# Patient Record
Sex: FEMALE | Race: WHITE | NOT HISPANIC OR LATINO | Employment: FULL TIME | URBAN - METROPOLITAN AREA
[De-identification: names, ages, dates, MRNs, and addresses within clinical notes are randomized per-mention and may not be internally consistent; named-entity substitution may affect disease eponyms.]

---

## 2019-04-04 ENCOUNTER — OFFICE VISIT (OUTPATIENT)
Dept: INTERNAL MEDICINE | Facility: CLINIC | Age: 49
End: 2019-04-04
Payer: COMMERCIAL

## 2019-04-04 VITALS
RESPIRATION RATE: 16 BRPM | HEIGHT: 67 IN | BODY MASS INDEX: 22.18 KG/M2 | OXYGEN SATURATION: 98 % | SYSTOLIC BLOOD PRESSURE: 104 MMHG | DIASTOLIC BLOOD PRESSURE: 70 MMHG | HEART RATE: 105 BPM | WEIGHT: 141.31 LBS | TEMPERATURE: 97 F

## 2019-04-04 DIAGNOSIS — Z00.00 ANNUAL PHYSICAL EXAM: ICD-10-CM

## 2019-04-04 DIAGNOSIS — M79.642 PAIN IN BOTH HANDS: ICD-10-CM

## 2019-04-04 DIAGNOSIS — M79.641 PAIN IN BOTH HANDS: ICD-10-CM

## 2019-04-04 DIAGNOSIS — Z12.31 SCREENING MAMMOGRAM, ENCOUNTER FOR: Primary | ICD-10-CM

## 2019-04-04 PROCEDURE — 3008F PR BODY MASS INDEX (BMI) DOCUMENTED: ICD-10-PCS | Mod: CPTII,S$GLB,, | Performed by: FAMILY MEDICINE

## 2019-04-04 PROCEDURE — 3008F BODY MASS INDEX DOCD: CPT | Mod: CPTII,S$GLB,, | Performed by: FAMILY MEDICINE

## 2019-04-04 PROCEDURE — 99204 OFFICE O/P NEW MOD 45 MIN: CPT | Mod: S$GLB,,, | Performed by: FAMILY MEDICINE

## 2019-04-04 PROCEDURE — 99999 PR PBB SHADOW E&M-NEW PATIENT-LVL III: CPT | Mod: PBBFAC,,, | Performed by: FAMILY MEDICINE

## 2019-04-04 PROCEDURE — 99999 PR PBB SHADOW E&M-NEW PATIENT-LVL III: ICD-10-PCS | Mod: PBBFAC,,, | Performed by: FAMILY MEDICINE

## 2019-04-04 PROCEDURE — 99204 PR OFFICE/OUTPT VISIT, NEW, LEVL IV, 45-59 MIN: ICD-10-PCS | Mod: S$GLB,,, | Performed by: FAMILY MEDICINE

## 2019-04-04 NOTE — PROGRESS NOTES
Tammy Saxena  04/04/2019  [id number]    Primary Doctor No  Patient Care Team:  Primary Doctor No as PCP - General  Has the patient seen any provider outside of the [facility] network since the last visit? (yes). If yes, HIPPA forms completed and records requested.        Visit Type:New patient, new problem, urgent visit    Chief Complaint:  Chief Complaint   Patient presents with    Hand Pain       History of Present Illness:  48 year old here as new patient,  Urgent visit c/o hand pain and swelling, both hands, started a month ago.  She was seen at [place] for this on 3/1/2019.  Unknown trauma. Xray taken.  On March 1, 2019, right hand xray was normal.    She reports when she sleeps on her side, she will have hip and arms numbness. She reports she has some pain, when she flexes for sometime. She reports that the pain does move up her arm.  She had some localized swelling.    She has had swelling in both hand.     She has never had MMG.  She reports last pap, >5 years.  She does menstruate, every couple of months.  She does have hot flashes.         History:  History reviewed. No pertinent past medical history.  Past Surgical History:   Procedure Laterality Date    Tubal Lig      TUBAL LIGATION       Family History   Problem Relation Age of Onset    Diabetes Sister     Cancer Sister     Diabetes Brother      Social History     Socioeconomic History    Marital status: Single     Spouse name: Not on file    Number of children: Not on file    Years of education: Not on file    Highest education level: Not on file   Occupational History    Occupation: [employer], supervisor   Social Needs    Financial resource strain: Not on file    Food insecurity:     Worry: Not on file     Inability: Not on file    Transportation needs:     Medical: Not on file     Non-medical: Not on file   Tobacco Use    Smoking status: Current Some Day Smoker     Packs/day: 0.50     Years: 30.00     Pack years: 15.00    Smokeless  tobacco: Never Used   Substance and Sexual Activity    Alcohol use: Never     Frequency: Never    Drug use: Never    Sexual activity: Not Currently   Lifestyle    Physical activity:     Days per week: Not on file     Minutes per session: Not on file    Stress: Not on file   Relationships    Social connections:     Talks on phone: Not on file     Gets together: Not on file     Attends Buddhism service: Not on file     Active member of club or organization: Not on file     Attends meetings of clubs or organizations: Not on file     Relationship status: Not on file   Other Topics Concern    Not on file   Social History Narrative    Not on file     There is no problem list on file for this patient.    Review of patient's allergies indicates:  No Known Allergies    The following were reviewed at this visit: active problem list, medication list, allergies, family history, social history, and health maintenance.    Medications:  No current outpatient medications on file prior to visit.     No current facility-administered medications on file prior to visit.        Medications have been reviewed and reconciled with patient at this visit.  Barriers to medications present (no)    Adverse reactions to current medications (no)    Over the counter medications reviewed (Yes ), and if needed added to active Medication list at this visit.     Exam:  Wt Readings from Last 3 Encounters:   No data found for Wt     Temp Readings from Last 3 Encounters:   No data found for Temp     BP Readings from Last 3 Encounters:   No data found for BP     Pulse Readings from Last 3 Encounters:   No data found for Pulse     There is no height or weight on file to calculate BMI.      Review of Systems   Constitutional: Negative.  Negative for chills and fever.   HENT: Negative.  Negative for congestion, sinus pain and sore throat.    Eyes: Negative for blurred vision and double vision.   Respiratory: Negative for cough, sputum production,  shortness of breath and wheezing.    Cardiovascular: Negative for chest pain, palpitations and leg swelling.   Gastrointestinal: Negative for abdominal pain, constipation, diarrhea, heartburn, nausea and vomiting.   Genitourinary: Negative.    Musculoskeletal: Positive for joint pain.   Skin: Negative.  Negative for rash.   Neurological: Negative.    Endo/Heme/Allergies: Negative.  Negative for polydipsia. Does not bruise/bleed easily.   Psychiatric/Behavioral: Negative for depression and substance abuse.     Physical Exam   Constitutional: She is oriented to person, place, and time. She appears well-developed and well-nourished. No distress.   HENT:   Head: Normocephalic and atraumatic.   Right Ear: External ear normal.   Left Ear: External ear normal.   Nose: Nose normal.   Mouth/Throat: Oropharynx is clear and moist. No oropharyngeal exudate.   Eyes: Pupils are equal, round, and reactive to light. Conjunctivae and EOM are normal. Right eye exhibits no discharge. Left eye exhibits no discharge.   Neck: Normal range of motion. Neck supple. No thyromegaly present.   Cardiovascular: Normal rate, regular rhythm, normal heart sounds and intact distal pulses.   No murmur heard.  Pulmonary/Chest: Effort normal and breath sounds normal. No respiratory distress. She has no wheezes.   Abdominal: Soft. Bowel sounds are normal. She exhibits no distension and no mass. There is no tenderness.   Musculoskeletal: Normal range of motion. She exhibits tenderness. She exhibits no edema.        Right hand: She exhibits tenderness. She exhibits normal capillary refill and no swelling.        Left hand: She exhibits tenderness. She exhibits normal capillary refill and no swelling. Normal sensation noted.   Lymphadenopathy:     She has no cervical adenopathy.   Neurological: She is alert and oriented to person, place, and time. No cranial nerve deficit.   Skin: Capillary refill takes less than 2 seconds. She is not diaphoretic.    Psychiatric: She has a normal mood and affect. Her behavior is normal. Judgment and thought content normal.   Nursing note and vitals reviewed.      Laboratory Reviewed ({N/A)  Lab Results   Component Value Date    CHOL 138 02/22/2005    TRIG 90 02/22/2005    HDL 36.0 (L) 02/22/2005    ALT 15 06/08/2005    AST 18 06/08/2005       Tammy was seen today for hand pain.    Diagnoses and all orders for this visit:    Screening mammogram, encounter for  -     Mammo Digital Screening Bilat; Future    Pain in both hands  -     ANT Screen w/Reflex; Future  -     Rheumatoid factor; Future  -     Sedimentation rate; Future  -     C-reactive protein; Future    Annual physical exam  -     CBC auto differential; Future  -     Comprehensive metabolic panel; Future  -     Lipid panel; Future      Labs ordered, r/o Autoimmune cause, may need Rheum consult.    HM reviewed, ordered  Return for pap.    Continue NSAID for now, may consider oral steriods, but complete labs first.    Recheck 4 weeks            Care Plan/Goals: Reviewed  (Yes)  Goals     None          Follow up: No follow-ups on file.    After visit summary was printed and given to patient upon discharge today.  Patient goals and care plan are included in After Visit Summary.

## 2019-04-08 ENCOUNTER — HOSPITAL ENCOUNTER (OUTPATIENT)
Dept: RADIOLOGY | Facility: HOSPITAL | Age: 49
Discharge: HOME | End: 2019-04-08
Attending: FAMILY MEDICINE
Payer: COMMERCIAL

## 2019-04-08 VITALS — HEIGHT: 67 IN | BODY MASS INDEX: 22.18 KG/M2 | WEIGHT: 141.31 LBS

## 2019-04-08 DIAGNOSIS — Z12.31 SCREENING MAMMOGRAM, ENCOUNTER FOR: ICD-10-CM

## 2019-04-08 PROCEDURE — 77067 SCR MAMMO BI INCL CAD: CPT | Mod: TC,PO

## 2019-04-08 PROCEDURE — 77067 SCR MAMMO BI INCL CAD: CPT | Mod: 26,,, | Performed by: RADIOLOGY

## 2019-04-08 PROCEDURE — 77067 MAMMO DIGITAL SCREENING BILAT WITH TOMOSYNTHESIS_CAD: ICD-10-PCS | Mod: 26,,, | Performed by: RADIOLOGY

## 2019-04-08 PROCEDURE — 77063 BREAST TOMOSYNTHESIS BI: CPT | Mod: 26,,, | Performed by: RADIOLOGY

## 2019-04-08 PROCEDURE — 77063 MAMMO DIGITAL SCREENING BILAT WITH TOMOSYNTHESIS_CAD: ICD-10-PCS | Mod: 26,,, | Performed by: RADIOLOGY

## 2019-04-09 ENCOUNTER — PATIENT MESSAGE (OUTPATIENT)
Dept: INTERNAL MEDICINE | Facility: CLINIC | Age: 49
End: 2019-04-09

## 2019-04-09 DIAGNOSIS — R76.8 RHEUMATOID FACTOR POSITIVE: Primary | ICD-10-CM

## 2019-04-15 NOTE — PROGRESS NOTES
"Subjective:       Patient ID: Tammy Saxena is a 48 y.o. female.    Chief Complaint: + RF factor    Tammy is a 47 y/o F referred by dr. Maria Teresa Aparicio for +RF and hand pain. ANT neg, normal esr/crp.  She reports increased hand/wrist and shoulder pain for the last few months.  Started in her right hand and wrist and moved to her left, then her shoulders.  She reports swelling in her wrist and mcps with tenderness. She states her episodes of swelling happen about every other week.   She has some mornings with stiffness in all her joints but does not note sig am stiffness in her hands.  Today she has no swelling in her hands or wrists, she is tender to her right shoulder, better than a few days ago-she could barely raise her arm.  She takes tylenol and aleve as needed.  Was seen in urgent care last month due to swelling and pain of her hand and was given lodine, she only took one.   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  She is a smoker, half pack per day, trying to quit.   She also reports a h/o TB exposure when she was a teen, she has positive skin tests and is monitored by chest xray yearly.  Today she rates her pain 0/10.    History reviewed. No pertinent past medical history.  Past Surgical History:   Procedure Laterality Date    Tubal Lig      TUBAL LIGATION       Family History   Problem Relation Age of Onset    Diabetes Sister     Cancer Sister     Breast cancer Sister     Diabetes Brother     Breast cancer Other      Social History     Socioeconomic History    Marital status: Single     Spouse name: Not on file    Number of children: Not on file    Years of education: Not on file    Highest education level: Not on file   Occupational History    Occupation: [employer], supervisor   Social Needs    Financial resource strain: Not on file    Food insecurity:     Worry: Not on file     Inability: Not on file    Transportation needs:     " "Medical: Not on file     Non-medical: Not on file   Tobacco Use    Smoking status: Current Some Day Smoker     Packs/day: 0.50     Years: 30.00     Pack years: 15.00    Smokeless tobacco: Never Used   Substance and Sexual Activity    Alcohol use: Never     Frequency: Never    Drug use: Never    Sexual activity: Not Currently   Lifestyle    Physical activity:     Days per week: Not on file     Minutes per session: Not on file    Stress: Not on file   Relationships    Social connections:     Talks on phone: Not on file     Gets together: Not on file     Attends Zoroastrian service: Not on file     Active member of club or organization: Not on file     Attends meetings of clubs or organizations: Not on file     Relationship status: Not on file   Other Topics Concern    Not on file   Social History Narrative    Not on file     Review of patient's allergies indicates:  No Known Allergies    Review of Systems   Constitutional: Negative for chills, fatigue, fever and unexpected weight change.   HENT: Negative for mouth sores, rhinorrhea and sore throat.    Eyes: Negative for pain and redness.   Respiratory: Negative for cough and shortness of breath.    Cardiovascular: Negative for chest pain.   Gastrointestinal: Negative for abdominal pain, constipation, diarrhea, nausea and vomiting.   Genitourinary: Negative for dysuria and hematuria.   Musculoskeletal: Positive for arthralgias and joint swelling. Negative for myalgias.   Skin: Negative for rash.   Neurological: Positive for tremors. Negative for weakness, numbness and headaches.   Psychiatric/Behavioral: The patient is not nervous/anxious.          Objective:   /71   Pulse 89   Ht 5' 7" (1.702 m)   Wt 63.5 kg (139 lb 15.9 oz)   LMP  (LMP Unknown)   BMI 21.93 kg/m²      Physical Exam   Constitutional: She is oriented to person, place, and time and well-developed, well-nourished, and in no distress.   HENT:   Head: Normocephalic and atraumatic.   Eyes: " Pupils are equal, round, and reactive to light. Right eye exhibits no discharge.   Neck: Normal range of motion.   Cardiovascular: Normal rate, regular rhythm and normal heart sounds.  Exam reveals no friction rub.    Pulmonary/Chest: Effort normal and breath sounds normal. No respiratory distress.   Abdominal: Soft. She exhibits no distension. There is no tenderness.   Lymphadenopathy:     She has no cervical adenopathy.   Neurological: She is alert and oriented to person, place, and time.   Skin: No rash noted. No erythema.     Psychiatric: Mood normal.   Musculoskeletal: Normal range of motion. She exhibits no edema or deformity.   delfino wrists, mcps, pips no synvoitis, no tenderness, no warmth, good rom  delfino elbows no swelling or tenderness,full rom  Right shoulder +ttp subacromial bursa, full rom, mild impingement sign   Left shoulder normal  delfino knees no effusion, no warmth, no tenderness, full rom  delfino ankles no effusion warmth or tenderness          Results for RENEE ANGEL (MRN [id number]) as of 4/15/2019 13:53   Ref. Range 4/8/2019 09:25   WBC Latest Ref Range: 3.90 - 12.70 K/uL 6.59   RBC Latest Ref Range: 4.00 - 5.40 M/uL 3.68 (L)   Hemoglobin Latest Ref Range: 12.0 - 16.0 g/dL 10.5 (L)   Hematocrit Latest Ref Range: 37.0 - 48.5 % 34.9 (L)   MCV Latest Ref Range: 82 - 98 fL 95   MCH Latest Ref Range: 27.0 - 31.0 pg 28.5   MCHC Latest Ref Range: 32.0 - 36.0 g/dL 30.1 (L)   RDW Latest Ref Range: 11.5 - 14.5 % 13.4   Platelets Latest Ref Range: 150 - 350 K/uL 287   MPV Latest Ref Range: 9.2 - 12.9 fL 11.1   Gran% Latest Ref Range: 38.0 - 73.0 % 53.6   Gran # (ANC) Latest Ref Range: 1.8 - 7.7 K/uL 3.5   Lymph% Latest Ref Range: 18.0 - 48.0 % 36.7   Lymph # Latest Ref Range: 1.0 - 4.8 K/uL 2.4   Mono% Latest Ref Range: 4.0 - 15.0 % 5.5   Mono # Latest Ref Range: 0.3 - 1.0 K/uL 0.4   Eosinophil% Latest Ref Range: 0.0 - 8.0 % 2.6   Eos # Latest Ref Range: 0.0 - 0.5 K/uL 0.2   Basophil% Latest Ref Range: 0.0  - 1.9 % 1.4   Baso # Latest Ref Range: 0.00 - 0.20 K/uL 0.09   nRBC Latest Ref Range: 0 /100 WBC 0   Differential Method Unknown Automated   Immature Grans (Abs) Latest Ref Range: 0.00 - 0.04 K/uL 0.01   Immature Granulocytes Latest Ref Range: 0.0 - 0.5 % 0.2   Sed Rate Latest Ref Range: 0 - 20 mm/Hr 16   Sodium Latest Ref Range: 136 - 145 mmol/L 140   Potassium Latest Ref Range: 3.5 - 5.1 mmol/L 4.0   Chloride Latest Ref Range: 95 - 110 mmol/L 108   CO2 Latest Ref Range: 23 - 29 mmol/L 27   Anion Gap Latest Ref Range: 8 - 16 mmol/L 5 (L)   BUN, Bld Latest Ref Range: 6 - 20 mg/dL 8   Creatinine Latest Ref Range: 0.5 - 1.4 mg/dL 0.8   eGFR if non African American Latest Ref Range: >60 mL/min/1.73 m^2 >60.0   eGFR if African American Latest Ref Range: >60 mL/min/1.73 m^2 >60.0   Glucose Latest Ref Range: 70 - 110 mg/dL 71   Calcium Latest Ref Range: 8.7 - 10.5 mg/dL 8.8   Alkaline Phosphatase Latest Ref Range: 55 - 135 U/L 83   Total Protein Latest Ref Range: 6.0 - 8.4 g/dL 7.1   Albumin Latest Ref Range: 3.5 - 5.2 g/dL 3.5   Total Bilirubin Latest Ref Range: 0.1 - 1.0 mg/dL 0.3   AST Latest Ref Range: 10 - 40 U/L 11   ALT Latest Ref Range: 10 - 44 U/L 9 (L)   CRP Latest Ref Range: 0.0 - 8.2 mg/L 2.0   Triglycerides Latest Ref Range: 30 - 150 mg/dL 82   Cholesterol Latest Ref Range: 120 - 199 mg/dL 134   HDL Latest Ref Range: 40 - 75 mg/dL 45   HDL/Chol Ratio Latest Ref Range: 20.0 - 50.0 % 33.6   LDL Cholesterol Latest Ref Range: 63.0 - 159.0 mg/dL 72.6   Non-HDL Cholesterol Latest Units: mg/dL 89   Total Cholesterol/HDL Ratio Latest Ref Range: 2.0 - 5.0  3.0   ANT Screen Latest Ref Range: Negative <1:160  Negative <1:160   Rheumatoid Factor Latest Ref Range: 0.0 - 15.0 IU/mL 18.0 (H)     Assessment:       1. Rheumatoid arthritis involving multiple sites with positive rheumatoid factor    2. Acute bursitis of right shoulder    3. Current smoker    4. 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+ years ago, monitored with CXR, no h/o infection    Current smoker: trying to quit .5 ppd    Plan:       Check ccp, g6pd, quant gold, hep studies, g6pd today   Xray hands and shoulders  She declined shoulder injection today   Given her frequent episodes of joint swelling, c/o pain and +RF will start tx for RA with plaquenil, no acute synovitis on exam -will wait on mtx -take pics of any joint swelling, if continues to flare will then add mtx   Send in medrol dose pack to have on hand for flares  Discussed plaquenil and SE in detail, drug info printed and given to patient, she knows to est plaquenil toxicity screens with her eye md  Encouraged smoking cessation      See back in 10 wks to recheck w reg 4 labs   She is on the patient portal, message me with any quest/concerns

## 2019-04-16 ENCOUNTER — PATIENT MESSAGE (OUTPATIENT)
Dept: RHEUMATOLOGY | Facility: CLINIC | Age: 49
End: 2019-04-16

## 2019-04-16 ENCOUNTER — HOSPITAL ENCOUNTER (OUTPATIENT)
Dept: RADIOLOGY | Facility: HOSPITAL | Age: 49
Discharge: HOME | End: 2019-04-16
Attending: PHYSICIAN ASSISTANT
Payer: COMMERCIAL

## 2019-04-16 ENCOUNTER — OFFICE VISIT (OUTPATIENT)
Dept: RHEUMATOLOGY | Facility: CLINIC | Age: 49
End: 2019-04-16
Payer: COMMERCIAL

## 2019-04-16 VITALS
HEART RATE: 89 BPM | DIASTOLIC BLOOD PRESSURE: 71 MMHG | WEIGHT: 140 LBS | SYSTOLIC BLOOD PRESSURE: 110 MMHG | BODY MASS INDEX: 21.97 KG/M2 | HEIGHT: 67 IN

## 2019-04-16 DIAGNOSIS — M05.79 RHEUMATOID ARTHRITIS INVOLVING MULTIPLE SITES WITH POSITIVE RHEUMATOID FACTOR: ICD-10-CM

## 2019-04-16 DIAGNOSIS — Z79.899 HIGH RISK MEDICATION USE: ICD-10-CM

## 2019-04-16 DIAGNOSIS — M75.51 ACUTE BURSITIS OF RIGHT SHOULDER: ICD-10-CM

## 2019-04-16 DIAGNOSIS — F17.200 CURRENT SMOKER: ICD-10-CM

## 2019-04-16 DIAGNOSIS — M05.79 RHEUMATOID ARTHRITIS INVOLVING MULTIPLE SITES WITH POSITIVE RHEUMATOID FACTOR: Primary | ICD-10-CM

## 2019-04-16 PROCEDURE — 73130 XR HAND COMPLETE 3 VIEWS BILATERAL: ICD-10-PCS | Mod: 26,,, | Performed by: RADIOLOGY

## 2019-04-16 PROCEDURE — 99999 PR PBB SHADOW E&M-EST. PATIENT-LVL IV: ICD-10-PCS | Mod: PBBFAC,,, | Performed by: PHYSICIAN ASSISTANT

## 2019-04-16 PROCEDURE — 3008F BODY MASS INDEX DOCD: CPT | Mod: CPTII,S$GLB,, | Performed by: PHYSICIAN ASSISTANT

## 2019-04-16 PROCEDURE — 73030 X-RAY EXAM OF SHOULDER: CPT | Mod: 26,,, | Performed by: RADIOLOGY

## 2019-04-16 PROCEDURE — 99204 OFFICE O/P NEW MOD 45 MIN: CPT | Mod: S$GLB,,, | Performed by: PHYSICIAN ASSISTANT

## 2019-04-16 PROCEDURE — 73030 X-RAY EXAM OF SHOULDER: CPT | Mod: TC,50

## 2019-04-16 PROCEDURE — 73130 X-RAY EXAM OF HAND: CPT | Mod: 26,,, | Performed by: RADIOLOGY

## 2019-04-16 PROCEDURE — 3008F PR BODY MASS INDEX (BMI) DOCUMENTED: ICD-10-PCS | Mod: CPTII,S$GLB,, | Performed by: PHYSICIAN ASSISTANT

## 2019-04-16 PROCEDURE — 73130 X-RAY EXAM OF HAND: CPT | Mod: 50,TC

## 2019-04-16 PROCEDURE — 99204 PR OFFICE/OUTPT VISIT, NEW, LEVL IV, 45-59 MIN: ICD-10-PCS | Mod: S$GLB,,, | Performed by: PHYSICIAN ASSISTANT

## 2019-04-16 PROCEDURE — 73030 XR SHOULDER COMPLETE 2 OR MORE VIEWS BILATERAL: ICD-10-PCS | Mod: 26,,, | Performed by: RADIOLOGY

## 2019-04-16 PROCEDURE — 99999 PR PBB SHADOW E&M-EST. PATIENT-LVL IV: CPT | Mod: PBBFAC,,, | Performed by: PHYSICIAN ASSISTANT

## 2019-04-16 RX ORDER — HYDROXYCHLOROQUINE SULFATE 200 MG/1
200 TABLET, FILM COATED ORAL DAILY
Qty: 60 TABLET | Refills: 3 | Status: SHIPPED | OUTPATIENT
Start: 2019-04-16 | End: 2019-04-17 | Stop reason: SDUPTHER

## 2019-04-16 RX ORDER — METHYLPREDNISOLONE 4 MG/1
TABLET ORAL
Qty: 1 PACKAGE | Refills: 5 | Status: SHIPPED | OUTPATIENT
Start: 2019-04-16 | End: 2019-05-07

## 2019-04-16 NOTE — PATIENT INSTRUCTIONS
Start plaquenil 200mg twice a day, let eye doctor know yearly plaquenil toxicity screens     Medrol dose pack to start at sign of flare     xrays today hands shoulders     Aleve for shoulder shot if needed      Hydroxychloroquine tablets  What is this medicine?  HYDROXYCHLOROQUINE (amanuel drox ee KLOR oh kwin) is used to treat rheumatoid arthritis and systemic lupus erythematosus. It is also used to treat malaria.  How should I use this medicine?  Take this medicine by mouth with a glass of water. Follow the directions on the prescription label. If this medicine upsets your stomach take it with food or milk. Take your doses at regular intervals. Do not take your medicine more often than directed.  Talk to your pediatrician regarding the use of this medicine in children. Special care may be needed.  What side effects may I notice from receiving this medicine?  Side effects that you should report to your doctor or health care professional as soon as possible:  · allergic reactions like skin rash, itching or hives, swelling of the face, lips, or tongue  · change in vision  · fever, infection  · hearing loss or ringing  · muscle weakness, tremor, or numbness  · redness, blistering, peeling or loosening of the skin, including inside the mouth  · seizures  · unusual bleeding or bruising  · unusually weak or tired  Side effects that usually do not require medical attention (report to your doctor or health care professional if they continue or are bothersome):  · change in coloration of the mouth or skin  · dizziness  · hair loss, lightening  · headache  · irritability, nervousness, nightmares  · loss of appetite  · stomach upset, diarrhea  What may interact with this medicine?  · antacids  · botulinum toxins  · digoxin  · kaolin  · penicillamine  What if I miss a dose?  If you miss a dose, take it as soon as you can. If it is almost time for your next dose, take only that dose. Do not take double or extra doses.  Where should  I keep my medicine?  Keep out of the reach of children. In children, this medicine can cause overdose with small doses.  Store at room temperature between 15 and 30 degrees C (59 and 86 degrees F). Protect from moisture and light. Throw away any unused medicine after the expiration date.  What should I tell my health care provider before I take this medicine?  They need to know if you have any of these conditions:  · alcoholism  · anemia or other blood disorder  · eye disease  · glucose 6-phosphate dehydrogenase (G6PD) deficiency  · liver disease  · porphyria  · psoriasis  · an unusual or allergic reaction to chloroquine, hydroxychloroquine, other medicines, foods, dyes, or preservatives  · pregnant or trying to get pregnant  · breast-feeding  What should I watch for while using this medicine?  Visit your doctor or health care professional for regular check ups. Tell your doctor if your symptoms do not improve. Arthritis symptoms may take several weeks to improve. If you are taking this medicine for a long time, you will need important blood work done. You will also need to have your eyes checked as directed.  This medicine can make you more sensitive to the sun. Keep out of the sun. If you cannot avoid being in the sun, wear protective clothing and use sunscreen. Do not use sun lamps or tanning beds/booths.  Avoid antacids and kaolin containing products for 2 hours before and after taking a dose of this medicine.  NOTE:This sheet is a summary. It may not cover all possible information. If you have questions about this medicine, talk to your doctor, pharmacist, or health care provider. Copyright© 2017 [employer]      Rheumatoid Arthritis    People have long feared rheumatoid arthritis (commonly called RA) as one of the most disabling types of arthritis. The good news is that the outlook has greatly improved for many people with newly diagnosed (detected) RA.   Of course, RA remains a serious disease, and one that  can vary widely in symptoms (what you feel) and outcomes. Even so, treatment advances have made it possible to stop or at least slow the progression (worsening) of joint damage. Rheumatologists now have many new treatments that target the inflammation that RA causes. They also understand better when and how to use treatments to get the best effects.  Fast facts  RA is an autoimmune disease. A faulty immune system (the bodys defense system) triggers it.   RA is the most common type of autoimmune arthritis.   At least 1.3 million [place] adults have RA.   Treatments have improved greatly and help many of those affected.   Rheumatologists are doctors who have the expertise to correctly diagnose this disease and to offer patients the most advanced treatments.  What is rheumatoid arthritis?  RA is a chronic (long-term) disease that causes pain, stiffness, swelling and limited motion and function of many joints. While RA can affect any joint, the small joints in the hands and feet tend to be involved most often. Inflammation sometimes can affect organs as well, for instance, the eyes or lungs.  The stiffness seen in active RA is most often worst in the morning. It may last one to two hours (or even the whole day). Stiffness for a long time in the morning is a clue that you may have RA, since few other arthritic diseases behave this way. For instance, osteoarthritis most often does not cause prolonged morning stiffness.  Other signs and symptoms that can occur in RA include:  Loss of energy   Low fevers   Loss of appetite   Dry eyes and mouth from a related health problem, Sjogren's syndrome   Firm lumps, called rheumatoid nodules, which grow beneath the skin in places such as the elbow and hands  The normal joint structure appears on the left. On the right is the joint with rheumatoid arthritis. RA causes synovitis, pain and swelling of the synovium (the tissue that lines the joint). This can make cartilage (the tissue that  cushions between joints) and bone erode, or wear away.  What causes rheumatoid arthritis?  RA is an autoimmune disease. This means that certain cells of the immune system do not work properly and start attacking healthy tissues -- the joints in RA. The cause of RA is not known. Yet, new research is giving us a better idea of what makes the immune system attack the body and create inflammation. In RA, the focus of the inflammation is in the synovium, the tissue that lines the joint. Immune cells release inflammation-causing chemicals. These chemicals can damage cartilage (the tissue that cushions between joints) and bone.  Other things likely play a role in RA as well. For instance, genes that affect the immune system may make some people more prone to getting RA.  Who gets rheumatoid arthritis?  RA is the most common form of autoimmune arthritis, affecting more than 1.3 million Americans. Of these, about 75% are women. In fact, 1-3% of women may get rheumatoid arthritis in their lifetime. The disease most often begins between the fourth and sixth decades of life. However, RA can start at any age.  How is rheumatoid arthritis diagnosed?  RA can be hard to detect because it may begin with subtle symptoms, such as achy joints or a little stiffness in the morning. Also, many diseases behave like RA early on. For this reason, if you or your primary care physician thinks you have RA, you should see a rheumatologist. A rheumatologist is a physician with the skill and knowledge to reach a correct diagnosis of RA and to make the most suitable treatment plan.  Diagnosis of RA depends on the symptoms and results of a physical exam, such as warmth, swelling and pain in the joints. Some blood tests also can help confirm RA. Telltale signs include:  Anemia (a low red blood cell count)   Rheumatoid factor (an antibody, or blood protein, found in about 80% of patients with RA in time, but in as few as 30% at the start of arthritis)    Antibodies to cyclic citrullinated peptides (pieces of proteins), or anti-CCP for short (found in 60-70% of patients with RA)   Elevated erythrocyte sedimentation rate (a blood test that, in most patients with RA, confirms the amount of inflammation in the joints)  X-rays can help in detecting RA, but may not show anything abnormal in early arthritis. Even so, these first X-rays may be useful later to show if the disease is progressing. Often, MRI and ultrasound scanning are done to help  the severity of RA.   There is no single test that confirms an RA diagnosis for most patients with this disease. (This is above all true for patients who have had symptoms fewer than six months.) Rather, a doctor makes the diagnosis by looking at the symptoms and results from the physical exam, lab tests and X-rays.  How is rheumatoid arthritis treated?  Therapy for RA has improved greatly in the past 30 years. Current treatments give most patients good or excellent relief of symptoms and let them keep functioning at, or near, normal levels. With the right medications, many patients can achieve remission -- that is, have no signs of active disease.   There is no cure for RA. The goal of treatment is to lessen your symptoms and poor function. Doctors do this by starting proper medical therapy as soon as possible, before your joints have lasting damage. No single treatment works for all patients. Many people with RA must change their treatment at least once during their lifetime.  Good control of RA requires early diagnosis and, at times, aggressive treatment. Thus, patients with a diagnosis of RA should begin their treatment with disease-modifying antirheumatic drugs -- referred to as DMARDs. These drugs not only relieve symptoms but also slow progression of the disease. Often, doctors prescribe DMARDs along with nonsteroidal anti-inflammatory drugs or NSAIDs and/or low-dose corticosteroids, to lower swelling, pain and  fever. DMARDs have greatly improved the symptoms, function and quality of life for nearly all patients with RA. Ask your rheumatologist about the need for DMARD therapy and the risks and benefits of these drugs.  Common DMARDs include methotrexate (brand names include Rheumatrex® and Folex®), leflunomide (Arava), hydroxychloroquine (Plaquenil) and sulfasalazine (Azulfidine). Older DMARDs include gold, given as a pill -- auranofin (Ridaura) -- or more often as an injection into a muscle (such as Myochrysine). The antibiotic minocycline (e.g., Minocin, Dynacin and Vectrin) also is a DMARD, as are the immune suppressants azathioprine (Imuran) and cyclosporine (Sandimmune and Neoral). These three drugs and gold are rarely prescribed for RA these days because other drugs work better or have fewer side effects.   Patients with more serious disease may need medications called biologic response modifiers or biologic agents. They can target the parts of the immune system and the signals that lead to inflammation and joint and tissue damage. These medications are also DMARDs. FDA-approved drugs of this type include abatacept (Orencia), adalimumab (Humira), anakinra (Kineret), certolizumab (Cimzia), etanercept (Enbrel), golimumab (Simponi) infliximab (Remicade), rituximab (Rituxan) and tocilizumab (Actemra). Most often, patients take these drugs with methotrexate, as the mix of medicines is more helpful.  The best treatment of RA needs more than medicines alone. Patient education, such as how to cope with RA, also is important. Proper care requires the expertise of a team of providers, including rheumatologists, primary care physicians, and physical and occupational therapists. You will need frequent visits through the year with your rheumatologist. These checkups let your doctor track the course of your disease and check for any side effects of your medications. You likely also will need to repeat blood tests and X-rays or  ultrasounds from time to time.  What is the broader health impact of rheumatoid arthritis?   Research shows that people with RA, mainly those whose disease is not well controlled, have a higher risk for heart disease and stroke. Talk with your doctor about these risks and ways to lower them.  Living with rheumatoid arthritis   It is important to be physically active most of the time, but to sometimes scale back activities when the disease flares. In general, rest is helpful when a joint is inflamed, or when you feel tired. At these times, do gentle range-of-motion exercises, such as stretching. This will keep the joint flexible.   When you feel better, do low-impact aerobic exercises, such as walking, and exercises to boost muscle strength. This will improve your overall health and reduce pressure on your joints. A physical or occupational therapist can help you find which types of activities are best for you, and at what level or pace you should do them.  Finding that you have a chronic illness is a life-changing event. It can cause worry and sometimes feelings of isolation or depression. Thanks to greatly improved treatments, these feelings tend to decrease with time as energy improves, and pain and stiffness decrease. Discuss these normal feelings with your health care providers. They can provide helpful information and resources.  Rheumatoid arthritis affects the wrist and the small joints of the hand, including the knuckles and the middle joints of the fingers.  Points to remember  Newer treatments are effective. RA drugs have greatly improved outcomes for patients. For most people with RA, early treatment can control joint pain and swelling, and lessen joint damage.   Seek an expert in arthritis: a rheumatologist. Expertise is vital to make an early diagnosis of RA and to rule out diseases that mimic RA, thus avoiding unneeded tests and treatments. A doctor who is an expert in RA also can design a customized  treatment plan that is best suited for you. Therefore, the rheumatologist, working with the primary care physician and other health care providers, should supervise the treatment of the patient with RA.   Start treatment early. Studies show that people who receive early treatment for RA feel better sooner and more often, and are more likely to lead an active life. They also are less likely to have the type of joint damage that leads to joint replacement.  The rheumatologist's role in the treatment of rheumatoid arthritis   RA is a complex disease, but many advances in treatment have occurred recently. Rheumatologists are doctors who are experts in diagnosing and treating arthritis and other diseases of the joints, muscles and bones. Thus, they are best qualified to make a proper diagnosis of RA. They can also advise patients about the best treatment options.  To find a rheumatologist  For a list of rheumatologists in your area, click here.  Learn more about rheumatologists and rheumatology health professionals.  For more information  The American College of Rheumatology has compiled this list to give you a starting point for your own additional research. The ACR does not endorse or maintain these web sites, and is notresponsible for any information or claims provided on them. It is always best to talk with your rheumatologist for more information and before making any decisions about your care.  Rheumatology Research Foundation  Learn how the Rheumatology Research Foundation advances research and training to improve the health of people with rheumatic diseases.  www.rheumatology.org/REF  The Arthritis Foundation  www.arthritis.org   National [place] of Arthritis and Musculoskeletal and Skin Diseases Information Clearinghouse  www.niams.nih.gov

## 2019-04-16 NOTE — LETTER
April 16, 2019      Maria Teresa Aparicio MD  [address] [id number]           [facility]  [address] [address] [address]  Phone: [phone]  Fax: [phone]          Patient: Tammy Saxena   MR Number: [id number]   YOB: 1970   Date of Visit: 4/16/2019       Dear Dr. Maria Teresa Aparicio:    Thank you for referring Tammy Saxena to me for evaluation. Attached you will find relevant portions of my assessment and plan of care.    If you have questions, please do not hesitate to call me. I look forward to following Tammy Saxena along with you.    Sincerely,    Ashley Olivas PA-C    Enclosure  CC:  No Recipients    If you would like to receive this communication electronically, please contact [email or web address] or [phone] to request more information on [employer] access.    For providers and/or their staff who would like to refer a patient to [facility], please contact us through our one-stop-shop provider referral line, [facility], at [phone].    If you feel you have received this communication in error or would no longer like to receive these types of communications, please e-mail [email or web address]

## 2019-04-17 RX ORDER — HYDROXYCHLOROQUINE SULFATE 200 MG/1
200 TABLET, FILM COATED ORAL 2 TIMES DAILY
Qty: 60 TABLET | Refills: 3 | Status: SHIPPED | OUTPATIENT
Start: 2019-04-17 | End: 2019-04-26

## 2019-04-22 ENCOUNTER — LAB VISIT (OUTPATIENT)
Dept: LAB | Facility: HOSPITAL | Age: 49
End: 2019-04-22
Attending: FAMILY MEDICINE
Payer: COMMERCIAL

## 2019-04-22 ENCOUNTER — PATIENT MESSAGE (OUTPATIENT)
Dept: RHEUMATOLOGY | Facility: CLINIC | Age: 49
End: 2019-04-22

## 2019-04-22 DIAGNOSIS — M79.10 MUSCLE PAIN: ICD-10-CM

## 2019-04-22 DIAGNOSIS — M05.79 RHEUMATOID ARTHRITIS INVOLVING MULTIPLE SITES WITH POSITIVE RHEUMATOID FACTOR: ICD-10-CM

## 2019-04-22 LAB
CK SERPL-CCNC: 79 U/L (ref 20–180)
CRP SERPL-MCNC: 6.7 MG/L (ref 0–8.2)
ERYTHROCYTE [SEDIMENTATION RATE] IN BLOOD BY WESTERGREN METHOD: 16 MM/HR (ref 0–20)

## 2019-04-22 PROCEDURE — 86140 C-REACTIVE PROTEIN: CPT

## 2019-04-22 PROCEDURE — 82550 ASSAY OF CK (CPK): CPT

## 2019-04-22 PROCEDURE — 85651 RBC SED RATE NONAUTOMATED: CPT | Mod: PO

## 2019-04-22 PROCEDURE — 36415 COLL VENOUS BLD VENIPUNCTURE: CPT | Mod: PO

## 2019-04-25 ENCOUNTER — PATIENT MESSAGE (OUTPATIENT)
Dept: RHEUMATOLOGY | Facility: CLINIC | Age: 49
End: 2019-04-25

## 2019-04-26 ENCOUNTER — PATIENT MESSAGE (OUTPATIENT)
Dept: RHEUMATOLOGY | Facility: CLINIC | Age: 49
End: 2019-04-26

## 2019-04-26 ENCOUNTER — TELEPHONE (OUTPATIENT)
Dept: RHEUMATOLOGY | Facility: CLINIC | Age: 49
End: 2019-04-26

## 2019-04-26 RX ORDER — METHOTREXATE 2.5 MG/1
TABLET ORAL
Qty: 30 TABLET | Refills: 5 | Status: SHIPPED | OUTPATIENT
Start: 2019-04-26 | End: 2020-04-15 | Stop reason: SDUPTHER

## 2019-04-26 RX ORDER — FOLIC ACID 1 MG/1
1 TABLET ORAL DAILY
Qty: 90 TABLET | Refills: 1 | Status: SHIPPED | OUTPATIENT
Start: 2019-04-26 | End: 2020-04-15 | Stop reason: SDUPTHER

## 2019-04-26 NOTE — TELEPHONE ENCOUNTER
----- Message from Ashley Olivas PA-C sent at 4/26/2019 11:23 AM CDT -----  Reg 4 labs in 4 wks.  Her phone number changed, can you change it in epic?   [phone]

## 2019-05-15 ENCOUNTER — OFFICE VISIT (OUTPATIENT)
Dept: INTERNAL MEDICINE | Facility: CLINIC | Age: 49
End: 2019-05-15
Payer: COMMERCIAL

## 2019-05-15 VITALS
BODY MASS INDEX: 22.07 KG/M2 | DIASTOLIC BLOOD PRESSURE: 72 MMHG | HEIGHT: 67 IN | HEART RATE: 95 BPM | WEIGHT: 140.63 LBS | TEMPERATURE: 98 F | SYSTOLIC BLOOD PRESSURE: 104 MMHG | OXYGEN SATURATION: 96 %

## 2019-05-15 DIAGNOSIS — Z01.419 WELL WOMAN EXAM WITH ROUTINE GYNECOLOGICAL EXAM: Primary | ICD-10-CM

## 2019-05-15 DIAGNOSIS — Z79.899 HIGH RISK MEDICATION USE: ICD-10-CM

## 2019-05-15 DIAGNOSIS — M05.79 RHEUMATOID ARTHRITIS INVOLVING MULTIPLE SITES WITH POSITIVE RHEUMATOID FACTOR: ICD-10-CM

## 2019-05-15 PROCEDURE — 99396 PR PREVENTIVE VISIT,EST,40-64: ICD-10-PCS | Mod: S$GLB,,, | Performed by: FAMILY MEDICINE

## 2019-05-15 PROCEDURE — 88175 CYTOPATH C/V AUTO FLUID REDO: CPT

## 2019-05-15 PROCEDURE — 99999 PR PBB SHADOW E&M-EST. PATIENT-LVL III: CPT | Mod: PBBFAC,,, | Performed by: FAMILY MEDICINE

## 2019-05-15 PROCEDURE — 87624 HPV HI-RISK TYP POOLED RSLT: CPT

## 2019-05-15 PROCEDURE — 99396 PREV VISIT EST AGE 40-64: CPT | Mod: S$GLB,,, | Performed by: FAMILY MEDICINE

## 2019-05-15 PROCEDURE — 99999 PR PBB SHADOW E&M-EST. PATIENT-LVL III: ICD-10-PCS | Mod: PBBFAC,,, | Performed by: FAMILY MEDICINE

## 2019-05-15 RX ORDER — METHYLPREDNISOLONE 4 MG/1
TABLET ORAL
COMMUNITY
Start: 2019-05-14 | End: 2022-07-12

## 2019-05-15 NOTE — PROGRESS NOTES
Tammy Saxena  05/15/2019  [id number]    Maria Teresa Aparicio MD  Patient Care Team:  Maria Teresa Aparicio MD as PCP - General (Family Medicine)  Has the patient seen any provider outside of the [facility] network since the last visit? (no). If yes, HIPPA forms completed and records requested.        Visit Type:a scheduled routine follow-up visit    Chief Complaint:  Chief Complaint   Patient presents with    Follow-up     4 wk f/u    Well Woman     pap smear       History of Present Illness:  Patient here for pap.    Since last visit, labs +RA  She saw Rheum. On methotrexate.    Pap today  Never had abnl pap  Last pap >5 years  She has sister with breast cancer at 51.        History:  History reviewed. No pertinent past medical history.  Past Surgical History:   Procedure Laterality Date    Tubal Lig      TUBAL LIGATION       Family History   Problem Relation Age of Onset    Diabetes Sister     Cancer Sister     Breast cancer Sister     Diabetes Brother     Breast cancer Other      Social History     Socioeconomic History    Marital status: Single     Spouse name: Not on file    Number of children: Not on file    Years of education: Not on file    Highest education level: Not on file   Occupational History    Occupation: [place], supervisor   Social Needs    Financial resource strain: Not very hard    Food insecurity:     Worry: Never true     Inability: Never true    Transportation needs:     Medical: No     Non-medical: No   Tobacco Use    Smoking status: Current Some Day Smoker     Packs/day: 0.50     Years: 30.00     Pack years: 15.00    Smokeless tobacco: Never Used   Substance and Sexual Activity    Alcohol use: Never     Frequency: Never     Drinks per session: Patient refused     Binge frequency: Never    Drug use: Never    Sexual activity: Not Currently   Lifestyle    Physical activity:     Days per week: 0 days     Minutes per session: 0 min    Stress: To some extent   Relationships     Social connections:     Talks on phone: Never     Gets together: Once a week     Attends Sikhism service: Not on file     Active member of club or organization: No     Attends meetings of clubs or organizations: Never     Relationship status:    Other Topics Concern    Not on file   Social History Narrative    Not on file     Patient Active Problem List   Diagnosis    Rheumatoid arthritis involving multiple sites with positive rheumatoid factor    Current smoker    High risk medication use    Muscle pain     Review of patient's allergies indicates:  No Known Allergies    The following were reviewed at this visit: active problem list, medication list, allergies, family history, social history, and health maintenance.    Medications:  Current Outpatient Medications on File Prior to Visit   Medication Sig Dispense Refill    folic acid (FOLVITE) 1 MG tablet Take 1 tablet (1 mg total) by mouth once daily. 90 tablet 1    methotrexate 2.5 MG Tab 4 tablets once a week x 2 weeks (10mg), then increase to 6 tablets once a week  (15mg) 30 tablet 5    methylPREDNISolone (MEDROL DOSEPACK) 4 mg tablet        No current facility-administered medications on file prior to visit.        Medications have been reviewed and reconciled with patient at this visit.  Barriers to medications present (no)    Adverse reactions to current medications (no)    Over the counter medications reviewed (Yes ), and if needed added to active Medication list at this visit.     Exam:  Wt Readings from Last 3 Encounters:   05/15/19 63.8 kg (140 lb 10.5 oz)   04/16/19 63.5 kg (139 lb 15.9 oz)   04/08/19 64.1 kg (141 lb 5 oz)     Temp Readings from Last 3 Encounters:   05/15/19 97.7 °F (36.5 °C) (Tympanic)   04/04/19 97.3 °F (36.3 °C) (Tympanic)     BP Readings from Last 3 Encounters:   05/15/19 104/72   04/16/19 110/71   04/04/19 104/70     Pulse Readings from Last 3 Encounters:   05/15/19 95   04/16/19 89   04/04/19 105     Body mass  index is 22.03 kg/m².      Review of Systems   HENT: Negative for hearing loss.    Eyes: Negative for discharge.   Respiratory: Negative for wheezing.    Cardiovascular: Negative for chest pain and palpitations.   Gastrointestinal: Negative for blood in stool, constipation, diarrhea and vomiting.   Genitourinary: Negative for dysuria and hematuria.   Musculoskeletal: Negative for neck pain.   Neurological: Negative for weakness and headaches.   Endo/Heme/Allergies: Negative for polydipsia.     Physical Exam   Constitutional: She is oriented to person, place, and time. She appears well-developed and well-nourished. No distress.   HENT:   Head: Normocephalic and atraumatic.   Right Ear: External ear normal.   Left Ear: External ear normal.   Nose: Nose normal.   Mouth/Throat: Oropharynx is clear and moist. No oropharyngeal exudate.   Eyes: Pupils are equal, round, and reactive to light. Conjunctivae and EOM are normal. Right eye exhibits no discharge. Left eye exhibits no discharge.   Neck: Normal range of motion. Neck supple. No thyromegaly present.   Cardiovascular: Normal rate, regular rhythm, normal heart sounds and intact distal pulses.   No murmur heard.  Pulmonary/Chest: Effort normal and breath sounds normal. No respiratory distress. She has no wheezes.   Abdominal: Soft. Bowel sounds are normal. She exhibits no distension and no mass. There is no tenderness.   Genitourinary: Uterus normal. Cervix exhibits no motion tenderness. Right adnexum displays no mass, no tenderness and no fullness. Left adnexum displays no mass, no tenderness and no fullness. Vaginal discharge found.   Musculoskeletal: Normal range of motion. She exhibits no edema.   Lymphadenopathy:     She has no cervical adenopathy.   Neurological: She is alert and oriented to person, place, and time. No cranial nerve deficit.   Skin: Capillary refill takes less than 2 seconds. She is not diaphoretic.   Psychiatric: She has a normal mood and affect.  Her behavior is normal. Judgment and thought content normal.   Nursing note and vitals reviewed.      Laboratory Reviewed ({Yes)  Lab Results   Component Value Date    WBC 6.59 04/08/2019    HGB 10.5 (L) 04/08/2019    HCT 34.9 (L) 04/08/2019     04/08/2019    CHOL 134 04/08/2019    TRIG 82 04/08/2019    HDL 45 04/08/2019    ALT 9 (L) 04/08/2019    AST 11 04/08/2019     04/08/2019    K 4.0 04/08/2019     04/08/2019    CREATININE 0.8 04/08/2019    BUN 8 04/08/2019    CO2 27 04/08/2019       Tammy was seen today for follow-up and well woman.    Diagnoses and all orders for this visit:    Well woman exam with routine gynecological exam  -     Liquid-based pap smear, screening  -     HPV High Risk Genotypes, PCR    High risk medication use    Rheumatoid arthritis involving multiple sites with positive rheumatoid factor      Follow up with Rheum for labs.             Care Plan/Goals: Reviewed  (Yes)  Goals     None          Follow up: Follow up in about 6 months (around 11/15/2019).    After visit summary was printed and given to patient upon discharge today.  Patient goals and care plan are included in After Visit Summary.    Answers for HPI/ROS submitted by the patient on 5/13/2019   activity change: No  unexpected weight change: No  rhinorrhea: No  trouble swallowing: No  visual disturbance: No  chest tightness: No  polyuria: No  difficulty urinating: No  menstrual problem: No  joint swelling: No  arthralgias: No  confusion: No  dysphoric mood: No

## 2019-05-21 LAB
HPV HR 12 DNA CVX QL NAA+PROBE: NEGATIVE
HPV16 AG SPEC QL: NEGATIVE
HPV18 DNA SPEC QL NAA+PROBE: NEGATIVE

## 2019-05-21 RX ORDER — METRONIDAZOLE 500 MG/1
500 TABLET ORAL EVERY 12 HOURS
Qty: 14 TABLET | Refills: 0 | Status: SHIPPED | OUTPATIENT
Start: 2019-05-21 | End: 2019-05-28

## 2019-05-22 ENCOUNTER — PATIENT MESSAGE (OUTPATIENT)
Dept: INTERNAL MEDICINE | Facility: CLINIC | Age: 49
End: 2019-05-22

## 2019-05-22 ENCOUNTER — LAB VISIT (OUTPATIENT)
Dept: LAB | Facility: HOSPITAL | Age: 49
End: 2019-05-22
Attending: PHYSICIAN ASSISTANT
Payer: COMMERCIAL

## 2019-05-22 DIAGNOSIS — M05.79 RHEUMATOID ARTHRITIS INVOLVING MULTIPLE SITES WITH POSITIVE RHEUMATOID FACTOR: ICD-10-CM

## 2019-05-22 LAB
ALBUMIN SERPL BCP-MCNC: 3.8 G/DL (ref 3.5–5.2)
ALP SERPL-CCNC: 80 U/L (ref 55–135)
ALT SERPL W/O P-5'-P-CCNC: 31 U/L (ref 10–44)
ANION GAP SERPL CALC-SCNC: 5 MMOL/L (ref 8–16)
AST SERPL-CCNC: 26 U/L (ref 10–40)
BASOPHILS # BLD AUTO: 0.06 K/UL (ref 0–0.2)
BASOPHILS NFR BLD: 0.9 % (ref 0–1.9)
BILIRUB SERPL-MCNC: 0.3 MG/DL (ref 0.1–1)
BUN SERPL-MCNC: 10 MG/DL (ref 6–20)
CALCIUM SERPL-MCNC: 9.6 MG/DL (ref 8.7–10.5)
CHLORIDE SERPL-SCNC: 106 MMOL/L (ref 95–110)
CO2 SERPL-SCNC: 26 MMOL/L (ref 23–29)
CREAT SERPL-MCNC: 0.8 MG/DL (ref 0.5–1.4)
CRP SERPL-MCNC: 2.6 MG/L (ref 0–8.2)
DIFFERENTIAL METHOD: ABNORMAL
EOSINOPHIL # BLD AUTO: 0.1 K/UL (ref 0–0.5)
EOSINOPHIL NFR BLD: 2.1 % (ref 0–8)
ERYTHROCYTE [DISTWIDTH] IN BLOOD BY AUTOMATED COUNT: 16.3 % (ref 11.5–14.5)
ERYTHROCYTE [SEDIMENTATION RATE] IN BLOOD BY WESTERGREN METHOD: 12 MM/HR (ref 0–20)
EST. GFR  (AFRICAN AMERICAN): >60 ML/MIN/1.73 M^2
EST. GFR  (NON AFRICAN AMERICAN): >60 ML/MIN/1.73 M^2
GLUCOSE SERPL-MCNC: 86 MG/DL (ref 70–110)
HCT VFR BLD AUTO: 36.6 % (ref 37–48.5)
HGB BLD-MCNC: 11.3 G/DL (ref 12–16)
LYMPHOCYTES # BLD AUTO: 2.2 K/UL (ref 1–4.8)
LYMPHOCYTES NFR BLD: 33.2 % (ref 18–48)
MCH RBC QN AUTO: 28.9 PG (ref 27–31)
MCHC RBC AUTO-ENTMCNC: 30.9 G/DL (ref 32–36)
MCV RBC AUTO: 94 FL (ref 82–98)
MONOCYTES # BLD AUTO: 0.3 K/UL (ref 0.3–1)
MONOCYTES NFR BLD: 4.9 % (ref 4–15)
NEUTROPHILS # BLD AUTO: 3.9 K/UL (ref 1.8–7.7)
NEUTROPHILS NFR BLD: 58.9 % (ref 38–73)
PLATELET # BLD AUTO: 279 K/UL (ref 150–350)
PMV BLD AUTO: 9.5 FL (ref 9.2–12.9)
POTASSIUM SERPL-SCNC: 4.1 MMOL/L (ref 3.5–5.1)
PROT SERPL-MCNC: 6.9 G/DL (ref 6–8.4)
RBC # BLD AUTO: 3.91 M/UL (ref 4–5.4)
SODIUM SERPL-SCNC: 137 MMOL/L (ref 136–145)
WBC # BLD AUTO: 6.56 K/UL (ref 3.9–12.7)

## 2019-05-22 PROCEDURE — 80053 COMPREHEN METABOLIC PANEL: CPT

## 2019-05-22 PROCEDURE — 85651 RBC SED RATE NONAUTOMATED: CPT | Mod: PO

## 2019-05-22 PROCEDURE — 36415 COLL VENOUS BLD VENIPUNCTURE: CPT | Mod: PO

## 2019-05-22 PROCEDURE — 86140 C-REACTIVE PROTEIN: CPT

## 2019-05-22 PROCEDURE — 85025 COMPLETE CBC W/AUTO DIFF WBC: CPT | Mod: PO

## 2019-05-28 ENCOUNTER — PATIENT MESSAGE (OUTPATIENT)
Dept: RHEUMATOLOGY | Facility: CLINIC | Age: 49
End: 2019-05-28

## 2019-06-17 ENCOUNTER — PATIENT MESSAGE (OUTPATIENT)
Dept: RHEUMATOLOGY | Facility: CLINIC | Age: 49
End: 2019-06-17

## 2019-08-27 ENCOUNTER — HOSPITAL ENCOUNTER (EMERGENCY)
Facility: HOSPITAL | Age: 49
Discharge: HOME | End: 2019-08-27
Attending: EMERGENCY MEDICINE

## 2019-08-27 VITALS
HEART RATE: 85 BPM | WEIGHT: 148.38 LBS | HEIGHT: 67 IN | RESPIRATION RATE: 20 BRPM | BODY MASS INDEX: 23.29 KG/M2 | OXYGEN SATURATION: 100 % | DIASTOLIC BLOOD PRESSURE: 63 MMHG | TEMPERATURE: 98 F | SYSTOLIC BLOOD PRESSURE: 125 MMHG

## 2019-08-27 DIAGNOSIS — M79.644 PAIN OF RIGHT THUMB: Primary | ICD-10-CM

## 2019-08-27 PROCEDURE — 99283 EMERGENCY DEPT VISIT LOW MDM: CPT | Mod: 25

## 2019-08-27 RX ORDER — NAPROXEN 375 MG/1
375 TABLET ORAL 2 TIMES DAILY WITH MEALS
Qty: 30 TABLET | Refills: 0 | Status: SHIPPED | OUTPATIENT
Start: 2019-08-27 | End: 2020-06-30

## 2019-08-27 NOTE — ED PROVIDER NOTES
SCRIBE #1 NOTE: I, Jose Joy, am scribing for, and in the presence of, Vadim Diaz MD. I have scribed the entire note.      History      Chief Complaint   Patient presents with    Hand Pain     pt reports injury to R thumb from metal bar       Review of patient's allergies indicates:  No Known Allergies     HPI   HPI    8/27/2019, 6:15 AM   History obtained from the patient      History of Present Illness: Tammy Saxena is a 48 y.o. female patient who presents to the Emergency Department for R thumb pain, onset just PTA after the area with hit with a metal bar at work. Symptoms are constant and moderate in severity. No mitigating or exacerbating factors reported. No associated sxs reported. Patient denies any fever, chills, n/v/d, SOB, CP, weakness, numbness, dizziness, headache, LOC, and all other sxs at this time. No prior Tx reported. No further complaints or concerns at this time.       Arrival mode: Personal vehicle     PCP: Maria Teresa Aparicio MD       Past Medical History:  Past Medical History:   Diagnosis Date    RA (rheumatoid arthritis)        Past Surgical History:  Past Surgical History:   Procedure Laterality Date    Tubal Lig      TUBAL LIGATION           Family History:  Family History   Problem Relation Age of Onset    Diabetes Sister     Cancer Sister     Breast cancer Sister     Diabetes Brother     Breast cancer Other        Social History:  Social History     Tobacco Use    Smoking status: Current Every Day Smoker     Packs/day: 0.50     Years: 30.00     Pack years: 15.00     Types: Cigarettes    Smokeless tobacco: Never Used   Substance and Sexual Activity    Alcohol use: Never     Frequency: Never     Drinks per session: Patient refused     Binge frequency: Never    Drug use: Never    Sexual activity: Not Currently       ROS   Review of Systems   Constitutional: Negative for chills, diaphoresis, fatigue and fever.   HENT: Negative for sore throat.    Respiratory:  Negative for shortness of breath.    Cardiovascular: Negative for chest pain.   Gastrointestinal: Negative for abdominal pain, nausea and vomiting.   Genitourinary: Negative for dysuria.   Musculoskeletal: Positive for arthralgias (R thumb). Negative for back pain.   Skin: Negative for rash.   Neurological: Negative for dizziness, weakness, light-headedness, numbness and headaches.   Hematological: Does not bruise/bleed easily.   All other systems reviewed and are negative.    Physical Exam      Initial Vitals [08/27/19 0609]   BP Pulse Resp Temp SpO2   125/63 85 20 97.9 °F (36.6 °C) 100 %      MAP       --          Physical Exam  Nursing Notes and Vital Signs Reviewed.  Constitutional: Patient is in no acute distress. Well-developed and well-nourished.  Head: Atraumatic. Normocephalic.  Eyes: PERRL. EOM intact. Conjunctivae are not pale. No scleral icterus.  ENT: Mucous membranes are moist. Oropharynx is clear and symmetric.    Neck: Supple. Full ROM. No lymphadenopathy.  Cardiovascular: Regular rate. Regular rhythm. No murmurs, rubs, or gallops. Distal pulses are 2+ and symmetric.  Pulmonary/Chest: No respiratory distress. Clear to auscultation bilaterally. No wheezing or rales.  Abdominal: Soft and non-distended.  There is no tenderness.  No rebound, guarding, or rigidity.  Musculoskeletal: Moves all extremities. No obvious deformities.  Right Hand: No obvious deformity. There is mild swelling of the R thumb.  There is TTP to the MCP of the R thumb. Full flexion and extension of the wrist. Radial, median, and ulnar nerves are intact. Radial and ulnar pulses are 2+. Normal capillary refill.  Distal sensation is intact. NVI distally.   Skin: Warm and dry.  Neurological:  Alert, awake, and appropriate.  Normal speech.  No acute focal neurological deficits are appreciated.  Psychiatric: Normal affect. Good eye contact. Appropriate in content.    ED Course    Procedures  ED Vital Signs:  Vitals:    08/27/19 0609   BP:  "125/63   Pulse: 85   Resp: 20   Temp: 97.9 °F (36.6 °C)   TempSrc: Oral   SpO2: 100%   Weight: 67.3 kg (148 lb 5.9 oz)   Height: 5' 7" (1.702 m)       Imaging Results:  Imaging Results          X-Ray Finger 2 or More Views Right (In process)                6:41 AM: Per ED provider, pt's XR results: No acute fracture or dislocation. No acute findings.           The Emergency Provider reviewed the vital signs and test results, which are outlined above.    ED Discussion     6:42 AM: Reassessed pt at this time. Discussed with pt all pertinent ED information and results. Discussed pt dx and plan of tx. Gave pt all f/u and return to the ED instructions. All questions and concerns were addressed at this time. Pt expresses understanding of information and instructions, and is comfortable with plan to discharge. Pt is stable for discharge.    Trauma precautions were discussed with patient and/or family/caretaker; I do not specifically detect any abdominal, thoracic, CNS, orthopedic, or other emergent or life threatening condition and that patient is safe to be discharged.  It was also discussed that despite an unrevealing examination and negative radiographic examination for serious or life threatening injury, these conditions may still exist.  As such, patient should return to ED immediately should they experience, severe or worsening pain, shortness of breath, abdominal pain, headache, vomiting, or any other concern.  It was also discussed that not infrequently, injuries may not be diagnosed during the initial ED visit (such as fractures) and that if the patient discovers a new area of concern, a new area of injury that was not evaluated in the ED, they should return for evaluation as they may have an injury that requires treatment.    ED Medication(s):  Medications - No data to display    Follow-up Information     Maria Teresa Aparicio MD.    Specialty:  Family Medicine  Contact information:  [id number]  [facility]" [place] [id number]  [phone]                  New Prescriptions    NAPROXEN (NAPROSYN) 375 MG TABLET    Take 1 tablet (375 mg total) by mouth 2 (two) times daily with meals.         Medical Decision Making    Medical Decision Making:   Clinical Tests:   Radiological Study: Ordered and Reviewed           Scribe Attestation:   Scribe #1: I performed the above scribed service and the documentation accurately describes the services I performed. I attest to the accuracy of the note.    Attending:   Physician Attestation Statement for Scribe #1: I, Vadim Diaz MD, personally performed the services described in this documentation, as scribed by Jose Joy, in my presence, and it is both accurate and complete.          Clinical Impression       ICD-10-CM ICD-9-CM   1. Pain of right thumb M79.644 729.5       Disposition:   Disposition: Discharged  Condition: Stable         Vadim Diaz MD  08/27/19 0645

## 2020-04-15 ENCOUNTER — PATIENT MESSAGE (OUTPATIENT)
Dept: RHEUMATOLOGY | Facility: CLINIC | Age: 50
End: 2020-04-15

## 2020-04-15 ENCOUNTER — LAB VISIT (OUTPATIENT)
Dept: LAB | Facility: HOSPITAL | Age: 50
End: 2020-04-15
Attending: PHYSICIAN ASSISTANT

## 2020-04-15 DIAGNOSIS — M05.79 RHEUMATOID ARTHRITIS INVOLVING MULTIPLE SITES WITH POSITIVE RHEUMATOID FACTOR: Primary | ICD-10-CM

## 2020-04-15 DIAGNOSIS — Z79.899 HIGH RISK MEDICATION USE: ICD-10-CM

## 2020-04-15 DIAGNOSIS — M05.79 RHEUMATOID ARTHRITIS INVOLVING MULTIPLE SITES WITH POSITIVE RHEUMATOID FACTOR: ICD-10-CM

## 2020-04-15 LAB
ALBUMIN SERPL BCP-MCNC: 4 G/DL (ref 3.5–5.2)
ALP SERPL-CCNC: 87 U/L (ref 55–135)
ALT SERPL W/O P-5'-P-CCNC: 9 U/L (ref 10–44)
ANION GAP SERPL CALC-SCNC: 7 MMOL/L (ref 8–16)
AST SERPL-CCNC: 12 U/L (ref 10–40)
BASOPHILS # BLD AUTO: 0.12 K/UL (ref 0–0.2)
BASOPHILS NFR BLD: 1.3 % (ref 0–1.9)
BILIRUB SERPL-MCNC: 0.2 MG/DL (ref 0.1–1)
BUN SERPL-MCNC: 8 MG/DL (ref 6–20)
CALCIUM SERPL-MCNC: 9.1 MG/DL (ref 8.7–10.5)
CHLORIDE SERPL-SCNC: 105 MMOL/L (ref 95–110)
CO2 SERPL-SCNC: 29 MMOL/L (ref 23–29)
CREAT SERPL-MCNC: 0.9 MG/DL (ref 0.5–1.4)
DIFFERENTIAL METHOD: ABNORMAL
EOSINOPHIL # BLD AUTO: 0.5 K/UL (ref 0–0.5)
EOSINOPHIL NFR BLD: 5.1 % (ref 0–8)
ERYTHROCYTE [DISTWIDTH] IN BLOOD BY AUTOMATED COUNT: 14.8 % (ref 11.5–14.5)
EST. GFR  (AFRICAN AMERICAN): >60 ML/MIN/1.73 M^2
EST. GFR  (NON AFRICAN AMERICAN): >60 ML/MIN/1.73 M^2
GLUCOSE SERPL-MCNC: 115 MG/DL (ref 70–110)
HCT VFR BLD AUTO: 38.5 % (ref 37–48.5)
HGB BLD-MCNC: 12.6 G/DL (ref 12–16)
IMM GRANULOCYTES # BLD AUTO: 0.02 K/UL (ref 0–0.04)
IMM GRANULOCYTES NFR BLD AUTO: 0.2 % (ref 0–0.5)
LYMPHOCYTES # BLD AUTO: 3 K/UL (ref 1–4.8)
LYMPHOCYTES NFR BLD: 33.5 % (ref 18–48)
MCH RBC QN AUTO: 31.3 PG (ref 27–31)
MCHC RBC AUTO-ENTMCNC: 32.7 G/DL (ref 32–36)
MCV RBC AUTO: 96 FL (ref 82–98)
MONOCYTES # BLD AUTO: 0.5 K/UL (ref 0.3–1)
MONOCYTES NFR BLD: 5.7 % (ref 4–15)
NEUTROPHILS # BLD AUTO: 4.9 K/UL (ref 1.8–7.7)
NEUTROPHILS NFR BLD: 54.2 % (ref 38–73)
NRBC BLD-RTO: 0 /100 WBC
PLATELET # BLD AUTO: 283 K/UL (ref 150–350)
PMV BLD AUTO: 10.1 FL (ref 9.2–12.9)
POTASSIUM SERPL-SCNC: 3.5 MMOL/L (ref 3.5–5.1)
PROT SERPL-MCNC: 7.4 G/DL (ref 6–8.4)
RBC # BLD AUTO: 4.03 M/UL (ref 4–5.4)
SODIUM SERPL-SCNC: 141 MMOL/L (ref 136–145)
WBC # BLD AUTO: 9.02 K/UL (ref 3.9–12.7)

## 2020-04-15 PROCEDURE — 80053 COMPREHEN METABOLIC PANEL: CPT

## 2020-04-15 PROCEDURE — 36415 COLL VENOUS BLD VENIPUNCTURE: CPT | Mod: PO

## 2020-04-15 PROCEDURE — 85025 COMPLETE CBC W/AUTO DIFF WBC: CPT | Mod: PO

## 2020-04-15 RX ORDER — FOLIC ACID 1 MG/1
1 TABLET ORAL DAILY
Qty: 90 TABLET | Refills: 1 | Status: SHIPPED | OUTPATIENT
Start: 2020-04-15 | End: 2022-07-12

## 2020-04-15 RX ORDER — HYDROXYCHLOROQUINE SULFATE 200 MG/1
200 TABLET, FILM COATED ORAL 2 TIMES DAILY
Qty: 60 TABLET | Refills: 3 | Status: SHIPPED | OUTPATIENT
Start: 2020-04-15 | End: 2022-07-12

## 2020-04-15 RX ORDER — METHOTREXATE 2.5 MG/1
TABLET ORAL
Qty: 30 TABLET | Refills: 3 | Status: SHIPPED | OUTPATIENT
Start: 2020-04-15 | End: 2020-06-30 | Stop reason: SDUPTHER

## 2020-05-27 ENCOUNTER — PATIENT MESSAGE (OUTPATIENT)
Dept: RHEUMATOLOGY | Facility: CLINIC | Age: 50
End: 2020-05-27

## 2020-06-26 ENCOUNTER — LAB VISIT (OUTPATIENT)
Dept: LAB | Facility: HOSPITAL | Age: 50
End: 2020-06-26
Attending: PHYSICIAN ASSISTANT

## 2020-06-26 DIAGNOSIS — M05.79 RHEUMATOID ARTHRITIS INVOLVING MULTIPLE SITES WITH POSITIVE RHEUMATOID FACTOR: ICD-10-CM

## 2020-06-26 DIAGNOSIS — Z79.899 HIGH RISK MEDICATION USE: ICD-10-CM

## 2020-06-26 LAB
ALBUMIN SERPL BCP-MCNC: 3.6 G/DL (ref 3.5–5.2)
ALP SERPL-CCNC: 103 U/L (ref 55–135)
ALT SERPL W/O P-5'-P-CCNC: 11 U/L (ref 10–44)
ANION GAP SERPL CALC-SCNC: 6 MMOL/L (ref 8–16)
AST SERPL-CCNC: 15 U/L (ref 10–40)
BASOPHILS # BLD AUTO: 0.11 K/UL (ref 0–0.2)
BASOPHILS NFR BLD: 1.4 % (ref 0–1.9)
BILIRUB SERPL-MCNC: 0.3 MG/DL (ref 0.1–1)
BUN SERPL-MCNC: 12 MG/DL (ref 6–20)
CALCIUM SERPL-MCNC: 8.6 MG/DL (ref 8.7–10.5)
CHLORIDE SERPL-SCNC: 107 MMOL/L (ref 95–110)
CO2 SERPL-SCNC: 25 MMOL/L (ref 23–29)
CREAT SERPL-MCNC: 0.8 MG/DL (ref 0.5–1.4)
CRP SERPL-MCNC: 19 MG/L (ref 0–8.2)
DIFFERENTIAL METHOD: ABNORMAL
EOSINOPHIL # BLD AUTO: 0.4 K/UL (ref 0–0.5)
EOSINOPHIL NFR BLD: 4.9 % (ref 0–8)
ERYTHROCYTE [DISTWIDTH] IN BLOOD BY AUTOMATED COUNT: 12.3 % (ref 11.5–14.5)
ERYTHROCYTE [SEDIMENTATION RATE] IN BLOOD BY WESTERGREN METHOD: 18 MM/HR (ref 0–20)
EST. GFR  (AFRICAN AMERICAN): >60 ML/MIN/1.73 M^2
EST. GFR  (NON AFRICAN AMERICAN): >60 ML/MIN/1.73 M^2
GLUCOSE SERPL-MCNC: 69 MG/DL (ref 70–110)
HCT VFR BLD AUTO: 37.9 % (ref 37–48.5)
HGB BLD-MCNC: 12.5 G/DL (ref 12–16)
IMM GRANULOCYTES # BLD AUTO: 0.03 K/UL (ref 0–0.04)
IMM GRANULOCYTES NFR BLD AUTO: 0.4 % (ref 0–0.5)
LYMPHOCYTES # BLD AUTO: 1.8 K/UL (ref 1–4.8)
LYMPHOCYTES NFR BLD: 22.6 % (ref 18–48)
MCH RBC QN AUTO: 31.6 PG (ref 27–31)
MCHC RBC AUTO-ENTMCNC: 33 G/DL (ref 32–36)
MCV RBC AUTO: 96 FL (ref 82–98)
MONOCYTES # BLD AUTO: 0.4 K/UL (ref 0.3–1)
MONOCYTES NFR BLD: 4.9 % (ref 4–15)
NEUTROPHILS # BLD AUTO: 5.1 K/UL (ref 1.8–7.7)
NEUTROPHILS NFR BLD: 65.8 % (ref 38–73)
NRBC BLD-RTO: 0 /100 WBC
PLATELET # BLD AUTO: 273 K/UL (ref 150–350)
PMV BLD AUTO: 10 FL (ref 9.2–12.9)
POTASSIUM SERPL-SCNC: 3.8 MMOL/L (ref 3.5–5.1)
PROT SERPL-MCNC: 7.2 G/DL (ref 6–8.4)
RBC # BLD AUTO: 3.96 M/UL (ref 4–5.4)
SODIUM SERPL-SCNC: 138 MMOL/L (ref 136–145)
WBC # BLD AUTO: 7.8 K/UL (ref 3.9–12.7)

## 2020-06-26 PROCEDURE — 36415 COLL VENOUS BLD VENIPUNCTURE: CPT | Mod: PO

## 2020-06-26 PROCEDURE — 80053 COMPREHEN METABOLIC PANEL: CPT

## 2020-06-26 PROCEDURE — 86140 C-REACTIVE PROTEIN: CPT

## 2020-06-26 PROCEDURE — 85651 RBC SED RATE NONAUTOMATED: CPT | Mod: PO

## 2020-06-26 PROCEDURE — 85025 COMPLETE CBC W/AUTO DIFF WBC: CPT | Mod: PO

## 2020-06-29 ENCOUNTER — PATIENT OUTREACH (OUTPATIENT)
Dept: ADMINISTRATIVE | Facility: OTHER | Age: 50
End: 2020-06-29

## 2020-06-29 NOTE — PROGRESS NOTES
"Subjective:       Patient ID: Tammy Saxena is a 49 y.o. female.    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  We started plaquenil and methotrexate and she hasn't been seen in a year d/t insurance issues..  Hand xrays normal. She resopnded very well to mtx initially.  She reports nausea with plaquenil so not taking.     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     She also reports a h/o TB exposure when she was a teen, she has positive skin tests and is monitored by chest xray yearly.  Today she rates her pain 0/10. But does have times when she has increased knee pain, low back pain, hip pain and hand pain.  She c/o morning stiffness for 30+ minutes.  Does have tenderenss and swelling to her left 2 mcp otherwise no swollen joints.     She has quit smoking.       Past Medical History:   Diagnosis Date    RA (rheumatoid arthritis)      Past Surgical History:   Procedure Laterality Date    Tubal Lig      TUBAL LIGATION       Family History   Problem Relation Age of Onset    Diabetes Sister     Cancer Sister     Breast cancer Sister     Diabetes Brother     Breast cancer Other      Social History     Socioeconomic History    Marital status: Single     Spouse name: Not on file    Number of children: Not on file    Years of education: Not on file    Highest education level: Not on file   Occupational History    Occupation: [place], supervisor   Social Needs    Financial resource strain: Not very hard    Food insecurity     Worry: Never true     Inability: Never true    Transportation needs     Medical: No     Non-medical: No   Tobacco Use    Smoking status: Current Every Day Smoker     Packs/day: 0.50     Years: 30.00     Pack years: 15.00     Types: Cigarettes    Smokeless tobacco: Never Used   Substance and Sexual Activity    Alcohol use: Never     Frequency: Never     " Drinks per session: Patient refused     Binge frequency: Never    Drug use: Never    Sexual activity: Not Currently   Lifestyle    Physical activity     Days per week: 0 days     Minutes per session: 0 min    Stress: To some extent   Relationships    Social connections     Talks on phone: Never     Gets together: Once a week     Attends Muslim service: Not on file     Active member of club or organization: No     Attends meetings of clubs or organizations: Never     Relationship status:    Other Topics Concern    Not on file   Social History Narrative    Not on file     Review of patient's allergies indicates:  No Known Allergies    Review of Systems   Constitutional: Negative for chills, fatigue, fever and unexpected weight change.   HENT: Negative for mouth sores, rhinorrhea and sore throat.    Eyes: Negative for pain and redness.   Respiratory: Negative for cough and shortness of breath.    Cardiovascular: Negative for chest pain.   Gastrointestinal: Negative for abdominal pain, constipation, diarrhea, nausea and vomiting.   Genitourinary: Negative for dysuria and hematuria.   Musculoskeletal: Positive for arthralgias and joint swelling. Negative for myalgias.   Skin: Negative for rash.   Neurological: Positive for tremors. Negative for weakness, numbness and headaches.   Psychiatric/Behavioral: The patient is not nervous/anxious.          Objective:   /76   Pulse 92   Wt 60.9 kg (134 lb 4.2 oz)   BMI 21.03 kg/m²      Physical Exam   Constitutional: She is oriented to person, place, and time and well-developed, well-nourished, and in no distress.   HENT:   Head: Normocephalic and atraumatic.   Eyes: Pupils are equal, round, and reactive to light. Right eye exhibits no discharge.   Neck: Normal range of motion.   Cardiovascular: Normal rate, regular rhythm and normal heart sounds.  Exam reveals no friction rub.    Pulmonary/Chest: Effort normal and breath sounds normal. No respiratory  distress.   Abdominal: Soft. She exhibits no distension. There is no abdominal tenderness.   Lymphadenopathy:     She has no cervical adenopathy.   Neurological: She is alert and oriented to person, place, and time.   Skin: No rash noted. No erythema.     Psychiatric: Mood normal.   Musculoskeletal: Normal range of motion. No deformity or edema.      Comments: Left 2 mcp swelling and tender otherwise  delfino wrists, mcps, pips no synvoitis, no tenderness, no warmth, good rom  delfino elbows no swelling or tenderness,full rom  Right shoulder +ttp subacromial bursa, full rom, mild impingement sign   Left shoulder normal  delfino knees no effusion, no warmth, no tenderness, full rom  delfino ankles no effusion warmth or tenderness           Total Cholesterol/HDL Ratio Latest Ref Range: 2.0 - 5.0  3.0   ANT Screen Latest Ref Range: Negative <1:160  Negative <1:160   Rheumatoid Factor Latest Ref Range: 0.0 - 15.0 IU/mL 18.0 (H)     Assessment:       1. Rheumatoid arthritis involving multiple sites with positive rheumatoid factor    2. High risk medication use    3. Current smoker        Impression:  Early Rheumatoid arthritis with RF borderline/+ mild (18) with frequent episodes of +joint swelling and pain in hands/wrists, shoulders; using aleve prn, on mtx 15mg/wk plaquenil bid (w nausea); still with flares of joint pain stiffness, elevated crp without sig signs of inflammation     Prev +TB skin test: exposure 30+ years ago, monitored with CXR, no h/o infection    Hi risk medication use: nausea with plaquenil , tolerated methotrexate well     Plan:         Increase mtx to 20mg/wk split dose  Try plaquenil once daily with food at night see if can tolerate   Use zofran prn as well  mobic sent into pharmacy for joint pain prn   Labs reviewed with elevated crp unsure why, will recheck next time        She is on the patient portal, message me with any quest/concerns    See back in 3 mos with reg 4 if her insurance issues are sorted out

## 2020-06-30 ENCOUNTER — TELEPHONE (OUTPATIENT)
Dept: RHEUMATOLOGY | Facility: CLINIC | Age: 50
End: 2020-06-30

## 2020-06-30 ENCOUNTER — OFFICE VISIT (OUTPATIENT)
Dept: RHEUMATOLOGY | Facility: CLINIC | Age: 50
End: 2020-06-30

## 2020-06-30 VITALS
WEIGHT: 134.25 LBS | SYSTOLIC BLOOD PRESSURE: 111 MMHG | DIASTOLIC BLOOD PRESSURE: 76 MMHG | HEART RATE: 92 BPM | BODY MASS INDEX: 21.03 KG/M2

## 2020-06-30 DIAGNOSIS — F17.200 CURRENT SMOKER: ICD-10-CM

## 2020-06-30 DIAGNOSIS — M05.79 RHEUMATOID ARTHRITIS INVOLVING MULTIPLE SITES WITH POSITIVE RHEUMATOID FACTOR: Primary | ICD-10-CM

## 2020-06-30 DIAGNOSIS — Z79.899 HIGH RISK MEDICATION USE: ICD-10-CM

## 2020-06-30 PROCEDURE — 99999 PR PBB SHADOW E&M-EST. PATIENT-LVL III: ICD-10-PCS | Mod: PBBFAC,,, | Performed by: PHYSICIAN ASSISTANT

## 2020-06-30 PROCEDURE — 99212 OFFICE O/P EST SF 10 MIN: CPT | Mod: S$PBB,,, | Performed by: PHYSICIAN ASSISTANT

## 2020-06-30 PROCEDURE — 99213 OFFICE O/P EST LOW 20 MIN: CPT | Mod: PBBFAC | Performed by: PHYSICIAN ASSISTANT

## 2020-06-30 PROCEDURE — 99999 PR PBB SHADOW E&M-EST. PATIENT-LVL III: CPT | Mod: PBBFAC,,, | Performed by: PHYSICIAN ASSISTANT

## 2020-06-30 PROCEDURE — 99212 PR OFFICE/OUTPT VISIT, EST, LEVL II, 10-19 MIN: ICD-10-PCS | Mod: S$PBB,,, | Performed by: PHYSICIAN ASSISTANT

## 2020-06-30 RX ORDER — MELOXICAM 15 MG/1
15 TABLET ORAL DAILY
Qty: 30 TABLET | Refills: 5 | Status: SHIPPED | OUTPATIENT
Start: 2020-06-30 | End: 2022-07-12

## 2020-06-30 RX ORDER — METHOTREXATE 2.5 MG/1
20 TABLET ORAL
Qty: 30 TABLET | Refills: 3 | Status: SHIPPED | OUTPATIENT
Start: 2020-06-30 | End: 2022-07-12

## 2020-06-30 RX ORDER — ONDANSETRON 4 MG/1
4 TABLET, ORALLY DISINTEGRATING ORAL EVERY 6 HOURS PRN
Qty: 45 TABLET | Refills: 4 | Status: SHIPPED | OUTPATIENT
Start: 2020-06-30 | End: 2022-07-12

## 2020-06-30 ASSESSMENT — ROUTINE ASSESSMENT OF PATIENT INDEX DATA (RAPID3): MDHAQ FUNCTION SCORE: 1.1

## 2020-06-30 NOTE — PROGRESS NOTES
Requested updates within Care Everywhere.  Patient's chart was reviewed for overdue CHARLEEN topics.  Immunizations reconciled.    Orders placed:  Tasked appts:  Labs Linked:

## 2020-06-30 NOTE — PATIENT INSTRUCTIONS
Increase methotrexate to 20mg/wk split dose   Cont folic acid daily     Try plaquenil once a day in evening    zofran as needed for nausea     Labs ok one elevated inflammatory marker crp     Recheck in 3 months     meloxicam once daily as needed, anti inflammatory

## 2020-10-06 ENCOUNTER — PATIENT MESSAGE (OUTPATIENT)
Dept: ADMINISTRATIVE | Facility: HOSPITAL | Age: 50
End: 2020-10-06

## 2021-01-06 DIAGNOSIS — Z12.31 OTHER SCREENING MAMMOGRAM: ICD-10-CM

## 2021-03-25 ENCOUNTER — PATIENT MESSAGE (OUTPATIENT)
Dept: ADMINISTRATIVE | Facility: HOSPITAL | Age: 51
End: 2021-03-25

## 2021-04-29 ENCOUNTER — PATIENT MESSAGE (OUTPATIENT)
Dept: RESEARCH | Facility: HOSPITAL | Age: 51
End: 2021-04-29

## 2021-05-26 ENCOUNTER — IMMUNIZATION (OUTPATIENT)
Dept: INTERNAL MEDICINE | Facility: CLINIC | Age: 51
End: 2021-05-26
Payer: COMMERCIAL

## 2021-05-26 DIAGNOSIS — Z23 NEED FOR VACCINATION: Primary | ICD-10-CM

## 2021-05-26 PROCEDURE — 0031A COVID-19,VECTOR-NR,RS-AD26,PF,0.5 ML DOSE VACCINE (JANSSEN): ICD-10-PCS | Mod: ,,, | Performed by: FAMILY MEDICINE

## 2021-05-26 PROCEDURE — 0031A COVID-19,VECTOR-NR,RS-AD26,PF,0.5 ML DOSE VACCINE (JANSSEN): CPT | Mod: ,,, | Performed by: FAMILY MEDICINE

## 2021-05-26 PROCEDURE — 91303 COVID-19,VECTOR-NR,RS-AD26,PF,0.5 ML DOSE VACCINE (JANSSEN): CPT | Mod: ,,, | Performed by: FAMILY MEDICINE

## 2021-05-26 PROCEDURE — 91303 COVID-19,VECTOR-NR,RS-AD26,PF,0.5 ML DOSE VACCINE (JANSSEN): ICD-10-PCS | Mod: ,,, | Performed by: FAMILY MEDICINE

## 2022-04-27 ENCOUNTER — PATIENT MESSAGE (OUTPATIENT)
Dept: ADMINISTRATIVE | Facility: HOSPITAL | Age: 52
End: 2022-04-27
Payer: COMMERCIAL

## 2022-07-12 ENCOUNTER — OFFICE VISIT (OUTPATIENT)
Dept: URGENT CARE | Facility: CLINIC | Age: 52
End: 2022-07-12
Payer: COMMERCIAL

## 2022-07-12 VITALS
TEMPERATURE: 100 F | OXYGEN SATURATION: 98 % | BODY MASS INDEX: 23.81 KG/M2 | HEIGHT: 67 IN | WEIGHT: 151.69 LBS | RESPIRATION RATE: 18 BRPM | DIASTOLIC BLOOD PRESSURE: 62 MMHG | SYSTOLIC BLOOD PRESSURE: 127 MMHG | HEART RATE: 98 BPM

## 2022-07-12 DIAGNOSIS — F17.200 NEEDS SMOKING CESSATION EDUCATION: ICD-10-CM

## 2022-07-12 DIAGNOSIS — U07.1 COVID: Primary | ICD-10-CM

## 2022-07-12 LAB
CTP QC/QA: YES
SARS-COV-2 RDRP RESP QL NAA+PROBE: POSITIVE

## 2022-07-12 PROCEDURE — 1160F PR REVIEW ALL MEDS BY PRESCRIBER/CLIN PHARMACIST DOCUMENTED: ICD-10-PCS | Mod: CPTII,S$GLB,, | Performed by: PHYSICIAN ASSISTANT

## 2022-07-12 PROCEDURE — 3078F DIAST BP <80 MM HG: CPT | Mod: CPTII,S$GLB,, | Performed by: PHYSICIAN ASSISTANT

## 2022-07-12 PROCEDURE — U0002 COVID-19 LAB TEST NON-CDC: HCPCS | Mod: QW,S$GLB,, | Performed by: PHYSICIAN ASSISTANT

## 2022-07-12 PROCEDURE — 3008F BODY MASS INDEX DOCD: CPT | Mod: CPTII,S$GLB,, | Performed by: PHYSICIAN ASSISTANT

## 2022-07-12 PROCEDURE — 3074F SYST BP LT 130 MM HG: CPT | Mod: CPTII,S$GLB,, | Performed by: PHYSICIAN ASSISTANT

## 2022-07-12 PROCEDURE — 1159F PR MEDICATION LIST DOCUMENTED IN MEDICAL RECORD: ICD-10-PCS | Mod: CPTII,S$GLB,, | Performed by: PHYSICIAN ASSISTANT

## 2022-07-12 PROCEDURE — 99203 OFFICE O/P NEW LOW 30 MIN: CPT | Mod: S$GLB,,, | Performed by: PHYSICIAN ASSISTANT

## 2022-07-12 PROCEDURE — 1159F MED LIST DOCD IN RCRD: CPT | Mod: CPTII,S$GLB,, | Performed by: PHYSICIAN ASSISTANT

## 2022-07-12 PROCEDURE — 1160F RVW MEDS BY RX/DR IN RCRD: CPT | Mod: CPTII,S$GLB,, | Performed by: PHYSICIAN ASSISTANT

## 2022-07-12 PROCEDURE — 3074F PR MOST RECENT SYSTOLIC BLOOD PRESSURE < 130 MM HG: ICD-10-PCS | Mod: CPTII,S$GLB,, | Performed by: PHYSICIAN ASSISTANT

## 2022-07-12 PROCEDURE — U0002: ICD-10-PCS | Mod: QW,S$GLB,, | Performed by: PHYSICIAN ASSISTANT

## 2022-07-12 PROCEDURE — 3078F PR MOST RECENT DIASTOLIC BLOOD PRESSURE < 80 MM HG: ICD-10-PCS | Mod: CPTII,S$GLB,, | Performed by: PHYSICIAN ASSISTANT

## 2022-07-12 PROCEDURE — 3008F PR BODY MASS INDEX (BMI) DOCUMENTED: ICD-10-PCS | Mod: CPTII,S$GLB,, | Performed by: PHYSICIAN ASSISTANT

## 2022-07-12 PROCEDURE — 99203 PR OFFICE/OUTPT VISIT, NEW, LEVL III, 30-44 MIN: ICD-10-PCS | Mod: S$GLB,,, | Performed by: PHYSICIAN ASSISTANT

## 2022-07-12 NOTE — PROGRESS NOTES
"Subjective:       Patient ID: Tammy Bee is a 51 y.o. female.    Vitals:  height is 5' 7" (1.702 m) and weight is 68.8 kg (151 lb 10.8 oz). Her temperature is 99.8 °F (37.7 °C). Her blood pressure is 127/62 and her pulse is 98. Her respiration is 18 and oxygen saturation is 98%.     Chief Complaint: Fever    Patient is a 51 year old female who presents with a one day history of scrathy throat and congestion. Patient states she started with a low grade fever today as well as chills. Patient denies nausea, vomiting, or diarrhea. Patient has not had chest pain or shortness of breath. She has not tried anything for the symptoms. She has a cough that is non-productive.     Fever   The current episode started today. The problem has been gradually worsening. The maximum temperature noted was 99 to 99.9 F. The temperature was taken using a tympanic thermometer. Associated symptoms include congestion, coughing, headaches, sleepiness and a sore throat. Pertinent negatives include no abdominal pain, chest pain, diarrhea, ear pain, muscle aches, nausea, rash, urinary pain, vomiting or wheezing. She has tried nothing for the symptoms. The treatment provided no relief.   Risk factors: no contaminated food, no contaminated water, no hx of cancer, no immunosuppression, no occupational exposure, no recent sickness, no recent travel and no sick contacts        Constitution: Positive for fever. Negative for chills.   HENT: Positive for congestion and sore throat. Negative for ear pain, postnasal drip, sinus pain, sinus pressure and trouble swallowing.    Neck: Negative for painful lymph nodes.   Cardiovascular: Negative for chest pain.   Respiratory: Positive for cough. Negative for sputum production, shortness of breath and wheezing.    Gastrointestinal: Negative for abdominal pain, nausea, vomiting and diarrhea.   Genitourinary: Negative for dysuria.   Musculoskeletal: Negative for muscle ache.   Skin: Negative for rash. "   Neurological: Positive for headaches.   Hematologic/Lymphatic: Negative for swollen lymph nodes.       Objective:      Physical Exam   Constitutional: She is oriented to person, place, and time. She appears well-developed. She is cooperative.  Non-toxic appearance. She does not appear ill. No distress.   HENT:   Head: Normocephalic and atraumatic.   Ears:   Right Ear: Hearing, tympanic membrane, external ear and ear canal normal.   Left Ear: Hearing, tympanic membrane, external ear and ear canal normal.   Nose: Congestion present. No mucosal edema, rhinorrhea or nasal deformity. No epistaxis. Right sinus exhibits no maxillary sinus tenderness and no frontal sinus tenderness. Left sinus exhibits no maxillary sinus tenderness and no frontal sinus tenderness.   Mouth/Throat: Uvula is midline and mucous membranes are normal. No trismus in the jaw. Normal dentition. No uvula swelling. Posterior oropharyngeal erythema present. No oropharyngeal exudate or posterior oropharyngeal edema.   Eyes: Conjunctivae and lids are normal. No scleral icterus.   Neck: Trachea normal and phonation normal. Neck supple. No edema present. No erythema present. No neck rigidity present.   Cardiovascular: Normal rate, regular rhythm, normal heart sounds and normal pulses.   No murmur heard.  Pulmonary/Chest: Effort normal and breath sounds normal. No respiratory distress. She has no decreased breath sounds. She has no wheezes. She has no rhonchi.   Abdominal: Normal appearance.   Musculoskeletal: Normal range of motion.         General: No deformity. Normal range of motion.   Lymphadenopathy:     She has no cervical adenopathy.   Neurological: She is alert and oriented to person, place, and time. She exhibits normal muscle tone. Coordination normal.   Skin: Skin is warm, dry, intact, not diaphoretic and not pale.   Psychiatric: Her speech is normal and behavior is normal. Judgment and thought content normal.   Nursing note and vitals  reviewed.    Results for orders placed or performed in visit on 07/12/22   POCT COVID-19 Rapid Screening   Result Value Ref Range    POC Rapid COVID Positive (A) Negative     Acceptable Yes            Assessment:       1. COVID          Plan:         COVID  -     POCT COVID-19 Rapid Screening  -     nirmatrelvir-ritonavir 150 mg x 2- 100 mg copackaged tablets (EUA); Take 3 tablets by mouth 2 (two) times daily for 5 days. Each dose contains 2 nirmatrelvir (pink tablets) and 1 ritonavir (white tablet). Take all 3 tablets together  Dispense: 30 tablet; Refill: 0    Discussed results with patient and proper quarantine based on [facility] guidelines.   Discussed use of OTC medications for symptom control as this is a viral disease.  Discussed paxlovid and risk/benefits. Discussed side effects including rebound COVID. Discussed drug interaction as related to patient's medication list. Med reconciliation performed.    All ER precautions covered including but not limited to shortness of breath, intractable fever, or chest pain.  Discussed RTC if symptoms worsen, change, or persist.     Patient verbalized understanding and agreed with the plan.     Phyllis Wood PA-C    Patient Instructions   [facility] COVID QUARANTINE     Quarantine  Quarantine if you have been in close contact (within 6 feet of someone for a cumulative total of 15 minutes or more over a 24-hour period) with someone who has COVID-19, unless you have been fully vaccinated or had a positive test within 90 days and are no longer symptomatic.  People who are fully vaccinated and/or had a positive test within 90 days do NOT need to quarantine after contact with someone who had COVID-19 unless they have symptoms. However, fully vaccinated people who have not had a positive test within 90 days, should get tested 3-5 days after their exposure, even if they don't have symptoms and wear a mask indoors in public for 14 days following exposure or until their test  result is negative.    If you have not been vaccinated, and don't have a positive test within 90 days, your quarantine is 10 days without a test, or you can choose to test out of quarantine.   After 5-7 days without symptoms, you can test at that point and you can come out of quarantine on day 8 if negative and still without symptoms.   The risk is that if you test without symptoms on Day 6 (for example) and you are positive, your 10 day quarantine begins on that day, and you turned your 7 day quarantine into 16 days.    PLEASE READ YOUR DISCHARGE INSTRUCTIONS ENTIRELY AS IT CONTAINS IMPORTANT INFORMATION.      Please drink plenty of fluids.    Please get plenty of rest.    Please return here or go to the Emergency Department for any concerns or worsening of condition.    Please take an over the counter antihistamine medication (allegra/Claritin/Zyrtec) of your choice as directed.    Try an over the counter decongestant like Mucinex D or Sudafed. You buy this behind the pharmacy counter    If you do have Hypertension or palpitations, it is safe to take Coricidin HBP for relief of sinus symptoms.    If not allergic, please take over the counter Tylenol (Acetaminophen) and/or Motrin (Ibuprofen) as directed for control of pain and/or fever.  Please follow up with your primary care doctor or specialist as needed.    Sore throat recommendations: Warm fluids, warm salt water gargles, throat lozenges, tea, honey, soup, rest, hydration.    Use over the counter flonase: one spray each nostril twice daily OR two sprays each nostril once daily.     Sinus rinses DO NOT USE TAP WATER, if you must, water must be a rolling boil for 1 minute, let it cool, then use.  May use distilled water, or over the counter nasal saline rinses.  Vics vapor rub in shower to help open nasal passages.  May use nasal gel to keep passages moisturized.  May use Nasal saline sprays during the day for added relief of congestion.   For those who go to  the gym, please do not use the sauna or steam room now to clear sinuses.    If you  smoke, please stop smoking.      Please return or see your primary care doctor if you develop new or worsening symptoms.     Please arrange follow up with your primary medical clinic as soon as possible. You must understand that you've received an Urgent Care treatment only and that you may be released before all of your medical problems are known or treated. You, the patient, will arrange for follow up as instructed. If your symptoms worsen or fail to improve you should go to the Emergency Room.

## 2022-07-12 NOTE — LETTER
[address] [facility], [address] ? [place], [address] ? Phone [phone] ? Fax [phone]           Return to Work/School    Patient: Tammy Bee  YOB: 1970   Date: 07/12/2022      To Whom It May Concern:     Tammy Bee was in contact with/seen in my office on 07/12/2022. COVID-19 is present in our communities across the [facility]. Not all patients are eligible or appropriate to be tested. In this situation, your employee meets the following criteria:     Tammy Bee has met the criteria for COVID-19 testing and has a POSITIVE result. She can return to work once they are asymptomatic for 24 hours without the use of fever reducing medications AND at least five days from the start of symptoms (or from the first positive result if they have no symptoms).      If you have any questions or concerns, or if I can be of further assistance, please do not hesitate to contact me.     Sincerely,    Phyllis Wood PA-C

## 2022-07-12 NOTE — PATIENT INSTRUCTIONS
[facility] COVID QUARANTINE     Quarantine  Quarantine if you have been in close contact (within 6 feet of someone for a cumulative total of 15 minutes or more over a 24-hour period) with someone who has COVID-19, unless you have been fully vaccinated or had a positive test within 90 days and are no longer symptomatic.  People who are fully vaccinated and/or had a positive test within 90 days do NOT need to quarantine after contact with someone who had COVID-19 unless they have symptoms. However, fully vaccinated people who have not had a positive test within 90 days, should get tested 3-5 days after their exposure, even if they don't have symptoms and wear a mask indoors in public for 14 days following exposure or until their test result is negative.    If you have not been vaccinated, and don't have a positive test within 90 days, your quarantine is 10 days without a test, or you can choose to test out of quarantine.   After 5-7 days without symptoms, you can test at that point and you can come out of quarantine on day 8 if negative and still without symptoms.   The risk is that if you test without symptoms on Day 6 (for example) and you are positive, your 10 day quarantine begins on that day, and you turned your 7 day quarantine into 16 days.    PLEASE READ YOUR DISCHARGE INSTRUCTIONS ENTIRELY AS IT CONTAINS IMPORTANT INFORMATION.      Please drink plenty of fluids.    Please get plenty of rest.    Please return here or go to the Emergency Department for any concerns or worsening of condition.    Please take an over the counter antihistamine medication (allegra/Claritin/Zyrtec) of your choice as directed.    Try an over the counter decongestant like Mucinex D or Sudafed. You buy this behind the pharmacy counter    If you do have Hypertension or palpitations, it is safe to take Coricidin HBP for relief of sinus symptoms.    If not allergic, please take over the counter Tylenol (Acetaminophen) and/or Motrin (Ibuprofen) as  directed for control of pain and/or fever.  Please follow up with your primary care doctor or specialist as needed.    Sore throat recommendations: Warm fluids, warm salt water gargles, throat lozenges, tea, honey, soup, rest, hydration.    Use over the counter flonase: one spray each nostril twice daily OR two sprays each nostril once daily.     Sinus rinses DO NOT USE TAP WATER, if you must, water must be a rolling boil for 1 minute, let it cool, then use.  May use distilled water, or over the counter nasal saline rinses.  Vics vapor rub in shower to help open nasal passages.  May use nasal gel to keep passages moisturized.  May use Nasal saline sprays during the day for added relief of congestion.   For those who go to the gym, please do not use the sauna or steam room now to clear sinuses.    If you  smoke, please stop smoking.      Please return or see your primary care doctor if you develop new or worsening symptoms.     Please arrange follow up with your primary medical clinic as soon as possible. You must understand that you've received an Urgent Care treatment only and that you may be released before all of your medical problems are known or treated. You, the patient, will arrange for follow up as instructed. If your symptoms worsen or fail to improve you should go to the Emergency Room.

## 2022-07-15 ENCOUNTER — TELEPHONE (OUTPATIENT)
Dept: URGENT CARE | Facility: CLINIC | Age: 52
End: 2022-07-15
Payer: COMMERCIAL

## 2022-07-15 NOTE — TELEPHONE ENCOUNTER
Contacted patient as courtesy call from recent Urgent Care visit on 07.12.2022, patient states she is fever free and feeling much better. /ronald/

## 2022-11-20 ENCOUNTER — OFFICE VISIT (OUTPATIENT)
Dept: URGENT CARE | Facility: CLINIC | Age: 52
End: 2022-11-20
Payer: COMMERCIAL

## 2022-11-20 VITALS
HEART RATE: 95 BPM | WEIGHT: 145 LBS | BODY MASS INDEX: 22.76 KG/M2 | TEMPERATURE: 98 F | DIASTOLIC BLOOD PRESSURE: 59 MMHG | OXYGEN SATURATION: 100 % | HEIGHT: 67 IN | SYSTOLIC BLOOD PRESSURE: 137 MMHG | RESPIRATION RATE: 18 BRPM

## 2022-11-20 DIAGNOSIS — M54.9 MID BACK PAIN ON LEFT SIDE: Primary | ICD-10-CM

## 2022-11-20 DIAGNOSIS — M62.830 MUSCLE SPASM OF BACK: ICD-10-CM

## 2022-11-20 PROCEDURE — 1160F RVW MEDS BY RX/DR IN RCRD: CPT | Mod: CPTII,S$GLB,,

## 2022-11-20 PROCEDURE — 3008F BODY MASS INDEX DOCD: CPT | Mod: CPTII,S$GLB,,

## 2022-11-20 PROCEDURE — 3008F PR BODY MASS INDEX (BMI) DOCUMENTED: ICD-10-PCS | Mod: CPTII,S$GLB,,

## 2022-11-20 PROCEDURE — 1160F PR REVIEW ALL MEDS BY PRESCRIBER/CLIN PHARMACIST DOCUMENTED: ICD-10-PCS | Mod: CPTII,S$GLB,,

## 2022-11-20 PROCEDURE — 3075F PR MOST RECENT SYSTOLIC BLOOD PRESS GE 130-139MM HG: ICD-10-PCS | Mod: CPTII,S$GLB,,

## 2022-11-20 PROCEDURE — 3075F SYST BP GE 130 - 139MM HG: CPT | Mod: CPTII,S$GLB,,

## 2022-11-20 PROCEDURE — 1159F PR MEDICATION LIST DOCUMENTED IN MEDICAL RECORD: ICD-10-PCS | Mod: CPTII,S$GLB,,

## 2022-11-20 PROCEDURE — 3078F PR MOST RECENT DIASTOLIC BLOOD PRESSURE < 80 MM HG: ICD-10-PCS | Mod: CPTII,S$GLB,,

## 2022-11-20 PROCEDURE — 99213 PR OFFICE/OUTPT VISIT, EST, LEVL III, 20-29 MIN: ICD-10-PCS | Mod: S$GLB,,,

## 2022-11-20 PROCEDURE — 99213 OFFICE O/P EST LOW 20 MIN: CPT | Mod: S$GLB,,,

## 2022-11-20 PROCEDURE — 3078F DIAST BP <80 MM HG: CPT | Mod: CPTII,S$GLB,,

## 2022-11-20 PROCEDURE — 1159F MED LIST DOCD IN RCRD: CPT | Mod: CPTII,S$GLB,,

## 2022-11-20 RX ORDER — METHOCARBAMOL 750 MG/1
750 TABLET, FILM COATED ORAL 3 TIMES DAILY PRN
Qty: 30 TABLET | Refills: 0 | Status: SHIPPED | OUTPATIENT
Start: 2022-11-20 | End: 2022-11-30 | Stop reason: ALTCHOICE

## 2022-11-20 RX ORDER — NAPROXEN 500 MG/1
500 TABLET ORAL 2 TIMES DAILY WITH MEALS
Qty: 14 TABLET | Refills: 0 | Status: SHIPPED | OUTPATIENT
Start: 2022-11-20 | End: 2022-11-27

## 2022-11-20 NOTE — PROGRESS NOTES
"Subjective:       Patient ID: Tammy Bee is a 52 y.o. female.    Vitals:  height is 5' 7" (1.702 m) and weight is 65.8 kg (145 lb). Her tympanic temperature is 97.8 °F (36.6 °C). Her blood pressure is 137/59 (abnormal) and her pulse is 95. Her respiration is 18 and oxygen saturation is 100%.     Chief Complaint: Back Pain (Left Lower posterior rib area)    Pt noticed yesterday her left mid back was painful. She describes pain as stabbing pain. She notices has pain when changing positions, raising her left arm above her head, and on deep inspiration. She denies any urinary symptoms or injury. She has applied heat and ice to area with no relief and has taken OTC aleve with no relief. She recalls having a violent episode of vomiting last Tuesday 11/15. She's unsure if this problem is related.Vomiting has completely resolved.    Flank Pain  This is a new problem. The current episode started yesterday. The problem occurs intermittently. The problem is unchanged. The pain is present in the lumbar spine. The quality of the pain is described as stabbing. The pain does not radiate. The pain is at a severity of 1/10. The pain is mild. The pain is Worse during the night. The symptoms are aggravated by lying down, standing, sitting, twisting, position and bending. Stiffness is present All day. Pertinent negatives include no abdominal pain, dysuria, fever, leg pain, numbness, tingling or weakness. She has tried NSAIDs for the symptoms. The treatment provided no relief.     Constitution: Negative for fever.   Gastrointestinal:  Negative for abdominal pain.   Genitourinary:  Negative for dysuria, frequency, urgency and flank pain.   Musculoskeletal:  Positive for back pain.   Neurological:  Negative for numbness.     Objective:      Physical Exam   Constitutional: She is oriented to person, place, and time. She appears well-developed. She is cooperative. No distress.   HENT:   Head: Normocephalic and atraumatic.   Nose: " Nose normal.   Mouth/Throat: Oropharynx is clear and moist and mucous membranes are normal.   Eyes: Conjunctivae and lids are normal.   Neck: Trachea normal and phonation normal. Neck supple.   Cardiovascular: Normal rate, regular rhythm, normal heart sounds and normal pulses.   Pulmonary/Chest: Effort normal and breath sounds normal.   Abdominal: Normal appearance and bowel sounds are normal. She exhibits no mass. Soft.   Musculoskeletal:         General: No deformity.        Back:    Neurological: She is alert and oriented to person, place, and time. She has normal strength and normal reflexes. No sensory deficit.   Skin: Skin is warm, dry, intact and not diaphoretic.   Psychiatric: Her speech is normal and behavior is normal. Judgment and thought content normal.   Nursing note and vitals reviewed.      Assessment:       1. Mid back pain on left side    2. Muscle spasm of back          Plan:         Patient declined toradol injection. She would prefer oral meds. Discussed proper use and side effects of prescribed and OTC medications recommended for symptomatic relief.   Return to clinic if symptoms worsen, persisit, or change.   Red flag signs/sx that warrants ED evaluation discussed with patient/parent who verbalized understanding      Mid back pain on left side  -     methocarbamoL (ROBAXIN) 750 MG Tab; Take 1 tablet (750 mg total) by mouth 3 (three) times daily as needed (msucles spasms/pain).  Dispense: 30 tablet; Refill: 0  -     naproxen (NAPROSYN) 500 MG tablet; Take 1 tablet (500 mg total) by mouth 2 (two) times daily with meals. for 7 days  Dispense: 14 tablet; Refill: 0    Muscle spasm of back

## 2022-11-20 NOTE — PATIENT INSTRUCTIONS
Back Pain Discharge Instructions    Alternate heat and ice for first 48 hours then  apply heat. You may do gently stretching if tolerable.    Moist warm compresss to area several times daily.  May use a heating pad on LOW to provide heat over a towel which was dampended with warm water. DO NOT FALL ASLEEP WITH HEATING PAD ON.  Do not stay in one position to long.  When sleeping on your back place a pillow under knees to reduce tension on back.  If sleeping on your side, place pillow between knees to keep spine in better alinement.  Wear supportive shoes such as tennis shoes for support of the lower back.  Take any medication as directed.    If you were not prescribed an anti-inflammatory medication, and if you do not have any history of stomach/intestinal ulcers, or kidney disease, or are not taking a blood thinner such as Coumadin, Plavix, Pradaxa, Eloquis, or Xaralta for example, it is OK to take over the counter Ibuprofen or Advil or Motrin or Aleve as directed.  Do not take these medications on an empty stomach.  - You were prescribed NAPROXEN. Do not take additional NSAIDs/anti-inflammatories with this (I.e., ibuprofen, aleve, mobic, celebrex, etc.). You may continue to take tylenol.    If you were prescribed a narcotic medication, do not drive or operate heavy equipment or machinery while taking these medications.    If you lose control of your bowel and/or bladder; lose sensation in between your legs by your genitalia and/or rectum or  lose control or sensation of any extremity, please go to the nearest Emergency Department immediately.    Chanel Warning:   The medication you have been prescribed may cause drowsiness and impair your judgement.  Therefore, you should avoid driving, climbing, using machinery, etc., so as not to increase your risk of injury.  Do NOT drink any alcohol while on this medication(s).      
soft/nontender

## 2022-11-29 ENCOUNTER — HOSPITAL ENCOUNTER (OUTPATIENT)
Dept: RADIOLOGY | Facility: HOSPITAL | Age: 52
Discharge: HOME | End: 2022-11-29
Attending: NURSE PRACTITIONER
Payer: COMMERCIAL

## 2022-11-29 ENCOUNTER — OFFICE VISIT (OUTPATIENT)
Dept: INTERNAL MEDICINE | Facility: CLINIC | Age: 52
End: 2022-11-29
Payer: COMMERCIAL

## 2022-11-29 ENCOUNTER — PATIENT MESSAGE (OUTPATIENT)
Dept: PULMONOLOGY | Facility: CLINIC | Age: 52
End: 2022-11-29
Payer: COMMERCIAL

## 2022-11-29 VITALS
WEIGHT: 147.94 LBS | HEART RATE: 100 BPM | BODY MASS INDEX: 23.22 KG/M2 | OXYGEN SATURATION: 99 % | SYSTOLIC BLOOD PRESSURE: 124 MMHG | RESPIRATION RATE: 18 BRPM | TEMPERATURE: 98 F | HEIGHT: 67 IN | DIASTOLIC BLOOD PRESSURE: 78 MMHG

## 2022-11-29 DIAGNOSIS — R06.02 SOB (SHORTNESS OF BREATH) ON EXERTION: ICD-10-CM

## 2022-11-29 DIAGNOSIS — R06.02 SOB (SHORTNESS OF BREATH) ON EXERTION: Primary | ICD-10-CM

## 2022-11-29 DIAGNOSIS — R91.1 SOLITARY PULMONARY NODULE: ICD-10-CM

## 2022-11-29 PROCEDURE — 93010 ELECTROCARDIOGRAM REPORT: CPT | Mod: S$GLB,,, | Performed by: INTERNAL MEDICINE

## 2022-11-29 PROCEDURE — 3008F PR BODY MASS INDEX (BMI) DOCUMENTED: ICD-10-PCS | Mod: CPTII,S$GLB,, | Performed by: NURSE PRACTITIONER

## 2022-11-29 PROCEDURE — 3008F BODY MASS INDEX DOCD: CPT | Mod: CPTII,S$GLB,, | Performed by: NURSE PRACTITIONER

## 2022-11-29 PROCEDURE — 94640 PR INHAL RX, AIRWAY OBST/DX SPUTUM INDUCT: ICD-10-PCS | Mod: 59,S$GLB,, | Performed by: NURSE PRACTITIONER

## 2022-11-29 PROCEDURE — 96372 PR INJECTION,THERAP/PROPH/DIAG2ST, IM OR SUBCUT: ICD-10-PCS | Mod: S$GLB,,, | Performed by: NURSE PRACTITIONER

## 2022-11-29 PROCEDURE — 99999 PR PBB SHADOW E&M-EST. PATIENT-LVL V: ICD-10-PCS | Mod: PBBFAC,,, | Performed by: NURSE PRACTITIONER

## 2022-11-29 PROCEDURE — 71046 XR CHEST PA AND LATERAL: ICD-10-PCS | Mod: 26,,, | Performed by: RADIOLOGY

## 2022-11-29 PROCEDURE — 93005 ELECTROCARDIOGRAM TRACING: CPT | Mod: S$GLB,,, | Performed by: NURSE PRACTITIONER

## 2022-11-29 PROCEDURE — 99213 OFFICE O/P EST LOW 20 MIN: CPT | Mod: S$GLB,,, | Performed by: NURSE PRACTITIONER

## 2022-11-29 PROCEDURE — 99213 PR OFFICE/OUTPT VISIT, EST, LEVL III, 20-29 MIN: ICD-10-PCS | Mod: S$GLB,,, | Performed by: NURSE PRACTITIONER

## 2022-11-29 PROCEDURE — 71046 X-RAY EXAM CHEST 2 VIEWS: CPT | Mod: TC

## 2022-11-29 PROCEDURE — 71046 X-RAY EXAM CHEST 2 VIEWS: CPT | Mod: 26,,, | Performed by: RADIOLOGY

## 2022-11-29 PROCEDURE — 3078F PR MOST RECENT DIASTOLIC BLOOD PRESSURE < 80 MM HG: ICD-10-PCS | Mod: CPTII,S$GLB,, | Performed by: NURSE PRACTITIONER

## 2022-11-29 PROCEDURE — 93005 EKG 12-LEAD: ICD-10-PCS | Mod: S$GLB,,, | Performed by: NURSE PRACTITIONER

## 2022-11-29 PROCEDURE — 3078F DIAST BP <80 MM HG: CPT | Mod: CPTII,S$GLB,, | Performed by: NURSE PRACTITIONER

## 2022-11-29 PROCEDURE — 94640 AIRWAY INHALATION TREATMENT: CPT | Mod: 59,S$GLB,, | Performed by: NURSE PRACTITIONER

## 2022-11-29 PROCEDURE — 99999 PR PBB SHADOW E&M-EST. PATIENT-LVL V: CPT | Mod: PBBFAC,,, | Performed by: NURSE PRACTITIONER

## 2022-11-29 PROCEDURE — 3074F PR MOST RECENT SYSTOLIC BLOOD PRESSURE < 130 MM HG: ICD-10-PCS | Mod: CPTII,S$GLB,, | Performed by: NURSE PRACTITIONER

## 2022-11-29 PROCEDURE — 96372 THER/PROPH/DIAG INJ SC/IM: CPT | Mod: S$GLB,,, | Performed by: NURSE PRACTITIONER

## 2022-11-29 PROCEDURE — 3074F SYST BP LT 130 MM HG: CPT | Mod: CPTII,S$GLB,, | Performed by: NURSE PRACTITIONER

## 2022-11-29 PROCEDURE — 1159F MED LIST DOCD IN RCRD: CPT | Mod: CPTII,S$GLB,, | Performed by: NURSE PRACTITIONER

## 2022-11-29 PROCEDURE — 93010 EKG 12-LEAD: ICD-10-PCS | Mod: S$GLB,,, | Performed by: INTERNAL MEDICINE

## 2022-11-29 PROCEDURE — 1159F PR MEDICATION LIST DOCUMENTED IN MEDICAL RECORD: ICD-10-PCS | Mod: CPTII,S$GLB,, | Performed by: NURSE PRACTITIONER

## 2022-11-29 RX ORDER — IPRATROPIUM BROMIDE AND ALBUTEROL SULFATE 2.5; .5 MG/3ML; MG/3ML
3 SOLUTION RESPIRATORY (INHALATION)
Status: DISCONTINUED | OUTPATIENT
Start: 2022-11-29 | End: 2022-11-29

## 2022-11-29 RX ORDER — DEXAMETHASONE SODIUM PHOSPHATE 100 MG/10ML
10 INJECTION INTRAMUSCULAR; INTRAVENOUS
Status: COMPLETED | OUTPATIENT
Start: 2022-11-29 | End: 2022-11-29

## 2022-11-29 RX ORDER — IPRATROPIUM BROMIDE AND ALBUTEROL SULFATE 2.5; .5 MG/3ML; MG/3ML
3 SOLUTION RESPIRATORY (INHALATION)
Status: COMPLETED | OUTPATIENT
Start: 2022-11-29 | End: 2022-11-29

## 2022-11-29 RX ORDER — IPRATROPIUM BROMIDE AND ALBUTEROL SULFATE 2.5; .5 MG/3ML; MG/3ML
3 SOLUTION RESPIRATORY (INHALATION) EVERY 6 HOURS PRN
Qty: 75 ML | Refills: 0 | Status: SHIPPED | OUTPATIENT
Start: 2022-11-29 | End: 2022-11-29 | Stop reason: SDUPTHER

## 2022-11-29 RX ADMIN — IPRATROPIUM BROMIDE AND ALBUTEROL SULFATE 3 ML: 2.5; .5 SOLUTION RESPIRATORY (INHALATION) at 04:11

## 2022-11-29 RX ADMIN — DEXAMETHASONE SODIUM PHOSPHATE 10 MG: 100 INJECTION INTRAMUSCULAR; INTRAVENOUS at 02:11

## 2022-11-29 NOTE — PROGRESS NOTES
;Tammy Bee  11/29/2022  [id number]    Maria Teresa Aparicio MD  Patient Care Team:  Maria Teresa Aparicio MD as PCP - General (Family Medicine)          Visit Type:an urgent visit for an existing problem     Chief Complaint:  Chief Complaint   Patient presents with    Shortness of Breath       History of Present Illness:  53 yo female presents with complaint of shortness of breath on exertion after testing positive for covid in July. Pt states she can walk down the isle at the grocery store without stopping to catch her breath. Reports occasional lightheadedness and dizziness. Pt denies hx of asthma, COPD, Emphysema,sinus pressure, nasal congestion, sore throat and cough, fever, chills, body aches, chest pain, nausea, vomiting, diarrhea, abdominal pain, weakness,  wheezing.   History:  Past Medical History:   Diagnosis Date    RA (rheumatoid arthritis)      Past Surgical History:   Procedure Laterality Date    Tubal Lig      TUBAL LIGATION       Family History   Problem Relation Age of Onset    Diabetes Sister     Cancer Sister     Breast cancer Sister     Diabetes Brother     Breast cancer Other      Social History     Socioeconomic History    Marital status: Single   Occupational History    Occupation: [employer], supervisor   Tobacco Use    Smoking status: Every Day     Packs/day: 0.50     Years: 30.00     Pack years: 15.00     Types: Vaping with nicotine, Cigarettes    Smokeless tobacco: Never   Substance and Sexual Activity    Alcohol use: Never    Drug use: Never    Sexual activity: Not Currently     Social Determinants of Health     Financial Resource Strain: Low Risk     Difficulty of Paying Living Expenses: Not very hard   Food Insecurity: No Food Insecurity    Worried About Running Out of Food in the Last Year: Never true    Ran Out of Food in the Last Year: Never true   Transportation Needs: No Transportation Needs    Lack of Transportation (Medical): No    Lack of Transportation (Non-Medical): No   Physical  Activity: Inactive    Days of Exercise per Week: 5 days    Minutes of Exercise per Session: 0 min   Stress: Stress Concern Present    Feeling of Stress : To some extent   Social Connections: Unknown    Frequency of Communication with Friends and Family: More than three times a week    Frequency of Social Gatherings with Friends and Family: More than three times a week    Active Member of Clubs or Organizations: No    Attends Club or Organization Meetings: Never    Marital Status:    Housing Stability: Low Risk     Unable to Pay for Housing in the Last Year: No    Number of Places Lived in the Last Year: 1    Unstable Housing in the Last Year: No     Patient Active Problem List   Diagnosis    Rheumatoid arthritis involving multiple sites with positive rheumatoid factor    Current smoker    High risk medication use    Muscle pain     Review of patient's allergies indicates:  No Known Allergies    The following were reviewed at this visit: active problem list, medication list, allergies, family history, social history, and health maintenance.    Medications:  Current Outpatient Medications on File Prior to Visit   Medication Sig Dispense Refill    methocarbamoL (ROBAXIN) 750 MG Tab Take 1 tablet (750 mg total) by mouth 3 (three) times daily as needed (msucles spasms/pain). 30 tablet 0     No current facility-administered medications on file prior to visit.       Medications have been reviewed and reconciled with patient at this visit.  Barriers to medications reviewed with patient.    Adverse reactions to current medications reviewed with patient..    Over the counter medications reviewed and reconciled with patient.    Exam:  Wt Readings from Last 3 Encounters:   11/29/22 67.1 kg (147 lb 14.9 oz)   11/20/22 65.8 kg (145 lb)   07/12/22 68.8 kg (151 lb 10.8 oz)     Temp Readings from Last 3 Encounters:   11/29/22 98.2 °F (36.8 °C) (Temporal)   11/20/22 97.8 °F (36.6 °C) (Tympanic)   07/12/22 99.8 °F (37.7 °C)      BP Readings from Last 3 Encounters:   11/29/22 124/78   11/20/22 (!) 137/59   07/12/22 127/62     Pulse Readings from Last 3 Encounters:   11/29/22 100   11/20/22 95   07/12/22 98     Body mass index is 23.17 kg/m².      ROS  Physical Exam    Laboratory Reviewed ({Yes)  Lab Results   Component Value Date    WBC 7.80 06/26/2020    HGB 12.5 06/26/2020    HCT 37.9 06/26/2020     06/26/2020    CHOL 134 04/08/2019    TRIG 82 04/08/2019    HDL 45 04/08/2019    ALT 11 06/26/2020    AST 15 06/26/2020     06/26/2020    K 3.8 06/26/2020     06/26/2020    CREATININE 0.8 06/26/2020    BUN 12 06/26/2020    CO2 25 06/26/2020       Tammy was seen today for shortness of breath.    Diagnoses and all orders for this visit:    SOB (shortness of breath) on exertion        Tammy was seen today for shortness of breath.    Diagnoses and all orders for this visit:    SOB (shortness of breath) on exertion  -     X-Ray Chest PA And Lateral; Future  -     IN OFFICE EKG 12-LEAD (to Muse)  -     dexAMETHasone injection 10 mg  -     albuterol-ipratropium (DUO-NEB) 2.5 mg-0.5 mg/3 mL nebulizer solution; Take 3 mLs by nebulization every 6 (six) hours as needed for Wheezing. Rescue           Care Plan/Goals: Reviewed    Goals    None         Follow up: No follow-ups on file.    After visit summary was printed and given to patient upon discharge today.  Patient goals and care plan are included in After Visit Summary.

## 2022-11-30 ENCOUNTER — OFFICE VISIT (OUTPATIENT)
Dept: PULMONOLOGY | Facility: CLINIC | Age: 52
End: 2022-11-30
Payer: COMMERCIAL

## 2022-11-30 ENCOUNTER — HOSPITAL ENCOUNTER (OUTPATIENT)
Dept: RADIOLOGY | Facility: HOSPITAL | Age: 52
Discharge: HOME | End: 2022-11-30
Attending: NURSE PRACTITIONER
Payer: COMMERCIAL

## 2022-11-30 ENCOUNTER — PATIENT MESSAGE (OUTPATIENT)
Dept: INTERNAL MEDICINE | Facility: CLINIC | Age: 52
End: 2022-11-30
Payer: COMMERCIAL

## 2022-11-30 ENCOUNTER — TELEPHONE (OUTPATIENT)
Dept: INTERNAL MEDICINE | Facility: CLINIC | Age: 52
End: 2022-11-30
Payer: COMMERCIAL

## 2022-11-30 VITALS
WEIGHT: 148.25 LBS | SYSTOLIC BLOOD PRESSURE: 122 MMHG | BODY MASS INDEX: 23.27 KG/M2 | RESPIRATION RATE: 18 BRPM | OXYGEN SATURATION: 100 % | HEIGHT: 67 IN | HEART RATE: 87 BPM | DIASTOLIC BLOOD PRESSURE: 80 MMHG

## 2022-11-30 DIAGNOSIS — R76.11 POSITIVE SKIN TEST FOR TUBERCULOSIS: ICD-10-CM

## 2022-11-30 DIAGNOSIS — D38.1 NEOPLASM OF UNCERTAIN BEHAVIOR OF LUNG: Primary | ICD-10-CM

## 2022-11-30 DIAGNOSIS — R06.02 SOB (SHORTNESS OF BREATH): ICD-10-CM

## 2022-11-30 DIAGNOSIS — R91.8 ABNORMAL CT LUNG SCREENING: ICD-10-CM

## 2022-11-30 DIAGNOSIS — R91.1 SOLITARY PULMONARY NODULE: ICD-10-CM

## 2022-11-30 DIAGNOSIS — R91.8 PULMONARY NODULES: Primary | ICD-10-CM

## 2022-11-30 DIAGNOSIS — R06.02 SOB (SHORTNESS OF BREATH) ON EXERTION: Primary | ICD-10-CM

## 2022-11-30 DIAGNOSIS — R06.02 SOB (SHORTNESS OF BREATH) ON EXERTION: ICD-10-CM

## 2022-11-30 PROCEDURE — 71260 CT THORAX DX C+: CPT | Mod: TC

## 2022-11-30 PROCEDURE — 1159F PR MEDICATION LIST DOCUMENTED IN MEDICAL RECORD: ICD-10-PCS | Mod: CPTII,S$GLB,, | Performed by: INTERNAL MEDICINE

## 2022-11-30 PROCEDURE — 1160F PR REVIEW ALL MEDS BY PRESCRIBER/CLIN PHARMACIST DOCUMENTED: ICD-10-PCS | Mod: CPTII,S$GLB,, | Performed by: INTERNAL MEDICINE

## 2022-11-30 PROCEDURE — 1159F MED LIST DOCD IN RCRD: CPT | Mod: CPTII,S$GLB,, | Performed by: INTERNAL MEDICINE

## 2022-11-30 PROCEDURE — 3008F BODY MASS INDEX DOCD: CPT | Mod: CPTII,S$GLB,, | Performed by: INTERNAL MEDICINE

## 2022-11-30 PROCEDURE — 3008F PR BODY MASS INDEX (BMI) DOCUMENTED: ICD-10-PCS | Mod: CPTII,S$GLB,, | Performed by: INTERNAL MEDICINE

## 2022-11-30 PROCEDURE — 25500020 PHARM REV CODE 255: Performed by: NURSE PRACTITIONER

## 2022-11-30 PROCEDURE — 1160F RVW MEDS BY RX/DR IN RCRD: CPT | Mod: CPTII,S$GLB,, | Performed by: INTERNAL MEDICINE

## 2022-11-30 PROCEDURE — 3079F PR MOST RECENT DIASTOLIC BLOOD PRESSURE 80-89 MM HG: ICD-10-PCS | Mod: CPTII,S$GLB,, | Performed by: INTERNAL MEDICINE

## 2022-11-30 PROCEDURE — 99999 PR PBB SHADOW E&M-EST. PATIENT-LVL V: ICD-10-PCS | Mod: PBBFAC,,, | Performed by: INTERNAL MEDICINE

## 2022-11-30 PROCEDURE — 3079F DIAST BP 80-89 MM HG: CPT | Mod: CPTII,S$GLB,, | Performed by: INTERNAL MEDICINE

## 2022-11-30 PROCEDURE — 3074F PR MOST RECENT SYSTOLIC BLOOD PRESSURE < 130 MM HG: ICD-10-PCS | Mod: CPTII,S$GLB,, | Performed by: INTERNAL MEDICINE

## 2022-11-30 PROCEDURE — 99205 PR OFFICE/OUTPT VISIT, NEW, LEVL V, 60-74 MIN: ICD-10-PCS | Mod: S$GLB,,, | Performed by: INTERNAL MEDICINE

## 2022-11-30 PROCEDURE — 99205 OFFICE O/P NEW HI 60 MIN: CPT | Mod: S$GLB,,, | Performed by: INTERNAL MEDICINE

## 2022-11-30 PROCEDURE — 99999 PR PBB SHADOW E&M-EST. PATIENT-LVL V: CPT | Mod: PBBFAC,,, | Performed by: INTERNAL MEDICINE

## 2022-11-30 PROCEDURE — 3074F SYST BP LT 130 MM HG: CPT | Mod: CPTII,S$GLB,, | Performed by: INTERNAL MEDICINE

## 2022-11-30 RX ADMIN — IOHEXOL 100 ML: 350 INJECTION, SOLUTION INTRAVENOUS at 09:11

## 2022-11-30 NOTE — TELEPHONE ENCOUNTER
Looking at her records, she has a history or RA with rheum factor +. Pulm nodules dont always mirror joint findings. Dyspnea probably unrelated to nodules and more likely just post-covid syndrome. I will place order for CT guided biopsy and PET that way will have results by the time I see her

## 2022-11-30 NOTE — TELEPHONE ENCOUNTER
I reviewed CT images. Rheumatoid nodules, metastatic malignancy possible. Lesions are peripheral and would need CT guided biopsy. PET may also be helpful. If you would like me to order these let me know.

## 2022-11-30 NOTE — PATIENT INSTRUCTIONS
[facility]  [address] [address]  (off [address])    Lung Biopsy Appointment    The radiologist will review your CT scan to determine when the lung biopsy will be scheduled. If you do not hear from the radiology department in 48 hours then call [phone].     This test is done in the Radiology Department on the 1st floor of [facility]    Lung Biopsy is performed to diagnose or determine the level of lung disease that cant be determined by regular lab testing.    Prior to Lung Biopsy: (VERY IMPORTANT-PLEASE READ)  You must be fasting: nothing to eat or drink after midnight the night before the test.  You must have a responsible adult to drive you home after procedure and stay with you the rest of the day.  Plan to stay approximately 4 hours after procedure is completed.  If you are on Coumadin, Heparin, Plavix, Aspirin or NSAIDS must be stopped 7 days prior to the procedure. (YOU MUST LET YOUR PHYSICIAN KNOW)  The ordering physician should order lab work to be done within 7 days of the procedure, to determine your blood clotting factor.  Diabetic patients: If you take oral glucose pills, dont take it the morning of the biopsy.    If you take insulin, take your normal dose as usual.  Blood pressure medications are to be taken the morning of procedure with a small sip of water at least 2 hours before biopsy.  Based on your medical condition, your physician may request other specific preparation.     During the test you will lie on your back with your right hand behind your head. You will need to lie completely still while the best spot to insert the biopsy needle is determine. Ultrasound, MRI, or CT may also be used to locate a specific spot in the lung. Then, your skin will be cleaned and a local anesthetic (numbing) will be applied to the skin. You will also be given a light sedation. A biopsy needle will be inserted and advanced to the surface of  the lung, where a tissue sample is obtained. After the biopsy you will remain lying on your right side for 2 hours. You may experience some pain around the site or in the shoulder. This isnt uncommon. Then, you will change position to lying on your back for 2-3 hours. You will be monitored closely during this time in recovery. You will be able to drink 2 hours and eat 4 hours after the procedure. The biopsy site will be dressed. A small amount of blood on dressing is normal.    What to expect when home for Lung Biopsy:  Bed rest for the rest of the day and evening.  Blood thinning medications can be resumed when instructed by prescribing physician.  Avoid heavy lifting and strenuous exercise for the next 2-3 days following the procedure.  You can resume your normal diet.  You wont be able to travel by airplane for up to 5 days after biopsy.    Contact doctors office or go to ER if you experience:  Excessive bleeding saturated dressing or bleeding that will not stop by biopsy site  Fever or chills  Severe pain in the abdomen  Difficulty breathing    If your biopsy needs to be rescheduled, please contact the Radiology Department at [phone]. Bio

## 2022-11-30 NOTE — PROGRESS NOTES
Subjective:     Patient ID: Tammy Bee is a 52 y.o. female.    Chief Complaint:  Left sided pleuritic chest wall pain    HPI 53 y/o smoker with Abnormal Chest X Ray  and left sided pleuritic pain     Dyspnea  Patient complains of shortness of breath. Symptoms occur with one block walking. Symptoms began 4 months ago after COVID ( was vaccinated beforehand)  gradually worsening since. Associated symptoms include  dyspnea on exertion, shortness of breath, and leg muscles burn . She denies post nasal drip and productive cough. She does not have had recent travel. Weight has increased 20 pounds over last few years . Symptoms are exacerbated by any exercise. Symptoms are alleviated by rest.       Lung Nodule  She presents for evaluation and treatment of a lung nodule. The patient reports that the imaging was performed to evaluate symptoms of dyspnea on exertion and shortness of breath which have been present for 4 months and are gradually worsening. Symptoms are exacerbated by exercise and relieved by rest. The patient denies other associated symptoms. She has a history of 32 pack years. The patient has a positive PPD. Untreated in 1998 The patient does not have a history of cancer. History of RA - no flare ups in over a year    Past Medical History:   Diagnosis Date    RA (rheumatoid arthritis)      Past Surgical History:   Procedure Laterality Date    Tubal Lig      TUBAL LIGATION       Review of patient's allergies indicates:  No Known Allergies  No current outpatient medications on file prior to visit.     No current facility-administered medications on file prior to visit.     Social History     Socioeconomic History    Marital status: Single   Occupational History    Occupation: [employer], supervisor   Tobacco Use    Smoking status: Every Day     Packs/day: 1.00     Years: 32.00     Pack years: 32.00     Types: Vaping with nicotine, Cigarettes     Start date: 1/1/1988     Last attempt to quit: 1/1/2020      Years since quittin.9    Smokeless tobacco: Never   Substance and Sexual Activity    Alcohol use: Never    Drug use: Never    Sexual activity: Not Currently     Social Determinants of Health     Financial Resource Strain: Low Risk     Difficulty of Paying Living Expenses: Not very hard   Food Insecurity: No Food Insecurity    Worried About Running Out of Food in the Last Year: Never true    Ran Out of Food in the Last Year: Never true   Transportation Needs: No Transportation Needs    Lack of Transportation (Medical): No    Lack of Transportation (Non-Medical): No   Physical Activity: Inactive    Days of Exercise per Week: 5 days    Minutes of Exercise per Session: 0 min   Stress: Stress Concern Present    Feeling of Stress : To some extent   Social Connections: Unknown    Frequency of Communication with Friends and Family: More than three times a week    Frequency of Social Gatherings with Friends and Family: More than three times a week    Active Member of Clubs or Organizations: No    Attends Club or Organization Meetings: Never    Marital Status:    Housing Stability: Low Risk     Unable to Pay for Housing in the Last Year: No    Number of Places Lived in the Last Year: 1    Unstable Housing in the Last Year: No     Family History   Problem Relation Age of Onset    Diabetes Sister     Cancer Sister     Breast cancer Sister     Diabetes Brother     Breast cancer Other        Review of Systems   Constitutional:  Negative for fever and fatigue.   HENT:  Negative for postnasal drip and rhinorrhea.    Eyes:  Negative for redness and itching.   Respiratory:  Positive for pleurisy. Negative for cough, shortness of breath, wheezing, dyspnea on extertion and Paroxysmal Nocturnal Dyspnea.    Cardiovascular:  Negative for chest pain.   Genitourinary:  Negative for difficulty urinating and hematuria.   Endocrine:  Negative for polyphagia, cold intolerance and heat intolerance.    Musculoskeletal:  Positive for  "arthralgias.   Skin:  Negative for rash.   Gastrointestinal:  Negative for nausea, vomiting, abdominal pain and abdominal distention.   Neurological:  Negative for dizziness and headaches.   Hematological:  Negative for adenopathy. Does not bruise/bleed easily and no excessive bruising.   Psychiatric/Behavioral:  The patient is not nervous/anxious.      Objective:      /80   Pulse 87   Resp 18   Ht 5' 7" (1.702 m)   Wt 67.3 kg (148 lb 4.2 oz)   SpO2 100%   BMI 23.22 kg/m²   Physical Exam  Vitals and nursing note reviewed.   Constitutional:       Appearance: Normal appearance. She is well-developed.   HENT:      Head: Normocephalic and atraumatic.      Nose: Nose normal.   Eyes:      Conjunctiva/sclera: Conjunctivae normal.      Pupils: Pupils are equal, round, and reactive to light.   Neck:      Thyroid: No thyromegaly.      Vascular: No JVD.      Trachea: No tracheal deviation.   Cardiovascular:      Rate and Rhythm: Normal rate and regular rhythm.      Heart sounds: Normal heart sounds.   Pulmonary:      Effort: Pulmonary effort is normal. No respiratory distress.      Breath sounds: Normal breath sounds. No wheezing or rales.   Chest:      Chest wall: No tenderness.   Abdominal:      General: Bowel sounds are normal.      Palpations: Abdomen is soft.   Musculoskeletal:         General: Normal range of motion.      Cervical back: Neck supple.   Lymphadenopathy:      Cervical: No cervical adenopathy.   Skin:     General: Skin is warm and dry.   Neurological:      Mental Status: She is alert and oriented to person, place, and time.     Personal Diagnostic Review  CT of chest performed on 11/30/2022 with contrast revealed multiple pulmonary nodules.    Pulmonary Studies Review 11/30/2022   SpO2 100   Height 67   Weight 2372.15   BMI (Calculated) 23.2   Predicted Distance 441.73   Predicted Distance Meters (Calculated) 583.12       CT Chest With Contrast  Narrative: EXAMINATION:  CT CHEST WITH " CONTRAST    CLINICAL HISTORY:  Solitary pulmonary noduleAbnormal xray - lung nodule, < 1 cm, mod-high risk;    COMPARISON:  Chest radiograph November 29, 2022.    TECHNIQUE:  Axial CT images were obtained. Iterative reconstruction technique was used.  IV contrast was administered.    FINDINGS:  MA multiple bilateral pulmonary nodules, 1 of which is cavitary in the right lower lobe.  The right lower lobe pulmonary nodule which is cavitary it measures 1.2 x 1.1 cm trans axially (series 5, image 312) the largest nodule is in the left lower lobe and involves the pleura and measures 1.7 by 1.4 cm (series 5, image 295).    No mediastinal or hilar lymphadenopathy.  No axillary lymphadenopathy.  No acute findings in the upper abdomen.  Impression: Multiple bilateral pulmonary nodules, 1 of which is cavitary.  Findings suggest metastatic disease.    The CT exam was performed using one or more of the following dose reduction techniques- Automated exposure control, adjustment of the mA and/or kV according to patient size, and/or use of iterative reconstructed technique.    Electronically signed by: Willie Pop  Date:    11/30/2022  Time:    10:13      Office Spirometry Results:     No flowsheet data found.  Pulmonary Studies Review 11/30/2022   SpO2 100   Height 67   Weight 2372.15   BMI (Calculated) 23.2   Predicted Distance 441.73   Predicted Distance Meters (Calculated) 583.12         Assessment:            Pulmonary nodules  -     NM PET CT Routine Skull to Mid Thigh; Future; Expected date: 11/30/2022    SOB (shortness of breath) on exertion  -     Ambulatory referral/consult to Pulmonology  -     Ambulatory referral/consult to Pulmonology  -     Complete PFT with bronchodilator; Future; Expected date: 11/30/2022  -     CBC Auto Differential; Future; Expected date: 11/30/2022  -     Comprehensive Metabolic Panel; Future; Expected date: 11/30/2022    Solitary pulmonary nodule  -     Ambulatory referral/consult to  Pulmonology  -     NM PET CT Routine Skull to Mid Thigh; Future; Expected date: 2022  -     CT Biopsy Lung w/ guidance; Future; Expected date: 2022    Abnormal CT lung screening  -     Ambulatory referral/consult to Pulmonology    SOB (shortness of breath)    Positive skin test for tuberculosis  -     QUANTIFERON GOLD TB; Future; Expected date: 2022        Outpatient Encounter Medications as of 2022   Medication Sig Dispense Refill    [DISCONTINUED] albuterol-ipratropium (DUO-NEB) 2.5 mg-0.5 mg/3 mL nebulizer solution Take 3 mLs by nebulization every 6 (six) hours as needed for Wheezing. Rescue 75 mL 0    [DISCONTINUED] methocarbamoL (ROBAXIN) 750 MG Tab Take 1 tablet (750 mg total) by mouth 3 (three) times daily as needed (msucles spasms/pain). (Patient not taking: Reported on 2022) 30 tablet 0    [] albuterol-ipratropium 2.5 mg-0.5 mg/3 mL nebulizer solution 3 mL       [] dexAMETHasone injection 10 mg       [] iohexoL (OMNIPAQUE 350) injection 100 mL       [DISCONTINUED] albuterol-ipratropium 2.5 mg-0.5 mg/3 mL nebulizer solution 3 mL        No facility-administered encounter medications on file as of 2022.     Plan:       Requested Prescriptions      No prescriptions requested or ordered in this encounter     Problem List Items Addressed This Visit    None  Visit Diagnoses       Pulmonary nodules    -  Primary    Relevant Orders    NM PET CT Routine Skull to Mid Thigh    SOB (shortness of breath) on exertion        Relevant Orders    Complete PFT with bronchodilator    CBC Auto Differential    Comprehensive Metabolic Panel    Solitary pulmonary nodule        Relevant Orders    NM PET CT Routine Skull to Mid Thigh    CT Biopsy Lung w/ guidance    Abnormal CT lung screening        SOB (shortness of breath)        Positive skin test for tuberculosis        Relevant Orders    QUANTIFERON GOLD TB               Follow up in about 2 weeks (around 2022) for  Review progress.    MEDICAL DECISION MAKING: Moderate to high complexity.  Overall, the multiple problems listed are of moderate to high severity that may impact quality of life and activities of daily living. Side effects of medications, treatment plan as well as options and alternatives reviewed and discussed with patient. There was counseling of patient concerning these issues.    Total time spent in counseling and coordination of care - 60  minutes of total time spent on the encounter, which includes face to face time and non-face to face time preparing to see the patient (eg, review of tests), Obtaining and/or reviewing separately obtained history, Documenting clinical information in the electronic or other health record, Independently interpreting results (not separately reported) and communicating results to the patient/family/caregiver, or Care coordination (not separately reported).    Time was used in discussion of prognosis, risks, benefits of treatment, instructions and compliance with regimen . Discussion with other physicians and/or health care providers - home health or for use of durable medical equipment (oxygen, nebulizers, CPAP, BiPAP) occurred.

## 2022-12-01 ENCOUNTER — PATIENT MESSAGE (OUTPATIENT)
Dept: PULMONOLOGY | Facility: CLINIC | Age: 52
End: 2022-12-01
Payer: COMMERCIAL

## 2022-12-01 ENCOUNTER — PATIENT MESSAGE (OUTPATIENT)
Dept: INTERNAL MEDICINE | Facility: CLINIC | Age: 52
End: 2022-12-01
Payer: COMMERCIAL

## 2022-12-01 DIAGNOSIS — R06.02 SOB (SHORTNESS OF BREATH) ON EXERTION: Primary | ICD-10-CM

## 2022-12-01 RX ORDER — ALBUTEROL SULFATE 90 UG/1
2 AEROSOL, METERED RESPIRATORY (INHALATION) EVERY 4 HOURS PRN
Qty: 18 G | Refills: 11 | Status: SHIPPED | OUTPATIENT
Start: 2022-12-01 | End: 2023-03-16

## 2022-12-02 ENCOUNTER — PATIENT MESSAGE (OUTPATIENT)
Dept: PULMONOLOGY | Facility: CLINIC | Age: 52
End: 2022-12-02
Payer: COMMERCIAL

## 2022-12-06 ENCOUNTER — TELEPHONE (OUTPATIENT)
Dept: PULMONOLOGY | Facility: CLINIC | Age: 52
End: 2022-12-06
Payer: COMMERCIAL

## 2022-12-06 DIAGNOSIS — R06.02 SOB (SHORTNESS OF BREATH): Primary | ICD-10-CM

## 2022-12-06 DIAGNOSIS — J45.31 MILD PERSISTENT ASTHMA WITH ACUTE EXACERBATION: ICD-10-CM

## 2022-12-06 NOTE — TELEPHONE ENCOUNTER
----- Message from Rubia Hager sent at 12/6/2022  9:18 AM CST -----  Contact: Tammy  .Type:  Same Day Appointment Request    Caller is requesting a same day appointment.  Caller declined first available appointment listed below.    Name of Caller: Tammy   When is the first available appointment?12/8  Symptoms Having trouble breathing, worsened, interfering with job   Best Call Back Number:.[phone]   Additional Information:

## 2022-12-06 NOTE — TELEPHONE ENCOUNTER
Pt states she is having worse sob which affecting her job.  She stated that she doesn't know how to use an inhaler.  Do you want to order a nebulizer and meds for her?  PDM?  Please advise.

## 2022-12-07 ENCOUNTER — PATIENT MESSAGE (OUTPATIENT)
Dept: PULMONOLOGY | Facility: CLINIC | Age: 52
End: 2022-12-07
Payer: COMMERCIAL

## 2022-12-07 DIAGNOSIS — Z12.31 OTHER SCREENING MAMMOGRAM: ICD-10-CM

## 2022-12-07 RX ORDER — AZITHROMYCIN 250 MG/1
TABLET, FILM COATED ORAL
Qty: 6 TABLET | Refills: 0 | Status: SHIPPED | OUTPATIENT
Start: 2022-12-07 | End: 2022-12-20

## 2022-12-07 RX ORDER — PREDNISONE 20 MG/1
TABLET ORAL
Qty: 20 TABLET | Refills: 0 | Status: SHIPPED | OUTPATIENT
Start: 2022-12-07 | End: 2022-12-20

## 2022-12-07 NOTE — TELEPHONE ENCOUNTER
Pt states she is having increased sob that is affecting her ability to do her job.  Pt has PET and appt with Dr. Cassidy this month but felt she needed to be seen by someone asap.  Appt scheduled for 12/9/22 with CHARLOTTE Suarez.

## 2022-12-08 ENCOUNTER — PATIENT MESSAGE (OUTPATIENT)
Dept: PULMONOLOGY | Facility: CLINIC | Age: 52
End: 2022-12-08
Payer: COMMERCIAL

## 2022-12-08 DIAGNOSIS — J45.31 MILD PERSISTENT ASTHMA WITH ACUTE EXACERBATION: Primary | ICD-10-CM

## 2022-12-08 RX ORDER — ALBUTEROL SULFATE 0.83 MG/ML
2.5 SOLUTION RESPIRATORY (INHALATION)
Qty: 360 ML | Refills: 11 | Status: SHIPPED | OUTPATIENT
Start: 2022-12-08 | End: 2023-03-16

## 2022-12-08 NOTE — TELEPHONE ENCOUNTER
Spoke to pt.  She stated that her daughter will  nebulizer at [facility] today.  Mei MERINO will have nebulizer ready for her.

## 2022-12-08 NOTE — TELEPHONE ENCOUNTER
Prescription for albuterol sol [facility] [address], [place] - [id number] [address] [id number]

## 2022-12-09 ENCOUNTER — CLINICAL SUPPORT (OUTPATIENT)
Dept: PULMONOLOGY | Facility: CLINIC | Age: 52
End: 2022-12-09
Payer: COMMERCIAL

## 2022-12-09 ENCOUNTER — HOSPITAL ENCOUNTER (OUTPATIENT)
Dept: RADIOLOGY | Facility: HOSPITAL | Age: 52
Discharge: HOME | End: 2022-12-09
Attending: NURSE PRACTITIONER
Payer: COMMERCIAL

## 2022-12-09 ENCOUNTER — OFFICE VISIT (OUTPATIENT)
Dept: PULMONOLOGY | Facility: CLINIC | Age: 52
End: 2022-12-09
Payer: COMMERCIAL

## 2022-12-09 VITALS
OXYGEN SATURATION: 100 % | WEIGHT: 146.63 LBS | HEIGHT: 67 IN | DIASTOLIC BLOOD PRESSURE: 78 MMHG | HEART RATE: 95 BPM | RESPIRATION RATE: 20 BRPM | BODY MASS INDEX: 23.01 KG/M2 | SYSTOLIC BLOOD PRESSURE: 130 MMHG

## 2022-12-09 VITALS — BODY MASS INDEX: 23.15 KG/M2 | WEIGHT: 147.5 LBS | HEIGHT: 67 IN

## 2022-12-09 DIAGNOSIS — M05.79 RHEUMATOID ARTHRITIS INVOLVING MULTIPLE SITES WITH POSITIVE RHEUMATOID FACTOR: ICD-10-CM

## 2022-12-09 DIAGNOSIS — R06.02 SOB (SHORTNESS OF BREATH): ICD-10-CM

## 2022-12-09 DIAGNOSIS — F06.4 ANXIETY DISORDER DUE TO MEDICAL CONDITION: ICD-10-CM

## 2022-12-09 DIAGNOSIS — R91.8 PULMONARY NODULES: ICD-10-CM

## 2022-12-09 DIAGNOSIS — R06.02 SOB (SHORTNESS OF BREATH): Primary | ICD-10-CM

## 2022-12-09 DIAGNOSIS — F17.211 NICOTINE DEPENDENCE, CIGARETTES, IN REMISSION: ICD-10-CM

## 2022-12-09 DIAGNOSIS — R76.11 POSITIVE SKIN TEST FOR TUBERCULOSIS: ICD-10-CM

## 2022-12-09 PROCEDURE — 99999 PR PBB SHADOW E&M-EST. PATIENT-LVL I: CPT | Mod: PBBFAC,,,

## 2022-12-09 PROCEDURE — 95012 NITRIC OXIDE EXP GAS DETER: CPT | Mod: S$GLB,,, | Performed by: INTERNAL MEDICINE

## 2022-12-09 PROCEDURE — 99417 PR PROLONGED SVC, OUTPT, W/WO DIRECT PT CONTACT,  EA ADDTL 15 MIN: ICD-10-PCS | Mod: S$GLB,,, | Performed by: NURSE PRACTITIONER

## 2022-12-09 PROCEDURE — 99999 PR PBB SHADOW E&M-EST. PATIENT-LVL I: ICD-10-PCS | Mod: PBBFAC,,,

## 2022-12-09 PROCEDURE — 3078F DIAST BP <80 MM HG: CPT | Mod: CPTII,S$GLB,, | Performed by: NURSE PRACTITIONER

## 2022-12-09 PROCEDURE — 3075F PR MOST RECENT SYSTOLIC BLOOD PRESS GE 130-139MM HG: ICD-10-PCS | Mod: CPTII,S$GLB,, | Performed by: NURSE PRACTITIONER

## 2022-12-09 PROCEDURE — 99417 PROLNG OP E/M EACH 15 MIN: CPT | Mod: S$GLB,,, | Performed by: NURSE PRACTITIONER

## 2022-12-09 PROCEDURE — 3075F SYST BP GE 130 - 139MM HG: CPT | Mod: CPTII,S$GLB,, | Performed by: NURSE PRACTITIONER

## 2022-12-09 PROCEDURE — 3008F BODY MASS INDEX DOCD: CPT | Mod: CPTII,S$GLB,, | Performed by: NURSE PRACTITIONER

## 2022-12-09 PROCEDURE — 95012 PR NITRIC OXIDE EXPIRED GAS DETERMINATION: ICD-10-PCS | Mod: S$GLB,,, | Performed by: INTERNAL MEDICINE

## 2022-12-09 PROCEDURE — 99215 OFFICE O/P EST HI 40 MIN: CPT | Mod: 25,S$GLB,, | Performed by: NURSE PRACTITIONER

## 2022-12-09 PROCEDURE — 94618 PULMONARY STRESS TESTING: CPT | Mod: S$GLB,,, | Performed by: INTERNAL MEDICINE

## 2022-12-09 PROCEDURE — 71046 X-RAY EXAM CHEST 2 VIEWS: CPT | Mod: TC

## 2022-12-09 PROCEDURE — 99215 PR OFFICE/OUTPT VISIT, EST, LEVL V, 40-54 MIN: ICD-10-PCS | Mod: 25,S$GLB,, | Performed by: NURSE PRACTITIONER

## 2022-12-09 PROCEDURE — 3078F PR MOST RECENT DIASTOLIC BLOOD PRESSURE < 80 MM HG: ICD-10-PCS | Mod: CPTII,S$GLB,, | Performed by: NURSE PRACTITIONER

## 2022-12-09 PROCEDURE — 94618 PULMONARY STRESS TESTING: ICD-10-PCS | Mod: S$GLB,,, | Performed by: INTERNAL MEDICINE

## 2022-12-09 PROCEDURE — 99999 PR PBB SHADOW E&M-EST. PATIENT-LVL IV: ICD-10-PCS | Mod: PBBFAC,,, | Performed by: NURSE PRACTITIONER

## 2022-12-09 PROCEDURE — 99999 PR PBB SHADOW E&M-EST. PATIENT-LVL IV: CPT | Mod: PBBFAC,,, | Performed by: NURSE PRACTITIONER

## 2022-12-09 PROCEDURE — 3008F PR BODY MASS INDEX (BMI) DOCUMENTED: ICD-10-PCS | Mod: CPTII,S$GLB,, | Performed by: NURSE PRACTITIONER

## 2022-12-09 PROCEDURE — 71046 XR CHEST PA AND LATERAL: ICD-10-PCS | Mod: 26,,, | Performed by: RADIOLOGY

## 2022-12-09 PROCEDURE — 71046 X-RAY EXAM CHEST 2 VIEWS: CPT | Mod: 26,,, | Performed by: RADIOLOGY

## 2022-12-09 RX ORDER — IPRATROPIUM BROMIDE AND ALBUTEROL SULFATE 2.5; .5 MG/3ML; MG/3ML
SOLUTION RESPIRATORY (INHALATION)
COMMUNITY
Start: 2022-11-29 | End: 2023-03-16

## 2022-12-09 RX ORDER — HYDROXYZINE PAMOATE 25 MG/1
25-50 CAPSULE ORAL 4 TIMES DAILY
Qty: 30 CAPSULE | Refills: 0 | Status: SHIPPED | OUTPATIENT
Start: 2022-12-09 | End: 2022-12-20

## 2022-12-09 NOTE — PROGRESS NOTES
Subjective:     Patient ID: Tammy Bee is a 52 y.o. female.  Chief Complaint   Patient presents with    Shortness of Breath         HPI     Tammy Bee is a 52 y.o. female presents for acute care visit related to shortness of breath mainly on exertion.    There is no cough, no wheezing, no hemoptysis, no sputum production, no weight loss,  TB exposure at age 27 years, + skin test, clear chest xray, never took TB medication.  no asbestos exposure.   Left lateral lower rib region pain is level 1-2/10, notices but not bothersome.     Work as  for [employer] is difficult related to shortness of breath.  She is requesting work release 12/9 - 12/15 return on 12/16. Provided work excuse.     No prior diagnosis of asthma or COPD. Never diagnosed with cancer.     12/9/2022 6MWD 100% sat during entire walk. No desaturations requiring supplemental oxygen at rest or exertion.    12/9/2022 FeNO 13, no inflammation of lungs suggested.      No indication for home O2, no indication to add ICS controller at this visit.  Some anxiety patient expresses about her medical condition may be in part contributing. If she worsens she is to report to ED for further evaluation.     She is under Dr Cassidy care for over 1 cm pulmonary nodules/cavitary lesion of lung. Pending PET CT and lung bx/CPFT.   Has follow up scheduled for 12/20/2022 with Dr. Cassidy to review PET CT     She was prescribed yesterday zpack and prednisone. She has Albuterol inhaler that she uses sporadically. Not sure if relief with Albuterol inhaler since also stops a rest which relieves shortness of breath .    Current treatment  duo nebs.  Albuterol inhaler.  Albuterol nebs.  New treatment 12/8/2022 Z-Henry/prednisone 20 mg taper per Dr. Cassidy.    Dyspnea Characteristics:   Dyspnea at rest and  Exertional Dyspnea   Dyspnea Duration:  Persistent over the past 5-6 months, worsening with exertion  Dyspnea Severity:  JAYLON 5  Dyspnea Timing:  At  rest occasional, mainly with exertion. Such walk up a ramp, walking on flat ground if carrying something 4-5 steps then severe shortness of breath, then if pulling  a cart has to take 2 steps then stop to rest. If not carrying or pulling a cart then can walk from bedroom to kitchen in her home and will become shortness of breath.  Dyspnea Contributing Factors:  Cavitary pulmonary nodule. 32 pack year former cigarette smoker. Quit 2020.    Dyspnea Associated Symptoms:  Sometimes squiggles bilateral visual aura at time of feeling of shortness of breath on exertion, resolves with rest. No headache at time .    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  No occupational exposure to Asbestos, Silica,  Petrochemicals,  Fiber glass,  Saw Dust,  Grain Dust, and  Pesticides.    Avocational Exposure:   None currently.       Pet Exposures:  Dogs one dog in home. Denies allergy to Dog and  cat dander.        Modified Bhaskar Dyspnea Scale      0  Nothing at all    0.5  Very, very slight (just noticeable)    1  Very slight    2  Slight    3  Moderate    4 Somewhat severe    5 Severe      6    7  Very severe    8    9   Very, very severe (almost maximal)  10  Maximal      Past Medical History:   Diagnosis Date    RA (rheumatoid arthritis)      Past Surgical History:   Procedure Laterality Date    Tubal Lig      TUBAL LIGATION       Review of patient's allergies indicates:  No Known Allergies  Current Outpatient Medications on File Prior to Visit   Medication Sig Dispense Refill    albuterol (PROVENTIL) 2.5 mg /3 mL (0.083 %) nebulizer solution Take 3 mLs (2.5 mg total) by nebulization every 4 to 6 hours as needed for Wheezing or Shortness of Breath. 360 mL 11    albuterol (PROVENTIL/VENTOLIN HFA) 90 mcg/actuation inhaler Inhale 2 puffs into the lungs every 4 (four) hours as needed for Wheezing or Shortness of Breath. 18 g 11    albuterol-ipratropium (DUO-NEB) 2.5 mg-0.5  mg/3 mL nebulizer solution SMARTSI Ampule(s) Via Nebulizer Every 6 Hours PRN      azithromycin (ZITHROMAX Z-PABLITO) 250 MG tablet 500 mg on day 1 (two tablets) followed by 250 mg once daily on days 2-5 6 tablet 0    predniSONE (DELTASONE) 20 MG tablet Prednisone 60 mg/ day for 3 days, 40 mg/day for 3 days,20 mg/ day for 3 days, (1/2 tablet )10 mg a day for 3 days. 20 tablet 0     No current facility-administered medications on file prior to visit.     Social History     Socioeconomic History    Marital status: Single   Occupational History    Occupation: [employer], supervisor   Tobacco Use    Smoking status: Former     Packs/day: 1.00     Years: 32.00     Pack years: 32.00     Types: Vaping with nicotine, Cigarettes     Start date:      Quit date:      Years since quittin.9    Smokeless tobacco: Never   Substance and Sexual Activity    Alcohol use: Not Currently    Drug use: Not Currently     Types: Marijuana    Sexual activity: Not Currently     Social Determinants of Health     Financial Resource Strain: Low Risk     Difficulty of Paying Living Expenses: Not very hard   Food Insecurity: No Food Insecurity    Worried About Running Out of Food in the Last Year: Never true    Ran Out of Food in the Last Year: Never true   Transportation Needs: No Transportation Needs    Lack of Transportation (Medical): No    Lack of Transportation (Non-Medical): No   Physical Activity: Inactive    Days of Exercise per Week: 5 days    Minutes of Exercise per Session: 0 min   Stress: Stress Concern Present    Feeling of Stress : To some extent   Social Connections: Unknown    Frequency of Communication with Friends and Family: More than three times a week    Frequency of Social Gatherings with Friends and Family: More than three times a week    Active Member of Clubs or Organizations: No    Attends Club or Organization Meetings: Never    Marital Status:    Housing Stability: Low Risk     Unable to Pay for Housing in  "the Last Year: No    Number of Places Lived in the Last Year: 1    Unstable Housing in the Last Year: No     Family History   Problem Relation Age of Onset    Diabetes Sister     Cancer Sister     Breast cancer Sister     Diabetes Brother     Breast cancer Other        Review of Systems   Constitutional:  Negative for fever and fatigue.   HENT:  Negative for postnasal drip and rhinorrhea.    Eyes:  Negative for redness and itching.   Respiratory:  Positive for shortness of breath and pleurisy. Negative for cough, wheezing, dyspnea on extertion and Paroxysmal Nocturnal Dyspnea.    Cardiovascular:  Negative for chest pain.   Genitourinary:  Negative for difficulty urinating and hematuria.   Endocrine:  Negative for polyphagia, cold intolerance and heat intolerance.    Musculoskeletal:  Positive for arthralgias.   Skin:  Negative for rash.   Gastrointestinal:  Negative for nausea, vomiting, abdominal pain and abdominal distention.   Neurological:  Negative for dizziness and headaches.   Hematological:  Negative for adenopathy. Does not bruise/bleed easily and no excessive bruising.   Psychiatric/Behavioral:  The patient is not nervous/anxious.      Objective:      /78   Pulse 95   Resp 20   Ht 5' 7" (1.702 m)   Wt 66.5 kg (146 lb 9.7 oz)   SpO2 100%   BMI 22.96 kg/m²   Physical Exam  Vitals and nursing note reviewed.   Constitutional:       Appearance: Normal appearance. She is well-developed.   HENT:      Head: Normocephalic and atraumatic.      Nose: Nose normal.   Eyes:      Conjunctiva/sclera: Conjunctivae normal.      Pupils: Pupils are equal, round, and reactive to light.   Neck:      Thyroid: No thyromegaly.      Vascular: No JVD.      Trachea: No tracheal deviation.   Cardiovascular:      Rate and Rhythm: Normal rate and regular rhythm.      Heart sounds: Normal heart sounds.   Pulmonary:      Effort: Pulmonary effort is normal. No respiratory distress.      Breath sounds: Normal breath sounds. No " wheezing or rales.   Chest:      Chest wall: No tenderness.   Abdominal:      General: Bowel sounds are normal.      Palpations: Abdomen is soft.   Musculoskeletal:         General: Normal range of motion.      Cervical back: Neck supple.   Lymphadenopathy:      Cervical: No cervical adenopathy.   Skin:     General: Skin is warm and dry.   Neurological:      Mental Status: She is alert and oriented to person, place, and time.     Personal Diagnostic Review    12/9/2022 6MWD No desaturations requiring supplemental oxygen at rest or exertion.  Phase Oxygen Assessment Supplemental O2 Heart   Rate Blood Pressure Bhaskar Dyspnea Scale Rating   Resting 100 % Room Air 97 bpm 125/63 1   Exercise             Minute             1 100 % Room Air 126 bpm       2 100 % Room Air 133 bpm       3 100 % Room Air 107 bpm       4 100 % Room Air 122 bpm       5 100 % Room Air 134 bpm       6  100 % Room Air 138 bpm 126/63 3   Recovery             Minute             1 100 % Room Air 110 bpm       2 100 % Room Air 102 bpm       3 100 % Room Air 97 bpm       4 99 % Room Air 96 bpm 121/56 0      Six Minute Walk Summary  6MWT Status: completed with stops  Patient Reported: Dyspnea, Lightheadedness (Burning sensation in muscles)            Interpretation:  Did the patient stop or pause?: Yes  How many times did the patient stop or pause?: 1  Stop Time 1: 114  Restart Time 1: 192  Pause Time 1: 78 seconds  Total Time Walked (Calculated): 282 seconds  Final Partial Lap Distance (feet): 0 feet  Total Distance Meters (Calculated): 304.8 meters  Predicted Distance Meters (Calculated): 583.92 meters  Percentage of Predicted (Calculated): 52.2  Peak VO2 (Calculated): 13.12  Mets: 3.75  Has The Patient Had a Previous Six Minute Walk Test?: No     Previous 6MWT Results  Has The Patient Had a Previous Six Minute Walk Test?: No      12/9/2022 FeNO  13  Clinical Guide to Interpretation or FeNO Levels :     FeNO  (ppb) LOW INTERMEDIATE HIGH   ADULT VALUES <  25 25-50          > 50   Th2-driven Inflammation Unlikely Likely Significant      Patients FeNO level at this visit : _13___ (ppb)        Last Resulted: 12/09/22          CT of chest performed on 11/30/2022 with contrast revealed multiple pulmonary nodules.        X-Ray Chest PA And Lateral  Narrative: EXAMINATION:  XR CHEST PA AND LATERAL    CLINICAL HISTORY:  Shortness of breath    TECHNIQUE:  PA lateral views of the chest are obtained.    COMPARISON:  Chest radiograph from 11/29/2022    FINDINGS:  Heart size is normal.  There is no pulmonary edema.  Pleuroparenchymal scarring is present the lung apices.  Bilateral pulmonary nodules are similar in size and extent compared to 11/29/2022.  No consolidation.  No pleural effusion.  Impression: 1. Persistent bilateral pulmonary nodules, similar compared to 11/29/2022.    Electronically signed by: Dat Miller MD  Date:    12/09/2022  Time:    11:47    EXAMINATION:  CT CHEST WITH CONTRAST     CLINICAL HISTORY:  Solitary pulmonary noduleAbnormal xray - lung nodule, < 1 cm, mod-high risk;     COMPARISON:  Chest radiograph November 29, 2022.     TECHNIQUE:  Axial CT images were obtained. Iterative reconstruction technique was used.  IV contrast was administered.     FINDINGS:  MA multiple bilateral pulmonary nodules, 1 of which is cavitary in the right lower lobe.  The right lower lobe pulmonary nodule which is cavitary it measures 1.2 x 1.1 cm trans axially (series 5, image 312) the largest nodule is in the left lower lobe and involves the pleura and measures 1.7 by 1.4 cm (series 5, image 295).     No mediastinal or hilar lymphadenopathy.  No axillary lymphadenopathy.  No acute findings in the upper abdomen.     Impression:     Multiple bilateral pulmonary nodules, 1 of which is cavitary.  Findings suggest metastatic disease.     The CT exam was performed using one or more of the following dose reduction techniques- Automated exposure control, adjustment of the mA  and/or kV according to patient size, and/or use of iterative reconstructed technique.        Electronically signed by: Willie Pop  Date:                                            2022       No flowsheet data found.        Assessment:       Positive skin test for tuberculosis  TB exposure at age 27 years, + skin test, clear chest xray, never took TB medication.    TB gold lab work ordered by Dr Cassidy pending         Rheumatoid arthritis involving multiple sites with positive rheumatoid factor  Managed by rheumatology    Pulmonary nodules  2022 CT chest MA multiple bilateral pulmonary nodules, 1 of which is cavitary in the right lower lobe.  The right lower lobe pulmonary nodule which is cavitary it measures 1.2 x 1.1 cm trans axially (series 5, image 312) the largest nodule is in the left lower lobe and involves the pleura and measures 1.7 by 1.4 cm (series 5, image 295).  No mediastinal or hilar lymphadenopathy.  No axillary lymphadenopathy.  No acute findings in the upper abdomen.  PET CT chest scheduled for 2022   CT guided lung bx ordered by Dr. Cassidy, pending  Keep testing and follow up with Dr. Cassidy 2022    Nicotine dependence, cigarettes, in remission  32 pack year former smoker. Quit .   2022 CT chest 2022 CT chest MA multiple bilateral pulmonary nodules, 1 of which is cavitary in the right lower lobe.  The right lower lobe pulmonary nodule which is cavitary it measures 1.2 x 1.1 cm trans axially (series 5, image 312) the largest nodule is in the left lower lobe and involves the pleura and measures 1.7 by 1.4 cm (series 5, image 295).  No mediastinal or hilar lymphadenopathy.  No axillary lymphadenopathy.  No acute findings in the upper abdomen.  PET CT chest scheduled for 2022   CT guided lung bx ordered by Dr. Cassidy, pending    SOB (shortness of breath)  -     Stress test, pulmonary; Future; Expected date: 2022  -     Fraction of  Nitric  Oxide; Future  -     X-Ray Chest PA And Lateral; Future; Expected date: 2022    Anxiety disorder due to medical condition  Comments:  some worry at night about cavitary lung lesion pending petct and lung bx. trial vistaril 25-50 mg every 4hrs if needed  Orders:  -     hydrOXYzine pamoate (VISTARIL) 25 MG Cap; Take 1-2 capsules (25-50 mg total) by mouth 4 (four) times daily.  Dispense: 30 capsule; Refill: 0    Pulmonary nodules    Positive skin test for tuberculosis    Rheumatoid arthritis involving multiple sites with positive rheumatoid factor    Nicotine dependence, cigarettes, in remission        Outpatient Encounter Medications as of 2022   Medication Sig Dispense Refill    albuterol (PROVENTIL) 2.5 mg /3 mL (0.083 %) nebulizer solution Take 3 mLs (2.5 mg total) by nebulization every 4 to 6 hours as needed for Wheezing or Shortness of Breath. 360 mL 11    albuterol (PROVENTIL/VENTOLIN HFA) 90 mcg/actuation inhaler Inhale 2 puffs into the lungs every 4 (four) hours as needed for Wheezing or Shortness of Breath. 18 g 11    albuterol-ipratropium (DUO-NEB) 2.5 mg-0.5 mg/3 mL nebulizer solution SMARTSI Ampule(s) Via Nebulizer Every 6 Hours PRN      azithromycin (ZITHROMAX Z-PABLITO) 250 MG tablet 500 mg on day 1 (two tablets) followed by 250 mg once daily on days 2-5 6 tablet 0    predniSONE (DELTASONE) 20 MG tablet Prednisone 60 mg/ day for 3 days, 40 mg/day for 3 days,20 mg/ day for 3 days, (1/2 tablet )10 mg a day for 3 days. 20 tablet 0    hydrOXYzine pamoate (VISTARIL) 25 MG Cap Take 1-2 capsules (25-50 mg total) by mouth 4 (four) times daily. 30 capsule 0     No facility-administered encounter medications on file as of 2022.     Plan:       Requested Prescriptions     Signed Prescriptions Disp Refills    hydrOXYzine pamoate (VISTARIL) 25 MG Cap 30 capsule 0     Sig: Take 1-2 capsules (25-50 mg total) by mouth 4 (four) times daily.     Problem List Items Addressed This Visit       SOB (shortness  of breath) - Primary    Overview     Severe shortness of breath with activity x 6-7 months  2022 cavitary lung lesion, pending PET CT chest and bx.  Pending CPFT next week  Former 32 pack year smoker. Quit 2022 6MWD O2 sats 100% during walk, No desaturations requiring supplemental oxygen at rest or exertion.  2022 FeNO 13, no inflammation of lung suggested  No wheezing, no cough, no mucous.   Chest xray 2022 no acute findings, prior pul nodule unchanged.  Suspect anxiety about medical condition in part.   Keep follow up with Dr. Quarles for continued work up and CPFT evaluate for COPD in smoker.  No indication to add on ICS.  Continue Albuterol inhaler if needed  Has zpack and pred per dr quarles began yesterday okay to complete               Relevant Orders    Stress test, pulmonary (Completed)    Fraction of  Nitric Oxide (Completed)    X-Ray Chest PA And Lateral (Completed)    Rheumatoid arthritis involving multiple sites with positive rheumatoid factor    Current Assessment & Plan     Managed by rheumatology         Pulmonary nodules    Overview     2022 CT chest MA multiple bilateral pulmonary nodules, 1 of which is cavitary in the right lower lobe.  The right lower lobe pulmonary nodule which is cavitary it measures 1.2 x 1.1 cm trans axially (series 5, image 312) the largest nodule is in the left lower lobe and involves the pleura and measures 1.7 by 1.4 cm (series 5, image 295).  No mediastinal or hilar lymphadenopathy.  No axillary lymphadenopathy.  No acute findings in the upper abdomen.  PET CT chest scheduled for 2022   CT guided lung bx ordered by Dr. Quarles, pending           Current Assessment & Plan     2022 CT chest MA multiple bilateral pulmonary nodules, 1 of which is cavitary in the right lower lobe.  The right lower lobe pulmonary nodule which is cavitary it measures 1.2 x 1.1 cm trans axially (series 5, image 312) the largest nodule is in the left  lower lobe and involves the pleura and measures 1.7 by 1.4 cm (series 5, image 295).  No mediastinal or hilar lymphadenopathy.  No axillary lymphadenopathy.  No acute findings in the upper abdomen.  PET CT chest scheduled for 12/14/2022   CT guided lung bx ordered by Dr. Cassidy, pending  Keep testing and follow up with Dr. Cassidy 12/20/2022         Positive skin test for tuberculosis    Overview     TB exposure at age 27 years, + skin test, clear chest xray, never took TB medication.    TB gold lab work ordered by Dr Cassidy pending            Current Assessment & Plan     TB exposure at age 27 years, + skin test, clear chest xray, never took TB medication.    TB gold lab work ordered by Dr Cassidy pending              Nicotine dependence, cigarettes, in remission    Overview     32 pack year former smoker. Quit 2020.   11/30/2022 CT chest 11/30/2022 CT chest MA multiple bilateral pulmonary nodules, 1 of which is cavitary in the right lower lobe.  The right lower lobe pulmonary nodule which is cavitary it measures 1.2 x 1.1 cm trans axially (series 5, image 312) the largest nodule is in the left lower lobe and involves the pleura and measures 1.7 by 1.4 cm (series 5, image 295).  No mediastinal or hilar lymphadenopathy.  No axillary lymphadenopathy.  No acute findings in the upper abdomen.  PET CT chest scheduled for 12/14/2022   CT guided lung bx ordered by Dr. Cassidy, pending             Current Assessment & Plan     32 pack year former smoker. Quit 2020.   11/30/2022 CT chest 11/30/2022 CT chest MA multiple bilateral pulmonary nodules, 1 of which is cavitary in the right lower lobe.  The right lower lobe pulmonary nodule which is cavitary it measures 1.2 x 1.1 cm trans axially (series 5, image 312) the largest nodule is in the left lower lobe and involves the pleura and measures 1.7 by 1.4 cm (series 5, image 295).  No mediastinal or hilar lymphadenopathy.  No axillary lymphadenopathy.  No acute findings in the upper  abdomen.  PET CT chest scheduled for 12/14/2022   CT guided lung bx ordered by Dr. Cassidy, pending            Other Visit Diagnoses       Anxiety disorder due to medical condition        some worry at night about cavitary lung lesion pending petct and lung bx. trial vistaril 25-50 mg every 4hrs if needed    Relevant Medications    hydrOXYzine pamoate (VISTARIL) 25 MG Cap             I spent a total of 79 minutes on the day of the visit.  This includes face to face time and non-face to face time preparing to see the patient (eg, review of tests), obtaining and/or reviewing separately obtained history, documenting clinical information in the electronic or other health record, independently interpreting results and communicating results to the patient/family/caregiver, or care coordinator. Patient seen twice since returned for testing with review in afternoon same day.     Follow up in about 11 days (around 12/20/2022) for Pulmonary nodule w/review CPFT/lab/PET CT chest as jessie Dr. Cassidy.

## 2022-12-09 NOTE — PROCEDURES
"Clinical Guide to Interpretation or FeNO Levels :    FeNO  (ppb) LOW INTERMEDIATE HIGH   ADULT VALUES < 25 25-50          > 50   Th2-driven Inflammation Unlikely Likely Significant     Patients FeNO level at this visit : _13___ (ppb)     Interpretation of FeNO measurement in adults:   [X ] FENO is less than 25 ppb implies non eosinophilic airway inflammation or the absence of airway inflammation.   Comment: Low FENO (<25 ppb) in adult asthmatics with persistent symptoms suggests other etiologies for these symptoms and a lower likelihood of benefit from adding or increasing inhaled glucocorticoids.     [ ] FENO between 25 and 50 ppb in adults should be interpreted cautiously with reference to the clinical situation (eg, symptomatic, on or off therapy, current smoking).     [ ] FENO greater than 50 ppb in adults  suggests eosinophilic airway inflammation   Comment: High FENO (>50 ppb) in adult asthmatics even with atypical symptoms suggests glucocorticoid responsiveness. High FENO (>50 ppb) can help identify poor adherence or uncontrolled inflammation in asthma patients with otherwise seemingly "controlled" asthma.     Discussion:   ·A FENO less than 25 ppb in adults and less than 20 ppb in children younger than 12 years of age implies noneosinophilic airway inflammation or the absence of airway inflammation.   ·A FENO greater than 50 ppb in adults or greater than 35 ppb in children suggests eosinophilic airway inflammation.   ·Values of FENO between 25 and 50 ppb in adults (20 to 35 ppb in children) should be interpreted cautiously with reference to the clinical situation (eg, symptomatic, on or off therapy, current smoking).   ·A rising FENO with a greater than 20 percent change and more than 25 ppb (20 ppb in children) from a previously stable level suggests increasing eosinophilic airway inflammation, but there are wide inter-individual differences.   ·A decrease in FENO greater than 20 percent for values over 50 " ppb or more than 10 ppb for values less than 50 ppb may be clinically important.   FENO in other respiratory diseases - Several other diseases are associated with altered levels of exhaled NO: low levels of FENO have been noted in cystic fibrosis, current smoking, pulmonary hypertension, hypothermia, primary ciliary dyskinesia, and bronchopulmonary dysplasia. Elevated FENO has been noted in atopy, nonasthmatic eosinophilic bronchitis, COPD exacerbations, noncystic fibrosis bronchiectasis, and viral upper respiratory infections.     REFERENCE:   ATS Board of Directors, December 2004, and by the ERS Executive Committee, June 2004. ATS/ERS Recommendations for Standardized Procedures for the Online and Offline Measurement of Exhaled Lower Respiratory Nitric Oxide and Nasal Nitric Oxide. Guideline 2005

## 2022-12-09 NOTE — ASSESSMENT & PLAN NOTE
TB exposure at age 27 years, + skin test, clear chest xray, never took TB medication.    TB gold lab work ordered by Dr Cassidy pending

## 2022-12-09 NOTE — ASSESSMENT & PLAN NOTE
32 pack year former smoker. Quit 2020.   11/30/2022 CT chest 11/30/2022 CT chest MA multiple bilateral pulmonary nodules, 1 of which is cavitary in the right lower lobe.  The right lower lobe pulmonary nodule which is cavitary it measures 1.2 x 1.1 cm trans axially (series 5, image 312) the largest nodule is in the left lower lobe and involves the pleura and measures 1.7 by 1.4 cm (series 5, image 295).  No mediastinal or hilar lymphadenopathy.  No axillary lymphadenopathy.  No acute findings in the upper abdomen.  PET CT chest scheduled for 12/14/2022   CT guided lung bx ordered by Dr. Cassidy, pending

## 2022-12-09 NOTE — PROCEDURES
"[facility]  Six Minute Walk     SUMMARY     Ordering Provider: ADY Suarez   Interpreting Provider:   Performing nurse/tech/RT: SHAHID Sanchez RRT  Diagnosis: Shortness of Breath  Height: 5' 7" (170.2 cm)  Weight: 66.9 kg (147 lb 7.8 oz)  BMI (Calculated): 23.1   Patient Race:             Phase Oxygen Assessment Supplemental O2 Heart   Rate Blood Pressure Bhaskar Dyspnea Scale Rating   Resting 100 % Room Air 97 bpm 125/63 1   Exercise        Minute        1 100 % Room Air 126 bpm     2 100 % Room Air 133 bpm     3 100 % Room Air 107 bpm     4 100 % Room Air 122 bpm     5 100 % Room Air 134 bpm     6  100 % Room Air 138 bpm 126/63 3   Recovery        Minute        1 100 % Room Air 110 bpm     2 100 % Room Air 102 bpm     3 100 % Room Air 97 bpm     4 99 % Room Air 96 bpm 121/56 0     Six Minute Walk Summary  6MWT Status: completed with stops  Patient Reported: Dyspnea, Lightheadedness (Burning sensation in muscles)     Interpretation:  Did the patient stop or pause?: Yes  How many times did the patient stop or pause?: 1  Stop Time 1: 114  Restart Time 1: 192  Pause Time 1: 78 seconds         Total Time Walked (Calculated): 282 seconds  Final Partial Lap Distance (feet): 0 feet  Total Distance Meters (Calculated): 304.8 meters  Predicted Distance Meters (Calculated): 583.92 meters  Percentage of Predicted (Calculated): 52.2  Peak VO2 (Calculated): 13.12  Mets: 3.75  Has The Patient Had a Previous Six Minute Walk Test?: No       Previous 6MWT Results  Has The Patient Had a Previous Six Minute Walk Test?: No    Six minute walk distance is 304.8 meters (52.2 % predicted) with light.Patient did not complete the study, walking 282 seconds of the 360 second test . During exercise, there was no  significant desaturation while breathing room air .Lowest oxygen saturation was 100% .Maximum heart rate during exercise was 138 bpm which is 81 % of maximum predicted heart rate of 170 bpm. Blood " pressure remained stable and Heart rate increased significantly Based upon age and body mass index, exercise capacity is less than predicted.  Peak VO2 during walking was 13.12 ml/kg/min which is 144 % of predicted Peak VO2 max of 9 ml/kg/min based on a resting heart rate of 97/min. Significant exercise impairment is likely due to cardiovascular causes.  Patient has not had a previous study. No previous study performed.

## 2022-12-09 NOTE — ASSESSMENT & PLAN NOTE
11/30/2022 CT chest MA multiple bilateral pulmonary nodules, 1 of which is cavitary in the right lower lobe.  The right lower lobe pulmonary nodule which is cavitary it measures 1.2 x 1.1 cm trans axially (series 5, image 312) the largest nodule is in the left lower lobe and involves the pleura and measures 1.7 by 1.4 cm (series 5, image 295).  No mediastinal or hilar lymphadenopathy.  No axillary lymphadenopathy.  No acute findings in the upper abdomen.  PET CT chest scheduled for 12/14/2022   CT guided lung bx ordered by Dr. Cassidy, pending  Keep testing and follow up with Dr. Cassidy 12/20/2022

## 2022-12-12 ENCOUNTER — TELEPHONE (OUTPATIENT)
Dept: RADIOLOGY | Facility: HOSPITAL | Age: 52
End: 2022-12-12
Payer: COMMERCIAL

## 2022-12-12 ENCOUNTER — PATIENT MESSAGE (OUTPATIENT)
Dept: INTERNAL MEDICINE | Facility: CLINIC | Age: 52
End: 2022-12-12
Payer: COMMERCIAL

## 2022-12-12 DIAGNOSIS — D68.9 COAGULATION DEFECT: ICD-10-CM

## 2022-12-12 DIAGNOSIS — R91.1 SOLITARY PULMONARY NODULE: Primary | ICD-10-CM

## 2022-12-12 DIAGNOSIS — R06.02 SOB (SHORTNESS OF BREATH) ON EXERTION: Primary | ICD-10-CM

## 2022-12-12 NOTE — TELEPHONE ENCOUNTER
Interventional Radiology:    Spoke to pt and got her scheduled for 12/15 @ 8:30am. Informed pt to be NPO after midnight, show up to [facility] on [address] at 7:30am, either have a ride with them or have a phone number for the person driving them home so that we can get in contact with them to keep them updated. Pt denies the use of blood thinners, aspirin, or fish oil. Answered all questions that the pt had and pt verbalized understanding of all discussed.

## 2022-12-13 ENCOUNTER — HOSPITAL ENCOUNTER (OUTPATIENT)
Dept: CARDIOLOGY | Facility: HOSPITAL | Age: 52
Discharge: HOME | End: 2022-12-13
Attending: FAMILY MEDICINE
Payer: COMMERCIAL

## 2022-12-13 VITALS
BODY MASS INDEX: 23.07 KG/M2 | DIASTOLIC BLOOD PRESSURE: 78 MMHG | SYSTOLIC BLOOD PRESSURE: 130 MMHG | WEIGHT: 147 LBS | HEIGHT: 67 IN

## 2022-12-13 DIAGNOSIS — R06.02 SOB (SHORTNESS OF BREATH) ON EXERTION: ICD-10-CM

## 2022-12-13 PROCEDURE — 93306 TTE W/DOPPLER COMPLETE: CPT | Mod: 26,,, | Performed by: STUDENT IN AN ORGANIZED HEALTH CARE EDUCATION/TRAINING PROGRAM

## 2022-12-13 PROCEDURE — 93306 TTE W/DOPPLER COMPLETE: CPT

## 2022-12-13 PROCEDURE — 93306 ECHO (CUPID ONLY): ICD-10-PCS | Mod: 26,,, | Performed by: STUDENT IN AN ORGANIZED HEALTH CARE EDUCATION/TRAINING PROGRAM

## 2022-12-14 ENCOUNTER — HOSPITAL ENCOUNTER (OUTPATIENT)
Dept: RADIOLOGY | Facility: HOSPITAL | Age: 52
Discharge: HOME | End: 2022-12-14
Attending: INTERNAL MEDICINE
Payer: COMMERCIAL

## 2022-12-14 DIAGNOSIS — R91.1 SOLITARY PULMONARY NODULE: ICD-10-CM

## 2022-12-14 DIAGNOSIS — R91.8 PULMONARY NODULES: ICD-10-CM

## 2022-12-14 LAB
AORTIC ROOT ANNULUS: 3.43 CM
ASCENDING AORTA: 3.09 CM
AV INDEX (PROSTH): 0.84
AV MEAN GRADIENT: 5 MMHG
AV PEAK GRADIENT: 10 MMHG
AV VALVE AREA: 2.89 CM2
AV VELOCITY RATIO: 0.82
BSA FOR ECHO PROCEDURE: 1.78 M2
CV ECHO LV RWT: 0.42 CM
DOP CALC AO PEAK VEL: 1.59 M/S
DOP CALC AO VTI: 29 CM
DOP CALC LVOT AREA: 3.4 CM2
DOP CALC LVOT DIAMETER: 2.09 CM
DOP CALC LVOT PEAK VEL: 1.3 M/S
DOP CALC LVOT STROKE VOLUME: 83.67 CM3
DOP CALC RVOT PEAK VEL: 1.04 M/S
DOP CALC RVOT VTI: 19.4 CM
DOP CALCLVOT PEAK VEL VTI: 24.4 CM
E WAVE DECELERATION TIME: 257.93 MSEC
E/A RATIO: 0.89
E/E' RATIO: 8.43 M/S
ECHO LV POSTERIOR WALL: 0.97 CM (ref 0.6–1.1)
EJECTION FRACTION: 60 %
FRACTIONAL SHORTENING: 40 % (ref 28–44)
INTERVENTRICULAR SEPTUM: 1.01 CM (ref 0.6–1.1)
IVC DIAMETER: 1.03 CM
IVRT: 71.36 MSEC
LA MAJOR: 5.47 CM
LA MINOR: 5.24 CM
LA WIDTH: 3.6 CM
LEFT ATRIUM SIZE: 3.27 CM
LEFT ATRIUM VOLUME INDEX MOD: 14.8 ML/M2
LEFT ATRIUM VOLUME INDEX: 30.3 ML/M2
LEFT ATRIUM VOLUME MOD: 26.11 CM3
LEFT ATRIUM VOLUME: 53.56 CM3
LEFT INTERNAL DIMENSION IN SYSTOLE: 2.78 CM (ref 2.1–4)
LEFT VENTRICLE DIASTOLIC VOLUME INDEX: 56.25 ML/M2
LEFT VENTRICLE DIASTOLIC VOLUME: 99.57 ML
LEFT VENTRICLE MASS INDEX: 90 G/M2
LEFT VENTRICLE SYSTOLIC VOLUME INDEX: 16.4 ML/M2
LEFT VENTRICLE SYSTOLIC VOLUME: 28.99 ML
LEFT VENTRICULAR INTERNAL DIMENSION IN DIASTOLE: 4.64 CM (ref 3.5–6)
LEFT VENTRICULAR MASS: 158.86 G
LV LATERAL E/E' RATIO: 6.93 M/S
LV SEPTAL E/E' RATIO: 10.78 M/S
LVOT MG: 3.21 MMHG
LVOT MV: 0.81 CM/S
MV PEAK A VEL: 1.09 M/S
MV PEAK E VEL: 0.97 M/S
MV STENOSIS PRESSURE HALF TIME: 74.8 MS
MV VALVE AREA P 1/2 METHOD: 2.94 CM2
PULM VEIN S/D RATIO: 1.46
PV MEAN GRADIENT: 1.84 MMHG
PV PEAK D VEL: 0.46 M/S
PV PEAK S VEL: 0.67 M/S
PV PEAK VELOCITY: 1.1 CM/S
RA MAJOR: 4.26 CM
RA PRESSURE: 3 MMHG
RA WIDTH: 3.95 CM
RIGHT VENTRICULAR END-DIASTOLIC DIMENSION: 3.57 CM
SINUS: 3.29 CM
STJ: 3.25 CM
TDI LATERAL: 0.14 M/S
TDI SEPTAL: 0.09 M/S
TDI: 0.12 M/S
TRICUSPID ANNULAR PLANE SYSTOLIC EXCURSION: 2.36 CM

## 2022-12-14 PROCEDURE — 78815 PET IMAGE W/CT SKULL-THIGH: CPT | Mod: 26,PI,, | Performed by: RADIOLOGY

## 2022-12-14 PROCEDURE — 78815 PET IMAGE W/CT SKULL-THIGH: CPT | Mod: TC

## 2022-12-14 PROCEDURE — 78815 NM PET CT ROUTINE: ICD-10-PCS | Mod: 26,PI,, | Performed by: RADIOLOGY

## 2022-12-14 PROCEDURE — A9552 F18 FDG: HCPCS

## 2022-12-15 ENCOUNTER — TELEPHONE (OUTPATIENT)
Dept: PULMONOLOGY | Facility: CLINIC | Age: 52
End: 2022-12-15
Payer: COMMERCIAL

## 2022-12-15 ENCOUNTER — PATIENT MESSAGE (OUTPATIENT)
Dept: INTERNAL MEDICINE | Facility: CLINIC | Age: 52
End: 2022-12-15
Payer: COMMERCIAL

## 2022-12-15 ENCOUNTER — HOSPITAL ENCOUNTER (EMERGENCY)
Facility: HOSPITAL | Age: 52
Discharge: HOME | End: 2022-12-15
Attending: EMERGENCY MEDICINE
Payer: COMMERCIAL

## 2022-12-15 VITALS
DIASTOLIC BLOOD PRESSURE: 59 MMHG | HEART RATE: 86 BPM | SYSTOLIC BLOOD PRESSURE: 108 MMHG | RESPIRATION RATE: 20 BRPM | WEIGHT: 147.06 LBS | BODY MASS INDEX: 23.03 KG/M2 | TEMPERATURE: 99 F | OXYGEN SATURATION: 100 %

## 2022-12-15 DIAGNOSIS — Z12.11 SCREEN FOR COLON CANCER: Primary | ICD-10-CM

## 2022-12-15 DIAGNOSIS — D64.9 SYMPTOMATIC ANEMIA: Primary | ICD-10-CM

## 2022-12-15 LAB
ABO + RH BLD: NORMAL
ANION GAP SERPL CALC-SCNC: 8 MMOL/L (ref 8–16)
BASOPHILS # BLD AUTO: 0.06 K/UL (ref 0–0.2)
BASOPHILS NFR BLD: 0.7 % (ref 0–1.9)
BLD GP AB SCN CELLS X3 SERPL QL: NORMAL
BLD PROD TYP BPU: NORMAL
BLD PROD TYP BPU: NORMAL
BLOOD UNIT EXPIRATION DATE: NORMAL
BLOOD UNIT EXPIRATION DATE: NORMAL
BLOOD UNIT TYPE CODE: 6200
BLOOD UNIT TYPE CODE: 6200
BLOOD UNIT TYPE: NORMAL
BLOOD UNIT TYPE: NORMAL
BUN SERPL-MCNC: 12 MG/DL (ref 6–20)
CALCIUM SERPL-MCNC: 8.4 MG/DL (ref 8.7–10.5)
CHLORIDE SERPL-SCNC: 108 MMOL/L (ref 95–110)
CO2 SERPL-SCNC: 22 MMOL/L (ref 23–29)
CODING SYSTEM: NORMAL
CODING SYSTEM: NORMAL
CREAT SERPL-MCNC: 0.7 MG/DL (ref 0.5–1.4)
DIFFERENTIAL METHOD: ABNORMAL
DISPENSE STATUS: NORMAL
DISPENSE STATUS: NORMAL
EOSINOPHIL # BLD AUTO: 0.4 K/UL (ref 0–0.5)
EOSINOPHIL NFR BLD: 4.1 % (ref 0–8)
ERYTHROCYTE [DISTWIDTH] IN BLOOD BY AUTOMATED COUNT: 17.6 % (ref 11.5–14.5)
EST. GFR  (NO RACE VARIABLE): >60 ML/MIN/1.73 M^2
FERRITIN SERPL-MCNC: 2 NG/ML (ref 20–300)
GLUCOSE SERPL-MCNC: 92 MG/DL (ref 70–110)
HCT VFR BLD AUTO: 20.2 % (ref 37–48.5)
HGB BLD-MCNC: 5.2 G/DL (ref 12–16)
IMM GRANULOCYTES # BLD AUTO: 0.12 K/UL (ref 0–0.04)
IMM GRANULOCYTES NFR BLD AUTO: 1.3 % (ref 0–0.5)
IRON SERPL-MCNC: 10 UG/DL (ref 30–160)
LYMPHOCYTES # BLD AUTO: 2.1 K/UL (ref 1–4.8)
LYMPHOCYTES NFR BLD: 22.6 % (ref 18–48)
MCH RBC QN AUTO: 17.2 PG (ref 27–31)
MCHC RBC AUTO-ENTMCNC: 25.7 G/DL (ref 32–36)
MCV RBC AUTO: 67 FL (ref 82–98)
MONOCYTES # BLD AUTO: 0.7 K/UL (ref 0.3–1)
MONOCYTES NFR BLD: 7.2 % (ref 4–15)
NEUTROPHILS # BLD AUTO: 5.8 K/UL (ref 1.8–7.7)
NEUTROPHILS NFR BLD: 64.1 % (ref 38–73)
NRBC BLD-RTO: 0 /100 WBC
NUM UNITS TRANS PACKED RBC: NORMAL
NUM UNITS TRANS PACKED RBC: NORMAL
PLATELET # BLD AUTO: 416 K/UL (ref 150–450)
PMV BLD AUTO: 10.2 FL (ref 9.2–12.9)
POTASSIUM SERPL-SCNC: 3.9 MMOL/L (ref 3.5–5.1)
RBC # BLD AUTO: 3.02 M/UL (ref 4–5.4)
SATURATED IRON: 2 % (ref 20–50)
SODIUM SERPL-SCNC: 138 MMOL/L (ref 136–145)
TOTAL IRON BINDING CAPACITY: 488 UG/DL (ref 250–450)
TRANSFERRIN SERPL-MCNC: 330 MG/DL (ref 200–375)
WBC # BLD AUTO: 9.09 K/UL (ref 3.9–12.7)

## 2022-12-15 PROCEDURE — 80048 BASIC METABOLIC PNL TOTAL CA: CPT | Performed by: EMERGENCY MEDICINE

## 2022-12-15 PROCEDURE — P9016 RBC LEUKOCYTES REDUCED: HCPCS | Performed by: EMERGENCY MEDICINE

## 2022-12-15 PROCEDURE — 84466 ASSAY OF TRANSFERRIN: CPT | Performed by: EMERGENCY MEDICINE

## 2022-12-15 PROCEDURE — 85025 COMPLETE CBC W/AUTO DIFF WBC: CPT | Performed by: EMERGENCY MEDICINE

## 2022-12-15 PROCEDURE — 36415 COLL VENOUS BLD VENIPUNCTURE: CPT | Performed by: EMERGENCY MEDICINE

## 2022-12-15 PROCEDURE — 99285 EMERGENCY DEPT VISIT HI MDM: CPT | Mod: 25

## 2022-12-15 PROCEDURE — 86901 BLOOD TYPING SEROLOGIC RH(D): CPT | Performed by: EMERGENCY MEDICINE

## 2022-12-15 PROCEDURE — 86920 COMPATIBILITY TEST SPIN: CPT | Performed by: EMERGENCY MEDICINE

## 2022-12-15 PROCEDURE — 82728 ASSAY OF FERRITIN: CPT | Performed by: EMERGENCY MEDICINE

## 2022-12-15 PROCEDURE — 36430 TRANSFUSION BLD/BLD COMPNT: CPT

## 2022-12-15 RX ORDER — HYDROCODONE BITARTRATE AND ACETAMINOPHEN 500; 5 MG/1; MG/1
TABLET ORAL
Status: DISCONTINUED | OUTPATIENT
Start: 2022-12-15 | End: 2022-12-15 | Stop reason: HOSPADM

## 2022-12-15 NOTE — ED PROVIDER NOTES
SCRIBE #1 NOTE: I, Aaron Hurd, am scribing for, and in the presence of, Ly Snowden MD. I have scribed the entire note.       History     Chief Complaint   Patient presents with    Abnormal Labs     Pts.  Called her this morning to come to the ER for a Hemoglobin 5.6 Hematocrit 22.2       Review of patient's allergies indicates:  No Known Allergies      History of Present Illness     HPI    12/15/2022, 8:18 AM  History obtained from the patient      History of Present Illness: Tammy Bee is a 52 y.o. female patient who presents to the Emergency Department for evaluation of low H&H. Pt was called this morning and advised to go to ED due to blood work from  showing hemoglobin 5.6 and hematocrit 22.2. Symptoms are constant and moderate in severity. No mitigating or exacerbating factors reported. Associated sxs include SOB x 1 month. Patient denies any blood in stool, hematuria, dizziness, weakness, HA, and all other sxs at this time. No prior tx reported. No further complaints or concerns at this time.       Arrival mode: Personal vehicle     PCP: Maria Teresa Aparicio MD        Past Medical History:  Past Medical History:   Diagnosis Date    RA (rheumatoid arthritis)        Past Surgical History:  Past Surgical History:   Procedure Laterality Date    Tubal Lig      TUBAL LIGATION           Family History:  Family History   Problem Relation Age of Onset    Diabetes Sister     Cancer Sister     Breast cancer Sister     Diabetes Brother     Breast cancer Other        Social History:  Social History     Tobacco Use    Smoking status: Former     Packs/day: 1.00     Years: 32.00     Pack years: 32.00     Types: Vaping with nicotine, Cigarettes     Start date:      Quit date: 2020     Years since quittin.9    Smokeless tobacco: Never   Substance and Sexual Activity    Alcohol use: Not Currently    Drug use: Not Currently     Types: Marijuana    Sexual activity: Not Currently        Review of  Systems     Review of Systems   Constitutional:  Negative for chills and fever.   HENT:  Negative for sore throat.    Respiratory:  Positive for shortness of breath.    Cardiovascular:  Negative for chest pain.   Gastrointestinal:  Negative for abdominal pain, nausea and vomiting.   Genitourinary:  Negative for dysuria.   Musculoskeletal:  Negative for back pain.   Skin:  Negative for rash.   Neurological:  Negative for dizziness, weakness, light-headedness and headaches.   Hematological:  Does not bruise/bleed easily.   All other systems reviewed and are negative.   Physical Exam     Initial Vitals [12/15/22 0746]   BP Pulse Resp Temp SpO2   102/62 92 16 97.8 °F (36.6 °C) 100 %      MAP       --          Physical Exam  Nursing Notes and Vital Signs Reviewed.  Constitutional: Patient is in no acute distress. Pale appearing.  Head: Atraumatic. Normocephalic.  Eyes: PERRL. EOM intact. Conjunctivae are pale. No scleral icterus.  ENT: Mucous membranes are moist. Oropharynx is clear and symmetric.    Neck: Supple. Full ROM. No lymphadenopathy.  Cardiovascular: Regular rate. Regular rhythm. No murmurs, rubs, or gallops. Distal pulses are 2+ and symmetric.  Pulmonary/Chest: No respiratory distress. Clear to auscultation bilaterally. No wheezing or rales.  Abdominal: Soft and non-distended.  There is no tenderness.  No rebound, guarding, or rigidity. Good bowel sounds.  Genitourinary: No CVA tenderness  Musculoskeletal: Moves all extremities. No obvious deformities. No edema. No calf tenderness.  Skin: Warm and dry.  Neurological:  Alert, awake, and appropriate.  Normal speech.  No acute focal neurological deficits are appreciated.  Psychiatric: Normal affect. Good eye contact. Appropriate in content.     ED Course   Critical Care    Date/Time: 12/15/2022 1:06 PM  Performed by: Ly Snowden MD  Authorized by: Ly Snowden MD   Direct patient critical care time: 15 minutes  Additional history critical care time: 5  minutes  Ordering / reviewing critical care time: 10 minutes  Documentation critical care time: 5 minutes  Consulting other physicians critical care time: 10 minutes  Consult with family critical care time: 5 minutes  Total critical care time (exclusive of procedural time) : 50 minutes  Critical care time was exclusive of separately billable procedures and treating other patients and teaching time.  Critical care was necessary to treat or prevent imminent or life-threatening deterioration of the following conditions: Symptomatic anemia.  Critical care was time spent personally by me on the following activities: development of treatment plan with patient or surrogate, blood draw for specimens, discussions with consultants, interpretation of cardiac output measurements, evaluation of patient's response to treatment, examination of patient, obtaining history from patient or surrogate, ordering and performing treatments and interventions, ordering and review of laboratory studies, ordering and review of radiographic studies, pulse oximetry, re-evaluation of patient's condition and review of old charts.      ED Vital Signs:  Vitals:    12/15/22 0746 12/15/22 0830 12/15/22 0930 12/15/22 1000   BP: 102/62 (!) 101/52 (!) 107/58 108/72   Pulse: 92 88 80 77   Resp: 16 16 20 16   Temp: 97.8 °F (36.6 °C)   98 °F (36.7 °C)   TempSrc: Oral   Oral   SpO2: 100% 98% 100% 100%   Weight: 66.7 kg (147 lb 0.8 oz)       12/15/22 1015 12/15/22 1030 12/15/22 1130 12/15/22 1230   BP: (!) 108/57 (!) 104/57 (!) 110/58 (!) 118/59   Pulse: 81 85 79 81   Resp: 16 16 20 18   Temp: 98.8 °F (37.1 °C) 98 °F (36.7 °C) 98.2 °F (36.8 °C) 98.7 °F (37.1 °C)   TempSrc: Oral Oral Oral Oral   SpO2: 100% 100% 100% 100%   Weight:        12/15/22 1243 12/15/22 1250 12/15/22 1305   BP: 107/60 108/60 (!) 113/57   Pulse: 84 85 89   Resp: 19 20 20   Temp: 98.5 °F (36.9 °C) 98.3 °F (36.8 °C) 98.1 °F (36.7 °C)   TempSrc: Oral Oral Oral   SpO2: 100% 100% 100%    Weight:          Abnormal Lab Results:  Labs Reviewed   CBC W/ AUTO DIFFERENTIAL - Abnormal; Notable for the following components:       Result Value    RBC 3.02 (*)     Hemoglobin 5.2 (*)     Hematocrit 20.2 (*)     MCV 67 (*)     MCH 17.2 (*)     MCHC 25.7 (*)     RDW 17.6 (*)     Immature Granulocytes 1.3 (*)     Immature Grans (Abs) 0.12 (*)     All other components within normal limits    Narrative:       HGB  critical result(s) called and verbal readback obtained from   YUNIOR RONQUILLO RN  by LAILA 12/15/2022 08:47   BASIC METABOLIC PANEL - Abnormal; Notable for the following components:    CO2 22 (*)     Calcium 8.4 (*)     All other components within normal limits   FERRITIN - Abnormal; Notable for the following components:    Ferritin 2 (*)     All other components within normal limits   IRON AND TIBC   TYPE & SCREEN   PREPARE RBC SOFT        All Lab Results:  Results for orders placed or performed during the hospital encounter of 12/15/22   CBC auto differential   Result Value Ref Range    WBC 9.09 3.90 - 12.70 K/uL    RBC 3.02 (L) 4.00 - 5.40 M/uL    Hemoglobin 5.2 (LL) 12.0 - 16.0 g/dL    Hematocrit 20.2 (L) 37.0 - 48.5 %    MCV 67 (L) 82 - 98 fL    MCH 17.2 (L) 27.0 - 31.0 pg    MCHC 25.7 (L) 32.0 - 36.0 g/dL    RDW 17.6 (H) 11.5 - 14.5 %    Platelets 416 150 - 450 K/uL    MPV 10.2 9.2 - 12.9 fL    Immature Granulocytes 1.3 (H) 0.0 - 0.5 %    Gran # (ANC) 5.8 1.8 - 7.7 K/uL    Immature Grans (Abs) 0.12 (H) 0.00 - 0.04 K/uL    Lymph # 2.1 1.0 - 4.8 K/uL    Mono # 0.7 0.3 - 1.0 K/uL    Eos # 0.4 0.0 - 0.5 K/uL    Baso # 0.06 0.00 - 0.20 K/uL    nRBC 0 0 /100 WBC    Gran % 64.1 38.0 - 73.0 %    Lymph % 22.6 18.0 - 48.0 %    Mono % 7.2 4.0 - 15.0 %    Eosinophil % 4.1 0.0 - 8.0 %    Basophil % 0.7 0.0 - 1.9 %    Differential Method Automated    Basic metabolic panel   Result Value Ref Range    Sodium 138 136 - 145 mmol/L    Potassium 3.9 3.5 - 5.1 mmol/L    Chloride 108 95 - 110 mmol/L    CO2 22 (L) 23 - 29  mmol/L    Glucose 92 70 - 110 mg/dL    BUN 12 6 - 20 mg/dL    Creatinine 0.7 0.5 - 1.4 mg/dL    Calcium 8.4 (L) 8.7 - 10.5 mg/dL    Anion Gap 8 8 - 16 mmol/L    eGFR >60 >60 mL/min/1.73 m^2   Ferritin   Result Value Ref Range    Ferritin 2 (L) 20.0 - 300.0 ng/mL   Type & Screen   Result Value Ref Range    Group & Rh A POS     Indirect Emeli NEG    Prepare RBC 2 Units; hgb 5.2   Result Value Ref Range    UNIT NUMBER [id number]     Product Code W6058C82     DISPENSE STATUS ISSUED     CODING SYSTEM [id number]     Unit Blood Type Code 6200     Unit Blood Type A POS     Unit Expiration [id number]     UNIT NUMBER [id number]     Product Code A7489S12     DISPENSE STATUS ISSUED     CODING SYSTEM [id number]     Unit Blood Type Code 6200     Unit Blood Type A POS     Unit Expiration [id number]        Imaging Results:  Imaging Results    None               The Emergency Provider reviewed the vital signs and test results, which are outlined above.     ED Discussion       1:07 PM: Reassessed pt at this time.  Discussed with pt all pertinent ED information and results. Discussed pt dx and plan of tx. Gave pt all f/u and return to the ED instructions. All questions and concerns were addressed at this time. Pt expresses understanding of information and instructions, and is comfortable with plan to discharge. Pt is stable for discharge.    I discussed with patient and/or family/caretaker that evaluation in the ED does not suggest any emergent or life threatening medical conditions requiring immediate intervention beyond what was provided in the ED, and I believe patient is safe for discharge.  Regardless, an unremarkable evaluation in the ED does not preclude the development or presence of a serious of life threatening condition. As such, patient was instructed to return immediately for any worsening or change in current symptoms.       Medical Decision Making:   Clinical Tests:   Lab Tests: Ordered and Reviewed         ED  Medication(s):  Medications   0.9%  NaCl infusion (for blood administration) (has no administration in time range)       New Prescriptions    No medications on file        Follow-up Information       [facility]. Schedule an appointment as soon as possible for a visit in 3 days.    Specialty: Hematology and Oncology  Why: Return to the Emergency Room, If symptoms worsen  Contact information:  [address] [facility] [address]  [phone]                               Scribe Attestation:   Scribe #1: I performed the above scribed service and the documentation accurately describes the services I performed. I attest to the accuracy of the note.     Attending:   Physician Attestation Statement for Scribe #1: I, Ly Snowden MD, personally performed the services described in this documentation, as scribed by Aaron Hurd, in my presence, and it is both accurate and complete.           Clinical Impression       ICD-10-CM ICD-9-CM   1. Symptomatic anemia  D64.9 285.9       Disposition:   Disposition: Discharged  Condition: Stable       Ly Snowden MD  12/15/22 7250

## 2022-12-16 ENCOUNTER — PATIENT MESSAGE (OUTPATIENT)
Dept: PULMONOLOGY | Facility: CLINIC | Age: 52
End: 2022-12-16
Payer: COMMERCIAL

## 2022-12-16 ENCOUNTER — HOSPITAL ENCOUNTER (OUTPATIENT)
Dept: PREADMISSION TESTING | Facility: HOSPITAL | Age: 52
Discharge: HOME | End: 2022-12-16
Payer: COMMERCIAL

## 2022-12-16 DIAGNOSIS — Z12.11 SCREEN FOR COLON CANCER: ICD-10-CM

## 2022-12-19 ENCOUNTER — TELEPHONE (OUTPATIENT)
Dept: RADIOLOGY | Facility: HOSPITAL | Age: 52
End: 2022-12-19
Payer: COMMERCIAL

## 2022-12-19 NOTE — TELEPHONE ENCOUNTER
Interventional Radiology:    Spoke with pt and states that she wants to wait until after she sees the hematologist on 12/21 Wednesday before scheduling the biopsy. Gave pt my phone number and also said that I will call her back next week if I do not hear from her later this week.

## 2022-12-20 ENCOUNTER — CLINICAL SUPPORT (OUTPATIENT)
Dept: PULMONOLOGY | Facility: CLINIC | Age: 52
End: 2022-12-20
Payer: COMMERCIAL

## 2022-12-20 ENCOUNTER — OFFICE VISIT (OUTPATIENT)
Dept: PULMONOLOGY | Facility: CLINIC | Age: 52
End: 2022-12-20
Payer: COMMERCIAL

## 2022-12-20 VITALS
RESPIRATION RATE: 18 BRPM | SYSTOLIC BLOOD PRESSURE: 122 MMHG | WEIGHT: 147.69 LBS | HEART RATE: 79 BPM | OXYGEN SATURATION: 99 % | DIASTOLIC BLOOD PRESSURE: 82 MMHG | BODY MASS INDEX: 26.17 KG/M2 | HEIGHT: 63 IN

## 2022-12-20 DIAGNOSIS — R06.02 SOB (SHORTNESS OF BREATH): ICD-10-CM

## 2022-12-20 DIAGNOSIS — R91.8 PULMONARY NODULES: Primary | ICD-10-CM

## 2022-12-20 DIAGNOSIS — R76.11 POSITIVE SKIN TEST FOR TUBERCULOSIS: ICD-10-CM

## 2022-12-20 DIAGNOSIS — D50.9 IRON DEFICIENCY ANEMIA, UNSPECIFIED IRON DEFICIENCY ANEMIA TYPE: ICD-10-CM

## 2022-12-20 DIAGNOSIS — R06.02 SOB (SHORTNESS OF BREATH) ON EXERTION: ICD-10-CM

## 2022-12-20 LAB
BRPFT: NORMAL
DLCO ADJ PRE: 18.78 ML/(MIN*MMHG)
DLCO SINGLE BREATH LLN: 17.85
DLCO SINGLE BREATH PRE REF: 48.3 %
DLCO SINGLE BREATH REF: 23.58
DLCOC SBVA LLN: 3.36
DLCOC SBVA PRE REF: 96 %
DLCOC SBVA REF: 4.94
DLCOC SINGLE BREATH LLN: 17.85
DLCOC SINGLE BREATH PRE REF: 79.7 %
DLCOC SINGLE BREATH REF: 23.58
DLCOVA LLN: 3.36
DLCOVA PRE REF: 58.2 %
DLCOVA PRE: 2.88 ML/(MIN*MMHG*L)
DLCOVA REF: 4.94
DLVAADJ PRE: 4.75 ML/(MIN*MMHG*L)
ERV LLN: -16449.06
ERV PRE REF: 116.7 %
ERV REF: 0.94
FEF 25 75 CHG: 24.3 %
FEF 25 75 LLN: 1.5
FEF 25 75 POST REF: 106.9 %
FEF 25 75 PRE REF: 86 %
FEF 25 75 REF: 2.67
FET100 CHG: -3.1 %
FEV1 CHG: 7.4 %
FEV1 FVC CHG: 8.2 %
FEV1 FVC LLN: 69
FEV1 FVC POST REF: 103 %
FEV1 FVC PRE REF: 95.3 %
FEV1 FVC REF: 81
FEV1 LLN: 2.08
FEV1 POST REF: 106.3 %
FEV1 PRE REF: 99 %
FEV1 REF: 2.67
FRCPLETH LLN: 1.81
FRCPLETH PREREF: 156.8 %
FRCPLETH REF: 2.63
FVC CHG: -0.7 %
FVC LLN: 2.6
FVC POST REF: 102.6 %
FVC PRE REF: 103.4 %
FVC REF: 3.33
IVC PRE: 2.67 L
IVC SINGLE BREATH LLN: 2.6
IVC SINGLE BREATH PRE REF: 80.2 %
IVC SINGLE BREATH REF: 3.33
MVV LLN: 84
MVV PRE REF: 92.1 %
MVV REF: 99
PEF CHG: -5.5 %
PEF LLN: 4.92
PEF POST REF: 82.1 %
PEF PRE REF: 86.9 %
PEF REF: 6.58
POST FEF 25 75: 2.85 L/S
POST FET 100: 6.84 SEC
POST FEV1 FVC: 83.11 %
POST FEV1: 2.84 L
POST FVC: 3.42 L
POST PEF: 5.4 L/S
PRE DLCO: 11.39 ML/(MIN*MMHG)
PRE ERV: 1.09 L
PRE FEF 25 75: 2.29 L/S
PRE FET 100: 7.06 SEC
PRE FEV1 FVC: 76.84 %
PRE FEV1: 2.65 L
PRE FRC PL: 4.13 L
PRE FVC: 3.44 L
PRE MVV: 91 L/MIN
PRE PEF: 5.72 L/S
PRE RV: 2.29 L
PRE TLC: 5.73 L
RAW LLN: 3.06
RAW PRE REF: 70.4 %
RAW PRE: 2.15 CMH2O*S/L
RAW REF: 3.06
RV LLN: 1.12
RV PRE REF: 134.8 %
RV REF: 1.7
RVTLC LLN: 26
RVTLC PRE REF: 111 %
RVTLC PRE: 39.92 %
RVTLC REF: 36
TLC LLN: 3.78
TLC PRE REF: 120.1 %
TLC REF: 4.77
VA PRE: 3.96 L
VA SINGLE BREATH LLN: 4.62
VA SINGLE BREATH PRE REF: 85.7 %
VA SINGLE BREATH REF: 4.62
VC LLN: 2.6
VC PRE REF: 103.4 %
VC PRE: 3.44 L
VC REF: 3.33
VTGRAWPRE: 4.11 L

## 2022-12-20 PROCEDURE — 3008F BODY MASS INDEX DOCD: CPT | Mod: CPTII,S$GLB,, | Performed by: INTERNAL MEDICINE

## 2022-12-20 PROCEDURE — 3074F SYST BP LT 130 MM HG: CPT | Mod: CPTII,S$GLB,, | Performed by: INTERNAL MEDICINE

## 2022-12-20 PROCEDURE — 1160F PR REVIEW ALL MEDS BY PRESCRIBER/CLIN PHARMACIST DOCUMENTED: ICD-10-PCS | Mod: CPTII,S$GLB,, | Performed by: INTERNAL MEDICINE

## 2022-12-20 PROCEDURE — 94726 PLETHYSMOGRAPHY LUNG VOLUMES: CPT | Mod: S$GLB,,, | Performed by: INTERNAL MEDICINE

## 2022-12-20 PROCEDURE — 94729 DIFFUSING CAPACITY: CPT | Mod: S$GLB,,, | Performed by: INTERNAL MEDICINE

## 2022-12-20 PROCEDURE — 3079F PR MOST RECENT DIASTOLIC BLOOD PRESSURE 80-89 MM HG: ICD-10-PCS | Mod: CPTII,S$GLB,, | Performed by: INTERNAL MEDICINE

## 2022-12-20 PROCEDURE — 1159F PR MEDICATION LIST DOCUMENTED IN MEDICAL RECORD: ICD-10-PCS | Mod: CPTII,S$GLB,, | Performed by: INTERNAL MEDICINE

## 2022-12-20 PROCEDURE — 3079F DIAST BP 80-89 MM HG: CPT | Mod: CPTII,S$GLB,, | Performed by: INTERNAL MEDICINE

## 2022-12-20 PROCEDURE — 99999 PR PBB SHADOW E&M-EST. PATIENT-LVL III: ICD-10-PCS | Mod: PBBFAC,,, | Performed by: INTERNAL MEDICINE

## 2022-12-20 PROCEDURE — 94060 PR EVAL OF BRONCHOSPASM: ICD-10-PCS | Mod: S$GLB,,, | Performed by: INTERNAL MEDICINE

## 2022-12-20 PROCEDURE — 3074F PR MOST RECENT SYSTOLIC BLOOD PRESSURE < 130 MM HG: ICD-10-PCS | Mod: CPTII,S$GLB,, | Performed by: INTERNAL MEDICINE

## 2022-12-20 PROCEDURE — 99215 OFFICE O/P EST HI 40 MIN: CPT | Mod: 25,S$GLB,, | Performed by: INTERNAL MEDICINE

## 2022-12-20 PROCEDURE — 94726 PULM FUNCT TST PLETHYSMOGRAP: ICD-10-PCS | Mod: S$GLB,,, | Performed by: INTERNAL MEDICINE

## 2022-12-20 PROCEDURE — 3008F PR BODY MASS INDEX (BMI) DOCUMENTED: ICD-10-PCS | Mod: CPTII,S$GLB,, | Performed by: INTERNAL MEDICINE

## 2022-12-20 PROCEDURE — 99215 PR OFFICE/OUTPT VISIT, EST, LEVL V, 40-54 MIN: ICD-10-PCS | Mod: 25,S$GLB,, | Performed by: INTERNAL MEDICINE

## 2022-12-20 PROCEDURE — 94060 EVALUATION OF WHEEZING: CPT | Mod: S$GLB,,, | Performed by: INTERNAL MEDICINE

## 2022-12-20 PROCEDURE — 99999 PR PBB SHADOW E&M-EST. PATIENT-LVL III: CPT | Mod: PBBFAC,,, | Performed by: INTERNAL MEDICINE

## 2022-12-20 PROCEDURE — 1159F MED LIST DOCD IN RCRD: CPT | Mod: CPTII,S$GLB,, | Performed by: INTERNAL MEDICINE

## 2022-12-20 PROCEDURE — 1160F RVW MEDS BY RX/DR IN RCRD: CPT | Mod: CPTII,S$GLB,, | Performed by: INTERNAL MEDICINE

## 2022-12-20 PROCEDURE — 94729 PR C02/MEMBANE DIFFUSE CAPACITY: ICD-10-PCS | Mod: S$GLB,,, | Performed by: INTERNAL MEDICINE

## 2022-12-20 NOTE — PROGRESS NOTES
Subjective:     Patient ID: Tammy Bee is a 52 y.o. female.    Chief Complaint:  follow up from Emergency Room visit for severe anemia    HPI  51 y/o transfused with 2 units of Packed Red Blood Cells for symptomatic anemia. Initial lab suggests iron deficiency anemia. May need transfusion of iron in light of need for prompt workup of lung nodules.Hematology visit scheduled for tomorrow.  Patient denies any obvious source of blood loss - last menstrual period over two years ago. No black or tarry stools or GI complaints.    Dyspnea  Patient complains of shortness of breath. Symptoms occur with one block walking. Symptoms began 4 months ago after COVID ( was vaccinated beforehand)  gradually worsening since. Associated symptoms include  dyspnea on exertion, shortness of breath, and leg muscles burn. She denies post nasal drip and productive cough. She does not have had recent travel. Weight has increased 20 pounds over last few years. Symptoms are exacerbated by any exercise. Symptoms are alleviated by rest.       Lung Nodule  She presents for evaluation and treatment of a lung nodule. The patient reports that the imaging was performed to evaluate symptoms of dyspnea on exertion and shortness of breath which have been present for 4 months and are gradually worsening. Symptoms are exacerbated by exercise and relieved by rest. The patient denies other associated symptoms. She has a history of 32 pack years of smoking.  The patient has a positive PPD. Untreated in 1998 . Recent TB gold is negative. The patient does not have a history of cancer. History of RA - no flare ups in over a year    Past Medical History:   Diagnosis Date    RA (rheumatoid arthritis)      Past Surgical History:   Procedure Laterality Date    Tubal Lig      TUBAL LIGATION       Review of patient's allergies indicates:  No Known Allergies  Current Outpatient Medications on File Prior to Visit   Medication Sig Dispense Refill     albuterol (PROVENTIL) 2.5 mg /3 mL (0.083 %) nebulizer solution Take 3 mLs (2.5 mg total) by nebulization every 4 to 6 hours as needed for Wheezing or Shortness of Breath. 360 mL 11    albuterol (PROVENTIL/VENTOLIN HFA) 90 mcg/actuation inhaler Inhale 2 puffs into the lungs every 4 (four) hours as needed for Wheezing or Shortness of Breath. 18 g 11    albuterol-ipratropium (DUO-NEB) 2.5 mg-0.5 mg/3 mL nebulizer solution SMARTSI Ampule(s) Via Nebulizer Every 6 Hours PRN      [DISCONTINUED] azithromycin (ZITHROMAX Z-PABLITO) 250 MG tablet 500 mg on day 1 (two tablets) followed by 250 mg once daily on days 2-5 6 tablet 0    [DISCONTINUED] hydrOXYzine pamoate (VISTARIL) 25 MG Cap Take 1-2 capsules (25-50 mg total) by mouth 4 (four) times daily. 30 capsule 0    [DISCONTINUED] predniSONE (DELTASONE) 20 MG tablet Prednisone 60 mg/ day for 3 days, 40 mg/day for 3 days,20 mg/ day for 3 days, (1/2 tablet )10 mg a day for 3 days. 20 tablet 0     No current facility-administered medications on file prior to visit.     Social History     Socioeconomic History    Marital status: Single   Occupational History    Occupation: [employer], supervisor   Tobacco Use    Smoking status: Former     Packs/day: 1.00     Years: 32.00     Pack years: 32.00     Types: Vaping with nicotine, Cigarettes     Start date:      Quit date: 2020     Years since quittin.9    Smokeless tobacco: Never   Substance and Sexual Activity    Alcohol use: Not Currently    Drug use: Not Currently     Types: Marijuana    Sexual activity: Not Currently     Social Determinants of Health     Financial Resource Strain: Low Risk     Difficulty of Paying Living Expenses: Not very hard   Food Insecurity: No Food Insecurity    Worried About Running Out of Food in the Last Year: Never true    Ran Out of Food in the Last Year: Never true   Transportation Needs: No Transportation Needs    Lack of Transportation (Medical): No    Lack of Transportation (Non-Medical): No  "  Physical Activity: Inactive    Days of Exercise per Week: 5 days    Minutes of Exercise per Session: 0 min   Stress: Stress Concern Present    Feeling of Stress : To some extent   Social Connections: Unknown    Frequency of Communication with Friends and Family: More than three times a week    Frequency of Social Gatherings with Friends and Family: More than three times a week    Active Member of Clubs or Organizations: No    Attends Club or Organization Meetings: Never    Marital Status:    Housing Stability: Low Risk     Unable to Pay for Housing in the Last Year: No    Number of Places Lived in the Last Year: 1    Unstable Housing in the Last Year: No     Family History   Problem Relation Age of Onset    Diabetes Sister     Cancer Sister     Breast cancer Sister     Diabetes Brother     Breast cancer Other        Review of Systems   Constitutional:  Negative for fever and fatigue.   HENT:  Negative for postnasal drip and rhinorrhea.    Eyes:  Negative for redness and itching.   Respiratory:  Positive for dyspnea on extertion. Negative for cough, shortness of breath, wheezing and Paroxysmal Nocturnal Dyspnea.    Cardiovascular:  Negative for chest pain.   Genitourinary:  Negative for difficulty urinating and hematuria.   Endocrine:  Negative for polyphagia, cold intolerance and heat intolerance.    Musculoskeletal:  Negative for arthralgias.   Skin:  Negative for rash.   Gastrointestinal:  Negative for nausea, vomiting, abdominal pain and abdominal distention.   Neurological:  Negative for dizziness and headaches.   Hematological:  Negative for adenopathy. Does not bruise/bleed easily and no excessive bruising.   Psychiatric/Behavioral:  The patient is not nervous/anxious.      Objective:      /82   Pulse 79   Resp 18   Ht 5' 3" (1.6 m)   Wt 67 kg (147 lb 11.3 oz)   SpO2 99%   BMI 26.17 kg/m²   Physical Exam  Vitals and nursing note reviewed.   Constitutional:       Appearance: Normal " appearance. She is well-developed.   HENT:      Head: Normocephalic and atraumatic.      Nose: Nose normal.   Eyes:      Conjunctiva/sclera: Conjunctivae normal.      Pupils: Pupils are equal, round, and reactive to light.   Neck:      Thyroid: No thyromegaly.      Vascular: No JVD.      Trachea: No tracheal deviation.   Cardiovascular:      Rate and Rhythm: Normal rate and regular rhythm.      Heart sounds: Normal heart sounds.   Pulmonary:      Effort: Pulmonary effort is normal. No respiratory distress.      Breath sounds: Normal breath sounds. No wheezing or rales.   Chest:      Chest wall: No tenderness.   Abdominal:      General: Bowel sounds are normal.      Palpations: Abdomen is soft.   Musculoskeletal:         General: Normal range of motion.      Cervical back: Neck supple.   Lymphadenopathy:      Cervical: No cervical adenopathy.   Skin:     General: Skin is warm and dry.   Neurological:      Mental Status: She is alert and oriented to person, place, and time.     Personal Diagnostic Review  Pulmonary Function Studies: normal  PET scan: reviewed     Pulmonary Studies Review 12/20/2022   SpO2 99   Ordering Provider -   Interpreting Provider -   Performing nurse/tech/RT -   Diagnosis -   Height 63   Weight 2363.33   BMI (Calculated) 26.2   Predicted Distance 423.01   Patient Race -   6MWT Status -   Patient Reported -   Was O2 used? -   6MW Distance walked (feet) -   Distance walked (meters) -   Did patient stop? -   How many times? -   Stop Time 1 -   Restart Time 1 -   Did patient restart? -   Type of assistive device(s) used? -   Is extra documentation required for this patient? -   Oxygen Saturation -   Supplemental Oxygen -   Heart Rate -   Blood Pressure -   Bhaskar Dyspnea Rating  -   Oxygen Saturation -   Supplemental Oxygen -   Heart Rate -   Blood Pressure -   Bhaskar Dyspnea Rating  -   Recovery Time (seconds) -   Oxygen Saturation -   Supplemental Oxygen -   Heart Rate -   Blood Pressure -   Bhaskar  Dyspnea Rating  -   Is procedure ready for interpretation? -   Did the patient stop or pause? -   How many times did the patient stop or pause? -   Stop Time 1 -   Restart Time 1 -   Pause Time 1 -   Total Time Walked (Calculated) -   Total Laps Walked -   Final Partial Lap Distance (feet) -   Total Distance Feet (Calculated) -   Total Distance Meters (Calculated) -   Predicted Distance Meters (Calculated) 562.17   Percentage of Predicted (Calculated) -   Peak VO2 (Calculated) -   Mets -   Has The Patient Had a Previous Six Minute Walk Test? -   Oxygen Qualification? -       Echo Saline Bubble? No  · The left ventricle is normal in size with normal systolic function.  · Normal left ventricular diastolic function.  · Normal right ventricular size with normal right ventricular systolic   function.  · The estimated ejection fraction is 60-65%.     NM PET CT Routine Skull to Mid Thigh  Narrative: EXAMINATION:  NM PET CT ROUTINE    CLINICAL HISTORY:  Lung nodule, > 8mm;  Solitary pulmonary nodule    TECHNIQUE:  Segmented attenuation corrected 3-D PET imaging was obtained from the skull base through the mid thighs utilizing 13.26 mCi F-[id number].  Noncontrast CT imaging was performed for attenuation correction, diagnosis, and anatomical fusion with PET    COMPARISON:  CT chest from 11/30/2022    FINDINGS:  Head/neck: There is normal physiologic uptake noted within the visualized brain parenchyma. No FDG avid adenopathy within the neck.    Chest: There is a new cluster of nodule surrounded by ground-glass opacity within the inferior aspect of the right upper lobe which demonstrates a SUV max of up to 4.4.  Findings may be representative of an infectious or inflammatory etiology.  The previously noted pulmonary nodules within either lung demonstrate low level uptake including the largest nodule within the left lower lobe which demonstrates a SUV max of 2.4.  The cavitary lesion within the right lower lobe has also changed in  appearance and is now more thin walled and slightly larger and demonstrates a SUV max of 1.3.  There is also low level uptake seen associated with the scarring changes within either lung apex which demonstrate a SUV max of up to 3.1 on the left and 3.3 on the right.  No FDG avid mediastinal, hilar or axillary lymph nodes.    Abdomen/Pelvis: Normal physiologic uptake noted within the liver, spleen, urinary tract, and bowel.The adrenals are unremarkable in appearance.  No FDG avid lymph nodes seen within the abdomen or pelvis.    Skeletal: No FDG avid osseus lesions identified.  Impression: 1. New cluster of nodules surrounded by ground-glass opacity within the inferior right upper lobe suspicious for an infectious or inflammatory etiology. The cavitary lesion within the right lower lobe is slightly larger and more thin walled as well suggesting that this is infectious or inflammatory in nature.  Low level uptake associated with the solid nodules and scarring changes within both lung apices including the larger nodule within the left lower lobe.  Given the above findings all of these changes could be related to an infectious or inflammatory etiology.  Clinical correlation and follow-up is recommended.  No other sites of suspicious uptake identified within the abdomen or pelvis.    Electronically signed by: Ventura Bolanos DO  Date:    12/14/2022  Time:    11:14      Office Spirometry Results:     No flowsheet data found.  Pulmonary Studies Review 12/20/2022   SpO2 99   Ordering Provider -   Interpreting Provider -   Performing nurse/tech/RT -   Diagnosis -   Height 63   Weight 2363.33   BMI (Calculated) 26.2   Predicted Distance 423.01   Patient Race -   6MWT Status -   Patient Reported -   Was O2 used? -   6MW Distance walked (feet) -   Distance walked (meters) -   Did patient stop? -   How many times? -   Stop Time 1 -   Restart Time 1 -   Did patient restart? -   Type of assistive device(s) used? -   Is extra  documentation required for this patient? -   Oxygen Saturation -   Supplemental Oxygen -   Heart Rate -   Blood Pressure -   Bhaskar Dyspnea Rating  -   Oxygen Saturation -   Supplemental Oxygen -   Heart Rate -   Blood Pressure -   Bhaskar Dyspnea Rating  -   Recovery Time (seconds) -   Oxygen Saturation -   Supplemental Oxygen -   Heart Rate -   Blood Pressure -   Bhaskar Dyspnea Rating  -   Is procedure ready for interpretation? -   Did the patient stop or pause? -   How many times did the patient stop or pause? -   Stop Time 1 -   Restart Time 1 -   Pause Time 1 -   Total Time Walked (Calculated) -   Total Laps Walked -   Final Partial Lap Distance (feet) -   Total Distance Feet (Calculated) -   Total Distance Meters (Calculated) -   Predicted Distance Meters (Calculated) 562.17   Percentage of Predicted (Calculated) -   Peak VO2 (Calculated) -   Mets -   Has The Patient Had a Previous Six Minute Walk Test? -   Oxygen Qualification? -         Assessment:       Iron deficiency anemia  Follow up with Hemeatology    Pulmonary nodules  CT guided needle biopsy of  peripheral lung lesion, if non diagnostic then proceed with bronchoscopy    Pulmonary nodules    SOB (shortness of breath)    Iron deficiency anemia, unspecified iron deficiency anemia type    Positive skin test for tuberculosis          Outpatient Encounter Medications as of 2022   Medication Sig Dispense Refill    albuterol (PROVENTIL) 2.5 mg /3 mL (0.083 %) nebulizer solution Take 3 mLs (2.5 mg total) by nebulization every 4 to 6 hours as needed for Wheezing or Shortness of Breath. 360 mL 11    albuterol (PROVENTIL/VENTOLIN HFA) 90 mcg/actuation inhaler Inhale 2 puffs into the lungs every 4 (four) hours as needed for Wheezing or Shortness of Breath. 18 g 11    albuterol-ipratropium (DUO-NEB) 2.5 mg-0.5 mg/3 mL nebulizer solution SMARTSI Ampule(s) Via Nebulizer Every 6 Hours PRN      [DISCONTINUED] azithromycin (ZITHROMAX Z-PABLITO) 250 MG tablet 500 mg on  day 1 (two tablets) followed by 250 mg once daily on days 2-5 6 tablet 0    [DISCONTINUED] hydrOXYzine pamoate (VISTARIL) 25 MG Cap Take 1-2 capsules (25-50 mg total) by mouth 4 (four) times daily. 30 capsule 0    [DISCONTINUED] predniSONE (DELTASONE) 20 MG tablet Prednisone 60 mg/ day for 3 days, 40 mg/day for 3 days,20 mg/ day for 3 days, (1/2 tablet )10 mg a day for 3 days. 20 tablet 0     No facility-administered encounter medications on file as of 12/20/2022.     Plan:       Requested Prescriptions      No prescriptions requested or ordered in this encounter     Problem List Items Addressed This Visit       Iron deficiency anemia    Current Assessment & Plan     Follow up with Hemeatology         Positive skin test for tuberculosis    Overview     TB exposure at age 27 years, by patient's history: positive skin test, clear chest xray, never took TB medication.    TB gold lab negative x 2, once in 2019 and 2022           Pulmonary nodules - Primary    Overview     11/30/2022 CT chest MA multiple bilateral pulmonary nodules, 1 of which is cavitary in the right lower lobe.  The right lower lobe pulmonary nodule which is cavitary it measures 1.2 x 1.1 cm trans axially (series 5, image 312) the largest nodule is in the left lower lobe and involves the pleura and measures 1.7 by 1.4 cm (series 5, image 295).  No mediastinal or hilar lymphadenopathy.  No axillary lymphadenopathy.  No acute findings in the upper abdomen.  PET CT chest reviewed - see belos  CT guided lung bx - rescheduled as per radiology           Current Assessment & Plan     CT guided needle biopsy of  peripheral lung lesion, if non diagnostic then proceed with bronchoscopy         SOB (shortness of breath)    Overview     Severe shortness of breath with activity x 6-7 months  11/30/2022 cavitary lung lesion, pending PET CT chest and bx.  Pending CPFT next week  Former 32 pack year smoker. Quit 2020 12/9/2022 6MWD O2 sats 100% during walk, No  desaturations requiring supplemental oxygen at rest or exertion.  12/9/2022 FeNO 13, no inflammation of lung suggested  No wheezing, no cough, no mucous.   Chest xray 12/9/2022 no acute findings, prior pul nodule unchanged.  Suspect anxiety about medical condition in part.   Keep follow up with Dr. Quarles for continued work up and CPFT evaluate for COPD in smoker.  No indication to add on ICS.  Continue Albuterol inhaler if needed  Has zpack and pred per dr quarles began yesterday okay to complete                     Follow up in about 17 days (around 1/6/2023) for Review progress.    MEDICAL DECISION MAKING: Moderate to high complexity.  Overall, the multiple problems listed are of moderate to high severity that may impact quality of life and activities of daily living. Side effects of medications, treatment plan as well as options and alternatives reviewed and discussed with patient. There was counseling of patient concerning these issues.    Total time spent in counseling and coordination of care - 45  minutes of total time spent on the encounter, which includes face to face time and non-face to face time preparing to see the patient (eg, review of tests), Obtaining and/or reviewing separately obtained history, Documenting clinical information in the electronic or other health record, Independently interpreting results (not separately reported) and communicating results to the patient/family/caregiver, or Care coordination (not separately reported).    Time was used in discussion of prognosis, risks, benefits of treatment, instructions and compliance with regimen . Discussion with other physicians and/or health care providers - home health or for use of durable medical equipment (oxygen, nebulizers, CPAP, BiPAP) occurred.

## 2022-12-21 ENCOUNTER — OFFICE VISIT (OUTPATIENT)
Dept: HEMATOLOGY/ONCOLOGY | Facility: CLINIC | Age: 52
End: 2022-12-21
Payer: COMMERCIAL

## 2022-12-21 ENCOUNTER — LAB VISIT (OUTPATIENT)
Dept: LAB | Facility: HOSPITAL | Age: 52
End: 2022-12-21
Attending: INTERNAL MEDICINE
Payer: COMMERCIAL

## 2022-12-21 VITALS
HEART RATE: 85 BPM | WEIGHT: 150.56 LBS | OXYGEN SATURATION: 98 % | SYSTOLIC BLOOD PRESSURE: 116 MMHG | DIASTOLIC BLOOD PRESSURE: 75 MMHG | HEIGHT: 67 IN | BODY MASS INDEX: 23.63 KG/M2 | TEMPERATURE: 97 F

## 2022-12-21 DIAGNOSIS — D50.9 IRON DEFICIENCY ANEMIA, UNSPECIFIED IRON DEFICIENCY ANEMIA TYPE: Primary | ICD-10-CM

## 2022-12-21 DIAGNOSIS — D50.0 IRON DEFICIENCY ANEMIA DUE TO CHRONIC BLOOD LOSS: ICD-10-CM

## 2022-12-21 DIAGNOSIS — D64.9 SYMPTOMATIC ANEMIA: ICD-10-CM

## 2022-12-21 DIAGNOSIS — D50.9 IRON DEFICIENCY ANEMIA, UNSPECIFIED IRON DEFICIENCY ANEMIA TYPE: ICD-10-CM

## 2022-12-21 LAB
BASOPHILS # BLD AUTO: 0.14 K/UL (ref 0–0.2)
BASOPHILS NFR BLD: 1.9 % (ref 0–1.9)
DIFFERENTIAL METHOD: ABNORMAL
EOSINOPHIL # BLD AUTO: 0.3 K/UL (ref 0–0.5)
EOSINOPHIL NFR BLD: 4.6 % (ref 0–8)
ERYTHROCYTE [DISTWIDTH] IN BLOOD BY AUTOMATED COUNT: 21.6 % (ref 11.5–14.5)
HCT VFR BLD AUTO: 30.4 % (ref 37–48.5)
HGB BLD-MCNC: 8.7 G/DL (ref 12–16)
IMM GRANULOCYTES # BLD AUTO: 0.04 K/UL (ref 0–0.04)
IMM GRANULOCYTES NFR BLD AUTO: 0.5 % (ref 0–0.5)
LYMPHOCYTES # BLD AUTO: 2.3 K/UL (ref 1–4.8)
LYMPHOCYTES NFR BLD: 30.7 % (ref 18–48)
MCH RBC QN AUTO: 21.2 PG (ref 27–31)
MCHC RBC AUTO-ENTMCNC: 28.6 G/DL (ref 32–36)
MCV RBC AUTO: 74 FL (ref 82–98)
MONOCYTES # BLD AUTO: 0.5 K/UL (ref 0.3–1)
MONOCYTES NFR BLD: 7.1 % (ref 4–15)
NEUTROPHILS # BLD AUTO: 4.1 K/UL (ref 1.8–7.7)
NEUTROPHILS NFR BLD: 55.2 % (ref 38–73)
NRBC BLD-RTO: 0 /100 WBC
PLATELET # BLD AUTO: 353 K/UL (ref 150–450)
PMV BLD AUTO: 9.1 FL (ref 9.2–12.9)
RBC # BLD AUTO: 4.1 M/UL (ref 4–5.4)
WBC # BLD AUTO: 7.42 K/UL (ref 3.9–12.7)

## 2022-12-21 PROCEDURE — 36415 COLL VENOUS BLD VENIPUNCTURE: CPT | Performed by: INTERNAL MEDICINE

## 2022-12-21 PROCEDURE — 3078F DIAST BP <80 MM HG: CPT | Mod: CPTII,S$GLB,, | Performed by: INTERNAL MEDICINE

## 2022-12-21 PROCEDURE — 1159F MED LIST DOCD IN RCRD: CPT | Mod: CPTII,S$GLB,, | Performed by: INTERNAL MEDICINE

## 2022-12-21 PROCEDURE — 3074F PR MOST RECENT SYSTOLIC BLOOD PRESSURE < 130 MM HG: ICD-10-PCS | Mod: CPTII,S$GLB,, | Performed by: INTERNAL MEDICINE

## 2022-12-21 PROCEDURE — 1159F PR MEDICATION LIST DOCUMENTED IN MEDICAL RECORD: ICD-10-PCS | Mod: CPTII,S$GLB,, | Performed by: INTERNAL MEDICINE

## 2022-12-21 PROCEDURE — 85025 COMPLETE CBC W/AUTO DIFF WBC: CPT | Performed by: INTERNAL MEDICINE

## 2022-12-21 PROCEDURE — 99204 PR OFFICE/OUTPT VISIT, NEW, LEVL IV, 45-59 MIN: ICD-10-PCS | Mod: S$GLB,,, | Performed by: INTERNAL MEDICINE

## 2022-12-21 PROCEDURE — 3078F PR MOST RECENT DIASTOLIC BLOOD PRESSURE < 80 MM HG: ICD-10-PCS | Mod: CPTII,S$GLB,, | Performed by: INTERNAL MEDICINE

## 2022-12-21 PROCEDURE — 1160F RVW MEDS BY RX/DR IN RCRD: CPT | Mod: CPTII,S$GLB,, | Performed by: INTERNAL MEDICINE

## 2022-12-21 PROCEDURE — 99204 OFFICE O/P NEW MOD 45 MIN: CPT | Mod: S$GLB,,, | Performed by: INTERNAL MEDICINE

## 2022-12-21 PROCEDURE — 3008F PR BODY MASS INDEX (BMI) DOCUMENTED: ICD-10-PCS | Mod: CPTII,S$GLB,, | Performed by: INTERNAL MEDICINE

## 2022-12-21 PROCEDURE — 3008F BODY MASS INDEX DOCD: CPT | Mod: CPTII,S$GLB,, | Performed by: INTERNAL MEDICINE

## 2022-12-21 PROCEDURE — 3074F SYST BP LT 130 MM HG: CPT | Mod: CPTII,S$GLB,, | Performed by: INTERNAL MEDICINE

## 2022-12-21 PROCEDURE — 99999 PR PBB SHADOW E&M-EST. PATIENT-LVL IV: ICD-10-PCS | Mod: PBBFAC,,, | Performed by: INTERNAL MEDICINE

## 2022-12-21 PROCEDURE — 1160F PR REVIEW ALL MEDS BY PRESCRIBER/CLIN PHARMACIST DOCUMENTED: ICD-10-PCS | Mod: CPTII,S$GLB,, | Performed by: INTERNAL MEDICINE

## 2022-12-21 PROCEDURE — 99999 PR PBB SHADOW E&M-EST. PATIENT-LVL IV: CPT | Mod: PBBFAC,,, | Performed by: INTERNAL MEDICINE

## 2022-12-21 RX ORDER — SODIUM CHLORIDE 0.9 % (FLUSH) 0.9 %
10 SYRINGE (ML) INJECTION
Status: CANCELLED | OUTPATIENT
Start: 2022-12-21

## 2022-12-21 RX ORDER — HEPARIN 100 UNIT/ML
500 SYRINGE INTRAVENOUS
Status: CANCELLED | OUTPATIENT
Start: 2022-12-21

## 2022-12-21 NOTE — PATIENT INSTRUCTIONS
Cbc today  Iv iron feraheme x 2 doses 1 week apart  EGD and colonoscopy asap  Pulmonary biopsy arrange with pulmonary  Repeat CBC in 4 wks, virtual Dr. Arevalo

## 2022-12-21 NOTE — H&P (VIEW-ONLY)
Subjective:      DATE OF VISIT: 12/21/22     ?  Patient ID:?Tammy Bee is a 52 y.o. female.?? MR#: [id number]   ?   REFERRING PROVIDER: Ly Snowden MD  [id number] [place] [address] [place] [id number]     ? Primary Care Providers:  Maria Teresa Aparicio MD, MD (General)     CHIEF COMPLAINT: ?Iron deficiency anemia??   ?   HPI    I had the pleasure meeting for the 1st time Ms. Aguero a 52 y.o. woman presenting for further evaluation of severe iron deficiency anemia.     History notable for rheumatoid arthritis with prior use of methotrexate however discontinued during COVID pandemic hand without flare and has not had interval follow-up with Rheumatology or other providers since she had felt healthy.  Most recent CBC was in 2020 hemoglobin within normal limits 12.5.  Most recent labs 12/14/2022 with hemoglobin 5.6.  Just prior to that she had presented due to progressive shortness of breath over the prior month with imaging chest x-ray followed by CT and PET scan showing pulmonary nodules.  She notes history of prior positive TB test.  No fevers chills night sweats unintentional weight loss.  She does note some weight gain.  She is unaware of melena hematochezia hemoptysis hematemesis hematuria.  She does note prior history of menorrhagia but has been postmenopausal for over 2 years.    She is never had screening colonoscopy.    Review of Systems    ?   A comprehensive 14-point review of systems was reviewed with patient and was negative other than as specified above.   ?   PAST MEDICAL HISTORY:   Past Medical History:   Diagnosis Date    RA (rheumatoid arthritis)     ?     PAST SURGICAL HISTORY:   Past Surgical History:   Procedure Laterality Date    Tubal Lig      TUBAL LIGATION        ?   ALLERGIES:   Allergies as of 12/21/2022    (No Known Allergies)      ?   MEDICATIONS:?   Outpatient Medications Marked as Taking for the 12/21/22 encounter (Office Visit) with Eve Arevalo MD   Medication  Sig Dispense Refill    albuterol (PROVENTIL) 2.5 mg /3 mL (0.083 %) nebulizer solution Take 3 mLs (2.5 mg total) by nebulization every 4 to 6 hours as needed for Wheezing or Shortness of Breath. 360 mL 11    albuterol (PROVENTIL/VENTOLIN HFA) 90 mcg/actuation inhaler Inhale 2 puffs into the lungs every 4 (four) hours as needed for Wheezing or Shortness of Breath. 18 g 11    albuterol-ipratropium (DUO-NEB) 2.5 mg-0.5 mg/3 mL nebulizer solution SMARTSI Ampule(s) Via Nebulizer Every 6 Hours PRN        ?   SOCIAL HISTORY:?   Social History     Tobacco Use    Smoking status: Former     Packs/day: 1.00     Years: 32.00     Pack years: 32.00     Types: Vaping with nicotine, Cigarettes     Start date:      Quit date:      Years since quittin.9    Smokeless tobacco: Never   Substance Use Topics    Alcohol use: Not Currently      ?   \   ?   FAMILY HISTORY:   family history includes Breast cancer in her sister and another family member; Cancer in her sister; Diabetes in her brother and sister.   ?        Objective:      Physical Exam      ?   Vitals:    22 1541   BP: 116/75   Pulse: 85   Temp: 97.1 °F (36.2 °C)      ?   ECOG:?0   General appearance: Generally well appearing, in no acute distress, pallor.   Head, eyes, ears, nose, and throat: moist mucous membranes.   Respiratory:  Normal work of breathing  Abdomen: nontender, nondistended.   Extremities: Warm, without edema.   Neurologic: Alert and oriented. Grossly normal strength, coordination, and gait.   Skin: No rashes, ecchymoses or petechial lesion.   Psychiatric:  Normal mood and affect.    ?   Laboratory:  ?   Lab Visit on 2022   Component Date Value Ref Range Status    WBC 2022 7.42  3.90 - 12.70 K/uL Final    RBC 2022 4.10  4.00 - 5.40 M/uL Final    Hemoglobin 2022 8.7 (L)  12.0 - 16.0 g/dL Final    Hematocrit 2022 30.4 (L)  37.0 - 48.5 % Final    MCV 2022 74 (L)  82 - 98 fL Final    MCH  12/21/2022 21.2 (L)  27.0 - 31.0 pg Final    MCHC 12/21/2022 28.6 (L)  32.0 - 36.0 g/dL Final    RDW 12/21/2022 21.6 (H)  11.5 - 14.5 % Final    Platelets 12/21/2022 353  150 - 450 K/uL Final    MPV 12/21/2022 9.1 (L)  9.2 - 12.9 fL Final    Immature Granulocytes 12/21/2022 0.5  0.0 - 0.5 % Final    Gran # (ANC) 12/21/2022 4.1  1.8 - 7.7 K/uL Final    Immature Grans (Abs) 12/21/2022 0.04  0.00 - 0.04 K/uL Final    Lymph # 12/21/2022 2.3  1.0 - 4.8 K/uL Final    Mono # 12/21/2022 0.5  0.3 - 1.0 K/uL Final    Eos # 12/21/2022 0.3  0.0 - 0.5 K/uL Final    Baso # 12/21/2022 0.14  0.00 - 0.20 K/uL Final    nRBC 12/21/2022 0  0 /100 WBC Final    Gran % 12/21/2022 55.2  38.0 - 73.0 % Final    Lymph % 12/21/2022 30.7  18.0 - 48.0 % Final    Mono % 12/21/2022 7.1  4.0 - 15.0 % Final    Eosinophil % 12/21/2022 4.6  0.0 - 8.0 % Final    Basophil % 12/21/2022 1.9  0.0 - 1.9 % Final    Differential Method 12/21/2022 Automated   Final      Lab Results   Component Value Date    IRON 10 (L) 12/15/2022    TRANSFERRIN 330 12/15/2022    TIBC 488 (H) 12/15/2022    FESATURATED 2 (L) 12/15/2022        Results for orders placed or performed during the hospital encounter of 11/30/22 (from the past 2160 hour(s))   CT Chest With Contrast    Impression    Multiple bilateral pulmonary nodules, 1 of which is cavitary.  Findings suggest metastatic disease.    The CT exam was performed using one or more of the following dose reduction techniques- Automated exposure control, adjustment of the mA and/or kV according to patient size, and/or use of iterative reconstructed technique.      Electronically signed by: Willie Pop  Date:    11/30/2022  Time:    10:13     Results for orders placed or performed during the hospital encounter of 12/14/22 (from the past 2160 hour(s))   NM PET CT Routine Skull to Mid Thigh    Impression    1. New cluster of nodules surrounded by ground-glass opacity within the inferior right upper lobe  suspicious for an infectious or inflammatory etiology. The cavitary lesion within the right lower lobe is slightly larger and more thin walled as well suggesting that this is infectious or inflammatory in nature.  Low level uptake associated with the solid nodules and scarring changes within both lung apices including the larger nodule within the left lower lobe.  Given the above findings all of these changes could be related to an infectious or inflammatory etiology.  Clinical correlation and follow-up is recommended.  No other sites of suspicious uptake identified within the abdomen or pelvis.      Electronically signed by: Ventura Bolanos,   Date:    12/14/2022  Time:    11:14         ?   Assessment/Plan:   Iron deficiency anemia, unspecified iron deficiency anemia type  -     Ambulatory referral/consult to Endo Procedure ; Future; Expected date: 12/22/2022  -     CBC W/ AUTO DIFFERENTIAL; Future; Expected date: 01/21/2023  -     CBC W/ AUTO DIFFERENTIAL; Future; Expected date: 12/21/2022    Symptomatic anemia  -     Ambulatory referral/consult to Hematology / Oncology    Iron deficiency anemia due to chronic blood loss    Other orders  -     ferumoxytoL (FERAHEME) 510 mg in dextrose 5 % 100 mL IVPB  -     heparin, porcine (PF) 100 unit/mL injection flush 500 Units  -     sodium chloride 0.9% flush 10 mL  -     sodium chloride 0.9% 100 mL flush bag       1. Iron deficiency anemia, unspecified iron deficiency anemia type    2. Symptomatic anemia    3. Iron deficiency anemia due to chronic blood loss            Plan:     # iron deficiency anemia:  Iron deficiency anemia with hemoglobin to 5.6 12/14/22, 2% saturation, ferritin 2.  Appropriate improvement to hemoglobin 8 status post 2 units PRBC transfusion.  Prior labs in 2020 within normal limits.  She has been postmenopausal during trying the interval of iron deficiency anemia development.  She is never had screening colonoscopy.  No blood thinner use.   Recommend full scope evaluation including EGD and colonoscopy, order placed instead of colonoscopy screening given iron deficiency anemia.  I reviewed the risks and benefits of IV iron therapy including possible allergy/anaphylaxis, electrolyte abdoornmality, fatigue, headache and limited use in early pregnancy.  We will arrange for IV iron therapy with follow-up in 1 months to assess for response.    # pulmonary nodules:  Multiple bilateral pulmonary nodules some of which cavitary differential includes infectious inflammatory or neoplastic.  Biopsy had been planned with Pulmonary however on hold due to critically low hemoglobin, repletion of iron per above and recommend proceeding with biopsy when possible.      Follow-Up:   Route Chart for Scheduling    Med Onc Chart Routing      Follow up with physician 4 weeks. cbc   Follow up with WILFRIDO    Infusion scheduling note feraheme x 2 doses 1 wk apart   Injection scheduling note    Labs CBC   Lab interval:     Imaging    Pharmacy appointment    Other referrals          Therapy Plan Information  Medications  ferumoxytoL (FERAHEME) 510 mg in dextrose 5 % 100 mL IVPB  510 mg, Intravenous, Every visit  Flushes  heparin, porcine (PF) 100 unit/mL injection flush 500 Units  500 Units, Intravenous, PRN  sodium chloride 0.9% flush 10 mL  10 mL, Intravenous, Every visit  sodium chloride 0.9% 100 mL flush bag  Intravenous, Every visit    Patient Instructions   Cbc today  Iv iron feraheme x 2 doses 1 week apart  EGD and colonoscopy asap  Pulmonary biopsy arrange with pulmonary  Repeat CBC in 4 wks, virtual Dr. Arevalo

## 2022-12-21 NOTE — ASSESSMENT & PLAN NOTE
CT guided needle biopsy of  peripheral lung lesion, if non diagnostic then proceed with bronchoscopy

## 2022-12-21 NOTE — PROGRESS NOTES
Subjective:      DATE OF VISIT: 12/21/22     ?  Patient ID:?Tammy Bee is a 52 y.o. female.?? MR#: [id number]   ?   REFERRING PROVIDER: Ly Snowden MD  [id number] [place] [address] [place] [id number]     ? Primary Care Providers:  Maria Teresa Aparicio MD, MD (General)     CHIEF COMPLAINT: ?Iron deficiency anemia??   ?   HPI    I had the pleasure meeting for the 1st time Ms. Aguero a 52 y.o. woman presenting for further evaluation of severe iron deficiency anemia.     History notable for rheumatoid arthritis with prior use of methotrexate however discontinued during COVID pandemic hand without flare and has not had interval follow-up with Rheumatology or other providers since she had felt healthy.  Most recent CBC was in 2020 hemoglobin within normal limits 12.5.  Most recent labs 12/14/2022 with hemoglobin 5.6.  Just prior to that she had presented due to progressive shortness of breath over the prior month with imaging chest x-ray followed by CT and PET scan showing pulmonary nodules.  She notes history of prior positive TB test.  No fevers chills night sweats unintentional weight loss.  She does note some weight gain.  She is unaware of melena hematochezia hemoptysis hematemesis hematuria.  She does note prior history of menorrhagia but has been postmenopausal for over 2 years.    She is never had screening colonoscopy.    Review of Systems    ?   A comprehensive 14-point review of systems was reviewed with patient and was negative other than as specified above.   ?   PAST MEDICAL HISTORY:   Past Medical History:   Diagnosis Date    RA (rheumatoid arthritis)     ?     PAST SURGICAL HISTORY:   Past Surgical History:   Procedure Laterality Date    Tubal Lig      TUBAL LIGATION        ?   ALLERGIES:   Allergies as of 12/21/2022    (No Known Allergies)      ?   MEDICATIONS:?   Outpatient Medications Marked as Taking for the 12/21/22 encounter (Office Visit) with Eve Arevalo MD   Medication  Sig Dispense Refill    albuterol (PROVENTIL) 2.5 mg /3 mL (0.083 %) nebulizer solution Take 3 mLs (2.5 mg total) by nebulization every 4 to 6 hours as needed for Wheezing or Shortness of Breath. 360 mL 11    albuterol (PROVENTIL/VENTOLIN HFA) 90 mcg/actuation inhaler Inhale 2 puffs into the lungs every 4 (four) hours as needed for Wheezing or Shortness of Breath. 18 g 11    albuterol-ipratropium (DUO-NEB) 2.5 mg-0.5 mg/3 mL nebulizer solution SMARTSI Ampule(s) Via Nebulizer Every 6 Hours PRN        ?   SOCIAL HISTORY:?   Social History     Tobacco Use    Smoking status: Former     Packs/day: 1.00     Years: 32.00     Pack years: 32.00     Types: Vaping with nicotine, Cigarettes     Start date:      Quit date:      Years since quittin.9    Smokeless tobacco: Never   Substance Use Topics    Alcohol use: Not Currently      ?   \   ?   FAMILY HISTORY:   family history includes Breast cancer in her sister and another family member; Cancer in her sister; Diabetes in her brother and sister.   ?        Objective:      Physical Exam      ?   Vitals:    22 1541   BP: 116/75   Pulse: 85   Temp: 97.1 °F (36.2 °C)      ?   ECOG:?0   General appearance: Generally well appearing, in no acute distress, pallor.   Head, eyes, ears, nose, and throat: moist mucous membranes.   Respiratory:  Normal work of breathing  Abdomen: nontender, nondistended.   Extremities: Warm, without edema.   Neurologic: Alert and oriented. Grossly normal strength, coordination, and gait.   Skin: No rashes, ecchymoses or petechial lesion.   Psychiatric:  Normal mood and affect.    ?   Laboratory:  ?   Lab Visit on 2022   Component Date Value Ref Range Status    WBC 2022 7.42  3.90 - 12.70 K/uL Final    RBC 2022 4.10  4.00 - 5.40 M/uL Final    Hemoglobin 2022 8.7 (L)  12.0 - 16.0 g/dL Final    Hematocrit 2022 30.4 (L)  37.0 - 48.5 % Final    MCV 2022 74 (L)  82 - 98 fL Final    MCH  12/21/2022 21.2 (L)  27.0 - 31.0 pg Final    MCHC 12/21/2022 28.6 (L)  32.0 - 36.0 g/dL Final    RDW 12/21/2022 21.6 (H)  11.5 - 14.5 % Final    Platelets 12/21/2022 353  150 - 450 K/uL Final    MPV 12/21/2022 9.1 (L)  9.2 - 12.9 fL Final    Immature Granulocytes 12/21/2022 0.5  0.0 - 0.5 % Final    Gran # (ANC) 12/21/2022 4.1  1.8 - 7.7 K/uL Final    Immature Grans (Abs) 12/21/2022 0.04  0.00 - 0.04 K/uL Final    Lymph # 12/21/2022 2.3  1.0 - 4.8 K/uL Final    Mono # 12/21/2022 0.5  0.3 - 1.0 K/uL Final    Eos # 12/21/2022 0.3  0.0 - 0.5 K/uL Final    Baso # 12/21/2022 0.14  0.00 - 0.20 K/uL Final    nRBC 12/21/2022 0  0 /100 WBC Final    Gran % 12/21/2022 55.2  38.0 - 73.0 % Final    Lymph % 12/21/2022 30.7  18.0 - 48.0 % Final    Mono % 12/21/2022 7.1  4.0 - 15.0 % Final    Eosinophil % 12/21/2022 4.6  0.0 - 8.0 % Final    Basophil % 12/21/2022 1.9  0.0 - 1.9 % Final    Differential Method 12/21/2022 Automated   Final      Lab Results   Component Value Date    IRON 10 (L) 12/15/2022    TRANSFERRIN 330 12/15/2022    TIBC 488 (H) 12/15/2022    FESATURATED 2 (L) 12/15/2022        Results for orders placed or performed during the hospital encounter of 11/30/22 (from the past 2160 hour(s))   CT Chest With Contrast    Impression    Multiple bilateral pulmonary nodules, 1 of which is cavitary.  Findings suggest metastatic disease.    The CT exam was performed using one or more of the following dose reduction techniques- Automated exposure control, adjustment of the mA and/or kV according to patient size, and/or use of iterative reconstructed technique.      Electronically signed by: Willie Pop  Date:    11/30/2022  Time:    10:13     Results for orders placed or performed during the hospital encounter of 12/14/22 (from the past 2160 hour(s))   NM PET CT Routine Skull to Mid Thigh    Impression    1. New cluster of nodules surrounded by ground-glass opacity within the inferior right upper lobe  suspicious for an infectious or inflammatory etiology. The cavitary lesion within the right lower lobe is slightly larger and more thin walled as well suggesting that this is infectious or inflammatory in nature.  Low level uptake associated with the solid nodules and scarring changes within both lung apices including the larger nodule within the left lower lobe.  Given the above findings all of these changes could be related to an infectious or inflammatory etiology.  Clinical correlation and follow-up is recommended.  No other sites of suspicious uptake identified within the abdomen or pelvis.      Electronically signed by: Ventura Bolanos,   Date:    12/14/2022  Time:    11:14         ?   Assessment/Plan:   Iron deficiency anemia, unspecified iron deficiency anemia type  -     Ambulatory referral/consult to Endo Procedure ; Future; Expected date: 12/22/2022  -     CBC W/ AUTO DIFFERENTIAL; Future; Expected date: 01/21/2023  -     CBC W/ AUTO DIFFERENTIAL; Future; Expected date: 12/21/2022    Symptomatic anemia  -     Ambulatory referral/consult to Hematology / Oncology    Iron deficiency anemia due to chronic blood loss    Other orders  -     ferumoxytoL (FERAHEME) 510 mg in dextrose 5 % 100 mL IVPB  -     heparin, porcine (PF) 100 unit/mL injection flush 500 Units  -     sodium chloride 0.9% flush 10 mL  -     sodium chloride 0.9% 100 mL flush bag       1. Iron deficiency anemia, unspecified iron deficiency anemia type    2. Symptomatic anemia    3. Iron deficiency anemia due to chronic blood loss            Plan:     # iron deficiency anemia:  Iron deficiency anemia with hemoglobin to 5.6 12/14/22, 2% saturation, ferritin 2.  Appropriate improvement to hemoglobin 8 status post 2 units PRBC transfusion.  Prior labs in 2020 within normal limits.  She has been postmenopausal during trying the interval of iron deficiency anemia development.  She is never had screening colonoscopy.  No blood thinner use.   Recommend full scope evaluation including EGD and colonoscopy, order placed instead of colonoscopy screening given iron deficiency anemia.  I reviewed the risks and benefits of IV iron therapy including possible allergy/anaphylaxis, electrolyte abdoornmality, fatigue, headache and limited use in early pregnancy.  We will arrange for IV iron therapy with follow-up in 1 months to assess for response.    # pulmonary nodules:  Multiple bilateral pulmonary nodules some of which cavitary differential includes infectious inflammatory or neoplastic.  Biopsy had been planned with Pulmonary however on hold due to critically low hemoglobin, repletion of iron per above and recommend proceeding with biopsy when possible.      Follow-Up:   Route Chart for Scheduling    Med Onc Chart Routing      Follow up with physician 4 weeks. cbc   Follow up with WILFRIDO    Infusion scheduling note feraheme x 2 doses 1 wk apart   Injection scheduling note    Labs CBC   Lab interval:     Imaging    Pharmacy appointment    Other referrals          Therapy Plan Information  Medications  ferumoxytoL (FERAHEME) 510 mg in dextrose 5 % 100 mL IVPB  510 mg, Intravenous, Every visit  Flushes  heparin, porcine (PF) 100 unit/mL injection flush 500 Units  500 Units, Intravenous, PRN  sodium chloride 0.9% flush 10 mL  10 mL, Intravenous, Every visit  sodium chloride 0.9% 100 mL flush bag  Intravenous, Every visit    Patient Instructions   Cbc today  Iv iron feraheme x 2 doses 1 week apart  EGD and colonoscopy asap  Pulmonary biopsy arrange with pulmonary  Repeat CBC in 4 wks, virtual Dr. Arevalo

## 2022-12-22 ENCOUNTER — PATIENT MESSAGE (OUTPATIENT)
Dept: INTERNAL MEDICINE | Facility: CLINIC | Age: 52
End: 2022-12-22
Payer: COMMERCIAL

## 2022-12-22 ENCOUNTER — TELEPHONE (OUTPATIENT)
Dept: INTERNAL MEDICINE | Facility: CLINIC | Age: 52
End: 2022-12-22
Payer: COMMERCIAL

## 2022-12-22 NOTE — TELEPHONE ENCOUNTER
[employer] paperwork faxed:    Your fax has been successfully sent to [phone] at [phone].  ------------------------------------------------------------  From: [id number]  ------------------------------------------------------------  12/22/2022 4:15:47 PM Transmission Record          Sent to [phone] with remote ID "          Result: (0/339;0/0) Success          Page record: 1 - 58          Elapsed time: 19:28 on channel 11

## 2023-01-04 ENCOUNTER — HOSPITAL ENCOUNTER (OUTPATIENT)
Dept: PREADMISSION TESTING | Facility: HOSPITAL | Age: 53
Discharge: HOME | End: 2023-01-04
Attending: INTERNAL MEDICINE
Payer: COMMERCIAL

## 2023-01-04 DIAGNOSIS — D50.9 IRON DEFICIENCY ANEMIA, UNSPECIFIED IRON DEFICIENCY ANEMIA TYPE: Primary | ICD-10-CM

## 2023-01-06 ENCOUNTER — PATIENT MESSAGE (OUTPATIENT)
Dept: HEMATOLOGY/ONCOLOGY | Facility: CLINIC | Age: 53
End: 2023-01-06
Payer: COMMERCIAL

## 2023-01-06 ENCOUNTER — TELEPHONE (OUTPATIENT)
Dept: RADIOLOGY | Facility: HOSPITAL | Age: 53
End: 2023-01-06
Payer: COMMERCIAL

## 2023-01-06 ENCOUNTER — INFUSION (OUTPATIENT)
Dept: INFUSION THERAPY | Facility: HOSPITAL | Age: 53
End: 2023-01-06
Attending: INTERNAL MEDICINE
Payer: COMMERCIAL

## 2023-01-06 VITALS
TEMPERATURE: 98 F | OXYGEN SATURATION: 99 % | RESPIRATION RATE: 18 BRPM | DIASTOLIC BLOOD PRESSURE: 70 MMHG | HEART RATE: 71 BPM | SYSTOLIC BLOOD PRESSURE: 99 MMHG

## 2023-01-06 DIAGNOSIS — D50.0 IRON DEFICIENCY ANEMIA DUE TO CHRONIC BLOOD LOSS: Primary | ICD-10-CM

## 2023-01-06 PROCEDURE — 96365 THER/PROPH/DIAG IV INF INIT: CPT

## 2023-01-06 PROCEDURE — 63600175 PHARM REV CODE 636 W HCPCS: Mod: JG | Performed by: INTERNAL MEDICINE

## 2023-01-06 PROCEDURE — 25000003 PHARM REV CODE 250: Performed by: INTERNAL MEDICINE

## 2023-01-06 RX ORDER — HEPARIN 100 UNIT/ML
500 SYRINGE INTRAVENOUS
Status: CANCELLED | OUTPATIENT
Start: 2023-01-06

## 2023-01-06 RX ORDER — SODIUM CHLORIDE 0.9 % (FLUSH) 0.9 %
10 SYRINGE (ML) INJECTION
Status: CANCELLED | OUTPATIENT
Start: 2023-01-06

## 2023-01-06 RX ADMIN — SODIUM CHLORIDE: 9 INJECTION, SOLUTION INTRAVENOUS at 02:01

## 2023-01-06 RX ADMIN — FERUMOXYTOL 510 MG: 510 INJECTION INTRAVENOUS at 02:01

## 2023-01-06 NOTE — TELEPHONE ENCOUNTER
Interventional Radiology:    Spoke with pt and got her scheduled for 1/11 @ 10:30am. Informed pt to be NPO after midnight, show up to [facility] on [address] at 9:30am, either have a ride with them or have a phone number for the person driving them home so that we can get in contact with them to keep them updated. Pt denies the use of aspirin, blood thinners, or fish oil. Answered all questions that the pt had and pt verbalized understanding of all discussed.

## 2023-01-06 NOTE — PLAN OF CARE
Patient reports feeling weak and tired. Tolerated first feraheme infusion well with no adverse reactions. Patient to return to clinic in 1 week for second dose.

## 2023-01-06 NOTE — DISCHARGE INSTRUCTIONS
.[facility]  [address]  [address] [address]  [phone] phone     [phone] fax  Hours of Operation: Monday- Friday 8:00am- 5:00pm  After hours phone  [phone]  Hematology / Oncology Physicians on call    Dr. Rajan Love      Nurse Practitioners:    Mitra Velez, ADY Jiménez, ADY Kumari, ADY Mccain, ADY Islas, LELA Gomez      Please don't hesitate to call if you have any concerns.

## 2023-01-10 ENCOUNTER — TELEPHONE (OUTPATIENT)
Dept: RADIOLOGY | Facility: HOSPITAL | Age: 53
End: 2023-01-10

## 2023-01-10 NOTE — TELEPHONE ENCOUNTER
INTERVENTIONAL RADIOLOGY    CONFIRMED 1030 APPOINTMENT ON 1/11/23 WITH MS. RENEE ROBLEDO. INSTRUCTED PT ARRIVE @ 0930 TO FRONT ENTRANCE OF HOSPITAL ([address] [address]) WITH .  PT DENIES BT, ASA AND FISH OIL.  INSTRUCTED NOT TO EAT OR DRINK ANYTHING AFTER MIDNIGHT  THE NIGHT PRIOR TO PROCEDURE AND HAVE A  ON MORNING OF PROCEDURE TO DRIVE HOME.  QUESTIONS ANSWERED.  INSTRUCTED PT MAY TAKE NECESSARY MEDICATIONS ON MORNING OF PROCEDURE WITH A SMALL SIP OF WATER.

## 2023-01-11 ENCOUNTER — HOSPITAL ENCOUNTER (OUTPATIENT)
Dept: RADIOLOGY | Facility: HOSPITAL | Age: 53
Discharge: HOME | End: 2023-01-11
Attending: PHYSICIAN ASSISTANT
Payer: COMMERCIAL

## 2023-01-11 ENCOUNTER — HOSPITAL ENCOUNTER (OUTPATIENT)
Dept: RADIOLOGY | Facility: HOSPITAL | Age: 53
Discharge: HOME | End: 2023-01-11
Attending: INTERNAL MEDICINE
Payer: COMMERCIAL

## 2023-01-11 VITALS
SYSTOLIC BLOOD PRESSURE: 105 MMHG | RESPIRATION RATE: 16 BRPM | HEIGHT: 67 IN | HEART RATE: 62 BPM | WEIGHT: 150 LBS | BODY MASS INDEX: 23.54 KG/M2 | DIASTOLIC BLOOD PRESSURE: 61 MMHG | OXYGEN SATURATION: 100 %

## 2023-01-11 DIAGNOSIS — R91.1 SOLITARY PULMONARY NODULE: ICD-10-CM

## 2023-01-11 PROCEDURE — 71045 XR CHEST 1 VIEW: ICD-10-PCS | Mod: 26,,, | Performed by: RADIOLOGY

## 2023-01-11 PROCEDURE — 71045 X-RAY EXAM CHEST 1 VIEW: CPT | Mod: 26,76,, | Performed by: RADIOLOGY

## 2023-01-11 PROCEDURE — 88312 PR  SPECIAL STAINS,GROUP I: ICD-10-PCS | Mod: 26,,, | Performed by: PATHOLOGY

## 2023-01-11 PROCEDURE — 32408 CORE NDL BX LNG/MED PERQ: CPT

## 2023-01-11 PROCEDURE — 27200939 CT BIOPSY LUNG W/ GUIDANCE

## 2023-01-11 PROCEDURE — 71045 X-RAY EXAM CHEST 1 VIEW: CPT | Mod: TC

## 2023-01-11 PROCEDURE — 32408 CORE NDL BX LNG/MED PERQ: CPT | Mod: ,,, | Performed by: RADIOLOGY

## 2023-01-11 PROCEDURE — 71045 X-RAY EXAM CHEST 1 VIEW: CPT | Mod: 26,,, | Performed by: RADIOLOGY

## 2023-01-11 PROCEDURE — 71045 XR CHEST 1 VIEW: ICD-10-PCS | Mod: 26,76,, | Performed by: RADIOLOGY

## 2023-01-11 PROCEDURE — 32408 CT BIOPSY LUNG W/ GUIDANCE: ICD-10-PCS | Mod: ,,, | Performed by: RADIOLOGY

## 2023-01-11 PROCEDURE — 88305 TISSUE EXAM BY PATHOLOGIST: CPT | Mod: 26,,, | Performed by: PATHOLOGY

## 2023-01-11 PROCEDURE — 88305 TISSUE EXAM BY PATHOLOGIST: CPT | Performed by: PATHOLOGY

## 2023-01-11 PROCEDURE — 88305 TISSUE EXAM BY PATHOLOGIST: ICD-10-PCS | Mod: 26,,, | Performed by: PATHOLOGY

## 2023-01-11 PROCEDURE — 63600175 PHARM REV CODE 636 W HCPCS: Performed by: RADIOLOGY

## 2023-01-11 PROCEDURE — 88312 SPECIAL STAINS GROUP 1: CPT | Mod: 26,,, | Performed by: PATHOLOGY

## 2023-01-11 PROCEDURE — 88312 SPECIAL STAINS GROUP 1: CPT | Mod: 59 | Performed by: PATHOLOGY

## 2023-01-11 RX ORDER — FENTANYL CITRATE 50 UG/ML
INJECTION, SOLUTION INTRAMUSCULAR; INTRAVENOUS CODE/TRAUMA/SEDATION MEDICATION
Status: COMPLETED | OUTPATIENT
Start: 2023-01-11 | End: 2023-01-11

## 2023-01-11 RX ORDER — MIDAZOLAM HYDROCHLORIDE 1 MG/ML
INJECTION INTRAMUSCULAR; INTRAVENOUS CODE/TRAUMA/SEDATION MEDICATION
Status: COMPLETED | OUTPATIENT
Start: 2023-01-11 | End: 2023-01-11

## 2023-01-11 RX ADMIN — FENTANYL CITRATE 25 MCG: 50 INJECTION, SOLUTION INTRAMUSCULAR; INTRAVENOUS at 10:01

## 2023-01-11 RX ADMIN — FENTANYL CITRATE 25 MCG: 50 INJECTION, SOLUTION INTRAMUSCULAR; INTRAVENOUS at 11:01

## 2023-01-11 RX ADMIN — MIDAZOLAM HYDROCHLORIDE 0.5 MG: 1 INJECTION INTRAMUSCULAR; INTRAVENOUS at 10:01

## 2023-01-11 RX ADMIN — MIDAZOLAM HYDROCHLORIDE 0.5 MG: 1 INJECTION INTRAMUSCULAR; INTRAVENOUS at 11:01

## 2023-01-11 NOTE — SEDATION DOCUMENTATION
Procedure completed at this time. Bandaid to procedure site CDI. Pt positioned on left side. VSS and NADN.

## 2023-01-11 NOTE — DISCHARGE INSTRUCTIONS
Please return to ER if any of these symptoms occur:  Fever over 101 degrees,  Bleeding from the puncture site not controlled,  Pain not controlled with Aleve or Tylenol,    No driving for 24 hours after procedure due to sedation given during procedure.     Do not submerge in standing water for 2 days after biopsy but you may shower.    Resume home medications and diet    Biopsy results will be with Dr. Cassidy in 5-7 days, please follow up with him for results and any other questions or concerns that you may have.

## 2023-01-11 NOTE — SEDATION DOCUMENTATION
Pt transferred to stretcher, positioned left side lying. Bandaid to left chest site remains CDI. Transported pt to recovery and bedside report given to Lakshmi DELACRUZ. Pt denies pain and NADN at this time. Pt was stable at time of transfer.

## 2023-01-11 NOTE — PLAN OF CARE
Band aid to left lateral chest puncture site C/D/I with no bleeding/redness/swelling noted. VSS, NADN, and pt meets criteria for discharge. Discharge instructions given to and reviewed with pt, and pt verbalized understanding of all. Pt discharged to home, taken out via wheelchair and driven home by daughter.

## 2023-01-11 NOTE — DISCHARGE SUMMARY
[facility]  Discharge Note  Short Stay    CT Biopsy Lung w/ guidance      OUTCOME: Patient tolerated treatment/procedure well without complication and is now ready for discharge.    DISPOSITION: Home or Self Care    FINAL DIAGNOSIS:  <principal problem not specified>    FOLLOWUP: In clinic    DISCHARGE INSTRUCTIONS:  No discharge procedures on file.      Clinical Reference Documents Added to Patient Instructions         Document    NEEDLE BIOPSY OF THE LUNG AND PLEURA (ENGLISH)    PROCEDURAL SEDATION, ADULT ED (ENGLISH)            TIME SPENT ON DISCHARGE: 15 minutes    Pre Op Diagnosis: Lung nodules     Post Op Diagnosis: same     Procedure:  biopsy     Procedure performed by: Nahed CEE, Arnulfo OWENS     Written Informed Consent Obtained: Yes     Specimen Removed:  yes     Estimated Blood Loss:  minimal     Findings: Local anesthesia and moderate sedation were used.     The patient tolerated the procedure well and there were no complications.      Disposition:  F/U in clinic or with ordering physician    Discharge instructions:  Light activity for 24 hours.  Remove band aid in 24 hours.  No baths (showers are appropriate).      Sterile technique was performed in the left lateral thorax, lidocaine was used as a local anesthetic.  Multiple samples taken percutaneously from the left lung nodule.  Pt tolerated the procedure well without immediate complications.  Please see radiologist report for details. F/u with PCP and/or ordering physician.

## 2023-01-11 NOTE — SEDATION DOCUMENTATION
Pt positioned right side lying on CT table and CM applied. VS taken and stable. NADN. Pt verbalized understanding of procedure.

## 2023-01-11 NOTE — PLAN OF CARE
Pt ambulated from waiting room to pre-procedure area independently. Pt is Aox4. Pt denies pain and is resting comfortably.  Will continue to monitor.

## 2023-01-13 ENCOUNTER — INFUSION (OUTPATIENT)
Dept: INFUSION THERAPY | Facility: HOSPITAL | Age: 53
End: 2023-01-13
Attending: INTERNAL MEDICINE
Payer: COMMERCIAL

## 2023-01-13 VITALS
DIASTOLIC BLOOD PRESSURE: 65 MMHG | HEART RATE: 72 BPM | RESPIRATION RATE: 18 BRPM | OXYGEN SATURATION: 98 % | SYSTOLIC BLOOD PRESSURE: 119 MMHG | TEMPERATURE: 98 F

## 2023-01-13 DIAGNOSIS — D50.0 IRON DEFICIENCY ANEMIA DUE TO CHRONIC BLOOD LOSS: Primary | ICD-10-CM

## 2023-01-13 PROCEDURE — 25000003 PHARM REV CODE 250: Performed by: INTERNAL MEDICINE

## 2023-01-13 PROCEDURE — 63600175 PHARM REV CODE 636 W HCPCS: Mod: JG | Performed by: INTERNAL MEDICINE

## 2023-01-13 PROCEDURE — 96365 THER/PROPH/DIAG IV INF INIT: CPT

## 2023-01-13 RX ORDER — HEPARIN 100 UNIT/ML
500 SYRINGE INTRAVENOUS
Status: CANCELLED | OUTPATIENT
Start: 2023-01-13

## 2023-01-13 RX ORDER — SODIUM CHLORIDE 0.9 % (FLUSH) 0.9 %
10 SYRINGE (ML) INJECTION
Status: DISCONTINUED | OUTPATIENT
Start: 2023-01-13 | End: 2023-01-13 | Stop reason: HOSPADM

## 2023-01-13 RX ORDER — SODIUM CHLORIDE 0.9 % (FLUSH) 0.9 %
10 SYRINGE (ML) INJECTION
Status: CANCELLED | OUTPATIENT
Start: 2023-01-13

## 2023-01-13 RX ADMIN — SODIUM CHLORIDE: 9 INJECTION, SOLUTION INTRAVENOUS at 02:01

## 2023-01-13 RX ADMIN — FERUMOXYTOL 510 MG: 510 INJECTION INTRAVENOUS at 02:01

## 2023-01-13 NOTE — DISCHARGE INSTRUCTIONS
THANKS FOR ALLOWING ME TO CARE FOR YOU TODAY!!!           THANKS FOR CHOOSING [facility]!!!      [facility]  [address] [address]  [phone] phone     [phone] fax  Hours of Operation: Monday- Friday 8:00am- 5:00pm  After hours phone  [phone]  Hematology / Oncology Physicians on call      ROMAN Muniz Dr., Dr., NP Sydney Prescott, ADY Mccain, SEBASTIAN Noriega    Please call with any concerns regarding your appointment today.

## 2023-01-13 NOTE — PLAN OF CARE
Discussed plan of care with pt. Addressed any and ongoing concerns. Pt denies   Problem: Adult Inpatient Plan of Care  Goal: Plan of Care Review  Outcome: Ongoing, Progressing  Goal: Patient-Specific Goal (Individualized)  Outcome: Ongoing, Progressing  Goal: Absence of Hospital-Acquired Illness or Injury  Outcome: Ongoing, Progressing  Intervention: Identify and Manage Fall Risk  Flowsheets (Taken 1/13/2023 1537)  Safety Promotion/Fall Prevention: in recliner, wheels locked  Intervention: Prevent Infection  Flowsheets (Taken 1/13/2023 1537)  Infection Prevention:   hand hygiene promoted   personal protective equipment utilized   equipment surfaces disinfected  Goal: Optimal Comfort and Wellbeing  Outcome: Ongoing, Progressing  Intervention: Provide Person-Centered Care  Flowsheets (Taken 1/13/2023 1537)  Trust Relationship/Rapport:   care explained   choices provided   emotional support provided   questions answered   questions encouraged   reassurance provided

## 2023-01-17 LAB
FINAL PATHOLOGIC DIAGNOSIS: NORMAL
GROSS: NORMAL
Lab: NORMAL
MICROSCOPIC EXAM: NORMAL

## 2023-01-20 ENCOUNTER — PATIENT MESSAGE (OUTPATIENT)
Dept: INTERNAL MEDICINE | Facility: CLINIC | Age: 53
End: 2023-01-20
Payer: COMMERCIAL

## 2023-01-27 ENCOUNTER — OFFICE VISIT (OUTPATIENT)
Dept: PULMONOLOGY | Facility: CLINIC | Age: 53
End: 2023-01-27
Payer: COMMERCIAL

## 2023-01-27 VITALS
BODY MASS INDEX: 23.18 KG/M2 | SYSTOLIC BLOOD PRESSURE: 118 MMHG | DIASTOLIC BLOOD PRESSURE: 74 MMHG | HEIGHT: 67 IN | WEIGHT: 147.69 LBS | OXYGEN SATURATION: 99 % | HEART RATE: 97 BPM | RESPIRATION RATE: 17 BRPM

## 2023-01-27 DIAGNOSIS — R76.11 POSITIVE SKIN TEST FOR TUBERCULOSIS: ICD-10-CM

## 2023-01-27 DIAGNOSIS — D86.2 SARCOIDOSIS OF LUNG WITH SARCOIDOSIS OF LYMPH NODES: Primary | ICD-10-CM

## 2023-01-27 PROCEDURE — 99215 OFFICE O/P EST HI 40 MIN: CPT | Mod: S$GLB,,, | Performed by: INTERNAL MEDICINE

## 2023-01-27 PROCEDURE — 3078F DIAST BP <80 MM HG: CPT | Mod: CPTII,S$GLB,, | Performed by: INTERNAL MEDICINE

## 2023-01-27 PROCEDURE — 99215 PR OFFICE/OUTPT VISIT, EST, LEVL V, 40-54 MIN: ICD-10-PCS | Mod: S$GLB,,, | Performed by: INTERNAL MEDICINE

## 2023-01-27 PROCEDURE — 99999 PR PBB SHADOW E&M-EST. PATIENT-LVL IV: ICD-10-PCS | Mod: PBBFAC,,, | Performed by: INTERNAL MEDICINE

## 2023-01-27 PROCEDURE — 99999 PR PBB SHADOW E&M-EST. PATIENT-LVL IV: CPT | Mod: PBBFAC,,, | Performed by: INTERNAL MEDICINE

## 2023-01-27 PROCEDURE — 1159F MED LIST DOCD IN RCRD: CPT | Mod: CPTII,S$GLB,, | Performed by: INTERNAL MEDICINE

## 2023-01-27 PROCEDURE — 3008F PR BODY MASS INDEX (BMI) DOCUMENTED: ICD-10-PCS | Mod: CPTII,S$GLB,, | Performed by: INTERNAL MEDICINE

## 2023-01-27 PROCEDURE — 3008F BODY MASS INDEX DOCD: CPT | Mod: CPTII,S$GLB,, | Performed by: INTERNAL MEDICINE

## 2023-01-27 PROCEDURE — 1159F PR MEDICATION LIST DOCUMENTED IN MEDICAL RECORD: ICD-10-PCS | Mod: CPTII,S$GLB,, | Performed by: INTERNAL MEDICINE

## 2023-01-27 PROCEDURE — 3078F PR MOST RECENT DIASTOLIC BLOOD PRESSURE < 80 MM HG: ICD-10-PCS | Mod: CPTII,S$GLB,, | Performed by: INTERNAL MEDICINE

## 2023-01-27 PROCEDURE — 3074F SYST BP LT 130 MM HG: CPT | Mod: CPTII,S$GLB,, | Performed by: INTERNAL MEDICINE

## 2023-01-27 PROCEDURE — 3074F PR MOST RECENT SYSTOLIC BLOOD PRESSURE < 130 MM HG: ICD-10-PCS | Mod: CPTII,S$GLB,, | Performed by: INTERNAL MEDICINE

## 2023-01-27 RX ORDER — PREDNISONE 20 MG/1
20 TABLET ORAL DAILY
Qty: 30 TABLET | Refills: 5 | Status: SHIPPED | OUTPATIENT
Start: 2023-01-27 | End: 2023-03-16

## 2023-01-27 NOTE — PROGRESS NOTES
"Subjective:     Patient ID: Tammy Bee is a 52 y.o. female.    Chief Complaint:  follow up for lung biopsy     HPI  51 y/o with early RA with +RF and arthralgia.  Was started plaquenil and methotrexate - stopped at beginning of COVID.  Hand xrays normal in past She resopnded very well to mtx initially.  She reports nausea with plaquenil in past.    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     She also reports a h/o TB exposure when she was a teen, she has positive skin tests and is monitored by chest xray yearly.  Today she rates her pain 0/10. But does have times when she has increased knee pain, low back pain, hip pain and hand pain.  She c/o morning stiffness for 30+ minutes.    RA:   History of RA - no flare ups in over a year  On no medications   Stopped rheumatology meds Early 1920 ( was taking methotrexate )  Anemia: GI work up next week  History : transfused with 2 units of Packed Red Blood Cells for symptomatic anemia. Initial lab suggests iron deficiency anemia. May need transfusion of iron in light of need for prompt workup of lung nodules.Hematology visit scheduled for tomorrow.  Patient denies any obvious source of blood loss - last menstrual period over two years ago. No black or tarry stools or GI complaints.    Dyspnea  Patient complains of shortness of breath. Symptoms occur with one 1/2  block walking. Symptoms began 5 months ago after COVID ( was vaccinated beforehand)  gradually worsening since. Associated symptoms include  dyspnea on exertion, shortness of breath, and leg muscles burn. She denies post nasal drip and productive cough. She does not have had recent travel. Weight has been stable . Symptoms are exacerbated by any exercise. Symptoms are alleviated by rest.       Lung Nodule  She presents for evaluation and treatment of a lung nodule. The patient reports that the imaging was performed to evaluate symptoms of " "dyspnea on exertion and shortness of breath which have been present for 4 months and are gradually worsening. Symptoms are exacerbated by exercise and relieved by rest. The patient denies other associated symptoms. She has a history of 32 pack years of smoking.  The patient has a positive PPD. Untreated in 1998 . Recent TB gold is negative. The patient does not have a history of cancer.  BIOPSY RESULTS: LUNG, LEFT, "NODULE", CT-GUIDED BIOPSY:   - Focally necrotizing granulomatous inflammation   - Adjacent alveolated lung parenchyma with fibroelastotic changes and chronic   (lymphoplasmacytic) inflammation   - No evidence of acid-fast organisms or fungal elements on AFB and GMS   cytochemical stains, respectively   - No evidence of malignancy     Sarcoidosis Initial Evaluation:    History of sarcoidosis. No iritis, eye irritation, no new skin lesions, positive for arthralgias.  Sarcoidosis stage:   []        Stage I Sarcoidosis  [x]        Stage II Sarcoidosis  []        Stage III Sarcoidosis  []        Stage IV Sarcoidosis    Clinical assessment:   [x]        Symptomatic  []        Assymptomatic       Indications for treatment of pulmonary sarcoidosis:   [x]        Evidence of progressive disease   []        Severe disease at presentation    Management options:   []        Observation without therapy  [x]        Systemic glucocorticoids  []        Methotrexate.  []        Azathioprine   []        Leuflonamide  []        Mycophenolate  []        Anti TNF alpha antagonists         Past Medical History:   Diagnosis Date    RA (rheumatoid arthritis)      Past Surgical History:   Procedure Laterality Date    Tubal Lig      TUBAL LIGATION       Review of patient's allergies indicates:  No Known Allergies  Current Outpatient Medications on File Prior to Visit   Medication Sig Dispense Refill    albuterol (PROVENTIL) 2.5 mg /3 mL (0.083 %) nebulizer solution Take 3 mLs (2.5 mg total) by nebulization every 4 to 6 hours as " needed for Wheezing or Shortness of Breath. 360 mL 11    albuterol (PROVENTIL/VENTOLIN HFA) 90 mcg/actuation inhaler Inhale 2 puffs into the lungs every 4 (four) hours as needed for Wheezing or Shortness of Breath. 18 g 11    albuterol-ipratropium (DUO-NEB) 2.5 mg-0.5 mg/3 mL nebulizer solution SMARTSI Ampule(s) Via Nebulizer Every 6 Hours PRN       No current facility-administered medications on file prior to visit.     Social History     Socioeconomic History    Marital status: Single   Occupational History    Occupation: [employer], supervisor   Tobacco Use    Smoking status: Former     Packs/day: 1.00     Years: 32.00     Pack years: 32.00     Types: Vaping with nicotine, Cigarettes     Start date:      Quit date:      Years since quitting: 3.0    Smokeless tobacco: Never   Substance and Sexual Activity    Alcohol use: Not Currently    Drug use: Not Currently     Types: Marijuana    Sexual activity: Not Currently     Social Determinants of Health     Financial Resource Strain: Low Risk     Difficulty of Paying Living Expenses: Not very hard   Food Insecurity: No Food Insecurity    Worried About Running Out of Food in the Last Year: Never true    Ran Out of Food in the Last Year: Never true   Transportation Needs: No Transportation Needs    Lack of Transportation (Medical): No    Lack of Transportation (Non-Medical): No   Physical Activity: Inactive    Days of Exercise per Week: 5 days    Minutes of Exercise per Session: 0 min   Stress: Stress Concern Present    Feeling of Stress : To some extent   Social Connections: Unknown    Frequency of Communication with Friends and Family: More than three times a week    Frequency of Social Gatherings with Friends and Family: More than three times a week    Active Member of Clubs or Organizations: No    Attends Club or Organization Meetings: Never    Marital Status:    Housing Stability: Low Risk     Unable to Pay for Housing in the Last Year: No    Number of  "Places Lived in the Last Year: 1    Unstable Housing in the Last Year: No     Family History   Problem Relation Age of Onset    Diabetes Sister     Cancer Sister     Breast cancer Sister     Diabetes Brother     Breast cancer Other        Review of Systems   Constitutional:  Negative for fever and fatigue.   HENT:  Negative for postnasal drip and rhinorrhea.    Eyes:  Negative for redness and itching.   Respiratory:  Positive for dyspnea on extertion. Negative for cough, shortness of breath, wheezing and Paroxysmal Nocturnal Dyspnea.    Cardiovascular:  Negative for chest pain.   Genitourinary:  Negative for difficulty urinating and hematuria.   Endocrine:  Negative for polyphagia, cold intolerance and heat intolerance.    Musculoskeletal:  Positive for arthralgias.   Skin: Negative.  Negative for rash.   Gastrointestinal:  Negative for nausea, vomiting, abdominal pain and abdominal distention.   Neurological:  Negative for dizziness and headaches.   Hematological:  Negative for adenopathy. Does not bruise/bleed easily and no excessive bruising.   Psychiatric/Behavioral:  The patient is not nervous/anxious.      Objective:      /74   Pulse 97   Resp 17   Ht 5' 7" (1.702 m)   Wt 67 kg (147 lb 11.3 oz)   SpO2 99%   BMI 23.13 kg/m²   Physical Exam  Vitals and nursing note reviewed.   Constitutional:       Appearance: Normal appearance. She is well-developed.   HENT:      Head: Normocephalic and atraumatic.      Nose: Nose normal.   Eyes:      Conjunctiva/sclera: Conjunctivae normal.      Pupils: Pupils are equal, round, and reactive to light.   Neck:      Thyroid: No thyromegaly.      Vascular: No JVD.      Trachea: No tracheal deviation.   Cardiovascular:      Rate and Rhythm: Normal rate and regular rhythm.      Heart sounds: Normal heart sounds.   Pulmonary:      Effort: Pulmonary effort is normal. No respiratory distress.      Breath sounds: Normal breath sounds. No wheezing or rales.   Chest:      " Chest wall: No tenderness.   Abdominal:      General: Bowel sounds are normal.      Palpations: Abdomen is soft.   Musculoskeletal:         General: Normal range of motion.      Cervical back: Neck supple.   Lymphadenopathy:      Cervical: No cervical adenopathy.   Skin:     General: Skin is warm and dry.   Neurological:      Mental Status: She is alert and oriented to person, place, and time.     Personal Diagnostic Review  Pulmonary Function Studies: normal  PET scan: reviewed   EXAMINATION:  NM PET CT ROUTINE     CLINICAL HISTORY:  Lung nodule, > 8mm;  Solitary pulmonary nodule     TECHNIQUE:  Segmented attenuation corrected 3-D PET imaging was obtained from the skull base through the mid thighs utilizing 13.26 mCi F-18-FDG.  Noncontrast CT imaging was performed for attenuation correction, diagnosis, and anatomical fusion with PET     COMPARISON:  CT chest from 11/30/2022     FINDINGS:  Head/neck: There is normal physiologic uptake noted within the visualized brain parenchyma. No FDG avid adenopathy within the neck.     Chest: There is a new cluster of nodule surrounded by ground-glass opacity within the inferior aspect of the right upper lobe which demonstrates a SUV max of up to 4.4.  Findings may be representative of an infectious or inflammatory etiology.  The previously noted pulmonary nodules within either lung demonstrate low level uptake including the largest nodule within the left lower lobe which demonstrates a SUV max of 2.4.  The cavitary lesion within the right lower lobe has also changed in appearance and is now more thin walled and slightly larger and demonstrates a SUV max of 1.3.  There is also low level uptake seen associated with the scarring changes within either lung apex which demonstrate a SUV max of up to 3.1 on the left and 3.3 on the right.  No FDG avid mediastinal, hilar or axillary lymph nodes.     Abdomen/Pelvis: Normal physiologic uptake noted within the liver, spleen, urinary tract,  and bowel.The adrenals are unremarkable in appearance.  No FDG avid lymph nodes seen within the abdomen or pelvis.     Skeletal: No FDG avid osseus lesions identified.     Impression:     1. New cluster of nodules surrounded by ground-glass opacity within the inferior right upper lobe suspicious for an infectious or inflammatory etiology. The cavitary lesion within the right lower lobe is slightly larger and more thin walled as well suggesting that this is infectious or inflammatory in nature.  Low level uptake associated with the solid nodules and scarring changes within both lung apices including the larger nodule within the left lower lobe.  Given the above findings all of these changes could be related to an infectious or inflammatory etiology.  Clinical correlation and follow-up is recommended.  No other sites of suspicious uptake identified within the abdomen or pelvis.        Electronically signed by: Ventura Bolanos DO  Date:                                            12/14/2022  Time:                                           11:14  Pulmonary Studies Review 1/27/2023   SpO2 99   Ordering Provider -   Interpreting Provider -   Performing nurse/tech/RT -   Diagnosis -   Height 67   Weight 2363.33   BMI (Calculated) 23.1   Predicted Distance 442.36   Patient Race -   6MWT Status -   Patient Reported -   Was O2 used? -   6MW Distance walked (feet) -   Distance walked (meters) -   Did patient stop? -   How many times? -   Stop Time 1 -   Restart Time 1 -   Did patient restart? -   Type of assistive device(s) used? -   Is extra documentation required for this patient? -   Oxygen Saturation -   Supplemental Oxygen -   Heart Rate -   Blood Pressure -   Bhaskar Dyspnea Rating  -   Oxygen Saturation -   Supplemental Oxygen -   Heart Rate -   Blood Pressure -   Bhaskar Dyspnea Rating  -   Recovery Time (seconds) -   Oxygen Saturation -   Supplemental Oxygen -   Heart Rate -   Blood Pressure -   Bhaskar Dyspnea Rating  -   Is  procedure ready for interpretation? -   Did the patient stop or pause? -   How many times did the patient stop or pause? -   Stop Time 1 -   Restart Time 1 -   Pause Time 1 -   Total Time Walked (Calculated) -   Total Laps Walked -   Final Partial Lap Distance (feet) -   Total Distance Feet (Calculated) -   Total Distance Meters (Calculated) -   Predicted Distance Meters (Calculated) 583.69   Percentage of Predicted (Calculated) -   Peak VO2 (Calculated) -   Mets -   Has The Patient Had a Previous Six Minute Walk Test? -   Oxygen Qualification? -       X-Ray Chest 1 View  Narrative: EXAMINATION:  XR CHEST 1 VIEW    CLINICAL HISTORY:  2 hour s/p left lung biopsy. inspiration. show to radiologist.;    COMPARISON:  A study performed 2 hours earlier    FINDINGS:  Negative for pneumothorax status post left lung biopsy.  Negative for new pulmonary opacities with multiple pulmonary nodules again seen.  The hilar and mediastinal contours and osseous structures are unchanged.  Impression: 1.  Negative for pneumothorax status post left lung biopsy.    Electronically signed by: Brayan Pearce MD  Date:    01/11/2023  Time:    13:42  X-Ray Chest 1 View  Narrative: EXAMINATION:  XR CHEST 1 VIEW    CLINICAL HISTORY:  s/p left lung . inspiration. show to radiologist.;    COMPARISON:  CT images is performed earlier the same day, chest x-ray from December 9, 2022    FINDINGS:  Study is slightly rotated to the left.  No definite pneumothorax is seen in this patient who is status post left lung mass biopsy.  Bilateral pulmonary nodules again seen.  The lungs are otherwise clear.  The hilar and mediastinal contours and osseous structures are unchanged.  Impression: 1.  Negative for pneumothorax status post left lung biopsy    Electronically signed by: Brayan Pearce MD  Date:    01/11/2023  Time:    11:32  CT Biopsy Lung w/ guidance  Narrative: EXAMINATION:  CT BIOPSY LUNG W/ GUIDANCE    CLINICAL HISTORY:  Solitary pulmonary  nodulepulmonary nodule;    COMPARISON:  12/09/2022    FINDINGS:  Informed consent was obtained prior to the exam after discussion of risks and benefits of the procedure with the patient.  All questions were answered and signed informed consent was obtained.    All elements of maximal sterile barrier technique including cap and mask and sterile gown and sterile gloves and sterile full-body draped and hand hygiene and 2% chlorhexidine for cutaneous and sepsis.    5 mm CT images were obtained through the chest for localization which demonstrated  left lower lobe lung mass.  1% lidocaine was used for local anesthesia.  A 19 gauge needle was advanced to the lesion.  Position was confirmed with CT images.  Five core biopsies were obtained using a 20 gauge core biopsy set.  Needle was removed and hemostasis achieved.  Post images demonstrate no immediate complication.  Patient tolerated the procedure.  Impression: Successful CT-guided  left lower lobe lung biopsy.    All CT scans at this facility use dose modulation, iterative reconstruction, and/or weight based dosing when appropriate to reduce radiation dose to as low as reasonable achievable.    Electronically signed by: Tio Dockery MD  Date:    01/11/2023  Time:    11:28      Office Spirometry Results:     No flowsheet data found.  Pulmonary Studies Review 1/27/2023   SpO2 99   Ordering Provider -   Interpreting Provider -   Performing nurse/tech/RT -   Diagnosis -   Height 67   Weight 2363.33   BMI (Calculated) 23.1   Predicted Distance 442.36   Patient Race -   6MWT Status -   Patient Reported -   Was O2 used? -   6MW Distance walked (feet) -   Distance walked (meters) -   Did patient stop? -   How many times? -   Stop Time 1 -   Restart Time 1 -   Did patient restart? -   Type of assistive device(s) used? -   Is extra documentation required for this patient? -   Oxygen Saturation -   Supplemental Oxygen -   Heart Rate -   Blood Pressure -   Bhaskar Dyspnea Rating  -    Oxygen Saturation -   Supplemental Oxygen -   Heart Rate -   Blood Pressure -   Bhaskar Dyspnea Rating  -   Recovery Time (seconds) -   Oxygen Saturation -   Supplemental Oxygen -   Heart Rate -   Blood Pressure -   Bhaskar Dyspnea Rating  -   Is procedure ready for interpretation? -   Did the patient stop or pause? -   How many times did the patient stop or pause? -   Stop Time 1 -   Restart Time 1 -   Pause Time 1 -   Total Time Walked (Calculated) -   Total Laps Walked -   Final Partial Lap Distance (feet) -   Total Distance Feet (Calculated) -   Total Distance Meters (Calculated) -   Predicted Distance Meters (Calculated) 583.69   Percentage of Predicted (Calculated) -   Peak VO2 (Calculated) -   Mets -   Has The Patient Had a Previous Six Minute Walk Test? -   Oxygen Qualification? -         Assessment:       Sarcoidosis of lung with sarcoidosis of lymph nodes  Start prednisone 20 mg/day    Positive skin test for tuberculosis  Negative TB gold      Sarcoidosis of lung with sarcoidosis of lymph nodes  -     predniSONE (DELTASONE) 20 MG tablet; Take 1 tablet (20 mg total) by mouth once daily.  Dispense: 30 tablet; Refill: 5  -     Angiotensin Converting Enzyme; Future; Expected date: 01/27/2023  -     Fungitell Assay For (1.3)-B-D-Glucans; Future; Expected date: 01/27/2023  -     CT Chest Without Contrast; Future; Expected date: 01/27/2023  -     Ambulatory referral/consult to Rheumatology; Future; Expected date: 02/03/2023    Positive skin test for tuberculosis          Outpatient Encounter Medications as of 1/27/2023   Medication Sig Dispense Refill    albuterol (PROVENTIL) 2.5 mg /3 mL (0.083 %) nebulizer solution Take 3 mLs (2.5 mg total) by nebulization every 4 to 6 hours as needed for Wheezing or Shortness of Breath. 360 mL 11    albuterol (PROVENTIL/VENTOLIN HFA) 90 mcg/actuation inhaler Inhale 2 puffs into the lungs every 4 (four) hours as needed for Wheezing or Shortness of Breath. 18 g 11     albuterol-ipratropium (DUO-NEB) 2.5 mg-0.5 mg/3 mL nebulizer solution SMARTSI Ampule(s) Via Nebulizer Every 6 Hours PRN      [] polyethylene glycol (COLYTE) 240-22.72-6.72 -5.84 gram SolR Take 4,000 mLs by mouth once. for 1 dose 4000 mL 0    predniSONE (DELTASONE) 20 MG tablet Take 1 tablet (20 mg total) by mouth once daily. 30 tablet 5     No facility-administered encounter medications on file as of 2023.     Plan:       Requested Prescriptions     Signed Prescriptions Disp Refills    predniSONE (DELTASONE) 20 MG tablet 30 tablet 5     Sig: Take 1 tablet (20 mg total) by mouth once daily.     Problem List Items Addressed This Visit       Positive skin test for tuberculosis    Overview     TB exposure at age 27 years, by patient's history: positive skin test, clear chest xray, never took TB medication.    TB gold lab negative x 2, once in  and            Current Assessment & Plan     Negative TB gold           Sarcoidosis of lung with sarcoidosis of lymph nodes - Primary    Current Assessment & Plan     Start prednisone 20 mg/day         Relevant Medications    predniSONE (DELTASONE) 20 MG tablet    Other Relevant Orders    Angiotensin Converting Enzyme    Fungitell Assay For (1.3)-B-D-Glucans    CT Chest Without Contrast    Ambulatory referral/consult to Rheumatology        Follow up for Review CT/PET - on return visit.    MEDICAL DECISION MAKING: Moderate to high complexity.  Overall, the multiple problems listed are of moderate to high severity that may impact quality of life and activities of daily living. Side effects of medications, treatment plan as well as options and alternatives reviewed and discussed with patient. There was counseling of patient concerning these issues.    Total time spent in counseling and coordination of care - 45  minutes of total time spent on the encounter, which includes face to face time and non-face to face time preparing to see the patient (eg, review of  tests), Obtaining and/or reviewing separately obtained history, Documenting clinical information in the electronic or other health record, Independently interpreting results (not separately reported) and communicating results to the patient/family/caregiver, or Care coordination (not separately reported).    Time was used in discussion of prognosis, risks, benefits of treatment, instructions and compliance with regimen . Discussion with other physicians and/or health care providers - home health or for use of durable medical equipment (oxygen, nebulizers, CPAP, BiPAP) occurred.

## 2023-02-01 ENCOUNTER — HOSPITAL ENCOUNTER (OUTPATIENT)
Facility: HOSPITAL | Age: 53
Discharge: HOME | End: 2023-02-01
Attending: INTERNAL MEDICINE | Admitting: INTERNAL MEDICINE
Payer: COMMERCIAL

## 2023-02-01 ENCOUNTER — ANESTHESIA EVENT (OUTPATIENT)
Dept: ENDOSCOPY | Facility: HOSPITAL | Age: 53
End: 2023-02-01
Payer: COMMERCIAL

## 2023-02-01 ENCOUNTER — PATIENT MESSAGE (OUTPATIENT)
Dept: RHEUMATOLOGY | Facility: CLINIC | Age: 53
End: 2023-02-01
Payer: COMMERCIAL

## 2023-02-01 ENCOUNTER — ANESTHESIA (OUTPATIENT)
Dept: ENDOSCOPY | Facility: HOSPITAL | Age: 53
End: 2023-02-01
Payer: COMMERCIAL

## 2023-02-01 DIAGNOSIS — Z12.11 COLON CANCER SCREENING: Primary | ICD-10-CM

## 2023-02-01 DIAGNOSIS — D50.0 IRON DEFICIENCY ANEMIA DUE TO CHRONIC BLOOD LOSS: ICD-10-CM

## 2023-02-01 PROCEDURE — 25000003 PHARM REV CODE 250: Performed by: INTERNAL MEDICINE

## 2023-02-01 PROCEDURE — 88305 TISSUE EXAM BY PATHOLOGIST: ICD-10-PCS | Mod: 26,,, | Performed by: PATHOLOGY

## 2023-02-01 PROCEDURE — 45378 PR COLONOSCOPY,DIAGNOSTIC: ICD-10-PCS | Mod: ,,, | Performed by: INTERNAL MEDICINE

## 2023-02-01 PROCEDURE — 45378 DIAGNOSTIC COLONOSCOPY: CPT | Mod: ,,, | Performed by: INTERNAL MEDICINE

## 2023-02-01 PROCEDURE — 63600175 PHARM REV CODE 636 W HCPCS: Performed by: NURSE ANESTHETIST, CERTIFIED REGISTERED

## 2023-02-01 PROCEDURE — 27201012 HC FORCEPS, HOT/COLD, DISP: Performed by: INTERNAL MEDICINE

## 2023-02-01 PROCEDURE — 25000003 PHARM REV CODE 250: Performed by: NURSE ANESTHETIST, CERTIFIED REGISTERED

## 2023-02-01 PROCEDURE — 37000008 HC ANESTHESIA 1ST 15 MINUTES: Performed by: INTERNAL MEDICINE

## 2023-02-01 PROCEDURE — 37000009 HC ANESTHESIA EA ADD 15 MINS: Performed by: INTERNAL MEDICINE

## 2023-02-01 PROCEDURE — 43239 PR EGD, FLEX, W/BIOPSY, SGL/MULTI: ICD-10-PCS | Mod: 51,,, | Performed by: INTERNAL MEDICINE

## 2023-02-01 PROCEDURE — 45378 DIAGNOSTIC COLONOSCOPY: CPT | Performed by: INTERNAL MEDICINE

## 2023-02-01 PROCEDURE — 88305 TISSUE EXAM BY PATHOLOGIST: CPT | Performed by: PATHOLOGY

## 2023-02-01 PROCEDURE — 43239 EGD BIOPSY SINGLE/MULTIPLE: CPT | Performed by: INTERNAL MEDICINE

## 2023-02-01 PROCEDURE — 88305 TISSUE EXAM BY PATHOLOGIST: CPT | Mod: 26,,, | Performed by: PATHOLOGY

## 2023-02-01 PROCEDURE — 43239 EGD BIOPSY SINGLE/MULTIPLE: CPT | Mod: 51,,, | Performed by: INTERNAL MEDICINE

## 2023-02-01 RX ORDER — LIDOCAINE HYDROCHLORIDE 10 MG/ML
INJECTION, SOLUTION EPIDURAL; INFILTRATION; INTRACAUDAL; PERINEURAL
Status: DISCONTINUED | OUTPATIENT
Start: 2023-02-01 | End: 2023-02-01

## 2023-02-01 RX ORDER — SODIUM CHLORIDE, SODIUM LACTATE, POTASSIUM CHLORIDE, CALCIUM CHLORIDE 600; 310; 30; 20 MG/100ML; MG/100ML; MG/100ML; MG/100ML
INJECTION, SOLUTION INTRAVENOUS CONTINUOUS PRN
Status: DISCONTINUED | OUTPATIENT
Start: 2023-02-01 | End: 2023-02-01

## 2023-02-01 RX ORDER — PROPOFOL 10 MG/ML
VIAL (ML) INTRAVENOUS
Status: DISCONTINUED | OUTPATIENT
Start: 2023-02-01 | End: 2023-02-01

## 2023-02-01 RX ORDER — DEXTROMETHORPHAN/PSEUDOEPHED 2.5-7.5/.8
DROPS ORAL
Status: DISCONTINUED | OUTPATIENT
Start: 2023-02-01 | End: 2023-02-01 | Stop reason: HOSPADM

## 2023-02-01 RX ADMIN — SODIUM CHLORIDE, SODIUM LACTATE, POTASSIUM CHLORIDE, AND CALCIUM CHLORIDE: 600; 310; 30; 20 INJECTION, SOLUTION INTRAVENOUS at 10:02

## 2023-02-01 RX ADMIN — PROPOFOL 40 MG: 10 INJECTION, EMULSION INTRAVENOUS at 10:02

## 2023-02-01 RX ADMIN — PROPOFOL 30 MG: 10 INJECTION, EMULSION INTRAVENOUS at 10:02

## 2023-02-01 RX ADMIN — PROPOFOL 50 MG: 10 INJECTION, EMULSION INTRAVENOUS at 10:02

## 2023-02-01 RX ADMIN — PROPOFOL 80 MG: 10 INJECTION, EMULSION INTRAVENOUS at 10:02

## 2023-02-01 RX ADMIN — LIDOCAINE HYDROCHLORIDE 50 MG: 10 INJECTION, SOLUTION EPIDURAL; INFILTRATION; INTRACAUDAL; PERINEURAL at 10:02

## 2023-02-01 NOTE — PROVATION PATIENT INSTRUCTIONS
Discharge Summary/Instructions after an Endoscopic Procedure  Patient Name: Tammy Judge  Patient MRN: [id number]  Patient YOB: 1970  Wednesday, February 1, 2023 Ángel Valdes MD  Dear patient,  As a result of recent federal legislation (The Federal Cures Act), you may   receive lab or pathology results from your procedure in your [facility]   account before your physician is able to contact you. Your physician or   their representative will relay the results to you with their   recommendations at their soonest availability.  Thank you,  RESTRICTIONS:  During your procedure today, you received medications for sedation.  These   medications may affect your judgment, balance and coordination.  Therefore,   for 24 hours, you have the following restrictions:   - DO NOT drive a car, operate machinery, make legal/financial decisions,   sign important papers or drink alcohol.    ACTIVITY:  Today: no heavy lifting, straining or running due to procedural   sedation/anesthesia.  The following day: return to full activity including work.  DIET:  Eat and drink normally unless instructed otherwise.     TREATMENT FOR COMMON SIDE EFFECTS:  - Mild abdominal pain, nausea, belching, bloating or excessive gas:  rest,   eat lightly and use a heating pad.  - Sore Throat: treat with throat lozenges and/or gargle with warm salt   water.  - Because air was used during the procedure, expelling large amounts of air   from your rectum or belching is normal.  - If a bowel prep was taken, you may not have a bowel movement for 1-3 days.    This is normal.  SYMPTOMS TO WATCH FOR AND REPORT TO YOUR PHYSICIAN:  1. Abdominal pain or bloating, other than gas cramps.  2. Chest pain.  3. Back pain.  4. Signs of infection such as: chills or fever occurring within 24 hours   after the procedure.  5. Rectal bleeding, which would show as bright red, maroon, or black stools.   (A tablespoon of blood from the rectum is not serious,  especially if   hemorrhoids are present.)  6. Vomiting.  7. Weakness or dizziness.  GO DIRECTLY TO THE NEAREST EMERGENCY ROOM IF YOU HAVE ANY OF THE FOLLOWING:      Difficulty breathing              Chills and/or fever over 101 F   Persistent vomiting and/or vomiting blood   Severe abdominal pain   Severe chest pain   Black, tarry stools   Bleeding- more than one tablespoon   Any other symptom or condition that you feel may need urgent attention  Your doctor recommends these additional instructions:  If any biopsies were taken, your doctors clinic will contact you in 1 to 2   weeks with any results.  - Discharge patient to home.   - Resume previous diet.   - Continue present medications.   - Repeat colonoscopy in 10 years for screening purposes.   - Return to referring physician.   - Patient has a contact number available for emergencies.  The signs and   symptoms of potential delayed complications were discussed with the   patient.  Return to normal activities tomorrow.  Written discharge   instructions were provided to the patient.  For questions, problems or results please call your physician Ángel Valdes MD at Work:  [phone]  If you have any questions about the above instructions, call the GI   department at [phone] or call the endoscopy unit at [phone]   from 7am until 3 pm.  [facility] [place], EMERGENCY ROOM PHONE NUMBER:   [phone]  IF A COMPLICATION OR EMERGENCY SITUATION ARISES AND YOU ARE UNABLE TO REACH   YOUR PHYSICIAN - GO DIRECTLY TO THE EMERGENCY ROOM.  I have read or have had read to me these discharge instructions for my   procedure and have received a written copy.  I understand these   instructions and will follow-up with my physician if I have any questions.     __________________________________       _____________________________________  Nurse Signature                                          Patient/Designated   Responsible Party  Signature  Ángel Valdes MD  2/1/2023 10:57:13 AM  This report has been verified and signed electronically.  Dear patient,  As a result of recent federal legislation (The Federal Cures Act), you may   receive lab or pathology results from your procedure in your [facility]   account before your physician is able to contact you. Your physician or   their representative will relay the results to you with their   recommendations at their soonest availability.  Thank you,  PROVATION

## 2023-02-01 NOTE — PROVATION PATIENT INSTRUCTIONS
Discharge Summary/Instructions after an Endoscopic Procedure  Patient Name: Tammy Judge  Patient MRN: [id number]  Patient YOB: 1970  Wednesday, February 1, 2023 Ángel Valdes MD  Dear patient,  As a result of recent federal legislation (The Federal Cures Act), you may   receive lab or pathology results from your procedure in your [facility]   account before your physician is able to contact you. Your physician or   their representative will relay the results to you with their   recommendations at their soonest availability.  Thank you,  RESTRICTIONS:  During your procedure today, you received medications for sedation.  These   medications may affect your judgment, balance and coordination.  Therefore,   for 24 hours, you have the following restrictions:   - DO NOT drive a car, operate machinery, make legal/financial decisions,   sign important papers or drink alcohol.    ACTIVITY:  Today: no heavy lifting, straining or running due to procedural   sedation/anesthesia.  The following day: return to full activity including work.  DIET:  Eat and drink normally unless instructed otherwise.     TREATMENT FOR COMMON SIDE EFFECTS:  - Mild abdominal pain, nausea, belching, bloating or excessive gas:  rest,   eat lightly and use a heating pad.  - Sore Throat: treat with throat lozenges and/or gargle with warm salt   water.  - Because air was used during the procedure, expelling large amounts of air   from your rectum or belching is normal.  - If a bowel prep was taken, you may not have a bowel movement for 1-3 days.    This is normal.  SYMPTOMS TO WATCH FOR AND REPORT TO YOUR PHYSICIAN:  1. Abdominal pain or bloating, other than gas cramps.  2. Chest pain.  3. Back pain.  4. Signs of infection such as: chills or fever occurring within 24 hours   after the procedure.  5. Rectal bleeding, which would show as bright red, maroon, or black stools.   (A tablespoon of blood from the rectum is not serious,  especially if   hemorrhoids are present.)  6. Vomiting.  7. Weakness or dizziness.  GO DIRECTLY TO THE NEAREST EMERGENCY ROOM IF YOU HAVE ANY OF THE FOLLOWING:      Difficulty breathing              Chills and/or fever over 101 F   Persistent vomiting and/or vomiting blood   Severe abdominal pain   Severe chest pain   Black, tarry stools   Bleeding- more than one tablespoon   Any other symptom or condition that you feel may need urgent attention  Your doctor recommends these additional instructions:  If any biopsies were taken, your doctors clinic will contact you in 1 to 2   weeks with any results.  - Discharge patient to home.   - Resume previous diet.   - Continue present medications.   - Await pathology results.   - Return to referring physician.  For questions, problems or results please call your physician Ángel Valdes MD at Work:  [phone]  If you have any questions about the above instructions, call the GI   department at [phone] or call the endoscopy unit at [phone]   from 7am until 3 pm.  [facility] [place] [facility], EMERGENCY ROOM PHONE NUMBER:   [phone]  IF A COMPLICATION OR EMERGENCY SITUATION ARISES AND YOU ARE UNABLE TO REACH   YOUR PHYSICIAN - GO DIRECTLY TO THE EMERGENCY ROOM.  I have read or have had read to me these discharge instructions for my   procedure and have received a written copy.  I understand these   instructions and will follow-up with my physician if I have any questions.     __________________________________       _____________________________________  Nurse Signature                                          Patient/Designated   Responsible Party Signature  Ángel Valdes MD  2/1/2023 10:39:26 AM  This report has been verified and signed electronically.  Dear patient,  As a result of recent federal legislation (The Federal Cures Act), you may   receive lab or pathology results from your procedure in your [facility]   account before  your physician is able to contact you. Your physician or   their representative will relay the results to you with their   recommendations at their soonest availability.  Thank you,  PROVATION

## 2023-02-01 NOTE — ANESTHESIA PREPROCEDURE EVALUATION
02/01/2023  Tammy Saxena is a 52 y.o., female.      Pre-op Assessment    I have reviewed the Patient Summary Reports.     I have reviewed the Nursing Notes. I have reviewed the NPO Status.   I have reviewed the Medications.     Review of Systems  Anesthesia Hx:  No problems with previous Anesthesia    Social:  Non-Smoker, No Alcohol Use    Hematology/Oncology:     Oncology Normal    -- Anemia:   EENT/Dental:EENT/Dental Normal   Cardiovascular:  Cardiovascular Normal     Pulmonary:   Shortness of breath Sarcoidosis    Renal/:  Renal/ Normal     Hepatic/GI:   Bowel Prep. GERD, well controlled    Musculoskeletal:  Musculoskeletal Normal    Neurological:  Neurology Normal    Endocrine:  Endocrine Normal    Dermatological:  Skin Normal    Psych:  Psychiatric Normal           Physical Exam  General: Well nourished, Alert, Cooperative and Oriented    Airway:  Mallampati: II   Mouth Opening: Normal  TM Distance: 4 - 6 cm  Tongue: Normal  Neck ROM: Normal ROM    Dental:  Intact        Anesthesia Plan  Type of Anesthesia, risks & benefits discussed:    Anesthesia Type: MAC  Intra-op Monitoring Plan: Standard ASA Monitors  Post Op Pain Control Plan: multimodal analgesia  Informed Consent: Informed consent signed with the Patient and all parties understand the risks and agree with anesthesia plan.  All questions answered.   ASA Score: 2  Day of Surgery Review of History & Physical: H&P Update referred to the surgeon/provider.    Ready For Surgery From Anesthesia Perspective.     .

## 2023-02-01 NOTE — ANESTHESIA POSTPROCEDURE EVALUATION
Anesthesia Post Evaluation    Patient: Tammy Saxena    Procedure(s) Performed: Procedure(s) (LRB):  EGD (ESOPHAGOGASTRODUODENOSCOPY) (N/A)  COLONOSCOPY (N/A)    Final Anesthesia Type: MAC      Patient location during evaluation: GI PACU  Patient participation: Yes- Able to Participate  Level of consciousness: awake and alert and oriented  Post-procedure vital signs: reviewed and stable  Pain management: adequate  Airway patency: patent  CRICKET mitigation strategies: Multimodal analgesia  PONV status at discharge: No PONV  Anesthetic complications: no      Cardiovascular status: hemodynamically stable  Respiratory status: unassisted, spontaneous ventilation and room air  Hydration status: euvolemic  Follow-up not needed.          Vitals Value Taken Time   /58 02/01/23 1057   Temp 36.4 °C (97.5 °F) 02/01/23 1057   Pulse 79 02/01/23 1057   Resp 16 02/01/23 1057   SpO2 99 % 02/01/23 1057         No case tracking events are documented in the log.      Pain/Regine Score: Regine Score: 5 (2/1/2023 10:57 AM)

## 2023-02-01 NOTE — H&P
PRE PROCEDURE H&P    Patient Name: Tammy Saxena  MRN: [id number]  : 1970  Date of Procedure:  2023  Referring Physician: Eve Arevalo MD  Primary Physician: Maria Teresa Aparicio MD  Procedure Physician: Ángel Valdes MD       Planned Procedure: Colonoscopy and EGD  Diagnosis: iron deficiency anemia  Chief Complaint: Same as above    HPI: Patient is an 52 y.o. female is here for the above.     Last colonoscopy: Index colonoscopy   Family history: None   Anticoagulation: None     Past Medical History:   Past Medical History:   Diagnosis Date    RA (rheumatoid arthritis)         Past Surgical History:  Past Surgical History:   Procedure Laterality Date    Tubal Lig      TUBAL LIGATION          Home Medications:  Prior to Admission medications    Medication Sig Start Date End Date Taking? Authorizing Provider   albuterol (PROVENTIL/VENTOLIN HFA) 90 mcg/actuation inhaler Inhale 2 puffs into the lungs every 4 (four) hours as needed for Wheezing or Shortness of Breath. 22  Yes Lazaro Cassidy MD   diphenhydrAMINE-acetaminophen (TYLENOL PM)  mg Tab Take 1 tablet by mouth nightly as needed.   Yes Historical Provider   albuterol (PROVENTIL) 2.5 mg /3 mL (0.083 %) nebulizer solution Take 3 mLs (2.5 mg total) by nebulization every 4 to 6 hours as needed for Wheezing or Shortness of Breath. 22  Lazaro Cassidy MD   albuterol-ipratropium (DUO-NEB) 2.5 mg-0.5 mg/3 mL nebulizer solution SMARTSI Ampule(s) Via Nebulizer Every 6 Hours PRN 22   Historical Provider   predniSONE (DELTASONE) 20 MG tablet Take 1 tablet (20 mg total) by mouth once daily. 23   Lazaro Cassidy MD        Allergies:  Review of patient's allergies indicates:  No Known Allergies     Social History:   Social History     Socioeconomic History    Marital status: Single   Occupational History    Occupation: [employer], supervisor   Tobacco Use    Smoking status: Former     Packs/day: 1.00     Years: 32.00  "    Pack years: 32.00     Types: Vaping with nicotine, Cigarettes     Start date: 1988     Quit date: 2020     Years since quitting: 3.0    Smokeless tobacco: Never   Substance and Sexual Activity    Alcohol use: Not Currently    Drug use: Not Currently     Types: Marijuana    Sexual activity: Not Currently     Social Determinants of Health     Financial Resource Strain: Low Risk     Difficulty of Paying Living Expenses: Not very hard   Food Insecurity: No Food Insecurity    Worried About Running Out of Food in the Last Year: Never true    Ran Out of Food in the Last Year: Never true   Transportation Needs: No Transportation Needs    Lack of Transportation (Medical): No    Lack of Transportation (Non-Medical): No   Physical Activity: Inactive    Days of Exercise per Week: 5 days    Minutes of Exercise per Session: 0 min   Stress: Stress Concern Present    Feeling of Stress : To some extent   Social Connections: Unknown    Frequency of Communication with Friends and Family: More than three times a week    Frequency of Social Gatherings with Friends and Family: More than three times a week    Active Member of Clubs or Organizations: No    Attends Club or Organization Meetings: Never    Marital Status:    Housing Stability: Low Risk     Unable to Pay for Housing in the Last Year: No    Number of Places Lived in the Last Year: 1    Unstable Housing in the Last Year: No       Family History:  Family History   Problem Relation Age of Onset    Diabetes Sister     Cancer Sister     Breast cancer Sister     Diabetes Brother     Breast cancer Other        ROS: No acute cardiac events, no acute respiratory complaints.     Physical Exam (all patients):    BP (!) 125/57 (BP Location: Left arm, Patient Position: Lying)   Pulse 86   Temp 97.9 °F (36.6 °C) (Temporal)   Resp 16   Ht 5' 7" (1.702 m)   Wt 66.7 kg (147 lb)   LMP 12/01/2020 (Approximate)   SpO2 99%   Breastfeeding No   BMI 23.02 kg/m²   Lungs: Clear to " auscultation bilaterally, respirations unlabored  Heart: Regular rate and rhythm, S1 and S2 normal, no obvious murmurs  Abdomen:         Soft, non-tender, bowel sounds normal, no masses, no organomegaly    Lab Results   Component Value Date    WBC 7.42 12/21/2022    MCV 74 (L) 12/21/2022    RDW 21.6 (H) 12/21/2022     12/21/2022    INR 0.9 01/11/2023    GLU 92 12/15/2022    BUN 12 12/15/2022     12/15/2022    K 3.9 12/15/2022     12/15/2022        SEDATION PLAN: per anesthesia      History reviewed, vital signs satisfactory, cardiopulmonary status satisfactory, sedation options, risks and plans have been discussed with the patient  All their questions were answered and the patient agrees to the sedation procedures as planned and the patient is deemed an appropriate candidate for the sedation as planned.    Procedure explained to patient, informed consent obtained and placed in chart.    Ángel Valdes  2/1/2023  10:19 AM

## 2023-02-01 NOTE — TRANSFER OF CARE
"Anesthesia Transfer of Care Note    Patient: Tammy Saxena    Procedure(s) Performed: Procedure(s) (LRB):  EGD (ESOPHAGOGASTRODUODENOSCOPY) (N/A)  COLONOSCOPY (N/A)    Patient location: GI    Anesthesia Type: MAC    Transport from OR: Transported from OR on room air with adequate spontaneous ventilation    Post pain: adequate analgesia    Post assessment: no apparent anesthetic complications and tolerated procedure well    Post vital signs: stable    Level of consciousness: awake, alert and oriented    Nausea/Vomiting: no nausea/vomiting    Complications: none    Transfer of care protocol was followed      Last vitals:   Visit Vitals  BP (!) 125/57 (BP Location: Left arm, Patient Position: Lying)   Pulse 86   Temp 36.6 °C (97.9 °F) (Temporal)   Resp 16   Ht 5' 7" (1.702 m)   Wt 66.7 kg (147 lb)   LMP 12/01/2020 (Approximate)   SpO2 99%   Breastfeeding No   BMI 23.02 kg/m²     "

## 2023-02-02 ENCOUNTER — TELEPHONE (OUTPATIENT)
Dept: INTERNAL MEDICINE | Facility: CLINIC | Age: 53
End: 2023-02-02
Payer: COMMERCIAL

## 2023-02-02 ENCOUNTER — OFFICE VISIT (OUTPATIENT)
Dept: RHEUMATOLOGY | Facility: CLINIC | Age: 53
End: 2023-02-02
Payer: COMMERCIAL

## 2023-02-02 ENCOUNTER — LAB VISIT (OUTPATIENT)
Dept: LAB | Facility: HOSPITAL | Age: 53
End: 2023-02-02
Attending: INTERNAL MEDICINE
Payer: COMMERCIAL

## 2023-02-02 VITALS
SYSTOLIC BLOOD PRESSURE: 116 MMHG | DIASTOLIC BLOOD PRESSURE: 74 MMHG | HEIGHT: 67 IN | BODY MASS INDEX: 23.07 KG/M2 | OXYGEN SATURATION: 100 % | HEART RATE: 71 BPM | WEIGHT: 147 LBS | RESPIRATION RATE: 18 BRPM | TEMPERATURE: 98 F

## 2023-02-02 VITALS
DIASTOLIC BLOOD PRESSURE: 83 MMHG | SYSTOLIC BLOOD PRESSURE: 128 MMHG | BODY MASS INDEX: 24.05 KG/M2 | RESPIRATION RATE: 14 BRPM | HEART RATE: 92 BPM | WEIGHT: 153.25 LBS | HEIGHT: 67 IN

## 2023-02-02 DIAGNOSIS — Z51.81 ENCOUNTER FOR MEDICATION MONITORING: ICD-10-CM

## 2023-02-02 DIAGNOSIS — D86.2 SARCOIDOSIS OF LUNG WITH SARCOIDOSIS OF LYMPH NODES: ICD-10-CM

## 2023-02-02 DIAGNOSIS — M05.79 RHEUMATOID ARTHRITIS INVOLVING MULTIPLE SITES WITH POSITIVE RHEUMATOID FACTOR: ICD-10-CM

## 2023-02-02 DIAGNOSIS — M05.79 RHEUMATOID ARTHRITIS INVOLVING MULTIPLE SITES WITH POSITIVE RHEUMATOID FACTOR: Primary | ICD-10-CM

## 2023-02-02 PROCEDURE — 3008F BODY MASS INDEX DOCD: CPT | Mod: CPTII,S$GLB,, | Performed by: INTERNAL MEDICINE

## 2023-02-02 PROCEDURE — 1160F PR REVIEW ALL MEDS BY PRESCRIBER/CLIN PHARMACIST DOCUMENTED: ICD-10-PCS | Mod: CPTII,S$GLB,, | Performed by: INTERNAL MEDICINE

## 2023-02-02 PROCEDURE — 86704 HEP B CORE ANTIBODY TOTAL: CPT | Performed by: INTERNAL MEDICINE

## 2023-02-02 PROCEDURE — 3008F PR BODY MASS INDEX (BMI) DOCUMENTED: ICD-10-PCS | Mod: CPTII,S$GLB,, | Performed by: INTERNAL MEDICINE

## 2023-02-02 PROCEDURE — 99214 OFFICE O/P EST MOD 30 MIN: CPT | Mod: S$GLB,,, | Performed by: INTERNAL MEDICINE

## 2023-02-02 PROCEDURE — 1160F RVW MEDS BY RX/DR IN RCRD: CPT | Mod: CPTII,S$GLB,, | Performed by: INTERNAL MEDICINE

## 2023-02-02 PROCEDURE — 87340 HEPATITIS B SURFACE AG IA: CPT | Performed by: INTERNAL MEDICINE

## 2023-02-02 PROCEDURE — 3079F DIAST BP 80-89 MM HG: CPT | Mod: CPTII,S$GLB,, | Performed by: INTERNAL MEDICINE

## 2023-02-02 PROCEDURE — 99999 PR PBB SHADOW E&M-EST. PATIENT-LVL IV: ICD-10-PCS | Mod: PBBFAC,,, | Performed by: INTERNAL MEDICINE

## 2023-02-02 PROCEDURE — 3074F SYST BP LT 130 MM HG: CPT | Mod: CPTII,S$GLB,, | Performed by: INTERNAL MEDICINE

## 2023-02-02 PROCEDURE — 99999 PR PBB SHADOW E&M-EST. PATIENT-LVL IV: CPT | Mod: PBBFAC,,, | Performed by: INTERNAL MEDICINE

## 2023-02-02 PROCEDURE — 1159F MED LIST DOCD IN RCRD: CPT | Mod: CPTII,S$GLB,, | Performed by: INTERNAL MEDICINE

## 2023-02-02 PROCEDURE — 99214 PR OFFICE/OUTPT VISIT, EST, LEVL IV, 30-39 MIN: ICD-10-PCS | Mod: S$GLB,,, | Performed by: INTERNAL MEDICINE

## 2023-02-02 PROCEDURE — 86706 HEP B SURFACE ANTIBODY: CPT | Mod: 91 | Performed by: INTERNAL MEDICINE

## 2023-02-02 PROCEDURE — 3074F PR MOST RECENT SYSTOLIC BLOOD PRESSURE < 130 MM HG: ICD-10-PCS | Mod: CPTII,S$GLB,, | Performed by: INTERNAL MEDICINE

## 2023-02-02 PROCEDURE — 86803 HEPATITIS C AB TEST: CPT | Performed by: INTERNAL MEDICINE

## 2023-02-02 PROCEDURE — 36415 COLL VENOUS BLD VENIPUNCTURE: CPT | Performed by: INTERNAL MEDICINE

## 2023-02-02 PROCEDURE — 1159F PR MEDICATION LIST DOCUMENTED IN MEDICAL RECORD: ICD-10-PCS | Mod: CPTII,S$GLB,, | Performed by: INTERNAL MEDICINE

## 2023-02-02 PROCEDURE — 3079F PR MOST RECENT DIASTOLIC BLOOD PRESSURE 80-89 MM HG: ICD-10-PCS | Mod: CPTII,S$GLB,, | Performed by: INTERNAL MEDICINE

## 2023-02-02 RX ORDER — ADALIMUMAB 40MG/0.4ML
40 KIT SUBCUTANEOUS
Qty: 2 PEN | Refills: 11 | Status: SHIPPED | OUTPATIENT
Start: 2023-02-02 | End: 2023-06-06

## 2023-02-02 NOTE — TELEPHONE ENCOUNTER
[employer] paper work faxed to [phone]    Your fax has been successfully sent to [phone] at [phone].  ------------------------------------------------------------  From: [id number]  ------------------------------------------------------------  2/2/2023 2:47:51 PM Transmission Record          Sent to [phone] with remote ID "          Result: (0/339;0/0) Success          Page record: 1 - 6          Elapsed time: 05:18 on channel 43

## 2023-02-02 NOTE — PROGRESS NOTES
RHEUMATOLOGY CLINIC CONSULT VISIT    Reason for consult:-  Sarcoidosis    Chief complaints, HPI, ROS, EXAM, Assessment & Plans:-  Tammy Aguero-Moralesis a 52 y.o. pleasant female comes in with NEWLY DIAGNOSED SARCOIDOSIS HERE FOR FURTHER MANAGEMENT OF STEROID SPARING THERAPY.   Back in November 2022 she was seen for worsening shortness of breath.  Found to have severe anemia with hemoglobin around 5, status post transfusion and underwent multiple workups to diagnose underlying etiology of her anemia.  Workup showed multiple nodules in her chest which initially was consider to be malignancy.  She underwent evaluation by pulmonologist.  Biopsy showed evidence of sarcoidosis.  PET scan showed no evidence of malignancy.  She is scheduled to undergo upper and lower endoscopy to rule out gastrointestinal bleeding since she does not have any other underlying etiologies for her severe iron deficiency anemia.  She has also been evaluated by our hematologist for her anemia.  She was prescribed prednisone 20 mg daily by our pulmonologist Dr. Cassidy.  She was also not to start the medication until she completes the endoscopy.  From a symptomatic standpoint she denies any chest pain.  No lymph node enlargement per patient.  No systemic B symptoms.  Longstanding history of rheumatoid arthritis treated with methotrexate in the past.  She self-discontinued methotrexate during COVID pandemic because of fear of immunosuppression.  She does complain of joint pain, swelling and prolonged morning stiffness of bilateral hands especially around MCP joints.  No significant large joint symptoms.  Rheumatological review of system negative otherwise.  Physical examination shows synovitis of bilateral 2nd 3rd and 4th MCP joints associated with mild effusion around ankles.    1. Rheumatoid arthritis involving multiple sites with positive rheumatoid factor    2. Sarcoidosis of lung with sarcoidosis of lymph nodes    3. Encounter for  medication monitoring      Problem List Items Addressed This Visit       Rheumatoid arthritis involving multiple sites with positive rheumatoid factor - Primary    Relevant Medications    adalimumab (HUMIRA,CF, PEN) 40 mg/0.4 mL PnKt    Other Relevant Orders    Hepatitis B Core Antibody, Total    Hepatitis B Surface Ab, Qualitative    Hepatitis B Surface Antigen    Hepatitis C Antibody    Sarcoidosis of lung with sarcoidosis of lymph nodes    Relevant Medications    adalimumab (HUMIRA,CF, PEN) 40 mg/0.4 mL PnKt    Other Relevant Orders    Hepatitis B Core Antibody, Total    Hepatitis B Surface Ab, Qualitative    Hepatitis B Surface Antigen    Hepatitis C Antibody    Encounter for medication monitoring    Relevant Orders    Hepatitis B Core Antibody, Total    Hepatitis B Surface Ab, Qualitative    Hepatitis B Surface Antigen    Hepatitis C Antibody      Latest Reference Range & Units Most Recent   WBC 3.90 - 12.70 K/uL 7.42  12/21/22 16:26   RBC 4.00 - 5.40 M/uL 4.10  12/21/22 16:26   Hemoglobin 12.0 - 16.0 g/dL 8.7 (L)  12/21/22 16:26   Hematocrit 37.0 - 48.5 % 30.4 (L)  12/21/22 16:26   MCV 82 - 98 fL 74 (L)  12/21/22 16:26   MCH 27.0 - 31.0 pg 21.2 (L)  12/21/22 16:26   MCHC 32.0 - 36.0 g/dL 28.6 (L)  12/21/22 16:26   RDW 11.5 - 14.5 % 21.6 (H)  12/21/22 16:26   Platelets 150 - 450 K/uL 353  12/21/22 16:26   MPV 9.2 - 12.9 fL 9.1 (L)  12/21/22 16:26   Platelet Estimate  Appears normal  12/14/22 08:31   Gran % 38.0 - 73.0 % 55.2  12/21/22 16:26   Lymph % 18.0 - 48.0 % 30.7  12/21/22 16:26   Mono % 4.0 - 15.0 % 7.1  12/21/22 16:26   Eosinophil % 0.0 - 8.0 % 4.6  12/21/22 16:26   Basophil % 0.0 - 1.9 % 1.9  12/21/22 16:26   Immature Granulocytes 0.0 - 0.5 % 0.5  12/21/22 16:26   Gran # (ANC) 1.8 - 7.7 K/uL 4.1  12/21/22 16:26   Lymph # 1.0 - 4.8 K/uL 2.3  12/21/22 16:26   Mono # 0.3 - 1.0 K/uL 0.5  12/21/22 16:26   Eos # 0.0 - 0.5 K/uL 0.3  12/21/22 16:26   Baso # 0.00 - 0.20 K/uL 0.14  12/21/22 16:26   Immature  Grans (Abs) 0.00 - 0.04 K/uL 0.04  12/21/22 16:26   nRBC 0 /100 WBC 0  12/21/22 16:26   Differential Method  Automated  12/21/22 16:26   Ovalocytes  Occasional  12/14/22 08:31   Aniso  Slight  12/14/22 08:31   Poikilocytosis  Slight  12/14/22 08:31   Poly  Occasional  12/14/22 08:31   Hypo  Moderate  12/14/22 08:31   Teardrop Cells  Occasional  12/14/22 08:31   Toxic Granulation  Present  12/14/22 08:31   Iron 30 - 160 ug/dL 10 (L)  12/15/22 08:32   TIBC 250 - 450 ug/dL 488 (H)  12/15/22 08:32   Saturated Iron 20 - 50 % 2 (L)  12/15/22 08:32   Transferrin 200 - 375 mg/dL 330  12/15/22 08:32   Ferritin 20.0 - 300.0 ng/mL 2 (L)  12/15/22 08:32   G6PD Quant 7.0 - 20.5 U/g Hgb 16.1  4/16/19 15:44   Sed Rate 0 - 20 mm/Hr 20  11/30/22 13:15   Protime 9.0 - 12.5 sec 10.2  1/11/23 09:26   INR 0.8 - 1.2  0.9  1/11/23 09:26   Sodium 136 - 145 mmol/L 138  12/15/22 08:32   Potassium 3.5 - 5.1 mmol/L 3.9  12/15/22 08:32   Chloride 95 - 110 mmol/L 108  12/15/22 08:32   CO2 23 - 29 mmol/L 22 (L)  12/15/22 08:32   Anion Gap 8 - 16 mmol/L 8  12/15/22 08:32   BUN 6 - 20 mg/dL 12  12/15/22 08:32   Creatinine 0.5 - 1.4 mg/dL 0.7  12/15/22 08:32   eGFR >60 mL/min/1.73 m^2 >60  12/15/22 08:32   eGFR if non African American >60 mL/min/1.73 m^2 >60.0  6/26/20 09:30   eGFR if African American >60 mL/min/1.73 m^2 >60.0  6/26/20 09:30   Glucose 70 - 110 mg/dL 92  12/15/22 08:32   Calcium 8.7 - 10.5 mg/dL 8.4 (L)  12/15/22 08:32   Alkaline Phosphatase 55 - 135 U/L 100  12/14/22 08:31   PROTEIN TOTAL 6.0 - 8.4 g/dL 7.2  12/14/22 08:31   Albumin 3.5 - 5.2 g/dL 3.7  12/14/22 08:31   BILIRUBIN TOTAL 0.1 - 1.0 mg/dL 0.3  12/14/22 08:31   Bilirubin Direct 0.0 - 0.2 mg/dl 0.1  6/8/05 08:44   AST 10 - 40 U/L 11  12/14/22 08:31   ALT 10 - 44 U/L 10  12/14/22 08:31   CRP 0.0 - 8.2 mg/L 1.1  11/30/22 13:15   Cholesterol 120 - 199 mg/dL 134  4/8/19 09:25   HDL 40 - 75 mg/dL 45  4/8/19 09:25   HDL/Cholesterol Ratio 20.0 - 50.0 % 33.6  4/8/19 09:25   LDL  Cholesterol External 63.0 - 159.0 mg/dL 72.6  4/8/19 09:25   Non-HDL Cholesterol mg/dL 89  4/8/19 09:25   Total Cholesterol/HDL Ratio 2.0 - 5.0  3.0  4/8/19 09:25   Triglycerides 30 - 150 mg/dL 82  4/8/19 09:25   CPK 20 - 180 U/L 79  4/22/19 10:14   ANT Screen Negative <1:80  Positive !  11/30/22 13:15   ANT Titer 1  1:80  11/30/22 13:15   ANT PATTERN 1  Homogeneous  11/30/22 13:15   ds DNA Ab Negative 1:10  Negative 1:10  11/30/22 13:15   Anti-SSA Antibody 0.00 - 0.99 Ratio 0.06  11/30/22 13:15   Anti-SSA Interpretation Negative  Negative  11/30/22 13:15   Anti-SSB Antibody 0.00 - 0.99 Ratio 0.05  11/30/22 13:15   Anti-SSB Interpretation Negative  Negative  11/30/22 13:15   Anti Sm Antibody 0.00 - 0.99 Ratio 0.07  11/30/22 13:15   Anti-Sm Interpretation Negative  Negative  11/30/22 13:15   Anti Sm/RNP Antibody 0.00 - 0.99 Ratio 0.06  11/30/22 13:15   Anti-Sm/RNP Interpretation Negative  Negative  11/30/22 13:15   CCP Antibodies <5.0 U/mL 99.4 (H)  11/30/22 13:15   Rheumatoid Factor 0.0 - 15.0 IU/mL 18.0 (H)  11/30/22 13:15   Group & Rh  A POS  12/15/22 08:32   INDIRECT KAITLIN  NEG  12/15/22 08:32   PREPARE RBC SOFT  Rpt  12/15/22 08:32   Hep A IgM  Negative  4/16/19 08:30   Hep B C IgM  Negative  4/16/19 08:30   Hepatitis B Surface Ag  Negative  4/16/19 08:30   Hepatitis C Ab  Negative  4/16/19 08:30   HPV High Risk type 16, PCR Negative  Negative  5/15/19 15:43   HPV High Risk type 18, PCR Negative  Negative  5/15/19 15:43   HPV other High Risk types, PCR Negative  Negative  5/15/19 15:43   SARS-CoV-2 RNA, Amplification, Qual Negative  Positive !  7/12/22 14:08   Mitogen - Nil IU/mL 1.628  12/14/22 08:31   NIL IU/mL 0.53012  12/14/22 08:31   TB Gold Plus Negative  Negative  12/14/22 08:31   TB1 - Nil IU/mL 0.058  12/14/22 08:31   TB2 - Nil IU/mL 0.045  12/14/22 08:31    Acceptable  Yes  7/12/22 14:08   CT BIOPSY LUNG W/ GUIDANCE  Rpt  1/11/23 11:21   CT CHEST WITH CONTRAST  Rpt  11/30/22 09:40   XR  "CHEST 1 VIEW  Rpt  1/11/23 13:20   XR CHEST PA AND LATERAL  Rpt  12/9/22 10:50   XR HAND COMPLETE 3 VIEWS BILATERAL  Rpt  4/16/19 08:20   XR SHOULDER COMPLETE 2 OR MORE VIEWS BILATERAL  Rpt  4/16/19 08:17   MAMMO DIGITAL SCREENING BILAT WITH LEROY  Rpt  4/8/19 09:53   NM PET CT ROUTINE  Rpt  12/14/22 10:55   EKG 12-LEAD  Rpt  11/29/22 14:19   ECHO (CUPID ONLY)  Rpt  12/13/22 10:25   COMPLETE PFT WITH BRONCHODILATOR  Rpt  12/20/22 12:50   Pre FVC L 3.44  12/20/22 12:50   Pre FEV1 L 2.65  12/20/22 12:50   Pre FEV1 FVC % 76.84  12/20/22 12:50   Post FVC L 3.42  12/20/22 12:50   Post FEV1 L 2.84  12/20/22 12:50   Pre TLC L 5.73  12/20/22 12:50   Pre DLCO ml/(min*mmHg) 11.39  12/20/22 12:50   DLCO ADJ PRE ml/(min*mmHg) 18.78  12/20/22 12:50   SPECIMEN TO PATHOLOGY, SURGERY  Rpt  2/1/23 10:55   SPECIMEN TO PATHOLOGY, RADIOLOGY  Rpt  1/11/23 11:01   Final Pathologic Diagnosis  LUNG, LEFT, "NODULE", CT-GUIDED BIOPSY:  - Focally necrotizing granulomatous inflammation  - Adjacent alveolated lung parenchyma with fibroelastotic changes and chronic  (lymphoplasmacytic) inflammation  - No evidence of acid-fast organisms or fungal elements on AFB and GMS  cytochemical stains, respectively  - No evidence of malignancy    1/11/23 11:01   Gross  Surgery ID:  [id number]   Pathology ID:  [id number]  1. Received in formalin labeled "left lung nodule biopsy" are multiple tan  core tissue fragments ranging from 0.3-1.5 cm in length.  The specimen is  submitted entirely in cassette 1A.  RIT--1-A    Grossed by: Elly Mckeon    1/11/23 11:01   Microscopic Exam  AFB and GMS cytochemical stains were performed on block 1A with appropriate  controls.    1/11/23 11:01   LIQUID-BASED PAP SMEAR, SCREENING  Rpt  5/15/19 15:43   Disclaimer  Unless the case is a 'gross only' or additional testing only, the final  diagnosis for each specimen is based on a microscopic examination of  appropriate tissue sections.    1/11/23 11:01   AV index (prosthetic)  " 0.84  22 10:25   Left Ventricle Relative Wall Thickness cm 0.42  22 10:25   E/E' ratio m/s 8.43  22 10:25   EF % 60  22 10:25   FRACTION OF  NITRIC OXIDE (FENO)  Rpt  22 09:30   HUMAN PAPILLOMAVIRUS (HPV) HIGH RISK GENOTYPES BY PCR  Rpt  5/15/19 15:43   LA WIDTH cm 3.60  22 10:25   Left Ventricular Outflow Tract Mean Gradient mmHg 3.21  22 10:25   Left Ventricular Outflow Tract Mean Velocity cm/s 0.81  22 10:25   Post FEV1 FVC % 83.11  22 12:50   Right Atrial Pressure (from IVC) mmHg 3  22 10:25   SARS-COV-2 RDRP GENE  Rpt !  22 14:08   XR FINGER 2 OR MORE VIEWS RIGHT  Rpt  19 06:34   (L): Data is abnormally low  (H): Data is abnormally high  !: Data is abnormal  Rpt: View report in Results Review for more information      Pulmonary sarcoidosis associated with seropositive nonerosive rheumatoid arthritis.  Start Humira.  Compromised immune system secondary to autoimmune disease and use of immunosuppressive drugs. Monitor carefully for infections. Advised to get immediate medical care if any infection. Also advised strict adherence to age appropriate vaccinations and cancer screenings with PCP.  Hold Humira if any infection  Advised all precautions  Would recommend a faster prednisone taper since she will be starting Humira unless her disease warrants.        # Follow up in about 4 months (around 2023).      Past Medical History:   Diagnosis Date    RA (rheumatoid arthritis)        Past Surgical History:   Procedure Laterality Date    COLONOSCOPY N/A 2023    Procedure: COLONOSCOPY;  Surgeon: Ángel Valdes MD;  Location: [facility];  Service: Gastroenterology;  Laterality: N/A;    ESOPHAGOGASTRODUODENOSCOPY N/A 2023    Procedure: EGD (ESOPHAGOGASTRODUODENOSCOPY);  Surgeon: Ángel Valdes MD;  Location: [facility];  Service: Gastroenterology;  Laterality: N/A;    Tubal Lig      TUBAL LIGATION          Social History      Tobacco Use    Smoking status: Former     Packs/day: 1.00     Years: 32.00     Pack years: 32.00     Types: Vaping with nicotine, Cigarettes     Start date:      Quit date:      Years since quitting: 3.0    Smokeless tobacco: Never   Substance Use Topics    Alcohol use: Not Currently    Drug use: Not Currently     Types: Marijuana       Family History   Problem Relation Age of Onset    Diabetes Sister     Cancer Sister     Breast cancer Sister     Diabetes Brother     Breast cancer Other        Review of patient's allergies indicates:  No Known Allergies    Medication List with Changes/Refills   New Medications    ADALIMUMAB (HUMIRA,CF, PEN) 40 MG/0.4 ML PNKT    Inject 0.4 mLs (40 mg total) into the skin every 14 (fourteen) days.   Current Medications    ALBUTEROL (PROVENTIL) 2.5 MG /3 ML (0.083 %) NEBULIZER SOLUTION    Take 3 mLs (2.5 mg total) by nebulization every 4 to 6 hours as needed for Wheezing or Shortness of Breath.    ALBUTEROL (PROVENTIL/VENTOLIN HFA) 90 MCG/ACTUATION INHALER    Inhale 2 puffs into the lungs every 4 (four) hours as needed for Wheezing or Shortness of Breath.    ALBUTEROL-IPRATROPIUM (DUO-NEB) 2.5 MG-0.5 MG/3 ML NEBULIZER SOLUTION    SMARTSI Ampule(s) Via Nebulizer Every 6 Hours PRN    DIPHENHYDRAMINE-ACETAMINOPHEN (TYLENOL PM)  MG TAB    Take 1 tablet by mouth nightly as needed.    PREDNISONE (DELTASONE) 20 MG TABLET    Take 1 tablet (20 mg total) by mouth once daily.         Thank you for allowing me to participate in the care ofTammy Saxena.    Disclaimer: This note was prepared using voice recognition system and is likely to have sound alike errors and is not proof read.  Please call me with any questions.

## 2023-02-03 ENCOUNTER — PATIENT MESSAGE (OUTPATIENT)
Dept: RHEUMATOLOGY | Facility: CLINIC | Age: 53
End: 2023-02-03
Payer: COMMERCIAL

## 2023-02-03 ENCOUNTER — SPECIALTY PHARMACY (OUTPATIENT)
Dept: PHARMACY | Facility: CLINIC | Age: 53
End: 2023-02-03
Payer: COMMERCIAL

## 2023-02-03 LAB
HBV CORE AB SERPL QL IA: NORMAL
HBV SURFACE AB SER-ACNC: 99.52 MIU/ML
HBV SURFACE AB SER-ACNC: REACTIVE M[IU]/ML
HBV SURFACE AG SERPL QL IA: NORMAL
HCV AB SERPL QL IA: NORMAL

## 2023-02-03 NOTE — TELEPHONE ENCOUNTER
Alyssia, this is Rosi Brewer with [facility].  We are working on your prescription that your doctor has sent us. We will be working with your insurance to get this approved for you. We will be calling you along the way with updates on your medication.  If you have any questions, you can reach us at [phone].    Welcome call outcome: Patient/caregiver reached

## 2023-02-03 NOTE — TELEPHONE ENCOUNTER
COLEMANI:    Patient aware that she can start the Prednisone as recommended by Dr Cassidy and that you will give her instructions on tapering once she receives her Humira.     Patient will let us know when she receives the Humira or if there is a delay

## 2023-02-06 ENCOUNTER — PATIENT MESSAGE (OUTPATIENT)
Dept: INTERNAL MEDICINE | Facility: CLINIC | Age: 53
End: 2023-02-06
Payer: COMMERCIAL

## 2023-02-06 ENCOUNTER — PATIENT MESSAGE (OUTPATIENT)
Dept: PHARMACY | Facility: CLINIC | Age: 53
End: 2023-02-06
Payer: COMMERCIAL

## 2023-02-06 NOTE — TELEPHONE ENCOUNTER
-Patient enrolled with [employer] and has obtained a Humira co-pay card. Co-pay should be $0 per [employer] rep.  -Patient must fill with [facility].  -Will transfer rx and close out.

## 2023-02-06 NOTE — TELEPHONE ENCOUNTER
OSP OON. Patient must call [employer]    -Patient must contact [employer] ([phone]) for enrollment.  -Patient informed via telephone and patient portal message.   -Awaiting call back from patient regarding where to transfer medication.

## 2023-02-07 ENCOUNTER — PATIENT MESSAGE (OUTPATIENT)
Dept: INTERNAL MEDICINE | Facility: CLINIC | Age: 53
End: 2023-02-07
Payer: COMMERCIAL

## 2023-02-08 ENCOUNTER — PATIENT MESSAGE (OUTPATIENT)
Dept: INTERNAL MEDICINE | Facility: CLINIC | Age: 53
End: 2023-02-08
Payer: COMMERCIAL

## 2023-02-08 ENCOUNTER — TELEPHONE (OUTPATIENT)
Dept: GASTROENTEROLOGY | Facility: CLINIC | Age: 53
End: 2023-02-08
Payer: COMMERCIAL

## 2023-02-08 DIAGNOSIS — D50.9 IRON DEFICIENCY ANEMIA, UNSPECIFIED IRON DEFICIENCY ANEMIA TYPE: Primary | ICD-10-CM

## 2023-02-08 LAB
FINAL PATHOLOGIC DIAGNOSIS: NORMAL
Lab: NORMAL

## 2023-02-10 ENCOUNTER — PATIENT MESSAGE (OUTPATIENT)
Dept: INTERNAL MEDICINE | Facility: CLINIC | Age: 53
End: 2023-02-10
Payer: COMMERCIAL

## 2023-02-10 NOTE — TELEPHONE ENCOUNTER
Ms. Huang is scheduled in the virtual on Wed at 12:40. For the three day change she needs to check on Monday or Sunday?

## 2023-02-12 ENCOUNTER — NURSE TRIAGE (OUTPATIENT)
Dept: ADMINISTRATIVE | Facility: CLINIC | Age: 53
End: 2023-02-12
Payer: COMMERCIAL

## 2023-02-12 NOTE — TELEPHONE ENCOUNTER
Pt's states her doctor sent a message in [employer] that her schedule for the week should open on Sunday and she should call to make an appt. Able to schedule an appt for Wednesday. Advised to call PCP office when they are open for further concerns regarding an appt.     Reason for Disposition   Requesting regular office appointment    Additional Information   Negative: RN needs further essential information from caller in order to complete triage    Protocols used: Information Only Call - No Triage-A-

## 2023-02-13 ENCOUNTER — LAB VISIT (OUTPATIENT)
Dept: LAB | Facility: HOSPITAL | Age: 53
End: 2023-02-13
Attending: INTERNAL MEDICINE
Payer: COMMERCIAL

## 2023-02-13 ENCOUNTER — PATIENT MESSAGE (OUTPATIENT)
Dept: HEMATOLOGY/ONCOLOGY | Facility: CLINIC | Age: 53
End: 2023-02-13

## 2023-02-13 ENCOUNTER — OFFICE VISIT (OUTPATIENT)
Dept: HEMATOLOGY/ONCOLOGY | Facility: CLINIC | Age: 53
End: 2023-02-13
Payer: COMMERCIAL

## 2023-02-13 VITALS
HEART RATE: 101 BPM | WEIGHT: 155 LBS | BODY MASS INDEX: 24.33 KG/M2 | HEIGHT: 67 IN | TEMPERATURE: 98 F | DIASTOLIC BLOOD PRESSURE: 80 MMHG | SYSTOLIC BLOOD PRESSURE: 129 MMHG | OXYGEN SATURATION: 98 %

## 2023-02-13 DIAGNOSIS — D64.9 SYMPTOMATIC ANEMIA: ICD-10-CM

## 2023-02-13 DIAGNOSIS — R91.8 PULMONARY NODULES: ICD-10-CM

## 2023-02-13 DIAGNOSIS — D50.9 IRON DEFICIENCY ANEMIA, UNSPECIFIED IRON DEFICIENCY ANEMIA TYPE: ICD-10-CM

## 2023-02-13 DIAGNOSIS — D50.9 IRON DEFICIENCY ANEMIA, UNSPECIFIED IRON DEFICIENCY ANEMIA TYPE: Primary | ICD-10-CM

## 2023-02-13 LAB
ANISOCYTOSIS BLD QL SMEAR: SLIGHT
BASOPHILS # BLD AUTO: 0.08 K/UL (ref 0–0.2)
BASOPHILS NFR BLD: 0.5 % (ref 0–1.9)
DIFFERENTIAL METHOD: ABNORMAL
EOSINOPHIL # BLD AUTO: 0 K/UL (ref 0–0.5)
EOSINOPHIL NFR BLD: 0.1 % (ref 0–8)
ERYTHROCYTE [DISTWIDTH] IN BLOOD BY AUTOMATED COUNT: 25.3 % (ref 11.5–14.5)
HCT VFR BLD AUTO: 38.9 % (ref 37–48.5)
HGB BLD-MCNC: 12.2 G/DL (ref 12–16)
IMM GRANULOCYTES # BLD AUTO: 0.39 K/UL (ref 0–0.04)
IMM GRANULOCYTES NFR BLD AUTO: 2.4 % (ref 0–0.5)
LYMPHOCYTES # BLD AUTO: 1.6 K/UL (ref 1–4.8)
LYMPHOCYTES NFR BLD: 9.9 % (ref 18–48)
MCH RBC QN AUTO: 27.5 PG (ref 27–31)
MCHC RBC AUTO-ENTMCNC: 31.4 G/DL (ref 32–36)
MCV RBC AUTO: 88 FL (ref 82–98)
MONOCYTES # BLD AUTO: 0.5 K/UL (ref 0.3–1)
MONOCYTES NFR BLD: 3.2 % (ref 4–15)
NEUTROPHILS # BLD AUTO: 13.4 K/UL (ref 1.8–7.7)
NEUTROPHILS NFR BLD: 83.9 % (ref 38–73)
NRBC BLD-RTO: 0 /100 WBC
PLATELET # BLD AUTO: 346 K/UL (ref 150–450)
PLATELET BLD QL SMEAR: ABNORMAL
PMV BLD AUTO: 9.3 FL (ref 9.2–12.9)
POIKILOCYTOSIS BLD QL SMEAR: SLIGHT
RBC # BLD AUTO: 4.44 M/UL (ref 4–5.4)
TARGETS BLD QL SMEAR: ABNORMAL
WBC # BLD AUTO: 16 K/UL (ref 3.9–12.7)

## 2023-02-13 PROCEDURE — 3008F BODY MASS INDEX DOCD: CPT | Mod: CPTII,S$GLB,, | Performed by: INTERNAL MEDICINE

## 2023-02-13 PROCEDURE — 1160F PR REVIEW ALL MEDS BY PRESCRIBER/CLIN PHARMACIST DOCUMENTED: ICD-10-PCS | Mod: CPTII,S$GLB,, | Performed by: INTERNAL MEDICINE

## 2023-02-13 PROCEDURE — 3074F PR MOST RECENT SYSTOLIC BLOOD PRESSURE < 130 MM HG: ICD-10-PCS | Mod: CPTII,S$GLB,, | Performed by: INTERNAL MEDICINE

## 2023-02-13 PROCEDURE — 36415 COLL VENOUS BLD VENIPUNCTURE: CPT | Performed by: INTERNAL MEDICINE

## 2023-02-13 PROCEDURE — 99999 PR PBB SHADOW E&M-EST. PATIENT-LVL III: ICD-10-PCS | Mod: PBBFAC,,, | Performed by: INTERNAL MEDICINE

## 2023-02-13 PROCEDURE — 3074F SYST BP LT 130 MM HG: CPT | Mod: CPTII,S$GLB,, | Performed by: INTERNAL MEDICINE

## 2023-02-13 PROCEDURE — 3079F PR MOST RECENT DIASTOLIC BLOOD PRESSURE 80-89 MM HG: ICD-10-PCS | Mod: CPTII,S$GLB,, | Performed by: INTERNAL MEDICINE

## 2023-02-13 PROCEDURE — 3079F DIAST BP 80-89 MM HG: CPT | Mod: CPTII,S$GLB,, | Performed by: INTERNAL MEDICINE

## 2023-02-13 PROCEDURE — 1159F PR MEDICATION LIST DOCUMENTED IN MEDICAL RECORD: ICD-10-PCS | Mod: CPTII,S$GLB,, | Performed by: INTERNAL MEDICINE

## 2023-02-13 PROCEDURE — 99215 PR OFFICE/OUTPT VISIT, EST, LEVL V, 40-54 MIN: ICD-10-PCS | Mod: S$GLB,,, | Performed by: INTERNAL MEDICINE

## 2023-02-13 PROCEDURE — 1159F MED LIST DOCD IN RCRD: CPT | Mod: CPTII,S$GLB,, | Performed by: INTERNAL MEDICINE

## 2023-02-13 PROCEDURE — 99999 PR PBB SHADOW E&M-EST. PATIENT-LVL III: CPT | Mod: PBBFAC,,, | Performed by: INTERNAL MEDICINE

## 2023-02-13 PROCEDURE — 1160F RVW MEDS BY RX/DR IN RCRD: CPT | Mod: CPTII,S$GLB,, | Performed by: INTERNAL MEDICINE

## 2023-02-13 PROCEDURE — 85025 COMPLETE CBC W/AUTO DIFF WBC: CPT | Performed by: INTERNAL MEDICINE

## 2023-02-13 PROCEDURE — 99215 OFFICE O/P EST HI 40 MIN: CPT | Mod: S$GLB,,, | Performed by: INTERNAL MEDICINE

## 2023-02-13 PROCEDURE — 3008F PR BODY MASS INDEX (BMI) DOCUMENTED: ICD-10-PCS | Mod: CPTII,S$GLB,, | Performed by: INTERNAL MEDICINE

## 2023-02-13 NOTE — PROGRESS NOTES
Subjective:      DATE OF VISIT: 2/13/23     ?  Patient ID:?Tammy Saxena is a 52 y.o. female.?? MR#: [id number]     ? Primary Care Providers:  Maria Teresa Aparicio MD, MD (General)     CHIEF COMPLAINT: ?Iron deficiency anemia??   ?   HPI    Follows up today after completion 2 doses IV iron therapy Feraheme tolerated well.  Improvement in symptomatic anemia/fatigue.  She denies any evidence of abnormal bleeding.  Of note she is postmenopausal, see below.  She has had upper and lower endoscopy unrevealing for active bleed pending capsule endoscopy planned March 2023.  She is following with Pulmonary due to pulmonary nodules status post biopsy pathology demonstrating granulomatous inflammation without malignancy.    Review of Systems    ?   A comprehensive 14-point review of systems was reviewed with patient and was negative other than as specified above.   ?   PAST MEDICAL HISTORY:   Past Medical History:   Diagnosis Date    RA (rheumatoid arthritis)     ?     PAST SURGICAL HISTORY:   Past Surgical History:   Procedure Laterality Date    COLONOSCOPY N/A 2/1/2023    Procedure: COLONOSCOPY;  Surgeon: Ángel Valdes MD;  Location: [facility];  Service: Gastroenterology;  Laterality: N/A;    ESOPHAGOGASTRODUODENOSCOPY N/A 2/1/2023    Procedure: EGD (ESOPHAGOGASTRODUODENOSCOPY);  Surgeon: Ángel Valdes MD;  Location: [facility];  Service: Gastroenterology;  Laterality: N/A;    Tubal Lig      TUBAL LIGATION        ?   ALLERGIES:   Allergies as of 02/13/2023    (No Known Allergies)      ?   MEDICATIONS:?   Outpatient Medications Marked as Taking for the 2/13/23 encounter (Office Visit) with Eve Arevalo MD   Medication Sig Dispense Refill    adalimumab (HUMIRA,CF, PEN) 40 mg/0.4 mL PnKt Inject 0.4 mLs (40 mg total) into the skin every 14 (fourteen) days. 2 pen 11    albuterol (PROVENTIL) 2.5 mg /3 mL (0.083 %) nebulizer solution Take 3 mLs (2.5 mg total) by nebulization every 4 to 6 hours as  needed for Wheezing or Shortness of Breath. 360 mL 11    albuterol (PROVENTIL/VENTOLIN HFA) 90 mcg/actuation inhaler Inhale 2 puffs into the lungs every 4 (four) hours as needed for Wheezing or Shortness of Breath. 18 g 11    albuterol-ipratropium (DUO-NEB) 2.5 mg-0.5 mg/3 mL nebulizer solution SMARTSI Ampule(s) Via Nebulizer Every 6 Hours PRN      diphenhydrAMINE-acetaminophen (TYLENOL PM)  mg Tab Take 1 tablet by mouth nightly as needed.      predniSONE (DELTASONE) 20 MG tablet Take 1 tablet (20 mg total) by mouth once daily. 30 tablet 5      ?   SOCIAL HISTORY:?   Social History     Tobacco Use    Smoking status: Former     Packs/day: 1.00     Years: 32.00     Pack years: 32.00     Types: Vaping with nicotine, Cigarettes     Start date:      Quit date:      Years since quitting: 3.1    Smokeless tobacco: Never   Substance Use Topics    Alcohol use: Not Currently      ?     ?   FAMILY HISTORY:   family history includes Breast cancer in her sister and another family member; Cancer in her sister; Diabetes in her brother and sister.   ?        Objective:      Physical Exam      ?   Vitals:    23 1403   BP: 129/80   Pulse: 101   Temp: 97.5 °F (36.4 °C)      ?   ECOG:?0   General appearance: Generally well appearing, in no acute distress, pallor.   Head, eyes, ears, nose, and throat: moist mucous membranes.   Respiratory:  Normal work of breathing  Abdomen: nontender, nondistended.   Extremities: Warm, without edema.   Neurologic: Alert and oriented. Grossly normal strength, coordination, and gait.   Skin: No rashes, ecchymoses or petechial lesion.   Psychiatric:  Normal mood and affect.    ?   Laboratory:  ?   Lab Visit on 2023   Component Date Value Ref Range Status    WBC 2023 16.00 (H)  3.90 - 12.70 K/uL Final    RBC 2023 4.44  4.00 - 5.40 M/uL Final    Hemoglobin 2023 12.2  12.0 - 16.0 g/dL Final    Hematocrit 2023 38.9  37.0 - 48.5 % Final    MCV  02/13/2023 88  82 - 98 fL Final    MCH 02/13/2023 27.5  27.0 - 31.0 pg Final    MCHC 02/13/2023 31.4 (L)  32.0 - 36.0 g/dL Final    RDW 02/13/2023 25.3 (H)  11.5 - 14.5 % Final    Platelets 02/13/2023 346  150 - 450 K/uL Final    MPV 02/13/2023 9.3  9.2 - 12.9 fL Final    Immature Granulocytes 02/13/2023 2.4 (H)  0.0 - 0.5 % Final    Gran # (ANC) 02/13/2023 13.4 (H)  1.8 - 7.7 K/uL Final    Immature Grans (Abs) 02/13/2023 0.39 (H)  0.00 - 0.04 K/uL Final    Lymph # 02/13/2023 1.6  1.0 - 4.8 K/uL Final    Mono # 02/13/2023 0.5  0.3 - 1.0 K/uL Final    Eos # 02/13/2023 0.0  0.0 - 0.5 K/uL Final    Baso # 02/13/2023 0.08  0.00 - 0.20 K/uL Final    nRBC 02/13/2023 0  0 /100 WBC Final    Gran % 02/13/2023 83.9 (H)  38.0 - 73.0 % Final    Lymph % 02/13/2023 9.9 (L)  18.0 - 48.0 % Final    Mono % 02/13/2023 3.2 (L)  4.0 - 15.0 % Final    Eosinophil % 02/13/2023 0.1  0.0 - 8.0 % Final    Basophil % 02/13/2023 0.5  0.0 - 1.9 % Final    Platelet Estimate 02/13/2023 Appears normal   Final    Aniso 02/13/2023 Slight   Final    Poik 02/13/2023 Slight   Final    Target Cells 02/13/2023 Occasional   Final    Differential Method 02/13/2023 Automated   Final      Lab Results   Component Value Date    IRON 10 (L) 12/15/2022    TRANSFERRIN 330 12/15/2022    TIBC 488 (H) 12/15/2022    FESATURATED 2 (L) 12/15/2022        Results for orders placed or performed during the hospital encounter of 01/11/23 (from the past 2160 hour(s))   CT Biopsy Lung w/ guidance    Impression    Successful CT-guided  left lower lobe lung biopsy.    All CT scans at this facility use dose modulation, iterative reconstruction, and/or weight based dosing when appropriate to reduce radiation dose to as low as reasonable achievable.      Electronically signed by: Tio Dockery MD  Date:    01/11/2023  Time:    11:28   Results for orders placed or performed during the hospital encounter of 11/30/22 (from the past 2160 hour(s))   CT Chest With  Contrast    Impression    Multiple bilateral pulmonary nodules, 1 of which is cavitary.  Findings suggest metastatic disease.    The CT exam was performed using one or more of the following dose reduction techniques- Automated exposure control, adjustment of the mA and/or kV according to patient size, and/or use of iterative reconstructed technique.      Electronically signed by: Willie Pop  Date:    11/30/2022  Time:    10:13     Results for orders placed or performed during the hospital encounter of 12/14/22 (from the past 2160 hour(s))   NM PET CT Routine Skull to Mid Thigh    Impression    1. New cluster of nodules surrounded by ground-glass opacity within the inferior right upper lobe suspicious for an infectious or inflammatory etiology. The cavitary lesion within the right lower lobe is slightly larger and more thin walled as well suggesting that this is infectious or inflammatory in nature.  Low level uptake associated with the solid nodules and scarring changes within both lung apices including the larger nodule within the left lower lobe.  Given the above findings all of these changes could be related to an infectious or inflammatory etiology.  Clinical correlation and follow-up is recommended.  No other sites of suspicious uptake identified within the abdomen or pelvis.      Electronically signed by: Ventura Bolanos DO  Date:    12/14/2022  Time:    11:14         ?   Assessment/Plan:   Iron deficiency anemia, unspecified iron deficiency anemia type  -     CBC Auto Differential; Future; Expected date: 05/13/2023  -     Ferritin; Future; Expected date: 05/13/2023  -     Iron and TIBC; Future; Expected date: 05/13/2023    Symptomatic anemia    Pulmonary nodules         1. Iron deficiency anemia, unspecified iron deficiency anemia type    2. Symptomatic anemia    3. Pulmonary nodules              Plan:     # iron deficiency anemia:  Iron deficiency anemia with hemoglobin to 5.6 12/14/22, 2% saturation,  ferritin 2.  Appropriate improvement to hemoglobin 8 status post 2 units PRBC transfusion.  Prior labs in 2020 within normal limits.  She has been postmenopausal during trying the interval of iron deficiency anemia development.  She is never had screening colonoscopy.  No blood thinner use.  EGD and colonoscopy 02/01/2023 showing a recent risk of gastropathy with benign pathology.  Plan for capsule endoscopy March 2023.  Of note PET scan December 2022 follow-up for pulmonary nodules with biopsy January 2023 noting granulomatous inflammation with follow-up CT chest with pulmonary.    Repeat labs with normalization hemoglobin over 12 from prior 8.7 excellent response to IV iron therapy.  Recommend close monitoring with repeat labs in 3 months follow-up with GI and Pulmonary and Rheumatology.        Follow-Up:   Route Chart for Scheduling    Med Onc Chart Routing      Follow up with physician 3 months. virtual ok   Follow up with [facility]    Infusion scheduling note    Injection scheduling note    Labs CBC, ferritin and iron and TIBC   Lab interval:  couple days prior to RV   Imaging    Pharmacy appointment    Other referrals          Therapy Plan Information  Flushes  heparin, porcine (PF) 100 unit/mL injection flush 500 Units  500 Units, Intravenous, PRN  sodium chloride 0.9% flush 10 mL  10 mL, Intravenous, Every visit    There are no Patient Instructions on file for this visit.

## 2023-02-15 ENCOUNTER — PATIENT MESSAGE (OUTPATIENT)
Dept: RHEUMATOLOGY | Facility: CLINIC | Age: 53
End: 2023-02-15
Payer: COMMERCIAL

## 2023-02-15 ENCOUNTER — PATIENT MESSAGE (OUTPATIENT)
Dept: INTERNAL MEDICINE | Facility: CLINIC | Age: 53
End: 2023-02-15

## 2023-02-15 ENCOUNTER — OFFICE VISIT (OUTPATIENT)
Dept: INTERNAL MEDICINE | Facility: CLINIC | Age: 53
End: 2023-02-15
Payer: COMMERCIAL

## 2023-02-15 VITALS
BODY MASS INDEX: 24.26 KG/M2 | DIASTOLIC BLOOD PRESSURE: 78 MMHG | RESPIRATION RATE: 19 BRPM | SYSTOLIC BLOOD PRESSURE: 126 MMHG | HEIGHT: 67 IN | TEMPERATURE: 97 F | HEART RATE: 105 BPM | OXYGEN SATURATION: 100 % | WEIGHT: 154.56 LBS

## 2023-02-15 DIAGNOSIS — D86.2 SARCOIDOSIS OF LUNG WITH SARCOIDOSIS OF LYMPH NODES: ICD-10-CM

## 2023-02-15 DIAGNOSIS — D50.9 IRON DEFICIENCY ANEMIA, UNSPECIFIED IRON DEFICIENCY ANEMIA TYPE: ICD-10-CM

## 2023-02-15 DIAGNOSIS — R91.8 PULMONARY NODULES: ICD-10-CM

## 2023-02-15 DIAGNOSIS — M05.79 RHEUMATOID ARTHRITIS INVOLVING MULTIPLE SITES WITH POSITIVE RHEUMATOID FACTOR: Primary | ICD-10-CM

## 2023-02-15 PROCEDURE — 3074F PR MOST RECENT SYSTOLIC BLOOD PRESSURE < 130 MM HG: ICD-10-PCS | Mod: CPTII,S$GLB,, | Performed by: FAMILY MEDICINE

## 2023-02-15 PROCEDURE — 1159F PR MEDICATION LIST DOCUMENTED IN MEDICAL RECORD: ICD-10-PCS | Mod: CPTII,S$GLB,, | Performed by: FAMILY MEDICINE

## 2023-02-15 PROCEDURE — 99999 PR PBB SHADOW E&M-EST. PATIENT-LVL III: ICD-10-PCS | Mod: PBBFAC,,, | Performed by: FAMILY MEDICINE

## 2023-02-15 PROCEDURE — 3008F BODY MASS INDEX DOCD: CPT | Mod: CPTII,S$GLB,, | Performed by: FAMILY MEDICINE

## 2023-02-15 PROCEDURE — 99999 PR PBB SHADOW E&M-EST. PATIENT-LVL III: CPT | Mod: PBBFAC,,, | Performed by: FAMILY MEDICINE

## 2023-02-15 PROCEDURE — 3074F SYST BP LT 130 MM HG: CPT | Mod: CPTII,S$GLB,, | Performed by: FAMILY MEDICINE

## 2023-02-15 PROCEDURE — 3008F PR BODY MASS INDEX (BMI) DOCUMENTED: ICD-10-PCS | Mod: CPTII,S$GLB,, | Performed by: FAMILY MEDICINE

## 2023-02-15 PROCEDURE — 3078F PR MOST RECENT DIASTOLIC BLOOD PRESSURE < 80 MM HG: ICD-10-PCS | Mod: CPTII,S$GLB,, | Performed by: FAMILY MEDICINE

## 2023-02-15 PROCEDURE — 1159F MED LIST DOCD IN RCRD: CPT | Mod: CPTII,S$GLB,, | Performed by: FAMILY MEDICINE

## 2023-02-15 PROCEDURE — 99214 PR OFFICE/OUTPT VISIT, EST, LEVL IV, 30-39 MIN: ICD-10-PCS | Mod: S$GLB,,, | Performed by: FAMILY MEDICINE

## 2023-02-15 PROCEDURE — 99214 OFFICE O/P EST MOD 30 MIN: CPT | Mod: S$GLB,,, | Performed by: FAMILY MEDICINE

## 2023-02-15 PROCEDURE — 3078F DIAST BP <80 MM HG: CPT | Mod: CPTII,S$GLB,, | Performed by: FAMILY MEDICINE

## 2023-02-15 NOTE — PROGRESS NOTES
Tammy Saxena  02/15/2023  [id number]    Maria Teresa Aparicio MD  Patient Care Team:  Maria Teresa Aparicio MD as PCP - General (Family Medicine)          Visit Type:a scheduled routine follow-up visit    Chief Complaint:  Chief Complaint   Patient presents with    work release.        History of Present Illness:  52 year old  Presented in Nov with Shortness of breath  Found with anemia  Had abnl chest xray. Had biopsy. New Dx Sarcoidosis. History of RA    FOllowing with Pulm, RHeum, Heme and GI.  Had COlon and EGD. No sign of bleeding.  WIll do capsular study.  Lab Results   Component Value Date    WBC 16.00 (H) 02/13/2023    HGB 12.2 02/13/2023    HCT 38.9 02/13/2023    MCV 88 02/13/2023     02/13/2023 11/30/2022 CT chest MA multiple bilateral pulmonary nodules, 1 of which is cavitary in the right lower lobe.  The right lower lobe pulmonary nodule which is cavitary it measures 1.2 x 1.1 cm trans axially (series 5, image 312) the largest nodule is in the left lower lobe and involves the pleura and measures 1.7 by 1.4 cm (series 5, image 295).  No mediastinal or hilar lymphadenopathy.  No axillary lymphadenopathy.  No acute findings in the upper abdomen.  PET CT chest reviewed - see bel  CT guided lung bx   Back in November 2022 she was seen for worsening shortness of breath.  Found to have severe anemia with hemoglobin around 5, status post transfusion and underwent multiple workups to diagnose underlying etiology of her anemia.  Workup showed multiple nodules in her chest which initially was consider to be malignancy.  She underwent evaluation by pulmonologist.  Biopsy showed evidence of sarcoidosis.  PET scan showed no evidence of malignancy  Rheum started Humira  She needs return to work    Shortness of breath is better.    She is having some heartburn, reflux more with laying down.          History:  Past Medical History:   Diagnosis Date    RA (rheumatoid arthritis)      Past Surgical History:    Procedure Laterality Date    COLONOSCOPY N/A 2/1/2023    Procedure: COLONOSCOPY;  Surgeon: Ángel Valdes MD;  Location: [facility];  Service: Gastroenterology;  Laterality: N/A;    ESOPHAGOGASTRODUODENOSCOPY N/A 2/1/2023    Procedure: EGD (ESOPHAGOGASTRODUODENOSCOPY);  Surgeon: Ángel Valdes MD;  Location: [facility];  Service: Gastroenterology;  Laterality: N/A;    Tubal Lig      TUBAL LIGATION       Family History   Problem Relation Age of Onset    Diabetes Sister     Cancer Sister     Breast cancer Sister     Diabetes Brother     Breast cancer Other      Social History     Socioeconomic History    Marital status: Single   Occupational History    Occupation: [employer], supervisor   Tobacco Use    Smoking status: Former     Packs/day: 1.00     Years: 32.00     Pack years: 32.00     Types: Vaping with nicotine, Cigarettes     Start date: 1988     Quit date: 2020     Years since quitting: 3.1    Smokeless tobacco: Never   Substance and Sexual Activity    Alcohol use: Not Currently    Drug use: Not Currently     Types: Marijuana    Sexual activity: Not Currently     Social Determinants of Health     Financial Resource Strain: Low Risk     Difficulty of Paying Living Expenses: Not very hard   Food Insecurity: No Food Insecurity    Worried About Running Out of Food in the Last Year: Never true    Ran Out of Food in the Last Year: Never true   Transportation Needs: No Transportation Needs    Lack of Transportation (Medical): No    Lack of Transportation (Non-Medical): No   Physical Activity: Inactive    Days of Exercise per Week: 5 days    Minutes of Exercise per Session: 0 min   Stress: Stress Concern Present    Feeling of Stress : To some extent   Social Connections: Unknown    Frequency of Communication with Friends and Family: More than three times a week    Frequency of Social Gatherings with Friends and Family: More than three times a week    Active Member of Clubs or Organizations: No    Attends Club or  Organization Meetings: Never    Marital Status:    Housing Stability: Low Risk     Unable to Pay for Housing in the Last Year: No    Number of Places Lived in the Last Year: 1    Unstable Housing in the Last Year: No     Patient Active Problem List   Diagnosis    Rheumatoid arthritis involving multiple sites with positive rheumatoid factor    Nicotine dependence, cigarettes, in remission    High risk medication use    Muscle pain    Positive skin test for tuberculosis    Pulmonary nodules    SOB (shortness of breath)    Iron deficiency anemia    Iron deficiency anemia due to chronic blood loss    Sarcoidosis of lung with sarcoidosis of lymph nodes    Encounter for medication monitoring     Review of patient's allergies indicates:  No Known Allergies    The following were reviewed at this visit: active problem list, medication list, allergies, family history, social history, and health maintenance.    Medications:  Current Outpatient Medications on File Prior to Visit   Medication Sig Dispense Refill    albuterol (PROVENTIL) 2.5 mg /3 mL (0.083 %) nebulizer solution Take 3 mLs (2.5 mg total) by nebulization every 4 to 6 hours as needed for Wheezing or Shortness of Breath. 360 mL 11    albuterol (PROVENTIL/VENTOLIN HFA) 90 mcg/actuation inhaler Inhale 2 puffs into the lungs every 4 (four) hours as needed for Wheezing or Shortness of Breath. 18 g 11    albuterol-ipratropium (DUO-NEB) 2.5 mg-0.5 mg/3 mL nebulizer solution SMARTSI Ampule(s) Via Nebulizer Every 6 Hours PRN      diphenhydrAMINE-acetaminophen (TYLENOL PM)  mg Tab Take 1 tablet by mouth nightly as needed.      predniSONE (DELTASONE) 20 MG tablet Take 1 tablet (20 mg total) by mouth once daily. 30 tablet 5    adalimumab (HUMIRA,CF, PEN) 40 mg/0.4 mL PnKt Inject 0.4 mLs (40 mg total) into the skin every 14 (fourteen) days. (Patient not taking: Reported on 2/15/2023) 2 pen 11     No current facility-administered medications on file prior to  visit.       Medications have been reviewed and reconciled with patient at this visit.  Barriers to medications reviewed with patient.    Adverse reactions to current medications reviewed with patient..    Over the counter medications reviewed and reconciled with patient.    Exam:  Wt Readings from Last 3 Encounters:   02/15/23 70.1 kg (154 lb 8.7 oz)   02/13/23 70.3 kg (154 lb 15.7 oz)   02/02/23 69.5 kg (153 lb 3.5 oz)     Temp Readings from Last 3 Encounters:   02/15/23 97.3 °F (36.3 °C) (Temporal)   02/13/23 97.5 °F (36.4 °C) (Temporal)   02/01/23 97.5 °F (36.4 °C) (Temporal)     BP Readings from Last 3 Encounters:   02/15/23 126/78   02/13/23 129/80   02/02/23 128/83     Pulse Readings from Last 3 Encounters:   02/15/23 105   02/13/23 101   02/02/23 92     Body mass index is 24.2 kg/m².      Review of Systems   Constitutional:  Positive for malaise/fatigue.   Respiratory:  Positive for shortness of breath.    Physical Exam  Nursing note reviewed.   Cardiovascular:      Rate and Rhythm: Normal rate and regular rhythm.   Pulmonary:      Effort: Pulmonary effort is normal. No respiratory distress.   Neurological:      Mental Status: She is alert and oriented to person, place, and time.   Psychiatric:         Mood and Affect: Mood normal.         Behavior: Behavior normal.         Thought Content: Thought content normal.         Judgment: Judgment normal.       Laboratory Reviewed ({Yes)  Lab Results   Component Value Date    WBC 16.00 (H) 02/13/2023    HGB 12.2 02/13/2023    HCT 38.9 02/13/2023     02/13/2023    CHOL 134 04/08/2019    TRIG 82 04/08/2019    HDL 45 04/08/2019    ALT 10 12/14/2022    AST 11 12/14/2022     12/15/2022    K 3.9 12/15/2022     12/15/2022    CREATININE 0.7 12/15/2022    BUN 12 12/15/2022    CO2 22 (L) 12/15/2022    INR 0.9 01/11/2023       Tammy was seen today for work release. .    Diagnoses and all orders for this visit:    Rheumatoid arthritis involving multiple sites  with positive rheumatoid factor  rheum  Pulmonary Nodules  Repeat scan with Pulm  Biopsy negative  Iron deficiency anemia, unspecified iron deficiency anemia type  -     CBC Auto Differential; Future    Sarcoidosis of lung with sarcoidosis of lymph nodes  Rheum and Pulm following    Take PPI for reflux    Repeat CBC in 4 weeks  Unclear why elevated WBC.  ANemia resolved    Can return to work        Care Plan/Goals: Reviewed    Goals    None         Follow up: No follow-ups on file.    After visit summary was printed and given to patient upon discharge today.  Patient goals and care plan are included in After Visit Summary.

## 2023-02-15 NOTE — TELEPHONE ENCOUNTER
Patient is on Prednisone 20 mg daily prescribed by Dr Cassidy, please advise if she should start Humira and begin to taper prednisone

## 2023-02-17 ENCOUNTER — PATIENT MESSAGE (OUTPATIENT)
Dept: HEMATOLOGY/ONCOLOGY | Facility: CLINIC | Age: 53
End: 2023-02-17
Payer: COMMERCIAL

## 2023-02-28 ENCOUNTER — PATIENT MESSAGE (OUTPATIENT)
Dept: INTERNAL MEDICINE | Facility: CLINIC | Age: 53
End: 2023-02-28
Payer: COMMERCIAL

## 2023-03-01 ENCOUNTER — PATIENT MESSAGE (OUTPATIENT)
Dept: INTERNAL MEDICINE | Facility: CLINIC | Age: 53
End: 2023-03-01

## 2023-03-01 ENCOUNTER — PATIENT MESSAGE (OUTPATIENT)
Dept: INTERNAL MEDICINE | Facility: CLINIC | Age: 53
End: 2023-03-01
Payer: COMMERCIAL

## 2023-03-01 ENCOUNTER — OFFICE VISIT (OUTPATIENT)
Dept: INTERNAL MEDICINE | Facility: CLINIC | Age: 53
End: 2023-03-01
Payer: COMMERCIAL

## 2023-03-01 ENCOUNTER — HOSPITAL ENCOUNTER (OUTPATIENT)
Facility: HOSPITAL | Age: 53
Discharge: HOME | End: 2023-03-01
Attending: INTERNAL MEDICINE | Admitting: INTERNAL MEDICINE
Payer: COMMERCIAL

## 2023-03-01 VITALS
OXYGEN SATURATION: 98 % | WEIGHT: 153.69 LBS | SYSTOLIC BLOOD PRESSURE: 116 MMHG | TEMPERATURE: 99 F | HEART RATE: 102 BPM | BODY MASS INDEX: 24.07 KG/M2 | DIASTOLIC BLOOD PRESSURE: 72 MMHG | RESPIRATION RATE: 18 BRPM

## 2023-03-01 DIAGNOSIS — L60.1 ONYCHOLYSIS: Primary | ICD-10-CM

## 2023-03-01 DIAGNOSIS — M05.79 RHEUMATOID ARTHRITIS INVOLVING MULTIPLE SITES WITH POSITIVE RHEUMATOID FACTOR: ICD-10-CM

## 2023-03-01 DIAGNOSIS — Z79.899 IMMUNOCOMPROMISED STATE DUE TO DRUG THERAPY: ICD-10-CM

## 2023-03-01 DIAGNOSIS — D50.9 IRON DEFICIENCY ANEMIA, UNSPECIFIED IRON DEFICIENCY ANEMIA TYPE: ICD-10-CM

## 2023-03-01 DIAGNOSIS — D84.821 IMMUNOCOMPROMISED STATE DUE TO DRUG THERAPY: ICD-10-CM

## 2023-03-01 PROCEDURE — 1160F PR REVIEW ALL MEDS BY PRESCRIBER/CLIN PHARMACIST DOCUMENTED: ICD-10-PCS | Mod: CPTII,S$GLB,,

## 2023-03-01 PROCEDURE — 1159F PR MEDICATION LIST DOCUMENTED IN MEDICAL RECORD: ICD-10-PCS | Mod: CPTII,S$GLB,,

## 2023-03-01 PROCEDURE — 3078F PR MOST RECENT DIASTOLIC BLOOD PRESSURE < 80 MM HG: ICD-10-PCS | Mod: CPTII,S$GLB,,

## 2023-03-01 PROCEDURE — 1159F MED LIST DOCD IN RCRD: CPT | Mod: CPTII,S$GLB,,

## 2023-03-01 PROCEDURE — 99999 PR PBB SHADOW E&M-EST. PATIENT-LVL IV: CPT | Mod: PBBFAC,,,

## 2023-03-01 PROCEDURE — 99214 PR OFFICE/OUTPT VISIT, EST, LEVL IV, 30-39 MIN: ICD-10-PCS | Mod: S$GLB,,,

## 2023-03-01 PROCEDURE — 3074F SYST BP LT 130 MM HG: CPT | Mod: CPTII,S$GLB,,

## 2023-03-01 PROCEDURE — 99214 OFFICE O/P EST MOD 30 MIN: CPT | Mod: S$GLB,,,

## 2023-03-01 PROCEDURE — 91110 GI TRC IMG INTRAL ESOPH-ILE: CPT | Mod: TC | Performed by: INTERNAL MEDICINE

## 2023-03-01 PROCEDURE — 1160F RVW MEDS BY RX/DR IN RCRD: CPT | Mod: CPTII,S$GLB,,

## 2023-03-01 PROCEDURE — 3008F BODY MASS INDEX DOCD: CPT | Mod: CPTII,S$GLB,,

## 2023-03-01 PROCEDURE — 3074F PR MOST RECENT SYSTOLIC BLOOD PRESSURE < 130 MM HG: ICD-10-PCS | Mod: CPTII,S$GLB,,

## 2023-03-01 PROCEDURE — 3008F PR BODY MASS INDEX (BMI) DOCUMENTED: ICD-10-PCS | Mod: CPTII,S$GLB,,

## 2023-03-01 PROCEDURE — 3078F DIAST BP <80 MM HG: CPT | Mod: CPTII,S$GLB,,

## 2023-03-01 PROCEDURE — 99999 PR PBB SHADOW E&M-EST. PATIENT-LVL IV: ICD-10-PCS | Mod: PBBFAC,,,

## 2023-03-01 NOTE — PROGRESS NOTES
Tammy Saxena  03/01/2023  [id number]    Maria Teresa Aparicio MD  Patient Care Team:  Maria Teresa Aparicio MD as PCP - General (Family Medicine)          Visit Type:an urgent visit for a new problem    Chief Complaint:  Chief Complaint   Patient presents with    Toe Injury     Patient advised that she woke up and found that her toe was swollen, nail coming off. She denies any trauma to the toe. Happened overnight.        History of Present Illness:    Has RA  Rheum started Humira    JACKELYN  Denies s/s of bleeding  SOB has improved     History:  Past Medical History:   Diagnosis Date    RA (rheumatoid arthritis)      Past Surgical History:   Procedure Laterality Date    COLONOSCOPY N/A 02/01/2023    Procedure: COLONOSCOPY;  Surgeon: Ángel Valdes MD;  Location: [facility];  Service: Gastroenterology;  Laterality: N/A;    ESOPHAGOGASTRODUODENOSCOPY N/A 02/01/2023    Procedure: EGD (ESOPHAGOGASTRODUODENOSCOPY);  Surgeon: Ángel Valdes MD;  Location: [facility];  Service: Gastroenterology;  Laterality: N/A;    Tubal Lig      TUBAL LIGATION       Family History   Problem Relation Age of Onset    Diabetes Sister     Cancer Sister     Breast cancer Sister     Asthma Sister     Diabetes Brother     Breast cancer Other     Alcohol abuse Mother     Asthma Mother     COPD Mother     Depression Mother     Heart disease Mother      Social History     Socioeconomic History    Marital status: Single   Occupational History    Occupation: [employer], supervisor   Tobacco Use    Smoking status: Some Days     Types: Vaping with nicotine     Start date: 1/1/2020     Passive exposure: Past    Smokeless tobacco: Never   Substance and Sexual Activity    Alcohol use: Not Currently     Comment: I drank when i was younger but can no longer take the smell    Drug use: Never     Types: Marijuana    Sexual activity: Not Currently     Partners: Male     Birth control/protection: See Surgical Hx, None     Social Determinants of Health      Financial Resource Strain: Low Risk     Difficulty of Paying Living Expenses: Not very hard   Food Insecurity: No Food Insecurity    Worried About Running Out of Food in the Last Year: Never true    Ran Out of Food in the Last Year: Never true   Transportation Needs: No Transportation Needs    Lack of Transportation (Medical): No    Lack of Transportation (Non-Medical): No   Physical Activity: Inactive    Days of Exercise per Week: 5 days    Minutes of Exercise per Session: 0 min   Stress: Stress Concern Present    Feeling of Stress : To some extent   Social Connections: Unknown    Frequency of Communication with Friends and Family: More than three times a week    Frequency of Social Gatherings with Friends and Family: More than three times a week    Active Member of Clubs or Organizations: No    Attends Club or Organization Meetings: Never    Marital Status:    Housing Stability: Low Risk     Unable to Pay for Housing in the Last Year: No    Number of Places Lived in the Last Year: 1    Unstable Housing in the Last Year: No     Patient Active Problem List   Diagnosis    Rheumatoid arthritis involving multiple sites with positive rheumatoid factor    Nicotine dependence, cigarettes, in remission    High risk medication use    Muscle pain    Positive skin test for tuberculosis    Pulmonary nodules    SOB (shortness of breath)    Iron deficiency anemia    Iron deficiency anemia due to chronic blood loss    Sarcoidosis of lung with sarcoidosis of lymph nodes    Encounter for medication monitoring     Review of patient's allergies indicates:  No Known Allergies    The following were reviewed at this visit: active problem list, medication list, allergies, family history, social history, and health maintenance.    Medications:  Current Outpatient Medications on File Prior to Visit   Medication Sig Dispense Refill    adalimumab (HUMIRA,CF, PEN) 40 mg/0.4 mL PnKt Inject 0.4 mLs (40 mg total) into the skin every  14 (fourteen) days. 2 pen 11    albuterol (PROVENTIL) 2.5 mg /3 mL (0.083 %) nebulizer solution Take 3 mLs (2.5 mg total) by nebulization every 4 to 6 hours as needed for Wheezing or Shortness of Breath. 360 mL 11    albuterol (PROVENTIL/VENTOLIN HFA) 90 mcg/actuation inhaler Inhale 2 puffs into the lungs every 4 (four) hours as needed for Wheezing or Shortness of Breath. 18 g 11    albuterol-ipratropium (DUO-NEB) 2.5 mg-0.5 mg/3 mL nebulizer solution SMARTSI Ampule(s) Via Nebulizer Every 6 Hours PRN      diphenhydrAMINE-acetaminophen (TYLENOL PM)  mg Tab Take 1 tablet by mouth nightly as needed.      predniSONE (DELTASONE) 20 MG tablet Take 1 tablet (20 mg total) by mouth once daily. 30 tablet 5     No current facility-administered medications on file prior to visit.       Medications have been reviewed and reconciled with patient at this visit.  Barriers to medications reviewed with patient.    Adverse reactions to current medications reviewed with patient..    Over the counter medications reviewed and reconciled with patient.    Exam:  Wt Readings from Last 3 Encounters:   23 69.7 kg (153 lb 10.6 oz)   02/15/23 70.1 kg (154 lb 8.7 oz)   23 70.3 kg (154 lb 15.7 oz)     Temp Readings from Last 3 Encounters:   23 98.7 °F (37.1 °C)   02/15/23 97.3 °F (36.3 °C) (Temporal)   23 97.5 °F (36.4 °C) (Temporal)     BP Readings from Last 3 Encounters:   23 116/72   02/15/23 126/78   23 129/80     Pulse Readings from Last 3 Encounters:   23 102   02/15/23 105   23 101     Body mass index is 24.07 kg/m².      Review of Systems   Respiratory:  Negative for shortness of breath.    Cardiovascular:  Negative for chest pain and palpitations.   Endo/Heme/Allergies:  Does not bruise/bleed easily.   Physical Exam  Vitals and nursing note reviewed.   Constitutional:       General: She is not in acute distress.     Appearance: She is well-developed. She is not diaphoretic.   HENT:       Head: Normocephalic and atraumatic.      Right Ear: External ear normal.      Left Ear: External ear normal.   Eyes:      General:         Right eye: No discharge.         Left eye: No discharge.      Conjunctiva/sclera: Conjunctivae normal.      Pupils: Pupils are equal, round, and reactive to light.   Cardiovascular:      Rate and Rhythm: Normal rate and regular rhythm.      Pulses:           Dorsalis pedis pulses are 2+ on the right side.      Heart sounds: Normal heart sounds. No murmur heard.  Pulmonary:      Effort: Pulmonary effort is normal. No respiratory distress.      Breath sounds: Normal breath sounds. No wheezing.   Abdominal:      General: Bowel sounds are normal.   Musculoskeletal:         General: Swelling and tenderness present.   Feet:      Right foot:      Protective Sensation: 10 sites tested.  10 sites sensed.      Comments: Cap refill 2-3 second s  Skin:     Coloration: Skin is pale.   Neurological:      Mental Status: She is alert and oriented to person, place, and time.   Psychiatric:         Behavior: Behavior normal.         Thought Content: Thought content normal.         Judgment: Judgment normal.          Laboratory Reviewed ({Yes)  Lab Results   Component Value Date    WBC 16.00 (H) 02/13/2023    HGB 12.2 02/13/2023    HCT 38.9 02/13/2023     02/13/2023    CHOL 134 04/08/2019    TRIG 82 04/08/2019    HDL 45 04/08/2019    ALT 10 12/14/2022    AST 11 12/14/2022     12/15/2022    K 3.9 12/15/2022     12/15/2022    CREATININE 0.7 12/15/2022    BUN 12 12/15/2022    CO2 22 (L) 12/15/2022    INR 0.9 01/11/2023       Tammy was seen today for toe injury.    Diagnoses and all orders for this visit:    Onycholysis  -     Ambulatory referral/consult to Podiatry; Future    Rheumatoid arthritis involving multiple sites with positive rheumatoid factor  On Humira     Immunocompromised state due to drug therapy  On Humira     Iron deficiency anemia, unspecified iron deficiency  anemia type  Denies s/s of bleeding  Being followed by GI and hem     Spoke with Dr. Aparicio  Agreed with referring to podiatry    Soak feet in warm water with epsom salt           Care Plan/Goals: Reviewed    Goals    None         Follow up: No follow-ups on file.    After visit summary was printed and given to patient upon discharge today.  Patient goals and care plan are included in After Visit Summary.

## 2023-03-07 ENCOUNTER — OFFICE VISIT (OUTPATIENT)
Dept: PODIATRY | Facility: CLINIC | Age: 53
End: 2023-03-07
Payer: COMMERCIAL

## 2023-03-07 DIAGNOSIS — L60.1 ONYCHOLYSIS: Primary | ICD-10-CM

## 2023-03-07 DIAGNOSIS — M05.79 RHEUMATOID ARTHRITIS INVOLVING MULTIPLE SITES WITH POSITIVE RHEUMATOID FACTOR: ICD-10-CM

## 2023-03-07 PROCEDURE — 1160F RVW MEDS BY RX/DR IN RCRD: CPT | Mod: CPTII,S$GLB,, | Performed by: PODIATRIST

## 2023-03-07 PROCEDURE — 99203 PR OFFICE/OUTPT VISIT, NEW, LEVL III, 30-44 MIN: ICD-10-PCS | Mod: 25,S$GLB,, | Performed by: PODIATRIST

## 2023-03-07 PROCEDURE — 99999 PR PBB SHADOW E&M-EST. PATIENT-LVL II: ICD-10-PCS | Mod: PBBFAC,,, | Performed by: PODIATRIST

## 2023-03-07 PROCEDURE — 1160F PR REVIEW ALL MEDS BY PRESCRIBER/CLIN PHARMACIST DOCUMENTED: ICD-10-PCS | Mod: CPTII,S$GLB,, | Performed by: PODIATRIST

## 2023-03-07 PROCEDURE — 99203 OFFICE O/P NEW LOW 30 MIN: CPT | Mod: 25,S$GLB,, | Performed by: PODIATRIST

## 2023-03-07 PROCEDURE — 1159F PR MEDICATION LIST DOCUMENTED IN MEDICAL RECORD: ICD-10-PCS | Mod: CPTII,S$GLB,, | Performed by: PODIATRIST

## 2023-03-07 PROCEDURE — 1159F MED LIST DOCD IN RCRD: CPT | Mod: CPTII,S$GLB,, | Performed by: PODIATRIST

## 2023-03-07 PROCEDURE — 99999 PR PBB SHADOW E&M-EST. PATIENT-LVL II: CPT | Mod: PBBFAC,,, | Performed by: PODIATRIST

## 2023-03-07 NOTE — PROCEDURES
Nail Removal    Date/Time: 3/7/2023 8:15 AM  Performed by: Radhika Bynum DPM  Authorized by: Radhika Bynum DPM     Consent Done?:  Yes (Written)  Time out: Immediately prior to the procedure a time out was called    Prep: patient was prepped and draped in usual sterile fashion    Location:     Location:  Left foot  Anesthesia:     Anesthesia:  Digital block    Local anesthetic:  Lidocaine 1% without epinephrine (0.75% Marcaine plain)    Anesthetic total (ml):  3  Procedure Details:     Preparation:  Skin prepped with alcohol and skin prepped with Betadine    Amount removed:  Complete    Wedge excision of skin of nail fold: No      Nail bed sutured?: No      Nail matrix removed:  None    Removed nail replaced and anchored: No      Dressing applied:  4x4, antibiotic ointment and dressing applied    Patient tolerance:  Patient tolerated the procedure well with no immediate complications

## 2023-03-07 NOTE — PROGRESS NOTES
Subjective:       Patient ID: Tammy Aguero is a 52 y.o. female.    Chief Complaint: Nail Problem (Right hallux nail is discolored, she denies pain at present or injury/ )      HPI: Tammy Aguero presents to the office with complaints of discoloration to the right great  toe, due to history of onycholysis.  Patient reports noticing discoloration over the last few days.  Reports that she is on her feet for long periods the day.  She continues to ambulate without work.  Denies any recent trauma or injury.  Denies any acute signs of infection.  She presents to clinic today at the referral of Vilma Reynoso NP.  Patient's Primary Care Provider is Maria Teresa Aparicio MD.     Review of patient's allergies indicates:  No Known Allergies    Past Medical History:   Diagnosis Date    RA (rheumatoid arthritis)        Family History   Problem Relation Age of Onset    Diabetes Sister     Cancer Sister     Breast cancer Sister     Asthma Sister     Diabetes Brother     Breast cancer Other     Alcohol abuse Mother     Asthma Mother     COPD Mother     Depression Mother     Heart disease Mother        Social History     Socioeconomic History    Marital status: Single   Occupational History    Occupation: [employer], supervisor   Tobacco Use    Smoking status: Some Days     Types: Vaping with nicotine     Start date: 1/1/2020     Passive exposure: Past    Smokeless tobacco: Never   Substance and Sexual Activity    Alcohol use: Not Currently     Comment: I drank when i was younger but can no longer take the smell    Drug use: Never     Types: Marijuana    Sexual activity: Not Currently     Partners: Male     Birth control/protection: See Surgical Hx, None     Social Determinants of Health     Financial Resource Strain: Low Risk     Difficulty of Paying Living Expenses: Not very hard   Food Insecurity: No Food Insecurity    Worried About Running Out of Food in the Last Year: Never true    Ran Out of Food in the Last Year: Never true    Transportation Needs: No Transportation Needs    Lack of Transportation (Medical): No    Lack of Transportation (Non-Medical): No   Physical Activity: Inactive    Days of Exercise per Week: 5 days    Minutes of Exercise per Session: 0 min   Stress: Stress Concern Present    Feeling of Stress : To some extent   Social Connections: Unknown    Frequency of Communication with Friends and Family: More than three times a week    Frequency of Social Gatherings with Friends and Family: More than three times a week    Active Member of Clubs or Organizations: No    Attends Club or Organization Meetings: Never    Marital Status:    Housing Stability: Low Risk     Unable to Pay for Housing in the Last Year: No    Number of Places Lived in the Last Year: 1    Unstable Housing in the Last Year: No       Past Surgical History:   Procedure Laterality Date    COLONOSCOPY N/A 02/01/2023    Procedure: COLONOSCOPY;  Surgeon: Ángel Valdes MD;  Location: [facility];  Service: Gastroenterology;  Laterality: N/A;    ESOPHAGOGASTRODUODENOSCOPY N/A 02/01/2023    Procedure: EGD (ESOPHAGOGASTRODUODENOSCOPY);  Surgeon: Ángel Valdes MD;  Location: [facility];  Service: Gastroenterology;  Laterality: N/A;    INTRALUMINAL GASTROINTESTINAL TRACT IMAGING VIA CAPSULE N/A 3/1/2023    Procedure: IMAGING PROCEDURE, GI TRACT, INTRALUMINAL, VIA CAPSULE;  Surgeon: First Available [place];  Location: [facility];  Service: Endoscopy;  Laterality: N/A;    Tubal Lig      TUBAL LIGATION         Review of Systems      Objective:   LMP 12/01/2020 (Approximate)     Physical Exam  LOWER EXTREMITY PHYSICAL EXAMINATION    NEUROLOGY: Sensation to light touch is intact. Proprioception is intact.     VASCULAR:  The right dorsalis pedis pulse 2/4 and the right posterior tibial pulse 2/4.  The left dorsalis pedis pulse 2/4 and posterior tibial pulse on the left is 2/4.  Capillary refill is intact.  Pedal hair growth intact  DERMATOLOGY:  Partial  onycholysis of the left great toenail.  Nail continues to be attached only proximally at the cuticle.  Is elevated from the underlying nail bed proximally.  No acute signs of infection.  Discoloration underlying toenail likely associated with subungual hematoma    ORTHOPEDIC: Manual Muscle Testing is 5/5 in all planes on the left, without pains, with and without resistance. Gait pattern is slightly antalgic.    Assessment:     1. Onycholysis    2. Rheumatoid arthritis involving multiple sites with positive rheumatoid factor        Plan:     Onycholysis  -     Ambulatory referral/consult to Podiatry    Rheumatoid arthritis involving multiple sites with positive rheumatoid factor        Thorough discussion is had with the patient today, concerning the diagnosis, its etiology, and the treatment algorithm at present.    We discussed patient's options for treating the partial onycholysis of the great toenail., We discussed allowing the toenail to fall off on its own versus temporary complete avulsion.  Patient would like to proceed with formal total nail avulsion.  Discussed the risk and benefits of the procedure.  Discussed risk of recurrence.  Patient did give written consent to proceed with procedure.    Patient tolerated procedure well without complications.  The nail was removed without complications.  History patient will start soaking in warm water and Epson salt twice daily.  Apply antibiotic cream and a Band-Aid to the affected borders following soaking and showering.  Patient will call if there is any acute signs of infection associated with increased redness, swelling, abnormal drainage, increased pain.      Future Appointments   Date Time Provider Department Center   3/29/2023  8:30 AM [facility] CT1 LIMIT 500 LBS [facility] CT SCAN [place]   3/29/2023  9:00 AM [facility]   6/6/2023  3:15 PM Gama Guajardo MD [facility] RHEUM [facility]

## 2023-03-08 ENCOUNTER — PATIENT MESSAGE (OUTPATIENT)
Dept: GASTROENTEROLOGY | Facility: CLINIC | Age: 53
End: 2023-03-08

## 2023-03-08 PROCEDURE — 91110 GI TRC IMG INTRAL ESOPH-ILE: CPT | Mod: 26,,, | Performed by: INTERNAL MEDICINE

## 2023-03-08 PROCEDURE — 91110 PR GI TRACT CAPSULE ENDOSCOPY: ICD-10-PCS | Mod: 26,,, | Performed by: INTERNAL MEDICINE

## 2023-03-08 NOTE — PROVATION PATIENT INSTRUCTIONS
Discharge Summary/Instructions for after Video Capsule Endoscopy  Patient Name: Tammy Judge  Patient MRN: [id number]  Patient YOB: 1970  Wednesday, March 1, 2023  Ángel Valdes MD  1.  Do Not eat or drink anything for 1 hour.  Try sips of water first.  If   tolerated, resume your regular diet or one recommended by your physician.  2.  Do not drive, operate machinery, make critical decisions, or do   activities that require coordination or balance for 24 hours.  3.   You may experience a sore throat for 24 to 48 hours.  You may use   throat lozenges or gargle with warm salt water to relieve the discomfort.  4.  Because air was put into your stomach during the procedure, you may   experience some belching.  5.  Do not use any medication containing aspirin for 10 days.  6.  Go directly to the emergency room if you notice any of the following:   Chills and/or fever over 101 F   Persistent vomiting or vomiting with blood/nasal regurgitation   Severe abdominal pain, other than gas cramps   Severe chest pain   Black, tarry stools     Your doctor recommends these additional instructions:  - Discharge patient to home.   - Return to referring physician.  For questions, problems or results please call your physician Ángel Valdes MD at Work:  [phone]  If you have any questions about the above instructions, call the GI   department at [phone] or call the endoscopy unit at [phone]   from 7am until 3 pm.  [facility], EMERGENCY ROOM PHONE NUMBER:   [phone]  IF A COMPLICATION OR EMERGENCY SITUATION ARISES AND YOU ARE UNABLE TO REACH   YOUR PHYSICIAN - GO DIRECTLY TO THE EMERGENCY ROOM.  I have read or have had read to me these discharge instructions for my   procedure and have received a written copy.  I understand these   instructions and will follow-up with my physician if I have any questions.     __________________________________        _____________________________________  Nurse Signature                                          Patient/Designated   Responsible Party Signature  Ángel Valdes MD  3/8/2023 10:28:09 AM  This report has been verified and signed electronically.  Dear patient,  As a result of recent federal legislation (The Federal Cures Act), you may   receive lab or pathology results from your procedure in your [facility]   account before your physician is able to contact you. Your physician or   their representative will relay the results to you with their   recommendations at their soonest availability.  Thank you,  PROVATION

## 2023-03-16 ENCOUNTER — HOSPITAL ENCOUNTER (OUTPATIENT)
Facility: HOSPITAL | Age: 53
Discharge: HOME | End: 2023-03-18
Attending: EMERGENCY MEDICINE | Admitting: FAMILY MEDICINE
Payer: COMMERCIAL

## 2023-03-16 ENCOUNTER — PATIENT MESSAGE (OUTPATIENT)
Dept: HEMATOLOGY/ONCOLOGY | Facility: CLINIC | Age: 53
End: 2023-03-16
Payer: COMMERCIAL

## 2023-03-16 ENCOUNTER — PATIENT MESSAGE (OUTPATIENT)
Dept: INTERNAL MEDICINE | Facility: CLINIC | Age: 53
End: 2023-03-16
Payer: COMMERCIAL

## 2023-03-16 DIAGNOSIS — D64.9 SYMPTOMATIC ANEMIA: ICD-10-CM

## 2023-03-16 DIAGNOSIS — F17.211 NICOTINE DEPENDENCE, CIGARETTES, IN REMISSION: ICD-10-CM

## 2023-03-16 DIAGNOSIS — D50.9 IRON DEFICIENCY ANEMIA, UNSPECIFIED IRON DEFICIENCY ANEMIA TYPE: ICD-10-CM

## 2023-03-16 DIAGNOSIS — K92.1 BLACK TARRY STOOLS: ICD-10-CM

## 2023-03-16 DIAGNOSIS — D50.0 IRON DEFICIENCY ANEMIA DUE TO CHRONIC BLOOD LOSS: ICD-10-CM

## 2023-03-16 DIAGNOSIS — R06.00 DYSPNEA: ICD-10-CM

## 2023-03-16 DIAGNOSIS — K92.1 MELENA: ICD-10-CM

## 2023-03-16 DIAGNOSIS — R06.02 SOB (SHORTNESS OF BREATH): ICD-10-CM

## 2023-03-16 DIAGNOSIS — K92.2 UPPER GI BLEED: ICD-10-CM

## 2023-03-16 DIAGNOSIS — K25.0 ACUTE GASTRIC ULCER WITH HEMORRHAGE: Primary | ICD-10-CM

## 2023-03-16 DIAGNOSIS — K92.2 GI BLEED: ICD-10-CM

## 2023-03-16 LAB
ABO + RH BLD: NORMAL
ALBUMIN SERPL BCP-MCNC: 3.8 G/DL (ref 3.5–5.2)
ALP SERPL-CCNC: 141 U/L (ref 55–135)
ALT SERPL W/O P-5'-P-CCNC: 16 U/L (ref 10–44)
ANION GAP SERPL CALC-SCNC: 11 MMOL/L (ref 8–16)
AST SERPL-CCNC: 13 U/L (ref 10–40)
BASOPHILS # BLD AUTO: 0.11 K/UL (ref 0–0.2)
BASOPHILS NFR BLD: 1.2 % (ref 0–1.9)
BILIRUB SERPL-MCNC: 0.2 MG/DL (ref 0.1–1)
BILIRUB UR QL STRIP: NEGATIVE
BLD GP AB SCN CELLS X3 SERPL QL: NORMAL
BLD PROD TYP BPU: NORMAL
BLOOD UNIT EXPIRATION DATE: NORMAL
BLOOD UNIT TYPE CODE: 6200
BLOOD UNIT TYPE: NORMAL
BUN SERPL-MCNC: 22 MG/DL (ref 6–20)
CALCIUM SERPL-MCNC: 8.7 MG/DL (ref 8.7–10.5)
CHLORIDE SERPL-SCNC: 108 MMOL/L (ref 95–110)
CLARITY UR: CLEAR
CO2 SERPL-SCNC: 21 MMOL/L (ref 23–29)
CODING SYSTEM: NORMAL
COLOR UR: COLORLESS
CREAT SERPL-MCNC: 0.7 MG/DL (ref 0.5–1.4)
CROSSMATCH INTERPRETATION: NORMAL
DIFFERENTIAL METHOD: ABNORMAL
DISPENSE STATUS: NORMAL
EOSINOPHIL # BLD AUTO: 0.2 K/UL (ref 0–0.5)
EOSINOPHIL NFR BLD: 2.4 % (ref 0–8)
ERYTHROCYTE [DISTWIDTH] IN BLOOD BY AUTOMATED COUNT: 16.7 % (ref 11.5–14.5)
EST. GFR  (NO RACE VARIABLE): >60 ML/MIN/1.73 M^2
GLUCOSE SERPL-MCNC: 98 MG/DL (ref 70–110)
GLUCOSE UR QL STRIP: NEGATIVE
HCT VFR BLD AUTO: 25.7 % (ref 37–48.5)
HGB BLD-MCNC: 7.6 G/DL (ref 12–16)
HGB UR QL STRIP: NEGATIVE
IMM GRANULOCYTES # BLD AUTO: 0.04 K/UL (ref 0–0.04)
IMM GRANULOCYTES NFR BLD AUTO: 0.4 % (ref 0–0.5)
KETONES UR QL STRIP: NEGATIVE
LACTATE SERPL-SCNC: 1.8 MMOL/L (ref 0.5–2.2)
LEUKOCYTE ESTERASE UR QL STRIP: NEGATIVE
LIPASE SERPL-CCNC: 10 U/L (ref 4–60)
LYMPHOCYTES # BLD AUTO: 2.2 K/UL (ref 1–4.8)
LYMPHOCYTES NFR BLD: 23.9 % (ref 18–48)
MCH RBC QN AUTO: 27.9 PG (ref 27–31)
MCHC RBC AUTO-ENTMCNC: 29.6 G/DL (ref 32–36)
MCV RBC AUTO: 95 FL (ref 82–98)
MONOCYTES # BLD AUTO: 0.5 K/UL (ref 0.3–1)
MONOCYTES NFR BLD: 5.9 % (ref 4–15)
NEUTROPHILS # BLD AUTO: 6.1 K/UL (ref 1.8–7.7)
NEUTROPHILS NFR BLD: 66.2 % (ref 38–73)
NITRITE UR QL STRIP: NEGATIVE
NRBC BLD-RTO: 0 /100 WBC
NUM UNITS TRANS PACKED RBC: NORMAL
PH UR STRIP: 6 [PH] (ref 5–8)
PLATELET # BLD AUTO: 452 K/UL (ref 150–450)
PMV BLD AUTO: 9.7 FL (ref 9.2–12.9)
POTASSIUM SERPL-SCNC: 3.6 MMOL/L (ref 3.5–5.1)
PROT SERPL-MCNC: 6.8 G/DL (ref 6–8.4)
PROT UR QL STRIP: NEGATIVE
RBC # BLD AUTO: 2.72 M/UL (ref 4–5.4)
SODIUM SERPL-SCNC: 140 MMOL/L (ref 136–145)
SP GR UR STRIP: 1.01 (ref 1–1.03)
URN SPEC COLLECT METH UR: ABNORMAL
UROBILINOGEN UR STRIP-ACNC: NEGATIVE EU/DL
WBC # BLD AUTO: 9.21 K/UL (ref 3.9–12.7)

## 2023-03-16 PROCEDURE — 99284 EMERGENCY DEPT VISIT MOD MDM: CPT | Mod: ,,, | Performed by: INTERNAL MEDICINE

## 2023-03-16 PROCEDURE — 36430 TRANSFUSION BLD/BLD COMPNT: CPT

## 2023-03-16 PROCEDURE — 83605 ASSAY OF LACTIC ACID: CPT | Performed by: NURSE PRACTITIONER

## 2023-03-16 PROCEDURE — 99284 PR EMERGENCY DEPT VISIT,LEVEL IV: ICD-10-PCS | Mod: ,,, | Performed by: INTERNAL MEDICINE

## 2023-03-16 PROCEDURE — 63600175 PHARM REV CODE 636 W HCPCS: Performed by: FAMILY MEDICINE

## 2023-03-16 PROCEDURE — 83690 ASSAY OF LIPASE: CPT | Performed by: NURSE PRACTITIONER

## 2023-03-16 PROCEDURE — P9016 RBC LEUKOCYTES REDUCED: HCPCS | Performed by: FAMILY MEDICINE

## 2023-03-16 PROCEDURE — G0378 HOSPITAL OBSERVATION PER HR: HCPCS

## 2023-03-16 PROCEDURE — 81003 URINALYSIS AUTO W/O SCOPE: CPT | Performed by: FAMILY MEDICINE

## 2023-03-16 PROCEDURE — 93005 ELECTROCARDIOGRAM TRACING: CPT

## 2023-03-16 PROCEDURE — 86920 COMPATIBILITY TEST SPIN: CPT | Performed by: FAMILY MEDICINE

## 2023-03-16 PROCEDURE — C9113 INJ PANTOPRAZOLE SODIUM, VIA: HCPCS | Performed by: FAMILY MEDICINE

## 2023-03-16 PROCEDURE — 86900 BLOOD TYPING SEROLOGIC ABO: CPT | Performed by: NURSE PRACTITIONER

## 2023-03-16 PROCEDURE — 99285 EMERGENCY DEPT VISIT HI MDM: CPT | Mod: 25

## 2023-03-16 PROCEDURE — 25000003 PHARM REV CODE 250: Performed by: FAMILY MEDICINE

## 2023-03-16 PROCEDURE — 96374 THER/PROPH/DIAG INJ IV PUSH: CPT

## 2023-03-16 PROCEDURE — 85025 COMPLETE CBC W/AUTO DIFF WBC: CPT | Performed by: NURSE PRACTITIONER

## 2023-03-16 PROCEDURE — 80053 COMPREHEN METABOLIC PANEL: CPT | Performed by: NURSE PRACTITIONER

## 2023-03-16 PROCEDURE — 93010 ELECTROCARDIOGRAM REPORT: CPT | Mod: ,,, | Performed by: INTERNAL MEDICINE

## 2023-03-16 PROCEDURE — 93010 EKG 12-LEAD: ICD-10-PCS | Mod: ,,, | Performed by: INTERNAL MEDICINE

## 2023-03-16 RX ORDER — HYDROCODONE BITARTRATE AND ACETAMINOPHEN 500; 5 MG/1; MG/1
TABLET ORAL
Status: DISCONTINUED | OUTPATIENT
Start: 2023-03-16 | End: 2023-03-18 | Stop reason: HOSPADM

## 2023-03-16 RX ORDER — ACETAMINOPHEN 325 MG/1
650 TABLET ORAL EVERY 4 HOURS PRN
Status: DISCONTINUED | OUTPATIENT
Start: 2023-03-16 | End: 2023-03-18 | Stop reason: HOSPADM

## 2023-03-16 RX ORDER — SUCRALFATE 1 G/1
1 TABLET ORAL EVERY 6 HOURS
Status: DISCONTINUED | OUTPATIENT
Start: 2023-03-16 | End: 2023-03-17

## 2023-03-16 RX ORDER — PANTOPRAZOLE SODIUM 40 MG/10ML
40 INJECTION, POWDER, LYOPHILIZED, FOR SOLUTION INTRAVENOUS 2 TIMES DAILY
Status: DISCONTINUED | OUTPATIENT
Start: 2023-03-16 | End: 2023-03-18

## 2023-03-16 RX ORDER — SODIUM CHLORIDE 0.9 % (FLUSH) 0.9 %
10 SYRINGE (ML) INJECTION
Status: DISCONTINUED | OUTPATIENT
Start: 2023-03-16 | End: 2023-03-18 | Stop reason: HOSPADM

## 2023-03-16 RX ADMIN — SODIUM CHLORIDE: 0.9 INJECTION, SOLUTION INTRAVENOUS at 08:03

## 2023-03-16 RX ADMIN — SUCRALFATE 1 G: 1 TABLET ORAL at 06:03

## 2023-03-16 RX ADMIN — PANTOPRAZOLE SODIUM 40 MG: 40 INJECTION, POWDER, FOR SOLUTION INTRAVENOUS at 03:03

## 2023-03-16 NOTE — HPI
Ms. Aguero is a 51 yo female with hx of RA and sarcoidosis of the lung presented with black tarry stool this morning, this had progressed from dark stool over the last few days.  Patient states she slept most of the day on Tuesday and Wednesday but noted some shortness of breathe when ambulating on Wednesday.  She did not this much of it as she thought it was due to her being tired.  This morning she went to work and noted she was so dyspneic when going from her car to inside the building.  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  She proceeded to the hospital.  Of note, patient was recently scoped by Dr. Valdes and noted to have some gastritis.  Given her history of black stool and hbg 7.5, she may rescope if hbg drops.  Patient has been taking Aleve PM regularly which may contribute to the bleeding in conjunction with the gastritis.  Hospital medicine will admit for observation and repeat labs in the AM.  NPO after midnight for possible scope.

## 2023-03-16 NOTE — ASSESSMENT & PLAN NOTE
- Likely related to sarcoidosis of the lung and exacerbated by her anemia.  - Patient is currently not on any home meds.

## 2023-03-16 NOTE — FIRST PROVIDER EVALUATION
Medical screening examination initiated.  I have conducted a focused provider triage encounter, findings are as follows:    Brief history of present illness:  Pt. C/o black stools and not SOB.     There were no vitals filed for this visit.    Pertinent physical exam:  mild labored breathing    Brief workup plan:  labs,     Preliminary workup initiated; this workup will be continued and followed by the physician or advanced practice provider that is assigned to the patient when roomed.

## 2023-03-16 NOTE — SUBJECTIVE & OBJECTIVE
Past Medical History:   Diagnosis Date    RA (rheumatoid arthritis)        Past Surgical History:   Procedure Laterality Date    COLONOSCOPY N/A 02/01/2023    Procedure: COLONOSCOPY;  Surgeon: Ángel Valdes MD;  Location: [facility];  Service: Gastroenterology;  Laterality: N/A;    ESOPHAGOGASTRODUODENOSCOPY N/A 02/01/2023    Procedure: EGD (ESOPHAGOGASTRODUODENOSCOPY);  Surgeon: Ángel Valdes MD;  Location: [facility];  Service: Gastroenterology;  Laterality: N/A;    INTRALUMINAL GASTROINTESTINAL TRACT IMAGING VIA CAPSULE N/A 3/1/2023    Procedure: IMAGING PROCEDURE, GI TRACT, INTRALUMINAL, VIA CAPSULE;  Surgeon: First Available [place];  Location: [facility] ENDO;  Service: Endoscopy;  Laterality: N/A;    Tubal Lig      TUBAL LIGATION         Review of patient's allergies indicates:  No Known Allergies  Family History       Problem Relation (Age of Onset)    Alcohol abuse Mother    Asthma Sister, Mother    Breast cancer Sister, Other    COPD Mother    Cancer Sister    Depression Mother    Diabetes Sister, Brother    Heart disease Mother          Tobacco Use    Smoking status: Some Days     Types: Vaping with nicotine     Start date: 1/1/2020     Passive exposure: Past    Smokeless tobacco: Never   Substance and Sexual Activity    Alcohol use: Not Currently     Comment: I drank when i was younger but can no longer take the smell    Drug use: Never     Types: Marijuana    Sexual activity: Not Currently     Partners: Male     Birth control/protection: See Surgical Hx, None     Review of Systems   Constitutional:  Negative for fever.   HENT:  Negative for hearing loss.    Eyes:  Negative for visual disturbance.   Respiratory:  Positive for shortness of breath. Negative for cough.    Cardiovascular:  Negative for chest pain.   Gastrointestinal:         As per HPI.   Genitourinary:  Negative for dysuria, frequency and hematuria.   Musculoskeletal:  Positive for back pain. Negative for arthralgias.   Skin:   Negative for rash.   Neurological:  Positive for weakness. Negative for seizures, syncope, numbness and headaches.   Hematological:  Does not bruise/bleed easily.   Psychiatric/Behavioral:  The patient is not nervous/anxious.    Objective:     Vital Signs (Most Recent):  Temp: 98 °F (36.7 °C) (03/16/23 1132)  Pulse: 85 (03/16/23 1400)  Resp: 18 (03/16/23 1400)  BP: 101/73 (03/16/23 1400)  SpO2: 100 % (03/16/23 1400) Vital Signs (24h Range):  Temp:  [98 °F (36.7 °C)] 98 °F (36.7 °C)  Pulse:  [] 85  Resp:  [18-20] 18  SpO2:  [100 %] 100 %  BP: (101-124)/(52-74) 101/73     Weight: 68.9 kg (151 lb 14.4 oz) (03/16/23 1132)  Body mass index is 23.79 kg/m².    No intake or output data in the 24 hours ending 03/16/23 1526    Lines/Drains/Airways       None                   Physical Exam  Constitutional:       General: She is not in acute distress.     Appearance: Normal appearance. She is well-developed.   HENT:      Head: Normocephalic and atraumatic.   Eyes:      Extraocular Movements: Extraocular movements intact.   Cardiovascular:      Rate and Rhythm: Normal rate and regular rhythm.      Heart sounds: No murmur heard.  Pulmonary:      Effort: Pulmonary effort is normal. No respiratory distress.      Breath sounds: Normal breath sounds. No wheezing.   Abdominal:      General: Bowel sounds are normal. There is no distension.      Palpations: Abdomen is soft. There is no mass.      Tenderness: There is no abdominal tenderness.   Musculoskeletal:      Cervical back: Normal range of motion and neck supple.      Right lower leg: No edema.      Left lower leg: No edema.   Skin:     General: Skin is warm and dry.      Findings: No rash.   Neurological:      Mental Status: She is alert and oriented to person, place, and time.      Cranial Nerves: No cranial nerve deficit.   Psychiatric:         Behavior: Behavior normal.       Significant Labs:  CBC:   Recent Labs   Lab 03/16/23  1207   WBC 9.21   HGB 7.6*   HCT 25.7*    *     CMP:   Recent Labs   Lab 03/16/23  1207   GLU 98   CALCIUM 8.7   ALBUMIN 3.8   PROT 6.8      K 3.6   CO2 21*      BUN 22*   CREATININE 0.7   ALKPHOS 141*   ALT 16   AST 13   BILITOT 0.2     Coagulation: No results for input(s): PT, INR, APTT in the last 48 hours.    Significant Imaging:  Imaging results within the past 24 hours have been reviewed.

## 2023-03-16 NOTE — ED PROVIDER NOTES
SCRIBE #1 NOTE: IEmiliana, am scribing for, and in the presence of, Huy Mac Jr., MD. I have scribed the entire note.      History      Chief Complaint   Patient presents with    Shortness of Breath     Shortness of breath x onset this morning, has had symptoms of same with flare ups since December.       Review of patient's allergies indicates:  No Known Allergies     HPI   HPI    3/16/2023, 12:57 PM   History obtained from the patient      History of Present Illness: Tammy Aguero is a 52 y.o. female patient with a PMHx of RA and anemia who presents to the Emergency Department for SOB which onset gradually this morning. Earlier this morning, the pt contacted her Hematologist, Dr. Levin, about these sxs and was advised to come to the ER. Pt reports that her SOB is worse upon exertion and is sometimes better with rest. She states that she has needed a blood transfusion in the past when she has had these sxs. Symptoms are constant and moderate in severity. Associated sxs include pallor, generalized myalgias, and black, tarry stools. Patient denies any leg swelling, fever, chills, CP, weakness, and all other sxs at this time. No prior Tx reported. No further complaints or concerns at this time.         Arrival mode: Personal vehicle    PCP: Maria Teresa Aparicio MD       Past Medical History:  Past Medical History:   Diagnosis Date    RA (rheumatoid arthritis)        Past Surgical History:  Past Surgical History:   Procedure Laterality Date    COLONOSCOPY N/A 02/01/2023    Procedure: COLONOSCOPY;  Surgeon: Ángel Valdes MD;  Location: [facility];  Service: Gastroenterology;  Laterality: N/A;    ESOPHAGOGASTRODUODENOSCOPY N/A 02/01/2023    Procedure: EGD (ESOPHAGOGASTRODUODENOSCOPY);  Surgeon: Ángel Valdes MD;  Location: [facility];  Service: Gastroenterology;  Laterality: N/A;    INTRALUMINAL GASTROINTESTINAL TRACT IMAGING VIA CAPSULE N/A 3/1/2023    Procedure: IMAGING PROCEDURE, GI TRACT,  INTRALUMINAL, VIA CAPSULE;  Surgeon: First Available Roman Luevano;  Location: [facility] ENDO;  Service: Endoscopy;  Laterality: N/A;    Tubal Lig      TUBAL LIGATION           Family History:  Family History   Problem Relation Age of Onset    Diabetes Sister     Cancer Sister     Breast cancer Sister     Asthma Sister     Diabetes Brother     Breast cancer Other     Alcohol abuse Mother     Asthma Mother     COPD Mother     Depression Mother     Heart disease Mother        Social History:  Social History     Tobacco Use    Smoking status: Some Days     Types: Vaping with nicotine     Start date: 1/1/2020     Passive exposure: Past    Smokeless tobacco: Never   Substance and Sexual Activity    Alcohol use: Not Currently     Comment: I drank when i was younger but can no longer take the smell    Drug use: Never     Types: Marijuana    Sexual activity: Not Currently     Partners: Male     Birth control/protection: See Surgical Hx, None       ROS   Review of Systems   Constitutional:  Negative for chills and fever.   HENT:  Negative for sore throat.    Respiratory:  Positive for shortness of breath.    Cardiovascular:  Negative for chest pain and leg swelling.   Gastrointestinal:  Negative for nausea.        (+) black, tarry stools   Genitourinary:  Negative for dysuria.   Musculoskeletal:  Positive for myalgias (generalized). Negative for back pain.   Skin:  Positive for pallor. Negative for rash.   Neurological:  Negative for weakness.   Hematological:  Does not bruise/bleed easily.   All other systems reviewed and are negative.    Physical Exam      Initial Vitals [03/16/23 1132]   BP Pulse Resp Temp SpO2   117/74 (!) 120 20 98 °F (36.7 °C) 100 %      MAP       --          Physical Exam  Nursing Notes and Vital Signs Reviewed.  Constitutional: Patient is in no acute distress. Well-developed and well-nourished.  Head: Atraumatic. Normocephalic.  Eyes: PERRL. EOM intact. Conjunctivae are not pale. No scleral icterus.  ENT:  Mucous membranes are moist. Oropharynx is clear and symmetric.    Neck: Supple. Full ROM. No lymphadenopathy.  Cardiovascular: Regular rate. Regular rhythm. No murmurs, rubs, or gallops. Distal pulses are 2+ and symmetric.  Pulmonary/Chest: No respiratory distress. Clear to auscultation bilaterally. No wheezing or rales.  Abdominal: Soft and non-distended.  There is no tenderness.  No rebound, guarding, or rigidity.   Musculoskeletal: Moves all extremities. No obvious deformities. No edema.  Skin: Pallor.  Neurological:  Alert, awake, and appropriate.  Normal speech.  No acute focal neurological deficits are appreciated.  Psychiatric: Normal affect. Good eye contact. Appropriate in content.    ED Course    Critical Care    Date/Time: 3/16/2023 2:44 PM  Performed by: Huy Mac Jr., MD  Authorized by: Huy Mac Jr., MD   Direct patient critical care time: 14 minutes  Additional history critical care time: 7 minutes  Ordering / reviewing critical care time: 6 minutes  Documentation critical care time: 7 minutes  Consulting other physicians critical care time: 9 minutes  Total critical care time (exclusive of procedural time) : 43 minutes  Critical care time was exclusive of separately billable procedures and treating other patients and teaching time.  Critical care was necessary to treat or prevent imminent or life-threatening deterioration of the following conditions: symptomatic anemia and upper GI bleed.  Critical care was time spent personally by me on the following activities: blood draw for specimens, development of treatment plan with patient or surrogate, discussions with consultants, interpretation of cardiac output measurements, evaluation of patient's response to treatment, examination of patient, obtaining history from patient or surrogate, ordering and review of laboratory studies, ordering and performing treatments and interventions, ordering and review of radiographic studies, pulse oximetry,  "re-evaluation of patient's condition and review of old charts.      ED Vital Signs:  Vitals:    03/16/23 1132 03/16/23 1257 03/16/23 1300 03/16/23 1400   BP: 117/74 124/65 (!) 111/52 101/73   Pulse: (!) 120 100 93 85   Resp: 20 20 20 18   Temp: 98 °F (36.7 °C)      TempSrc: Oral      SpO2: 100% 100% 100% 100%   Weight: 68.9 kg (151 lb 14.4 oz)      Height: 5' 7" (1.702 m)          Abnormal Lab Results:  Labs Reviewed   CBC W/ AUTO DIFFERENTIAL - Abnormal; Notable for the following components:       Result Value    RBC 2.72 (*)     Hemoglobin 7.6 (*)     Hematocrit 25.7 (*)     MCHC 29.6 (*)     RDW 16.7 (*)     Platelets 452 (*)     All other components within normal limits   COMPREHENSIVE METABOLIC PANEL - Abnormal; Notable for the following components:    CO2 21 (*)     BUN 22 (*)     Alkaline Phosphatase 141 (*)     All other components within normal limits   LACTIC ACID, PLASMA   LIPASE   URINALYSIS, REFLEX TO URINE CULTURE   TYPE & SCREEN        All Lab Results:  Results for orders placed or performed during the hospital encounter of 03/16/23   CBC auto differential   Result Value Ref Range    WBC 9.21 3.90 - 12.70 K/uL    RBC 2.72 (L) 4.00 - 5.40 M/uL    Hemoglobin 7.6 (L) 12.0 - 16.0 g/dL    Hematocrit 25.7 (L) 37.0 - 48.5 %    MCV 95 82 - 98 fL    MCH 27.9 27.0 - 31.0 pg    MCHC 29.6 (L) 32.0 - 36.0 g/dL    RDW 16.7 (H) 11.5 - 14.5 %    Platelets 452 (H) 150 - 450 K/uL    MPV 9.7 9.2 - 12.9 fL    Immature Granulocytes 0.4 0.0 - 0.5 %    Gran # (ANC) 6.1 1.8 - 7.7 K/uL    Immature Grans (Abs) 0.04 0.00 - 0.04 K/uL    Lymph # 2.2 1.0 - 4.8 K/uL    Mono # 0.5 0.3 - 1.0 K/uL    Eos # 0.2 0.0 - 0.5 K/uL    Baso # 0.11 0.00 - 0.20 K/uL    nRBC 0 0 /100 WBC    Gran % 66.2 38.0 - 73.0 %    Lymph % 23.9 18.0 - 48.0 %    Mono % 5.9 4.0 - 15.0 %    Eosinophil % 2.4 0.0 - 8.0 %    Basophil % 1.2 0.0 - 1.9 %    Differential Method Automated    Comprehensive metabolic panel   Result Value Ref Range    Sodium 140 136 - 145 " mmol/L    Potassium 3.6 3.5 - 5.1 mmol/L    Chloride 108 95 - 110 mmol/L    CO2 21 (L) 23 - 29 mmol/L    Glucose 98 70 - 110 mg/dL    BUN 22 (H) 6 - 20 mg/dL    Creatinine 0.7 0.5 - 1.4 mg/dL    Calcium 8.7 8.7 - 10.5 mg/dL    Total Protein 6.8 6.0 - 8.4 g/dL    Albumin 3.8 3.5 - 5.2 g/dL    Total Bilirubin 0.2 0.1 - 1.0 mg/dL    Alkaline Phosphatase 141 (H) 55 - 135 U/L    AST 13 10 - 40 U/L    ALT 16 10 - 44 U/L    Anion Gap 11 8 - 16 mmol/L    eGFR >60 >60 mL/min/1.73 m^2   Lactic acid, plasma   Result Value Ref Range    Lactate (Lactic Acid) 1.8 0.5 - 2.2 mmol/L   Lipase   Result Value Ref Range    Lipase 10 4 - 60 U/L   Type & Screen   Result Value Ref Range    Group & Rh A POS     Indirect Emeli NEG          Imaging Results:  Imaging Results              X-Ray Chest 1 View (Final result)  Result time 03/16/23 12:05:30      Final result by Willie Pop MD (03/16/23 12:05:30)                   Impression:      No acute finding in the chest.  Known bilateral pulmonary nodules.      Electronically signed by: Willie Pop  Date:    03/16/2023  Time:    12:05               Narrative:    EXAMINATION:  XR CHEST 1 VIEW    CLINICAL HISTORY:  Dyspnea, unspecified    FINDINGS:  Comparison is made to January 11, 2023.  Multiple pulmonary nodules are redemonstrated.  No pneumothorax or pleural effusion.  Mediastinum is stable.                                     The EKG was ordered, reviewed, and independently interpreted by the ED provider.  Interpretation time: 11:37  Rate: 113 BPM  Rhythm: sinus tachycardia  Interpretation: No acute ST changes. No STEMI.           The Emergency Provider reviewed the vital signs and test results, which are outlined above.    ED Discussion     1:40 PM: Discussed case with Dr. Wallace ([facility]). Dr. Wallace agrees with current care and management of pt and accepts admission.   Admitting Service: Hospital Medicine   Admitting Physician: Dr. Wallace  Admit to: Obs    1:41 PM:  Re-evaluated pt. I have discussed test results, shared treatment plan, and the need for admission with patient and family at bedside. Pt and family express understanding at this time and agree with all information. All questions answered. Pt and family have no further questions or concerns at this time. Pt is ready for admit.    1:41 PM: I have sent a secure chat message to Dr. Valdes (Gastroenterology) at this time.    1:44 PM: Discussed pt's case with Dr. Arevalo (Hem/Onc).    2:17 PM: Discussed pt's case with Bryant Valdivia PA-C (Gastroenterology) who recommends keeping the pt NPO after midnight.         ED Medication(s):  Medications   sodium chloride 0.9% flush 10 mL (has no administration in time range)   pantoprazole injection 40 mg (has no administration in time range)   acetaminophen tablet 650 mg (has no administration in time range)       New Prescriptions    No medications on file             Medical Decision Making    Medical Decision Making:   Initial Assessment:   52-year-old female with known history of microcytic anemia being followed by Hematology she is had to have blood transfusions in the past.  Presents with a what appears to be an upper GI bleed black tarry stools symptomatic anemia very lightheaded weak short of breath.  Differential Diagnosis:   Upper GI bleed, severe anemia  Clinical Tests:   Lab Tests: Ordered and Reviewed  Radiological Study: Ordered and Reviewed  Medical Tests: Ordered and Reviewed  ED Management:  Patient had a low H&H she is typed and screened GI was consulted being admitted to the hospitalist service at this time possible scope.         Scribe Attestation:   Scribe #1: I performed the above scribed service and the documentation accurately describes the services I performed. I attest to the accuracy of the note.    Attending:   Physician Attestation Statement for Scribe #1: I, Huy Mac Jr., MD, personally performed the services described in this documentation,  as scribed by Emiliana Fregoso, in my presence, and it is both accurate and complete.          Clinical Impression       ICD-10-CM ICD-9-CM   1. Melena  K92.1 578.1   2. Dyspnea  R06.00 786.09   3. Upper GI bleed  K92.2 578.9   4. Symptomatic anemia  D64.9 285.9   5. GI bleed  K92.2 578.9       Disposition:   Disposition: Placed in Observation  Condition: Damaris Mac Jr., MD  03/16/23 1538

## 2023-03-16 NOTE — ASSESSMENT & PLAN NOTE
Melena with decreased Hgb while increased frequency of NSAID use.   Will plan on EGD tomorrow.   Ok to eat today and NPO after midnight.   Protonix IV bid.   Monitor H/H and transfuse as indicated.

## 2023-03-16 NOTE — HPI
The patient has a history of chronic iron deficiency anemia followed by [facility]. She was referred to the ER for a drop in Hgb from 12 in February after a transfusion to 7.6 today. The patient reports black stool today, nausea and vomiting x 2 and decreased appetite. She said it was overnight when she vomited, and dark so she didn't see what it looked like. The patient denies abdominal pain. She had an EGD, colonoscopy and VCE in the last month. They were unrevealing. The patient has been taking Aleve PM regularly since she went back to work a few weeks ago. Denies oral iron, Pepto or anticoagulation use.

## 2023-03-16 NOTE — H&P (VIEW-ONLY)
[facility] - Emergency Dept.  Gastroenterology  Consult Note    Patient Name: Tammy Aguero  MRN: [id number]  Admission Date: 3/16/2023  Hospital Length of Stay: 0 days  Code Status: Full Code   Attending Provider: Cecelia Wallace MD   Consulting Provider: Bryant Valdivia PA-C  Primary Care Physician: Maria Teresa Aparicio MD  Principal Problem:<principal problem not specified>    Inpatient consult to Gastroenterology  Consult performed by: Bryant Valdivia PA-C  Consult ordered by: Cecelia Wallace MD  Reason for consult: GI bleed        Subjective:     HPI:  The patient has a history of chronic iron deficiency anemia followed by [facility]. She was referred to the ER for a drop in Hgb from 12 in February after a transfusion to 7.6 today. The patient reports black stool today, nausea and vomiting x 2 and decreased appetite. She said it was overnight when she vomited, and dark so she didn't see what it looked like. The patient denies abdominal pain. She had an EGD, colonoscopy and VCE in the last month. They were unrevealing. The patient has been taking Aleve PM regularly since she went back to work a few weeks ago. Denies oral iron, Pepto or anticoagulation use.       Past Medical History:   Diagnosis Date    RA (rheumatoid arthritis)        Past Surgical History:   Procedure Laterality Date    COLONOSCOPY N/A 02/01/2023    Procedure: COLONOSCOPY;  Surgeon: Ángel Valdes MD;  Location: [facility];  Service: Gastroenterology;  Laterality: N/A;    ESOPHAGOGASTRODUODENOSCOPY N/A 02/01/2023    Procedure: EGD (ESOPHAGOGASTRODUODENOSCOPY);  Surgeon: Ángel Valdes MD;  Location: [facility];  Service: Gastroenterology;  Laterality: N/A;    INTRALUMINAL GASTROINTESTINAL TRACT IMAGING VIA CAPSULE N/A 3/1/2023    Procedure: IMAGING PROCEDURE, GI TRACT, INTRALUMINAL, VIA CAPSULE;  Surgeon: First Available [place];  Location: [facility];  Service: Endoscopy;  Laterality: N/A;    Tubal Lig      TUBAL LIGATION          Review of patient's allergies indicates:  No Known Allergies  Family History       Problem Relation (Age of Onset)    Alcohol abuse Mother    Asthma Sister, Mother    Breast cancer Sister, Other    COPD Mother    Cancer Sister    Depression Mother    Diabetes Sister, Brother    Heart disease Mother          Tobacco Use    Smoking status: Some Days     Types: Vaping with nicotine     Start date: 1/1/2020     Passive exposure: Past    Smokeless tobacco: Never   Substance and Sexual Activity    Alcohol use: Not Currently     Comment: I drank when i was younger but can no longer take the smell    Drug use: Never     Types: Marijuana    Sexual activity: Not Currently     Partners: Male     Birth control/protection: See Surgical Hx, None     Review of Systems   Constitutional:  Negative for fever.   HENT:  Negative for hearing loss.    Eyes:  Negative for visual disturbance.   Respiratory:  Positive for shortness of breath. Negative for cough.    Cardiovascular:  Negative for chest pain.   Gastrointestinal:         As per HPI.   Genitourinary:  Negative for dysuria, frequency and hematuria.   Musculoskeletal:  Positive for back pain. Negative for arthralgias.   Skin:  Negative for rash.   Neurological:  Positive for weakness. Negative for seizures, syncope, numbness and headaches.   Hematological:  Does not bruise/bleed easily.   Psychiatric/Behavioral:  The patient is not nervous/anxious.    Objective:     Vital Signs (Most Recent):  Temp: 98 °F (36.7 °C) (03/16/23 1132)  Pulse: 85 (03/16/23 1400)  Resp: 18 (03/16/23 1400)  BP: 101/73 (03/16/23 1400)  SpO2: 100 % (03/16/23 1400) Vital Signs (24h Range):  Temp:  [98 °F (36.7 °C)] 98 °F (36.7 °C)  Pulse:  [] 85  Resp:  [18-20] 18  SpO2:  [100 %] 100 %  BP: (101-124)/(52-74) 101/73     Weight: 68.9 kg (151 lb 14.4 oz) (03/16/23 1132)  Body mass index is 23.79 kg/m².    No intake or output data in the 24 hours ending 03/16/23 1526    Lines/Drains/Airways        None                   Physical Exam  Constitutional:       General: She is not in acute distress.     Appearance: Normal appearance. She is well-developed.   HENT:      Head: Normocephalic and atraumatic.   Eyes:      Extraocular Movements: Extraocular movements intact.   Cardiovascular:      Rate and Rhythm: Normal rate and regular rhythm.      Heart sounds: No murmur heard.  Pulmonary:      Effort: Pulmonary effort is normal. No respiratory distress.      Breath sounds: Normal breath sounds. No wheezing.   Abdominal:      General: Bowel sounds are normal. There is no distension.      Palpations: Abdomen is soft. There is no mass.      Tenderness: There is no abdominal tenderness.   Musculoskeletal:      Cervical back: Normal range of motion and neck supple.      Right lower leg: No edema.      Left lower leg: No edema.   Skin:     General: Skin is warm and dry.      Findings: No rash.   Neurological:      Mental Status: She is alert and oriented to person, place, and time.      Cranial Nerves: No cranial nerve deficit.   Psychiatric:         Behavior: Behavior normal.       Significant Labs:  CBC:   Recent Labs   Lab 03/16/23  1207   WBC 9.21   HGB 7.6*   HCT 25.7*   *     CMP:   Recent Labs   Lab 03/16/23  1207   GLU 98   CALCIUM 8.7   ALBUMIN 3.8   PROT 6.8      K 3.6   CO2 21*      BUN 22*   CREATININE 0.7   ALKPHOS 141*   ALT 16   AST 13   BILITOT 0.2     Coagulation: No results for input(s): PT, INR, APTT in the last 48 hours.    Significant Imaging:  Imaging results within the past 24 hours have been reviewed.    Assessment/Plan:     Pulmonary  SOB (shortness of breath)  Likely secondary to anemia. CXR without acute findings.     Oncology  Iron deficiency anemia  Melena with decreased Hgb while increased frequency of NSAID use.   Will plan on EGD tomorrow.   Ok to eat today and NPO after midnight.   Protonix IV bid.   Monitor H/H and transfuse as indicated.         Thank you for your  consult. I will follow-up with patient. Please contact us if you have any additional questions.    Bryant Valdivia PA-C  Gastroenterology  [facility] - Emergency Dept.

## 2023-03-16 NOTE — PHARMACY MED REC
"Admission Medication History     The home medication history was taken by Jose Manuel Arias.    You may go to "Admission" then "Reconcile Home Medications" tabs to review and/or act upon these items.     The home medication list has been updated by the Pharmacy department.   Please read ALL comments highlighted in yellow.   Please address this information as you see fit.    Feel free to contact us if you have any questions or require assistance.      Medications listed below were obtained from: Analytic software- [employer] and Medical records  (Not in a hospital admission)      Jose Manuel Arias  [id number]    Current Outpatient Medications on File Prior to Encounter   Medication Sig Dispense Refill Last Dose    adalimumab (HUMIRA,CF, PEN) 40 mg/0.4 mL PnKt Inject 0.4 mLs (40 mg total) into the skin every 14 (fourteen) days. 2 pen 11     diphenhydrAMINE-acetaminophen (TYLENOL PM)  mg Tab Take 1 tablet by mouth nightly as needed.                              .        "

## 2023-03-16 NOTE — H&P
[facility]  [facility]  History & Physical    Patient Name: Tammy Aguero  MRN: [id number]  Patient Class: OP- Observation  Admission Date: 3/16/2023  Attending Physician: Cecelia Wallace MD   Primary Care Provider: Maria Teresa Aparicio MD         Patient information was obtained from patient and ER records.     Subjective:     Principal Problem:<principal problem not specified>    Chief Complaint:   Chief Complaint   Patient presents with    Shortness of Breath     Shortness of breath x onset this morning, has had symptoms of same with flare ups since December.        HPI: Ms. Aguero is a 53 yo female with hx of RA and sarcoidosis of the lung presented with black tarry stool this morning, this had progressed from dark stool over the last few days.  Patient states she slept most of the day on Tuesday and Wednesday but noted some shortness of breathe when ambulating on Wednesday.  She did not this much of it as she thought it was due to her being tired.  This morning she went to work and noted she was so dyspneic when going from her car to inside the building.  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  She proceeded to the hospital.  Of note, patient was recently scoped by Dr. Valdes and noted to have some gastritis.  Given her history of black stool and hbg 7.5, she may rescope if hbg drops.  Patient has been taking Aleve PM regularly which may contribute to the bleeding in conjunction with the gastritis.  Hospital medicine will admit for observation and repeat labs in the AM.  NPO after midnight for possible scope.        Past Medical History:   Diagnosis Date    RA (rheumatoid arthritis)        Past Surgical History:   Procedure Laterality Date    COLONOSCOPY N/A 02/01/2023    Procedure: COLONOSCOPY;  Surgeon: Ángel Valdes MD;  Location: [facility];  Service: Gastroenterology;  Laterality: N/A;    ESOPHAGOGASTRODUODENOSCOPY N/A 02/01/2023     Procedure: EGD (ESOPHAGOGASTRODUODENOSCOPY);  Surgeon: Ángel Valdes MD;  Location: [facility] ENDO;  Service: Gastroenterology;  Laterality: N/A;    INTRALUMINAL GASTROINTESTINAL TRACT IMAGING VIA CAPSULE N/A 3/1/2023    Procedure: IMAGING PROCEDURE, GI TRACT, INTRALUMINAL, VIA CAPSULE;  Surgeon: First Available Roman Luevano;  Location: [facility] ENDO;  Service: Endoscopy;  Laterality: N/A;    Tubal Lig      TUBAL LIGATION         Review of patient's allergies indicates:  No Known Allergies    No current facility-administered medications on file prior to encounter.     Current Outpatient Medications on File Prior to Encounter   Medication Sig    adalimumab (HUMIRA,CF, PEN) 40 mg/0.4 mL PnKt Inject 0.4 mLs (40 mg total) into the skin every 14 (fourteen) days.    diphenhydrAMINE-acetaminophen (TYLENOL PM)  mg Tab Take 1 tablet by mouth nightly as needed.    [DISCONTINUED] albuterol (PROVENTIL) 2.5 mg /3 mL (0.083 %) nebulizer solution Take 3 mLs (2.5 mg total) by nebulization every 4 to 6 hours as needed for Wheezing or Shortness of Breath.    [DISCONTINUED] albuterol (PROVENTIL/VENTOLIN HFA) 90 mcg/actuation inhaler Inhale 2 puffs into the lungs every 4 (four) hours as needed for Wheezing or Shortness of Breath.    [DISCONTINUED] albuterol-ipratropium (DUO-NEB) 2.5 mg-0.5 mg/3 mL nebulizer solution SMARTSI Ampule(s) Via Nebulizer Every 6 Hours PRN    [DISCONTINUED] predniSONE (DELTASONE) 20 MG tablet Take 1 tablet (20 mg total) by mouth once daily.     Family History       Problem Relation (Age of Onset)    Alcohol abuse Mother    Asthma Sister, Mother    Breast cancer Sister, Other    COPD Mother    Cancer Sister    Depression Mother    Diabetes Sister, Brother    Heart disease Mother          Tobacco Use    Smoking status: Some Days     Types: Vaping with nicotine     Start date: 2020     Passive exposure: Past    Smokeless tobacco: Never   Substance and Sexual Activity    Alcohol use: Not  Currently     Comment: I drank when i was younger but can no longer take the smell    Drug use: Never     Types: Marijuana    Sexual activity: Not Currently     Partners: Male     Birth control/protection: See Surgical Hx, None     Review of Systems   Constitutional:  Positive for fatigue. Negative for activity change and appetite change.   Respiratory:  Positive for shortness of breath. Negative for cough and chest tightness.    Cardiovascular:  Negative for chest pain, palpitations and leg swelling.   Gastrointestinal:  Positive for blood in stool. Negative for abdominal distention, abdominal pain, constipation, diarrhea, nausea, rectal pain and vomiting.   Musculoskeletal:  Negative for gait problem.   Neurological:  Negative for dizziness, weakness, light-headedness and headaches.   Psychiatric/Behavioral:  Negative for agitation and confusion. The patient is not nervous/anxious.    All other systems reviewed and are negative.  Objective:     Vital Signs (Most Recent):  Temp: 98 °F (36.7 °C) (03/16/23 1132)  Pulse: 104 (03/16/23 1607)  Resp: 18 (03/16/23 1400)  BP: (!) 111/57 (03/16/23 1607)  SpO2: 100 % (03/16/23 1400)   Vital Signs (24h Range):  Temp:  [98 °F (36.7 °C)] 98 °F (36.7 °C)  Pulse:  [] 104  Resp:  [18-20] 18  SpO2:  [100 %] 100 %  BP: (101-124)/(52-74) 111/57     Weight: 68.9 kg (151 lb 14.4 oz)  Body mass index is 23.79 kg/m².    Physical Exam  Vitals and nursing note reviewed.   Constitutional:       Appearance: Normal appearance.   HENT:      Head: Normocephalic and atraumatic.      Nose: Nose normal.      Mouth/Throat:      Mouth: Mucous membranes are moist.   Eyes:      Extraocular Movements: Extraocular movements intact.      Conjunctiva/sclera: Conjunctivae normal.   Cardiovascular:      Rate and Rhythm: Regular rhythm. Tachycardia present.      Pulses: Normal pulses.      Heart sounds: Normal heart sounds.   Pulmonary:      Effort: Pulmonary effort is normal.      Breath sounds:  Normal breath sounds.   Abdominal:      General: Abdomen is flat. Bowel sounds are normal. There is no distension.      Palpations: Abdomen is soft.      Tenderness: There is no abdominal tenderness. There is no guarding.   Musculoskeletal:         General: Normal range of motion.      Cervical back: Normal range of motion and neck supple.   Skin:     General: Skin is warm.      Capillary Refill: Capillary refill takes less than 2 seconds.      Coloration: Skin is pale.   Neurological:      Mental Status: She is alert and oriented to person, place, and time. Mental status is at baseline.   Psychiatric:         Mood and Affect: Mood normal.         Behavior: Behavior normal.           Significant Labs: All pertinent labs within the past 24 hours have been reviewed.  Recent Lab Results         03/16/23  1207        Albumin 3.8       Alkaline Phosphatase 141       ALT 16       Anion Gap 11       AST 13       Baso # 0.11       Basophil % 1.2       BILIRUBIN TOTAL 0.2  Comment: For infants and newborns, interpretation of results should be based  on gestational age, weight and in agreement with clinical  observations.    Premature Infant recommended reference ranges:  Up to 24 hours.............<8.0 mg/dL  Up to 48 hours............<12.0 mg/dL  3-5 days..................<15.0 mg/dL  6-29 days.................<15.0 mg/dL         BUN 22       Calcium 8.7       Chloride 108       CO2 21       Creatinine 0.7       Differential Method Automated       eGFR >60       Eos # 0.2       Eosinophil % 2.4       Glucose 98       Gran # (ANC) 6.1       Gran % 66.2       Group & Rh A POS       Hematocrit 25.7       Hemoglobin 7.6       Immature Grans (Abs) 0.04  Comment: Mild elevation in immature granulocytes is non specific and   can be seen in a variety of conditions including stress response,   acute inflammation, trauma and pregnancy. Correlation with other   laboratory and clinical findings is essential.         Immature  Granulocytes 0.4       INDIRECT KAITLIN NEG       Lactate, Jonathon 1.8  Comment: Falsely low lactic acid results can be found in samples   containing >=13.0 mg/dL total bilirubin and/or >=3.5 mg/dL   direct bilirubin.         Lipase 10       Lymph # 2.2       Lymph % 23.9       MCH 27.9       MCHC 29.6       MCV 95       Mono # 0.5       Mono % 5.9       MPV 9.7       nRBC 0       Platelets 452       Potassium 3.6       PROTEIN TOTAL 6.8       RBC 2.72       RDW 16.7       Sodium 140       WBC 9.21               Significant Imaging:   X-Ray Chest 1 View   Final Result      No acute finding in the chest.  Known bilateral pulmonary nodules.         Electronically signed by: iWllie Pop   Date:    03/16/2023   Time:    12:05           Assessment/Plan:     Black tarry stools  - Hold Aleve PM   - PPI BID and carafate per GI recommendations  - Monitor bowel movement.  - NPO after midnight.  - Case dw Dr. Stevenson and LELA Hurtado.  Plan for scope tomorrow morning.      Sarcoidosis of lung with sarcoidosis of lymph nodes  - NO home meds  - Primary pulmonology: Dr. Cassidy.      Iron deficiency anemia  - Transfuse 1 unit of pRBC  - NO NSAIDs  - repeat in AM      SOB (shortness of breath)  - Likely related to sarcoidosis of the lung and exacerbated by her anemia.  - Patient is currently not on any home meds.      Rheumatoid arthritis involving multiple sites with positive rheumatoid factor  - On humara        VTE Risk Mitigation (From admission, onward)         Ordered     Place sequential compression device  Until discontinued         03/16/23 1525     IP VTE HIGH RISK PATIENT  Once         03/16/23 1525     Place sequential compression device  Until discontinued         03/16/23 1525                   On 03/16/2023, patient should be placed in hospital observation services under my care.        Cecelia Wallace MD  Department of Hospital Medicine  [facility]

## 2023-03-16 NOTE — CONSULTS
[facility] Dept[facility]  Gastroenterology  Consult Note    Patient Name: Tammy Aguero  MRN: [id number]  Admission Date: 3/16/2023  Hospital Length of Stay: 0 days  Code Status: Full Code   Attending Provider: Cecelia Wallace MD   Consulting Provider: Bryant Valdivia PA-C  Primary Care Physician: Maria Teresa Aparicio MD  Principal Problem:<principal problem not specified>    Inpatient consult to Gastroenterology  Consult performed by: Bryant Valdivia PA-C  Consult ordered by: Cecelia Wallace MD  Reason for consult: GI bleed        Subjective:     HPI:  The patient has a history of chronic iron deficiency anemia followed by [facility]. She was referred to the ER for a drop in Hgb from 12 in February after a transfusion to 7.6 today. The patient reports black stool today, nausea and vomiting x 2 and decreased appetite. She said it was overnight when she vomited, and dark so she didn't see what it looked like. The patient denies abdominal pain. She had an EGD, colonoscopy and VCE in the last month. They were unrevealing. The patient has been taking Aleve PM regularly since she went back to work a few weeks ago. Denies oral iron, Pepto or anticoagulation use.       Past Medical History:   Diagnosis Date    RA (rheumatoid arthritis)        Past Surgical History:   Procedure Laterality Date    COLONOSCOPY N/A 02/01/2023    Procedure: COLONOSCOPY;  Surgeon: Ángel Valdes MD;  Location: [facility];  Service: Gastroenterology;  Laterality: N/A;    ESOPHAGOGASTRODUODENOSCOPY N/A 02/01/2023    Procedure: EGD (ESOPHAGOGASTRODUODENOSCOPY);  Surgeon: Ángel Valdes MD;  Location: [facility];  Service: Gastroenterology;  Laterality: N/A;    INTRALUMINAL GASTROINTESTINAL TRACT IMAGING VIA CAPSULE N/A 3/1/2023    Procedure: IMAGING PROCEDURE, GI TRACT, INTRALUMINAL, VIA CAPSULE;  Surgeon: First Available Mckenna;  Location: [facility];  Service: Endoscopy;  Laterality: N/A;    Tubal Lig      TUBAL LIGATION          Review of patient's allergies indicates:  No Known Allergies  Family History       Problem Relation (Age of Onset)    Alcohol abuse Mother    Asthma Sister, Mother    Breast cancer Sister, Other    COPD Mother    Cancer Sister    Depression Mother    Diabetes Sister, Brother    Heart disease Mother          Tobacco Use    Smoking status: Some Days     Types: Vaping with nicotine     Start date: 1/1/2020     Passive exposure: Past    Smokeless tobacco: Never   Substance and Sexual Activity    Alcohol use: Not Currently     Comment: I drank when i was younger but can no longer take the smell    Drug use: Never     Types: Marijuana    Sexual activity: Not Currently     Partners: Male     Birth control/protection: See Surgical Hx, None     Review of Systems   Constitutional:  Negative for fever.   HENT:  Negative for hearing loss.    Eyes:  Negative for visual disturbance.   Respiratory:  Positive for shortness of breath. Negative for cough.    Cardiovascular:  Negative for chest pain.   Gastrointestinal:         As per HPI.   Genitourinary:  Negative for dysuria, frequency and hematuria.   Musculoskeletal:  Positive for back pain. Negative for arthralgias.   Skin:  Negative for rash.   Neurological:  Positive for weakness. Negative for seizures, syncope, numbness and headaches.   Hematological:  Does not bruise/bleed easily.   Psychiatric/Behavioral:  The patient is not nervous/anxious.    Objective:     Vital Signs (Most Recent):  Temp: 98 °F (36.7 °C) (03/16/23 1132)  Pulse: 85 (03/16/23 1400)  Resp: 18 (03/16/23 1400)  BP: 101/73 (03/16/23 1400)  SpO2: 100 % (03/16/23 1400) Vital Signs (24h Range):  Temp:  [98 °F (36.7 °C)] 98 °F (36.7 °C)  Pulse:  [] 85  Resp:  [18-20] 18  SpO2:  [100 %] 100 %  BP: (101-124)/(52-74) 101/73     Weight: 68.9 kg (151 lb 14.4 oz) (03/16/23 1132)  Body mass index is 23.79 kg/m².    No intake or output data in the 24 hours ending 03/16/23 1526    Lines/Drains/Airways        None                   Physical Exam  Constitutional:       General: She is not in acute distress.     Appearance: Normal appearance. She is well-developed.   HENT:      Head: Normocephalic and atraumatic.   Eyes:      Extraocular Movements: Extraocular movements intact.   Cardiovascular:      Rate and Rhythm: Normal rate and regular rhythm.      Heart sounds: No murmur heard.  Pulmonary:      Effort: Pulmonary effort is normal. No respiratory distress.      Breath sounds: Normal breath sounds. No wheezing.   Abdominal:      General: Bowel sounds are normal. There is no distension.      Palpations: Abdomen is soft. There is no mass.      Tenderness: There is no abdominal tenderness.   Musculoskeletal:      Cervical back: Normal range of motion and neck supple.      Right lower leg: No edema.      Left lower leg: No edema.   Skin:     General: Skin is warm and dry.      Findings: No rash.   Neurological:      Mental Status: She is alert and oriented to person, place, and time.      Cranial Nerves: No cranial nerve deficit.   Psychiatric:         Behavior: Behavior normal.       Significant Labs:  CBC:   Recent Labs   Lab 03/16/23  1207   WBC 9.21   HGB 7.6*   HCT 25.7*   *     CMP:   Recent Labs   Lab 03/16/23  1207   GLU 98   CALCIUM 8.7   ALBUMIN 3.8   PROT 6.8      K 3.6   CO2 21*      BUN 22*   CREATININE 0.7   ALKPHOS 141*   ALT 16   AST 13   BILITOT 0.2     Coagulation: No results for input(s): PT, INR, APTT in the last 48 hours.    Significant Imaging:  Imaging results within the past 24 hours have been reviewed.    Assessment/Plan:     Pulmonary  SOB (shortness of breath)  Likely secondary to anemia. CXR without acute findings.     Oncology  Iron deficiency anemia  Melena with decreased Hgb while increased frequency of NSAID use.   Will plan on EGD tomorrow.   Ok to eat today and NPO after midnight.   Protonix IV bid.   Monitor H/H and transfuse as indicated.         Thank you for your  consult. I will follow-up with patient. Please contact us if you have any additional questions.    Bryant Valdivia PA-C  Gastroenterology  [facility]

## 2023-03-16 NOTE — ASSESSMENT & PLAN NOTE
- Hold Aleve PM   - PPI BID and carafate per GI recommendations  - Monitor bowel movement.  - NPO after midnight.  - Case dw Dr. Stevenson and LELA Hurtado.  Plan for scope tomorrow morning.

## 2023-03-16 NOTE — SUBJECTIVE & OBJECTIVE
Past Medical History:   Diagnosis Date    RA (rheumatoid arthritis)        Past Surgical History:   Procedure Laterality Date    COLONOSCOPY N/A 2023    Procedure: COLONOSCOPY;  Surgeon: Ángel Valdes MD;  Location: [facility];  Service: Gastroenterology;  Laterality: N/A;    ESOPHAGOGASTRODUODENOSCOPY N/A 2023    Procedure: EGD (ESOPHAGOGASTRODUODENOSCOPY);  Surgeon: Ángel Valdes MD;  Location: [facility];  Service: Gastroenterology;  Laterality: N/A;    INTRALUMINAL GASTROINTESTINAL TRACT IMAGING VIA CAPSULE N/A 3/1/2023    Procedure: IMAGING PROCEDURE, GI TRACT, INTRALUMINAL, VIA CAPSULE;  Surgeon: First Available Galliano;  Location: [facility] ENDO;  Service: Endoscopy;  Laterality: N/A;    Tubal Lig      TUBAL LIGATION         Review of patient's allergies indicates:  No Known Allergies    No current facility-administered medications on file prior to encounter.     Current Outpatient Medications on File Prior to Encounter   Medication Sig    adalimumab (HUMIRA,CF, PEN) 40 mg/0.4 mL PnKt Inject 0.4 mLs (40 mg total) into the skin every 14 (fourteen) days.    diphenhydrAMINE-acetaminophen (TYLENOL PM)  mg Tab Take 1 tablet by mouth nightly as needed.    [DISCONTINUED] albuterol (PROVENTIL) 2.5 mg /3 mL (0.083 %) nebulizer solution Take 3 mLs (2.5 mg total) by nebulization every 4 to 6 hours as needed for Wheezing or Shortness of Breath.    [DISCONTINUED] albuterol (PROVENTIL/VENTOLIN HFA) 90 mcg/actuation inhaler Inhale 2 puffs into the lungs every 4 (four) hours as needed for Wheezing or Shortness of Breath.    [DISCONTINUED] albuterol-ipratropium (DUO-NEB) 2.5 mg-0.5 mg/3 mL nebulizer solution SMARTSI Ampule(s) Via Nebulizer Every 6 Hours PRN    [DISCONTINUED] predniSONE (DELTASONE) 20 MG tablet Take 1 tablet (20 mg total) by mouth once daily.     Family History       Problem Relation (Age of Onset)    Alcohol abuse Mother    Asthma Sister, Mother    Breast cancer Sister, Other     COPD Mother    Cancer Sister    Depression Mother    Diabetes Sister, Brother    Heart disease Mother          Tobacco Use    Smoking status: Some Days     Types: Vaping with nicotine     Start date: 1/1/2020     Passive exposure: Past    Smokeless tobacco: Never   Substance and Sexual Activity    Alcohol use: Not Currently     Comment: I drank when i was younger but can no longer take the smell    Drug use: Never     Types: Marijuana    Sexual activity: Not Currently     Partners: Male     Birth control/protection: See Surgical Hx, None     Review of Systems   Constitutional:  Positive for fatigue. Negative for activity change and appetite change.   Respiratory:  Positive for shortness of breath. Negative for cough and chest tightness.    Cardiovascular:  Negative for chest pain, palpitations and leg swelling.   Gastrointestinal:  Positive for blood in stool. Negative for abdominal distention, abdominal pain, constipation, diarrhea, nausea, rectal pain and vomiting.   Musculoskeletal:  Negative for gait problem.   Neurological:  Negative for dizziness, weakness, light-headedness and headaches.   Psychiatric/Behavioral:  Negative for agitation and confusion. The patient is not nervous/anxious.    All other systems reviewed and are negative.  Objective:     Vital Signs (Most Recent):  Temp: 98 °F (36.7 °C) (03/16/23 1132)  Pulse: 104 (03/16/23 1607)  Resp: 18 (03/16/23 1400)  BP: (!) 111/57 (03/16/23 1607)  SpO2: 100 % (03/16/23 1400)   Vital Signs (24h Range):  Temp:  [98 °F (36.7 °C)] 98 °F (36.7 °C)  Pulse:  [] 104  Resp:  [18-20] 18  SpO2:  [100 %] 100 %  BP: (101-124)/(52-74) 111/57     Weight: 68.9 kg (151 lb 14.4 oz)  Body mass index is 23.79 kg/m².    Physical Exam  Vitals and nursing note reviewed.   Constitutional:       Appearance: Normal appearance.   HENT:      Head: Normocephalic and atraumatic.      Nose: Nose normal.      Mouth/Throat:      Mouth: Mucous membranes are moist.   Eyes:       Extraocular Movements: Extraocular movements intact.      Conjunctiva/sclera: Conjunctivae normal.   Cardiovascular:      Rate and Rhythm: Regular rhythm. Tachycardia present.      Pulses: Normal pulses.      Heart sounds: Normal heart sounds.   Pulmonary:      Effort: Pulmonary effort is normal.      Breath sounds: Normal breath sounds.   Abdominal:      General: Abdomen is flat. Bowel sounds are normal. There is no distension.      Palpations: Abdomen is soft.      Tenderness: There is no abdominal tenderness. There is no guarding.   Musculoskeletal:         General: Normal range of motion.      Cervical back: Normal range of motion and neck supple.   Skin:     General: Skin is warm.      Capillary Refill: Capillary refill takes less than 2 seconds.      Coloration: Skin is pale.   Neurological:      Mental Status: She is alert and oriented to person, place, and time. Mental status is at baseline.   Psychiatric:         Mood and Affect: Mood normal.         Behavior: Behavior normal.           Significant Labs: All pertinent labs within the past 24 hours have been reviewed.  Recent Lab Results         03/16/23  1207        Albumin 3.8       Alkaline Phosphatase 141       ALT 16       Anion Gap 11       AST 13       Baso # 0.11       Basophil % 1.2       BILIRUBIN TOTAL 0.2  Comment: For infants and newborns, interpretation of results should be based  on gestational age, weight and in agreement with clinical  observations.    Premature Infant recommended reference ranges:  Up to 24 hours.............<8.0 mg/dL  Up to 48 hours............<12.0 mg/dL  3-5 days..................<15.0 mg/dL  6-29 days.................<15.0 mg/dL         BUN 22       Calcium 8.7       Chloride 108       CO2 21       Creatinine 0.7       Differential Method Automated       eGFR >60       Eos # 0.2       Eosinophil % 2.4       Glucose 98       Gran # (ANC) 6.1       Gran % 66.2       Group & Rh A POS       Hematocrit 25.7       Hemoglobin  7.6       Immature Grans (Abs) 0.04  Comment: Mild elevation in immature granulocytes is non specific and   can be seen in a variety of conditions including stress response,   acute inflammation, trauma and pregnancy. Correlation with other   laboratory and clinical findings is essential.         Immature Granulocytes 0.4       INDIRECT KAITLIN NEG       Lactate, Jonathon 1.8  Comment: Falsely low lactic acid results can be found in samples   containing >=13.0 mg/dL total bilirubin and/or >=3.5 mg/dL   direct bilirubin.         Lipase 10       Lymph # 2.2       Lymph % 23.9       MCH 27.9       MCHC 29.6       MCV 95       Mono # 0.5       Mono % 5.9       MPV 9.7       nRBC 0       Platelets 452       Potassium 3.6       PROTEIN TOTAL 6.8       RBC 2.72       RDW 16.7       Sodium 140       WBC 9.21               Significant Imaging:   X-Ray Chest 1 View   Final Result      No acute finding in the chest.  Known bilateral pulmonary nodules.         Electronically signed by: Willie Pop   Date:    03/16/2023   Time:    12:05

## 2023-03-17 ENCOUNTER — DOCUMENTATION ONLY (OUTPATIENT)
Dept: GASTROENTEROLOGY | Facility: HOSPITAL | Age: 53
End: 2023-03-17
Payer: COMMERCIAL

## 2023-03-17 ENCOUNTER — CLINICAL SUPPORT (OUTPATIENT)
Dept: SMOKING CESSATION | Facility: CLINIC | Age: 53
End: 2023-03-17

## 2023-03-17 ENCOUNTER — ANESTHESIA EVENT (OUTPATIENT)
Dept: ENDOSCOPY | Facility: HOSPITAL | Age: 53
End: 2023-03-17
Payer: COMMERCIAL

## 2023-03-17 ENCOUNTER — ANESTHESIA (OUTPATIENT)
Dept: ENDOSCOPY | Facility: HOSPITAL | Age: 53
End: 2023-03-17
Payer: COMMERCIAL

## 2023-03-17 DIAGNOSIS — F17.200 NICOTINE DEPENDENCE: Primary | ICD-10-CM

## 2023-03-17 PROBLEM — K25.0 ACUTE GASTRIC ULCER WITH HEMORRHAGE: Status: ACTIVE | Noted: 2023-03-17

## 2023-03-17 PROBLEM — K92.1 BLACK TARRY STOOLS: Status: RESOLVED | Noted: 2023-03-16 | Resolved: 2023-03-17

## 2023-03-17 LAB
ANION GAP SERPL CALC-SCNC: 7 MMOL/L (ref 8–16)
BASOPHILS # BLD AUTO: 0.11 K/UL (ref 0–0.2)
BASOPHILS NFR BLD: 1.5 % (ref 0–1.9)
BUN SERPL-MCNC: 14 MG/DL (ref 6–20)
CALCIUM SERPL-MCNC: 8.7 MG/DL (ref 8.7–10.5)
CHLORIDE SERPL-SCNC: 111 MMOL/L (ref 95–110)
CO2 SERPL-SCNC: 23 MMOL/L (ref 23–29)
CREAT SERPL-MCNC: 0.7 MG/DL (ref 0.5–1.4)
DIFFERENTIAL METHOD: ABNORMAL
EOSINOPHIL # BLD AUTO: 0.3 K/UL (ref 0–0.5)
EOSINOPHIL NFR BLD: 3.6 % (ref 0–8)
ERYTHROCYTE [DISTWIDTH] IN BLOOD BY AUTOMATED COUNT: 16.2 % (ref 11.5–14.5)
EST. GFR  (NO RACE VARIABLE): >60 ML/MIN/1.73 M^2
GIANT PLATELETS BLD QL SMEAR: PRESENT
GLUCOSE SERPL-MCNC: 88 MG/DL (ref 70–110)
HCT VFR BLD AUTO: 26.6 % (ref 37–48.5)
HGB BLD-MCNC: 8.2 G/DL (ref 12–16)
IMM GRANULOCYTES # BLD AUTO: 0.06 K/UL (ref 0–0.04)
IMM GRANULOCYTES NFR BLD AUTO: 0.8 % (ref 0–0.5)
LYMPHOCYTES # BLD AUTO: 2.2 K/UL (ref 1–4.8)
LYMPHOCYTES NFR BLD: 29.2 % (ref 18–48)
MCH RBC QN AUTO: 29.2 PG (ref 27–31)
MCHC RBC AUTO-ENTMCNC: 30.8 G/DL (ref 32–36)
MCV RBC AUTO: 95 FL (ref 82–98)
MONOCYTES # BLD AUTO: 0.5 K/UL (ref 0.3–1)
MONOCYTES NFR BLD: 6.1 % (ref 4–15)
NEUTROPHILS # BLD AUTO: 4.4 K/UL (ref 1.8–7.7)
NEUTROPHILS NFR BLD: 58.8 % (ref 38–73)
NRBC BLD-RTO: 0 /100 WBC
PLATELET # BLD AUTO: 364 K/UL (ref 150–450)
PMV BLD AUTO: 9.7 FL (ref 9.2–12.9)
POLYCHROMASIA BLD QL SMEAR: ABNORMAL
POTASSIUM SERPL-SCNC: 4 MMOL/L (ref 3.5–5.1)
RBC # BLD AUTO: 2.81 M/UL (ref 4–5.4)
SODIUM SERPL-SCNC: 141 MMOL/L (ref 136–145)
TARGETS BLD QL SMEAR: ABNORMAL
WBC # BLD AUTO: 7.4 K/UL (ref 3.9–12.7)

## 2023-03-17 PROCEDURE — 80048 BASIC METABOLIC PNL TOTAL CA: CPT | Performed by: FAMILY MEDICINE

## 2023-03-17 PROCEDURE — 43239 EGD BIOPSY SINGLE/MULTIPLE: CPT | Mod: ,,, | Performed by: INTERNAL MEDICINE

## 2023-03-17 PROCEDURE — 63600175 PHARM REV CODE 636 W HCPCS: Performed by: FAMILY MEDICINE

## 2023-03-17 PROCEDURE — 99406 PT REFUSED TOBACCO CESSATION: ICD-10-PCS | Mod: S$GLB,,,

## 2023-03-17 PROCEDURE — 36415 COLL VENOUS BLD VENIPUNCTURE: CPT | Performed by: FAMILY MEDICINE

## 2023-03-17 PROCEDURE — 96376 TX/PRO/DX INJ SAME DRUG ADON: CPT

## 2023-03-17 PROCEDURE — 63600175 PHARM REV CODE 636 W HCPCS: Performed by: INTERNAL MEDICINE

## 2023-03-17 PROCEDURE — 88342 IMHCHEM/IMCYTCHM 1ST ANTB: CPT | Performed by: PATHOLOGY

## 2023-03-17 PROCEDURE — 27201012 HC FORCEPS, HOT/COLD, DISP: Performed by: INTERNAL MEDICINE

## 2023-03-17 PROCEDURE — 88342 IMHCHEM/IMCYTCHM 1ST ANTB: CPT | Mod: 26,,, | Performed by: PATHOLOGY

## 2023-03-17 PROCEDURE — 25000003 PHARM REV CODE 250: Performed by: FAMILY MEDICINE

## 2023-03-17 PROCEDURE — G0378 HOSPITAL OBSERVATION PER HR: HCPCS

## 2023-03-17 PROCEDURE — 85025 COMPLETE CBC W/AUTO DIFF WBC: CPT | Performed by: FAMILY MEDICINE

## 2023-03-17 PROCEDURE — 37000008 HC ANESTHESIA 1ST 15 MINUTES: Performed by: INTERNAL MEDICINE

## 2023-03-17 PROCEDURE — 25000003 PHARM REV CODE 250: Performed by: NURSE ANESTHETIST, CERTIFIED REGISTERED

## 2023-03-17 PROCEDURE — 88305 TISSUE EXAM BY PATHOLOGIST: ICD-10-PCS | Mod: 26,,, | Performed by: PATHOLOGY

## 2023-03-17 PROCEDURE — C9113 INJ PANTOPRAZOLE SODIUM, VIA: HCPCS | Performed by: INTERNAL MEDICINE

## 2023-03-17 PROCEDURE — 37000009 HC ANESTHESIA EA ADD 15 MINS: Performed by: INTERNAL MEDICINE

## 2023-03-17 PROCEDURE — 99406 BEHAV CHNG SMOKING 3-10 MIN: CPT | Mod: S$GLB,,,

## 2023-03-17 PROCEDURE — 25000003 PHARM REV CODE 250: Performed by: INTERNAL MEDICINE

## 2023-03-17 PROCEDURE — 88305 TISSUE EXAM BY PATHOLOGIST: CPT | Mod: 26,,, | Performed by: PATHOLOGY

## 2023-03-17 PROCEDURE — 63600175 PHARM REV CODE 636 W HCPCS: Performed by: NURSE ANESTHETIST, CERTIFIED REGISTERED

## 2023-03-17 PROCEDURE — 43239 PR EGD, FLEX, W/BIOPSY, SGL/MULTI: ICD-10-PCS | Mod: ,,, | Performed by: INTERNAL MEDICINE

## 2023-03-17 PROCEDURE — 43239 EGD BIOPSY SINGLE/MULTIPLE: CPT | Performed by: INTERNAL MEDICINE

## 2023-03-17 PROCEDURE — 88342 CHG IMMUNOCYTOCHEMISTRY: ICD-10-PCS | Mod: 26,,, | Performed by: PATHOLOGY

## 2023-03-17 PROCEDURE — C9113 INJ PANTOPRAZOLE SODIUM, VIA: HCPCS | Performed by: FAMILY MEDICINE

## 2023-03-17 PROCEDURE — 88305 TISSUE EXAM BY PATHOLOGIST: CPT | Performed by: PATHOLOGY

## 2023-03-17 RX ORDER — DEXTROMETHORPHAN/PSEUDOEPHED 2.5-7.5/.8
DROPS ORAL
Status: DISCONTINUED | OUTPATIENT
Start: 2023-03-17 | End: 2023-03-17 | Stop reason: HOSPADM

## 2023-03-17 RX ORDER — LIDOCAINE HYDROCHLORIDE 10 MG/ML
INJECTION, SOLUTION EPIDURAL; INFILTRATION; INTRACAUDAL; PERINEURAL
Status: DISCONTINUED | OUTPATIENT
Start: 2023-03-17 | End: 2023-03-17

## 2023-03-17 RX ORDER — PROPOFOL 10 MG/ML
VIAL (ML) INTRAVENOUS
Status: DISCONTINUED | OUTPATIENT
Start: 2023-03-17 | End: 2023-03-17

## 2023-03-17 RX ORDER — SUCRALFATE 1 G/10ML
1 SUSPENSION ORAL
Status: DISCONTINUED | OUTPATIENT
Start: 2023-03-17 | End: 2023-03-18 | Stop reason: HOSPADM

## 2023-03-17 RX ADMIN — PROPOFOL 100 MG: 10 INJECTION, EMULSION INTRAVENOUS at 01:03

## 2023-03-17 RX ADMIN — SUCRALFATE 1 G: 1 TABLET ORAL at 12:03

## 2023-03-17 RX ADMIN — LIDOCAINE HYDROCHLORIDE 50 MG: 10 INJECTION, SOLUTION EPIDURAL; INFILTRATION; INTRACAUDAL; PERINEURAL at 01:03

## 2023-03-17 RX ADMIN — PANTOPRAZOLE SODIUM 40 MG: 40 INJECTION, POWDER, FOR SOLUTION INTRAVENOUS at 09:03

## 2023-03-17 RX ADMIN — SODIUM CHLORIDE, POTASSIUM CHLORIDE, SODIUM LACTATE AND CALCIUM CHLORIDE: 600; 310; 30; 20 INJECTION, SOLUTION INTRAVENOUS at 01:03

## 2023-03-17 RX ADMIN — PROPOFOL 50 MG: 10 INJECTION, EMULSION INTRAVENOUS at 01:03

## 2023-03-17 RX ADMIN — PANTOPRAZOLE SODIUM 40 MG: 40 INJECTION, POWDER, FOR SOLUTION INTRAVENOUS at 10:03

## 2023-03-17 RX ADMIN — SUCRALFATE 1 G: 1 SUSPENSION ORAL at 04:03

## 2023-03-17 RX ADMIN — SUCRALFATE 1 G: 1 TABLET ORAL at 05:03

## 2023-03-17 RX ADMIN — SUCRALFATE 1 G: 1 SUSPENSION ORAL at 09:03

## 2023-03-17 NOTE — PLAN OF CARE
Dr. Stevenson at bedside to discuss results of test with patient. Report called to Tyson BRYAN. No distress noted. Ready to transfer back to room.

## 2023-03-17 NOTE — PROGRESS NOTES
Pt states that she quit smoking cigarettes 1 year ago, and stopped using the vape 6 months ago.

## 2023-03-17 NOTE — PROGRESS NOTES
Please schedule a hosp follow up with me 03/28/22. Dx: JACKELYN, gastric ulcer.  Needs repeat EGD in two months.   Thanks  Bryant

## 2023-03-17 NOTE — PLAN OF CARE
Transferred to room 221 via wheelchair, tolerated well. Bed in locked and in lowest position with top two side rails up, and call light within reach. Agrees to call for assistance or any complaints. Family at bedside. María notified that patient has returned to room.

## 2023-03-17 NOTE — HOSPITAL COURSE
53 y/o female admitted after having black stools for a couple of days and black tarry stool the morning of admission. She has been having some SOB when ambulating and having more fatigue.   She has seen Dr. Valdes a few months ago, had an EGD showing some gastritis.  Hgb 7.5 on admission, given 1 unit PRBCs. She does admit to taking Aleve PM regularly for joint pain. H&H improved on discharge, 7.7 and 24.9 on the day of discharge.    GI was consulted, Dr. Stevenson, repeated EGD 3/17/23 revealing an acute gastric ulcer in the fundus, biopsies taken and are pending. She was started on Carafate QID x 14 days and PPI BID on discharge. Prescriptions sent to her pharmacy on file. She was instructed to avoid NSAIDs and only take tylenol products.   Will need to follow up with GI and repeat EGD in 2 months. GI wants her to follow up on 3/28/23, will need to call office for time.    Patient denies any further tarry stools or abdominal pain and tolerating a diet. Discussed discharge plan with Dr. Stevenson prior to discharge.    Follow up with PCP in 2-3 days for repeat labs and hospital follow up.   Follow up with Dr. Aervalo 1-2 weeks JACKELYN.     Patient seen and examined on the day of discharge.  All questions and concerns were addressed prior to discharge.    Face to face encounter with patient: 20 minutes

## 2023-03-17 NOTE — ASSESSMENT & PLAN NOTE
-Likely related to sarcoidosis of the lung and exacerbated by her anemia  -Patient is currently not on any home meds

## 2023-03-17 NOTE — SUBJECTIVE & OBJECTIVE
Interval History: awake, alert, lying in bed, reports having a headache from being NPO. Patient was seen prior to EGD. C/o mid epigastric pain. Will monitor overnight and d/c in am if stable.    Review of Systems   Constitutional:  Negative for fatigue and fever.   Respiratory:  Negative for cough and shortness of breath.    Cardiovascular:  Negative for chest pain (epigastric pain) and palpitations.   Gastrointestinal:  Negative for nausea and vomiting.   Neurological:  Positive for headaches.   All other systems reviewed and are negative.  Objective:     Vital Signs (Most Recent):  Temp: 98.1 °F (36.7 °C) (03/17/23 1359)  Pulse: 66 (03/17/23 1419)  Resp: 17 (03/17/23 1419)  BP: 90/63 (03/17/23 1419)  SpO2: 100 % (03/17/23 1419) Vital Signs (24h Range):  Temp:  [97.6 °F (36.4 °C)-98.9 °F (37.2 °C)] 98.1 °F (36.7 °C)  Pulse:  [] 66  Resp:  [15-22] 17  SpO2:  [98 %-100 %] 100 %  BP: ()/(55-65) 90/63     Weight: 68.9 kg (151 lb 14.4 oz)  Body mass index is 23.79 kg/m².    Intake/Output Summary (Last 24 hours) at 3/17/2023 1437  Last data filed at 3/17/2023 1416  Gross per 24 hour   Intake 920 ml   Output --   Net 920 ml      Physical Exam  Vitals and nursing note reviewed.   Constitutional:       Appearance: Normal appearance.   Cardiovascular:      Rate and Rhythm: Normal rate and regular rhythm.      Heart sounds: No murmur heard.  Pulmonary:      Effort: Pulmonary effort is normal.      Breath sounds: Normal breath sounds.   Abdominal:      General: Bowel sounds are normal.      Palpations: Abdomen is soft.   Musculoskeletal:         General: Normal range of motion.   Skin:     General: Skin is warm and dry.   Neurological:      General: No focal deficit present.      Mental Status: She is alert and oriented to person, place, and time.   Psychiatric:         Mood and Affect: Mood normal.         Behavior: Behavior normal.       Significant Labs: All pertinent labs within the past 24 hours have been  reviewed.  CBC:   Recent Labs   Lab 03/16/23  1207 03/17/23  0507   WBC 9.21 7.40   HGB 7.6* 8.2*   HCT 25.7* 26.6*   * 364     CMP:   Recent Labs   Lab 03/16/23  1207 03/17/23  0507    141   K 3.6 4.0    111*   CO2 21* 23   GLU 98 88   BUN 22* 14   CREATININE 0.7 0.7   CALCIUM 8.7 8.7   PROT 6.8  --    ALBUMIN 3.8  --    BILITOT 0.2  --    ALKPHOS 141*  --    AST 13  --    ALT 16  --    ANIONGAP 11 7*       Significant Imaging: I have reviewed all pertinent imaging results/findings within the past 24 hours.

## 2023-03-17 NOTE — ANESTHESIA PREPROCEDURE EVALUATION
03/17/2023  Tammy Aguero is a 52 y.o., female.      Pre-op Assessment    I have reviewed the Patient Summary Reports.     I have reviewed the Nursing Notes. I have reviewed the NPO Status.   I have reviewed the Medications.     Review of Systems  Anesthesia Hx:  No problems with previous Anesthesia  Denies Family Hx of Anesthesia complications.   Denies Personal Hx of Anesthesia complications.   Social:  Smoker, No Alcohol Use    Hematology/Oncology:     Oncology Normal    -- Anemia:   Cardiovascular:   Denies Hypertension.  Denies MI.   Denies CABG/stent.   Denies CHF. ECG has been reviewed. ekg 11/2022:  Normal sinus rhythm 82  Normal ECG   No previous ECGs available    Echo 12/2022:  ? The left ventricle is normal in size with normal systolic function.  ? Normal left ventricular diastolic function.  ? Normal right ventricular size with normal right ventricular systolic function.  ? The estimated ejection fraction is 60-65%.   Pulmonary:   Denies COPD.  Denies Asthma. Shortness of breath  Denies Sleep Apnea. Pulmonary nodules    Sarcoidosis of lung with sarcoidosis of lymph nodes   Renal/:  Renal/ Normal     Hepatic/GI:   Denies GERD. Denies Liver Disease.  Denies Hepatitis.    Musculoskeletal:   RA (rheumatoid arthritis   Neurological:   Denies CVA. Denies Seizures.    Endocrine:   Denies Diabetes. Denies Hypothyroidism. Denies Hyperthyroidism.        Physical Exam  General: Well nourished    Airway:  Mallampati: II   Mouth Opening: Normal    Dental:  Intact    Chest/Lungs:  Clear to auscultation, Normal Respiratory Rate    Heart:  Rate: Normal  Rhythm: Regular Rhythm        Anesthesia Plan  Type of Anesthesia, risks & benefits discussed:    Anesthesia Type: MAC  Intra-op Monitoring Plan: Standard ASA Monitors  Induction:  IV  Informed Consent: Informed consent signed with the Patient and all  parties understand the risks and agree with anesthesia plan.  All questions answered.   ASA Score: 3  Day of Surgery Review of History & Physical: H&P Update referred to the surgeon/provider.    Ready For Surgery From Anesthesia Perspective.     .

## 2023-03-17 NOTE — INTERVAL H&P NOTE
The patient has been examined and the H&P has been reviewed:    I concur with the findings and changes have been noted since the H&P was written: received a unit of blood overnight and had no issues. No more melena. H&H responded appropriately 8.2/26.6. BUN has normalized.     Anesthesia/Surgery risks, benefits and alternative options discussed and understood by patient/family.      The risks, benefits and alternatives of the procedure were discussed with the patient in detail. This discussion was had in the presence of endoscopy staff. The risks include, risks of adverse reaction to sedation requiring the use of reversal agents, bleeding requiring blood transfusion, perforation requiring surgical intervention and technical failure. Other risks include aspiration leading to respiratory distress and respiratory failure resulting in endotracheal intubation and mechanical ventilation including death. If anesthesia is being utilized for this procedure, it is up to the anesthesiologist to determine airway safety including elective endotracheal intubation. Questions were answered, they agree to proceed. There was no language barriers.      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  Pending CPFT next week  Former 32 pack year smoker. Quit 2020 12/9/2022 6MWD O2 sats 100% during walk, No desaturations requiring supplemental oxygen at rest or exertion.  12/9/2022 FeNO 13, no inflammation of lung suggested  No wheezing, no cough, no mucous.   Chest xray 12/9/2022 no acute findings, prior pul nodule unchanged.  Suspect anxiety about medical condition in part.   Keep follow up with Dr. Cassidy for continued work up and CPFT  evaluate for COPD in smoker.  No indication to add on ICS.  Continue Albuterol inhaler if needed  Has franciscock and pred per dr quarles began yesterday okay to complete            Rheumatoid arthritis involving multiple sites with positive rheumatoid factor [M05.79]  Yes      Resolved Hospital Problems   No resolved problems to display.

## 2023-03-17 NOTE — ASSESSMENT & PLAN NOTE
-confirmed by EGD 3/17/23, Dr. Stevenson  -black tarry stools PTA, no further BM since admission  -cont Carafate suspension AC/HS, cont PPI BID on discharge  -will need follow up with GI and repeat EGD in 2 months   -avoid NSAIDs

## 2023-03-17 NOTE — ASSESSMENT & PLAN NOTE
-Patient's anemia is currently uncontrolled  -Has recieved 1 units of PRBCs on 3/16/23. Etiology likely d/t JACKELYN  -Current CBC reviewed-   Lab Results   Component Value Date    HGB 8.2 (L) 03/17/2023    HCT 26.6 (L) 03/17/2023     -Monitor serial CBC and transfuse if patient becomes hemodynamically unstable, symptomatic or H/H drops below 7.0/21.0  -follows Dr. Arevalo OP

## 2023-03-17 NOTE — PROGRESS NOTES
[facility])  [facility]  Progress Note    Patient Name: aTmmy Aguero  MRN: [id number]  Patient Class: OP- Observation   Admission Date: 3/16/2023  Length of Stay: 0 days  Attending Physician: Rhiannon Ribeiro MD  Primary Care Provider: Maria Teresa Aparicio MD        Subjective:     Principal Problem:Acute gastric ulcer with hemorrhage      HPI:  Ms. Aguero is a 53 yo female with hx of RA and sarcoidosis of the lung presented with black tarry stool this morning, this had progressed from dark stool over the last few days.  Patient states she slept most of the day on Tuesday and Wednesday but noted some shortness of breathe when ambulating on Wednesday.  She did not this much of it as she thought it was due to her being tired.  This morning she went to work and noted she was so dyspneic when going from her car to inside the building.  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  She proceeded to the hospital.  Of note, patient was recently scoped by Dr. Valdes and noted to have some gastritis.  Given her history of black stool and hbg 7.5, she may rescope if hbg drops.  Patient has been taking Aleve PM regularly which may contribute to the bleeding in conjunction with the gastritis.  Hospital medicine will admit for observation and repeat labs in the AM.  NPO after midnight for possible scope.        Overview/Hospital Course:  51 y/o female admitted after having black stools for a couple of days and black tarry stool the morning of admission. She has been having some SOB when ambulating and having more fatigue.   She has seen Dr. Valdes a few months ago, had an EGD showing some gastritis.  Hgb 7.5 on admission, given 1 unit PRBCs. She does admit to taking Aleve PM regularly.   GI was consulted, Dr. Stevenson, repeated EGD 3/17/23 revealing an acute gastric ulcer in the fundus, biopsies taken and are pending. She was started on Carafate, can change to PPI  BID on discharge. Will need to repeat EGD in 2 months.       Interval History: awake, alert, lying in bed, reports having a headache from being NPO. Patient was seen prior to EGD. C/o mid epigastric pain. Will monitor overnight and d/c in am if stable.    Review of Systems   Constitutional:  Negative for fatigue and fever.   Respiratory:  Negative for cough and shortness of breath.    Cardiovascular:  Negative for chest pain (epigastric pain) and palpitations.   Gastrointestinal:  Negative for nausea and vomiting.   Neurological:  Positive for headaches.   All other systems reviewed and are negative.  Objective:     Vital Signs (Most Recent):  Temp: 98.1 °F (36.7 °C) (03/17/23 1359)  Pulse: 66 (03/17/23 1419)  Resp: 17 (03/17/23 1419)  BP: 90/63 (03/17/23 1419)  SpO2: 100 % (03/17/23 1419) Vital Signs (24h Range):  Temp:  [97.6 °F (36.4 °C)-98.9 °F (37.2 °C)] 98.1 °F (36.7 °C)  Pulse:  [] 66  Resp:  [15-22] 17  SpO2:  [98 %-100 %] 100 %  BP: ()/(55-65) 90/63     Weight: 68.9 kg (151 lb 14.4 oz)  Body mass index is 23.79 kg/m².    Intake/Output Summary (Last 24 hours) at 3/17/2023 1437  Last data filed at 3/17/2023 1416  Gross per 24 hour   Intake 920 ml   Output --   Net 920 ml      Physical Exam  Vitals and nursing note reviewed.   Constitutional:       Appearance: Normal appearance.   Cardiovascular:      Rate and Rhythm: Normal rate and regular rhythm.      Heart sounds: No murmur heard.  Pulmonary:      Effort: Pulmonary effort is normal.      Breath sounds: Normal breath sounds.   Abdominal:      General: Bowel sounds are normal.      Palpations: Abdomen is soft.   Musculoskeletal:         General: Normal range of motion.   Skin:     General: Skin is warm and dry.   Neurological:      General: No focal deficit present.      Mental Status: She is alert and oriented to person, place, and time.   Psychiatric:         Mood and Affect: Mood normal.         Behavior: Behavior normal.       Significant  Labs: All pertinent labs within the past 24 hours have been reviewed.  CBC:   Recent Labs   Lab 03/16/23  1207 03/17/23  0507   WBC 9.21 7.40   HGB 7.6* 8.2*   HCT 25.7* 26.6*   * 364     CMP:   Recent Labs   Lab 03/16/23  1207 03/17/23  0507    141   K 3.6 4.0    111*   CO2 21* 23   GLU 98 88   BUN 22* 14   CREATININE 0.7 0.7   CALCIUM 8.7 8.7   PROT 6.8  --    ALBUMIN 3.8  --    BILITOT 0.2  --    ALKPHOS 141*  --    AST 13  --    ALT 16  --    ANIONGAP 11 7*       Significant Imaging: I have reviewed all pertinent imaging results/findings within the past 24 hours.      Assessment/Plan:      * Acute gastric ulcer with hemorrhage  -confirmed by EGD 3/17/23, Dr. Stevenson  -black tarry stools PTA, no further BM since admission  -cont Carafate suspension AC/HS, cont PPI BID on discharge  -will need follow up with GI and repeat EGD in 2 months   -avoid NSAIDs    Iron deficiency anemia  -Patient's anemia is currently uncontrolled  -Has recieved 1 units of PRBCs on 3/16/23. Etiology likely d/t JACKELYN  -Current CBC reviewed-   Lab Results   Component Value Date    HGB 8.2 (L) 03/17/2023    HCT 26.6 (L) 03/17/2023     -Monitor serial CBC and transfuse if patient becomes hemodynamically unstable, symptomatic or H/H drops below 7.0/21.0  -follows Dr. Arevalo OP    SOB (shortness of breath)  -Likely related to sarcoidosis of the lung and exacerbated by her anemia  -Patient is currently not on any home meds    Rheumatoid arthritis involving multiple sites with positive rheumatoid factor  -On humara  -followed by Dr. Guajardo OP    Sarcoidosis of lung with sarcoidosis of lymph nodes  -NO home meds  -Primary pulmonology: Dr. Cassidy, follow up OP      VTE Risk Mitigation (From admission, onward)         Ordered     Place sequential compression device  Until discontinued         03/16/23 1525     IP VTE HIGH RISK PATIENT  Once         03/16/23 1525     Place sequential compression device  Until discontinued          03/16/23 1525                Discharge Planning   MASSIMO:      Code Status: Full Code   Is the patient medically ready for discharge?:     Reason for patient still in hospital (select all that apply): Patient trending condition, Laboratory test and Treatment                 Mera Shah NP  Department of Hospital Medicine   [facility] ([facility])

## 2023-03-17 NOTE — UM SECONDARY REVIEW
Physician Advisor Internal    PA - Medical Necessity Issue    Approved Observation  Name: Tammy Aguero MRN : [id number] Age/Gender: 53yo/Female Admit Date:3/16/2023 Attending Physician: Rhiannon Ribeiro MD The Order for Review: Place in Observation This recommendation is based upon review of the clinical information provided to me as of the date of this recommendation. Recommendation Summary Recommendation: Observation Supporting Clinical Factors: Patient presented to ED with melena x 1 day. She also complained of some dyspnea with ambulation the day prior to admission, which progressed over the next 24 hours. Patient did have a recent EGD that noted gastritis. She is taking Aleve regularly at night. Hemoglobin was 7.6, BUN 22 upon presentation. Tachycardia noted upon presentation (101-120). Patient's medical history is significant for sarcoidosis. The concern of the attending physician is for: GI bleed in patient with known history of sarcoidosis, NSAID use, and gastritis presenting with melena and dyspnea. Rationale: Observation services are appropriate for this patient with known history of sarcoidosis, melena, and NSAID use presenting with tachycardia, dyspnea, and a hemoglobin of 7.6, placing this patient at risk for clinical decompensation. Plan of Care: The plan of care includes GI consultation, NPO for repeat EGD, hold NSAIDs, Transfuse 1U PRBCs, serial hemoglobin, monitor vital signs.

## 2023-03-17 NOTE — PLAN OF CARE
Problem: Bleeding (Gastrointestinal Bleeding)  Goal: Hemostasis  Outcome: Ongoing, Progressing

## 2023-03-17 NOTE — PLAN OF CARE
Pt down for EGD; see GI's note for results.  Tolerated well.  Possible D/C home tomorrow.  VSS.  NADN.  Denies pain and SOB.

## 2023-03-17 NOTE — ANESTHESIA POSTPROCEDURE EVALUATION
Anesthesia Post Evaluation    Patient: Tammy Aguero    Procedure(s) Performed: Procedure(s) (LRB):  EGD (ESOPHAGOGASTRODUODENOSCOPY) (N/A)    Final Anesthesia Type: MAC      Patient location during evaluation: GI PACU  Patient participation: Yes- Able to Participate  Level of consciousness: awake and alert  Post-procedure vital signs: reviewed and stable  Pain management: adequate  Airway patency: patent    PONV status at discharge: No PONV  Anesthetic complications: no      Cardiovascular status: hemodynamically stable  Respiratory status: unassisted, room air and spontaneous ventilation  Hydration status: euvolemic  Follow-up not needed.          Vitals Value Taken Time   BP 99/61 03/17/23 1359   Temp 36.7 °C (98.1 °F) 03/17/23 1359   Pulse 89 03/17/23 1359   Resp 15 03/17/23 1359   SpO2 98 % 03/17/23 1359         No case tracking events are documented in the log.      Pain/Regine Score: Regine Score: 6 (3/17/2023  1:59 PM)

## 2023-03-17 NOTE — TRANSFER OF CARE
"Anesthesia Transfer of Care Note    Patient: Tammy Aguero    Procedure(s) Performed: Procedure(s) (LRB):  EGD (ESOPHAGOGASTRODUODENOSCOPY) (N/A)    Patient location: GI    Anesthesia Type: MAC    Transport from OR: Transported from OR on room air with adequate spontaneous ventilation    Post pain: adequate analgesia    Post assessment: no apparent anesthetic complications    Post vital signs: stable    Level of consciousness: awake, alert and oriented    Nausea/Vomiting: no nausea/vomiting    Complications: none    Transfer of care protocol was followed      Last vitals:   Visit Vitals  BP (!) 109/56 (BP Location: Left arm)   Pulse 98   Temp 37.2 °C (98.9 °F) (Temporal)   Resp 18   Ht 5' 7" (1.702 m)   Wt 68.9 kg (151 lb 14.4 oz)   LMP 12/01/2020 (Approximate)   SpO2 100%   Breastfeeding No   BMI 23.79 kg/m²     "

## 2023-03-17 NOTE — PR[employer]ATION PATIENT INSTRUCTIONS
Discharge Summary/Instructions after an Endoscopic Procedure  Patient Name: Tammy Aguero  Patient MRN: [id number]  Patient YOB: 1970  Friday, March 17, 2023 Ashley Stevenson MD  Dear patient,  As a result of recent federal legislation (The Federal Cures Act), you may   receive lab or pathology results from your procedure in your [facility]   account before your physician is able to contact you. Your physician or   their representative will relay the results to you with their   recommendations at their soonest availability.  Thank you,  RESTRICTIONS:  During your procedure today, you received medications for sedation.  These   medications may affect your judgment, balance and coordination.  Therefore,   for 24 hours, you have the following restrictions:   - DO NOT drive a car, operate machinery, make legal/financial decisions,   sign important papers or drink alcohol.    ACTIVITY:  Today: no heavy lifting, straining or running due to procedural   sedation/anesthesia.  The following day: return to full activity including work.  DIET:  Eat and drink normally unless instructed otherwise.     TREATMENT FOR COMMON SIDE EFFECTS:  - Mild abdominal pain, nausea, belching, bloating or excessive gas:  rest,   eat lightly and use a heating pad.  - Sore Throat: treat with throat lozenges and/or gargle with warm salt   water.  - Because air was used during the procedure, expelling large amounts of air   from your rectum or belching is normal.  - If a bowel prep was taken, you may not have a bowel movement for 1-3 days.    This is normal.  SYMPTOMS TO WATCH FOR AND REPORT TO YOUR PHYSICIAN:  1. Abdominal pain or bloating, other than gas cramps.  2. Chest pain.  3. Back pain.  4. Signs of infection such as: chills or fever occurring within 24 hours   after the procedure.  5. Rectal bleeding, which would show as bright red, maroon, or black stools.   (A tablespoon of blood from the rectum is not serious, especially if    hemorrhoids are present.)  6. Vomiting.  7. Weakness or dizziness.  GO DIRECTLY TO THE NEAREST EMERGENCY ROOM IF YOU HAVE ANY OF THE FOLLOWING:      Difficulty breathing              Chills and/or fever over 101 F   Persistent vomiting and/or vomiting blood   Severe abdominal pain   Severe chest pain   Black, tarry stools   Bleeding- more than one tablespoon   Any other symptom or condition that you feel may need urgent attention  Your doctor recommends these additional instructions:  If any biopsies were taken, your doctors clinic will contact you in 1 to 2   weeks with any results.  - Return patient to hospital [facility] for ongoing care.   - Full liquid diet.   - Continue present medications.   - Continue Pantoprazole at current dose.  - Use sucralfate suspension 1 gram PO QID for 2 weeks.   - No aspirin, ibuprofen, naproxen, or other non-steroidal anti-inflammatory   drugs.   - Await pathology results.   - Repeat upper endoscopy in 2 months to check healing.   - The findings and recommendations were discussed with the patient.   - Communicated via secure chat with hospital medicine regarding findings.  For questions, problems or results please call your physician Ashley Stevenson MD at Work:  [phone]  If you have any questions about the above instructions, call the GI   department at [phone] or call the endoscopy unit at [phone]   from 7am until 3 pm.  [facility] [place] [facility], EMERGENCY ROOM PHONE NUMBER:   [phone]  IF A COMPLICATION OR EMERGENCY SITUATION ARISES AND YOU ARE UNABLE TO REACH   YOUR PHYSICIAN - GO DIRECTLY TO THE EMERGENCY ROOM.  I have read or have had read to me these discharge instructions for my   procedure and have received a written copy.  I understand these   instructions and will follow-up with my physician if I have any questions.     __________________________________       _____________________________________  Nurse Signature                                           Patient/Designated   Responsible Party Signature  MD Ashley Fonseca MD  3/17/2023 2:09:00 PM  This report has been verified and signed electronically.  Dear patient,  As a result of recent federal legislation (The Federal Cures Act), you may   receive lab or pathology results from your procedure in your [facility]   account before your physician is able to contact you. Your physician or   their representative will relay the results to you with their   recommendations at their soonest availability.  Thank you,  PROVATION

## 2023-03-18 ENCOUNTER — DOCUMENTATION ONLY (OUTPATIENT)
Dept: GASTROENTEROLOGY | Facility: HOSPITAL | Age: 53
End: 2023-03-18
Payer: COMMERCIAL

## 2023-03-18 LAB
ANISOCYTOSIS BLD QL SMEAR: SLIGHT
BASOPHILS # BLD AUTO: 0.07 K/UL (ref 0–0.2)
BASOPHILS NFR BLD: 1.6 % (ref 0–1.9)
DIFFERENTIAL METHOD: ABNORMAL
EOSINOPHIL # BLD AUTO: 0.2 K/UL (ref 0–0.5)
EOSINOPHIL NFR BLD: 5 % (ref 0–8)
ERYTHROCYTE [DISTWIDTH] IN BLOOD BY AUTOMATED COUNT: 15.9 % (ref 11.5–14.5)
HCT VFR BLD AUTO: 24.9 % (ref 37–48.5)
HGB BLD-MCNC: 7.7 G/DL (ref 12–16)
HYPOCHROMIA BLD QL SMEAR: ABNORMAL
IMM GRANULOCYTES # BLD AUTO: 0.03 K/UL (ref 0–0.04)
IMM GRANULOCYTES NFR BLD AUTO: 0.7 % (ref 0–0.5)
LYMPHOCYTES # BLD AUTO: 1.6 K/UL (ref 1–4.8)
LYMPHOCYTES NFR BLD: 36.4 % (ref 18–48)
MCH RBC QN AUTO: 28.9 PG (ref 27–31)
MCHC RBC AUTO-ENTMCNC: 30.9 G/DL (ref 32–36)
MCV RBC AUTO: 94 FL (ref 82–98)
MONOCYTES # BLD AUTO: 0.3 K/UL (ref 0.3–1)
MONOCYTES NFR BLD: 7.8 % (ref 4–15)
NEUTROPHILS # BLD AUTO: 2.1 K/UL (ref 1.8–7.7)
NEUTROPHILS NFR BLD: 48.5 % (ref 38–73)
NRBC BLD-RTO: 0 /100 WBC
PLATELET # BLD AUTO: 352 K/UL (ref 150–450)
PMV BLD AUTO: 10.5 FL (ref 9.2–12.9)
POLYCHROMASIA BLD QL SMEAR: ABNORMAL
RBC # BLD AUTO: 2.66 M/UL (ref 4–5.4)
WBC # BLD AUTO: 4.37 K/UL (ref 3.9–12.7)

## 2023-03-18 PROCEDURE — 25000003 PHARM REV CODE 250: Performed by: INTERNAL MEDICINE

## 2023-03-18 PROCEDURE — 36415 COLL VENOUS BLD VENIPUNCTURE: CPT | Performed by: INTERNAL MEDICINE

## 2023-03-18 PROCEDURE — 85025 COMPLETE CBC W/AUTO DIFF WBC: CPT | Performed by: INTERNAL MEDICINE

## 2023-03-18 PROCEDURE — G0378 HOSPITAL OBSERVATION PER HR: HCPCS

## 2023-03-18 RX ORDER — PANTOPRAZOLE SODIUM 40 MG/1
40 TABLET, DELAYED RELEASE ORAL 2 TIMES DAILY
Qty: 60 TABLET | Refills: 0 | Status: SHIPPED | OUTPATIENT
Start: 2023-03-18 | End: 2023-06-06

## 2023-03-18 RX ORDER — PANTOPRAZOLE SODIUM 40 MG/1
40 TABLET, DELAYED RELEASE ORAL 2 TIMES DAILY
Status: DISCONTINUED | OUTPATIENT
Start: 2023-03-18 | End: 2023-03-18 | Stop reason: HOSPADM

## 2023-03-18 RX ORDER — SUCRALFATE 1 G/10ML
1 SUSPENSION ORAL
Qty: 560 ML | Refills: 0 | Status: SHIPPED | OUTPATIENT
Start: 2023-03-18 | End: 2023-04-01

## 2023-03-18 RX ADMIN — SUCRALFATE 1 G: 1 SUSPENSION ORAL at 06:03

## 2023-03-18 RX ADMIN — PANTOPRAZOLE SODIUM 40 MG: 40 TABLET, DELAYED RELEASE ORAL at 10:03

## 2023-03-18 RX ADMIN — SUCRALFATE 1 G: 1 SUSPENSION ORAL at 10:03

## 2023-03-18 NOTE — ASSESSMENT & PLAN NOTE
-Patient's anemia is currently uncontrolled  -Has recieved 1 units of PRBCs on 3/16/23. Etiology likely d/t JACKELYN  -Current CBC reviewed-   Lab Results   Component Value Date    HGB 7.7 (L) 03/18/2023    HCT 24.9 (L) 03/18/2023     -Monitor serial CBC and transfuse if patient becomes hemodynamically unstable, symptomatic or H/H drops below 7.0/21.0  -follows Dr. Arevalo OP

## 2023-03-18 NOTE — NURSING
Discharged order received and reviewed with patient and family. Patient instructed when to take each medication next dose. .IV removed, Patient assisted with dressing by family Patient wheeled to front lobby by staff to be transported home by family.

## 2023-03-18 NOTE — PROGRESS NOTES
Patient discharged before I can place a note in chart.     Seen and examined by me today. Denied abdominal pain. Tolerated grits and half a breakfast burrito this morning. No melena. Reviewed labs with her. She has been ambulating in her room but has not ambulated the halls or around nurses station. Denies presyncopal symptoms. Some SOB. Reviewed EGD findings with patient, daughter and fiancee. She understands that she is to avoid all NSAID use. May use Tylenol, advised no more than 4g/day. We will arrange outpt hospital follow up in 1-2 weeks and EGD in 2 months. Stressed importance of repeat EGD. They all expressed understanding.

## 2023-03-18 NOTE — PLAN OF CARE
[facility] - Telemetry (Hospital)  Discharge Final Note    Primary Care Provider: Maria Teresa Aparicio MD    Expected Discharge Date: 3/18/2023    Final Discharge Note (most recent)       Final Note - 03/18/23 0915          Final Note    Assessment Type Final Discharge Note     Anticipated Discharge Disposition Home or Self Care        Post-Acute Status    Discharge Delays None known at this time                     Important Message from Medicare             Contact Info       Maria Teresa Aparicio MD   Specialty: Family Medicine   Relationship: PCP - General    [address] [place] [id number]   Phone: [phone]       Next Steps: Schedule an appointment as soon as possible for a visit in 3 day(s)    Instructions: call office and schedule a hospital follow up in 2-3 days for repeat labs to monitor H&H    Bryant Valdivia PA-C   Specialty: Gastroenterology    [address]  [place] [address]   Phone: [phone]       Next Steps: Follow up on 3/28/2023    Instructions: call office for appointment time, will need EGD in 2 months    Eve Arevalo MD   Specialty: Hematology and Oncology    [address]  [place] [address]   Phone: [phone]       Next Steps: Schedule an appointment as soon as possible for a visit in 1 week(s)    Instructions: call office and schedule a hospital follow up in 1-2 weeks for JACKELYN          Pt has no discharge needs at the time of chart review.

## 2023-03-18 NOTE — ASSESSMENT & PLAN NOTE
-Likely related to sarcoidosis of the lung and exacerbated by her anemia  -Patient is currently not on any home meds  -SOB at her baseline

## 2023-03-18 NOTE — DISCHARGE SUMMARY
[facility] - Tele[facility] ([facility])  [facility] Medicine  Discharge Summary      Patient Name: Tammy Aguero  MRN: [id number]  [facility]: [id number]  Patient Class: OP- Observation  Admission Date: 3/16/2023  Hospital Length of Stay: 0 days  Discharge Date and Time: 3/18/2023 12:50 PM  Attending Physician: No att. providers found   Discharging Provider: Mera Shah NP  Primary Care Provider: Maria Teresa Aparicio MD    Primary Care Team: Networked reference to record PCT     HPI:   Ms. Aguero is a 51 yo female with hx of RA and sarcoidosis of the lung presented with black tarry stool this morning, this had progressed from dark stool over the last few days.  Patient states she slept most of the day on Tuesday and Wednesday but noted some shortness of breathe when ambulating on Wednesday.  She did not this much of it as she thought it was due to her being tired.  This morning she went to work and noted she was so dyspneic when going from her car to inside the building.  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  She proceeded to the hospital.  Of note, patient was recently scoped by Dr. Valdes and noted to have some gastritis.  Given her history of black stool and hbg 7.5, she may rescope if hbg drops.  Patient has been taking Aleve PM regularly which may contribute to the bleeding in conjunction with the gastritis.  Hospital medicine will admit for observation and repeat labs in the AM.  NPO after midnight for possible scope.        Procedure(s) (LRB):  EGD (ESOPHAGOGASTRODUODENOSCOPY) (N/A)      Hospital Course:   51 y/o female admitted after having black stools for a couple of days and black tarry stool the morning of admission. She has been having some SOB when ambulating and having more fatigue.   She has seen Dr. Valdes a few months ago, had an EGD showing some gastritis.  Hgb 7.5 on admission, given 1 unit PRBCs. She does admit to taking Aleve PM regularly for joint pain. H&H  improved on discharge, 7.7 and 24.9 on the day of discharge.    GI was consulted, Dr. Stevenson, repeated EGD 3/17/23 revealing an acute gastric ulcer in the fundus, biopsies taken and are pending. She was started on Carafate QID x 14 days and PPI BID on discharge. Prescriptions sent to her pharmacy on file. She was instructed to avoid NSAIDs and only take tylenol products.   Will need to follow up with GI and repeat EGD in 2 months. GI wants her to follow up on 3/28/23, will need to call office for time.    Patient denies any further tarry stools or abdominal pain and tolerating a diet. Discussed discharge plan with Dr. Stevenson prior to discharge.    Follow up with PCP in 2-3 days for repeat labs and hospital follow up.   Follow up with Dr. Arevalo 1-2 weeks JACKELYN.     Patient seen and examined on the day of discharge.  All questions and concerns were addressed prior to discharge.    Face to face encounter with patient: 20 minutes       Goals of Care Treatment Preferences:  Code Status: Full Code      Consults:   Consults (From admission, onward)          Status Ordering Provider     Inpatient consult to Gastroenterology  Once        Provider:  (Not yet assigned)    Completed KHUSHBOO LUU            Pulmonary  Sarcoidosis of lung with sarcoidosis of lymph nodes  -NO home meds  -Primary pulmonology: Dr. Cassidy, follow up OP    SOB (shortness of breath)  -Likely related to sarcoidosis of the lung and exacerbated by her anemia  -Patient is currently not on any home meds  -SOB at her baseline    Oncology  Iron deficiency anemia  -Patient's anemia is currently uncontrolled  -Has recieved 1 units of PRBCs on 3/16/23. Etiology likely d/t JACKELYN  -Current CBC reviewed-   Lab Results   Component Value Date    HGB 7.7 (L) 03/18/2023    HCT 24.9 (L) 03/18/2023     -Monitor serial CBC and transfuse if patient becomes hemodynamically unstable, symptomatic or H/H drops below 7.0/21.0  -follows Dr. Arevalo OP    GI  * Acute gastric ulcer  with hemorrhage  -confirmed by EGD 3/17/23, Dr. Stevenson  -black tarry stools PTA, no further BM since admission  -cont Carafate suspension AC/HS, cont PPI BID on discharge  -will need follow up with GI and repeat EGD in 2 months   -avoid NSAIDs    Other  Rheumatoid arthritis involving multiple sites with positive rheumatoid factor  -On humara  -followed by Dr. Guajardo OP      Final Active Diagnoses:    Diagnosis Date Noted POA    PRINCIPAL PROBLEM:  Acute gastric ulcer with hemorrhage [K25.0] 03/17/2023 Yes    Iron deficiency anemia [D50.9] 12/20/2022 Yes    SOB (shortness of breath) [R06.02] 12/09/2022 Yes    Rheumatoid arthritis involving multiple sites with positive rheumatoid factor [M05.79] 04/16/2019 Yes    Sarcoidosis of lung with sarcoidosis of lymph nodes [D86.2] 01/27/2023 Yes      Problems Resolved During this Admission:    Diagnosis Date Noted Date Resolved POA    Black tarry stools [K92.1] 03/16/2023 03/17/2023 Yes       Discharged Condition: good    Disposition: Home or Self Care    Follow Up:   Follow-up Information       Maria Teresa Aparicio MD. Schedule an appointment as soon as possible for a visit in 3 day(s).    Specialty: Family Medicine  Why: call office and schedule a hospital follow up in 2-3 days for repeat labs to monitor H&H  Contact information:  [address] [facility] [address] [phone]               Bryant Valdivia PA-C Follow up on 3/28/2023.    Specialty: Gastroenterology  Why: call office for appointment time, will need EGD in 2 months  Contact information:  [address]  [place] [address] [phone]               Eve Arevalo MD. Schedule an appointment as soon as possible for a visit in 1 week(s).    Specialty: Hematology and Oncology  Why: call office and schedule a hospital follow up in 1-2 weeks for JACKELYN  Contact information:  [address]  [place] [address] [phone]                           Patient Instructions:      Diet  Adult Regular   Order Comments: Avoid spicy and fried foods, stick to a bland diet     Other restrictions (specify):   Order Comments: AVOID NSAIDS (including ibuprofen, Advil, Aleve)     Activity as tolerated       Significant Diagnostic Studies: Labs: CMP   Recent Labs   Lab 03/17/23  0507      K 4.0   *   CO2 23   GLU 88   BUN 14   CREATININE 0.7   CALCIUM 8.7   ANIONGAP 7*    and CBC   Recent Labs   Lab 03/17/23  0507 03/18/23  0508   WBC 7.40 4.37   HGB 8.2* 7.7*   HCT 26.6* 24.9*    352     Microbiology: Blood Culture No results found for: LABBLOO  Radiology: X-Ray: CXR: X-Ray Chest 1 View (CXR):   Results for orders placed or performed during the hospital encounter of 03/16/23   X-Ray Chest 1 View    Narrative    EXAMINATION:  XR CHEST 1 VIEW    CLINICAL HISTORY:  Dyspnea, unspecified    FINDINGS:  Comparison is made to January 11, 2023.  Multiple pulmonary nodules are redemonstrated.  No pneumothorax or pleural effusion.  Mediastinum is stable.      Impression    No acute finding in the chest.  Known bilateral pulmonary nodules.      Electronically signed by: Willie Pop  Date:    03/16/2023  Time:    12:05       Pending Diagnostic Studies:       Procedure Component Value Units Date/Time    Specimen to Pathology, Surgery Gastrointestinal tract [[id number]] Collected: 03/17/23 3419    Order Status: Sent Lab Status: In process Updated: 03/17/23 4211    Specimen: Tissue            Medications:  Reconciled Home Medications:      Medication List        START taking these medications      pantoprazole 40 MG tablet  Commonly known as: PROTONIX  Take 1 tablet (40 mg total) by mouth 2 (two) times daily.     sucralfate 100 mg/mL suspension  Commonly known as: CARAFATE  Take 10 mLs (1 g total) by mouth 4 (four) times daily before meals and nightly. for 14 days            CONTINUE taking these medications      diphenhydrAMINE-acetaminophen  mg Tab  Commonly known as: TYLENOL PM  Take 1 tablet  by mouth nightly as needed.     HUMIRA(CF) PEN 40 mg/0.4 mL Pnkt  Generic drug: adalimumab  Inject 0.4 mLs (40 mg total) into the skin every 14 (fourteen) days.              Indwelling Lines/Drains at time of discharge:   Lines/Drains/Airways       None                   Time spent on the discharge of patient: 30 minutes         Mera Shah NP  Department of Hospital Medicine  '[place] [facility] - Telemetry ([facility])

## 2023-03-20 ENCOUNTER — PATIENT MESSAGE (OUTPATIENT)
Dept: RHEUMATOLOGY | Facility: CLINIC | Age: 53
End: 2023-03-20
Payer: COMMERCIAL

## 2023-03-20 ENCOUNTER — TELEPHONE (OUTPATIENT)
Dept: INTERNAL MEDICINE | Facility: CLINIC | Age: 53
End: 2023-03-20
Payer: COMMERCIAL

## 2023-03-20 DIAGNOSIS — D50.9 IRON DEFICIENCY ANEMIA, UNSPECIFIED IRON DEFICIENCY ANEMIA TYPE: Primary | ICD-10-CM

## 2023-03-21 VITALS
WEIGHT: 151.88 LBS | OXYGEN SATURATION: 100 % | DIASTOLIC BLOOD PRESSURE: 54 MMHG | BODY MASS INDEX: 23.84 KG/M2 | HEART RATE: 82 BPM | TEMPERATURE: 98 F | HEIGHT: 67 IN | RESPIRATION RATE: 18 BRPM | SYSTOLIC BLOOD PRESSURE: 109 MMHG

## 2023-03-22 ENCOUNTER — LAB VISIT (OUTPATIENT)
Dept: LAB | Facility: HOSPITAL | Age: 53
End: 2023-03-22
Attending: FAMILY MEDICINE
Payer: COMMERCIAL

## 2023-03-22 ENCOUNTER — OFFICE VISIT (OUTPATIENT)
Dept: INTERNAL MEDICINE | Facility: CLINIC | Age: 53
End: 2023-03-22
Payer: COMMERCIAL

## 2023-03-22 VITALS
HEART RATE: 100 BPM | RESPIRATION RATE: 18 BRPM | HEIGHT: 67 IN | SYSTOLIC BLOOD PRESSURE: 102 MMHG | WEIGHT: 149.94 LBS | OXYGEN SATURATION: 98 % | DIASTOLIC BLOOD PRESSURE: 62 MMHG | TEMPERATURE: 99 F | BODY MASS INDEX: 23.53 KG/M2

## 2023-03-22 DIAGNOSIS — Z09 HOSPITAL DISCHARGE FOLLOW-UP: Primary | ICD-10-CM

## 2023-03-22 DIAGNOSIS — D50.9 IRON DEFICIENCY ANEMIA, UNSPECIFIED IRON DEFICIENCY ANEMIA TYPE: ICD-10-CM

## 2023-03-22 DIAGNOSIS — K25.0 ACUTE GASTRIC ULCER WITH HEMORRHAGE: ICD-10-CM

## 2023-03-22 LAB
BASOPHILS # BLD AUTO: 0.12 K/UL (ref 0–0.2)
BASOPHILS NFR BLD: 1.8 % (ref 0–1.9)
DIFFERENTIAL METHOD: ABNORMAL
EOSINOPHIL # BLD AUTO: 0.2 K/UL (ref 0–0.5)
EOSINOPHIL NFR BLD: 3.6 % (ref 0–8)
ERYTHROCYTE [DISTWIDTH] IN BLOOD BY AUTOMATED COUNT: 15.3 % (ref 11.5–14.5)
HCT VFR BLD AUTO: 30.5 % (ref 37–48.5)
HGB BLD-MCNC: 9.5 G/DL (ref 12–16)
IMM GRANULOCYTES # BLD AUTO: 0.04 K/UL (ref 0–0.04)
IMM GRANULOCYTES NFR BLD AUTO: 0.6 % (ref 0–0.5)
LYMPHOCYTES # BLD AUTO: 2.2 K/UL (ref 1–4.8)
LYMPHOCYTES NFR BLD: 33.2 % (ref 18–48)
MCH RBC QN AUTO: 29.9 PG (ref 27–31)
MCHC RBC AUTO-ENTMCNC: 31.1 G/DL (ref 32–36)
MCV RBC AUTO: 96 FL (ref 82–98)
MONOCYTES # BLD AUTO: 0.5 K/UL (ref 0.3–1)
MONOCYTES NFR BLD: 7.3 % (ref 4–15)
NEUTROPHILS # BLD AUTO: 3.5 K/UL (ref 1.8–7.7)
NEUTROPHILS NFR BLD: 53.5 % (ref 38–73)
NRBC BLD-RTO: 0 /100 WBC
PLATELET # BLD AUTO: 522 K/UL (ref 150–450)
PMV BLD AUTO: 10.3 FL (ref 9.2–12.9)
RBC # BLD AUTO: 3.18 M/UL (ref 4–5.4)
WBC # BLD AUTO: 6.59 K/UL (ref 3.9–12.7)

## 2023-03-22 PROCEDURE — 3078F DIAST BP <80 MM HG: CPT | Mod: CPTII,S$GLB,, | Performed by: FAMILY MEDICINE

## 2023-03-22 PROCEDURE — 3078F PR MOST RECENT DIASTOLIC BLOOD PRESSURE < 80 MM HG: ICD-10-PCS | Mod: CPTII,S$GLB,, | Performed by: FAMILY MEDICINE

## 2023-03-22 PROCEDURE — 85025 COMPLETE CBC W/AUTO DIFF WBC: CPT | Performed by: FAMILY MEDICINE

## 2023-03-22 PROCEDURE — 99496 TRANSITIONAL CARE MANAGE SERVICE 7 DAY DISCHARGE: ICD-10-PCS | Mod: S$GLB,,, | Performed by: FAMILY MEDICINE

## 2023-03-22 PROCEDURE — 36415 COLL VENOUS BLD VENIPUNCTURE: CPT | Performed by: FAMILY MEDICINE

## 2023-03-22 PROCEDURE — 99999 PR PBB SHADOW E&M-EST. PATIENT-LVL IV: CPT | Mod: PBBFAC,,, | Performed by: FAMILY MEDICINE

## 2023-03-22 PROCEDURE — 1159F PR MEDICATION LIST DOCUMENTED IN MEDICAL RECORD: ICD-10-PCS | Mod: CPTII,S$GLB,, | Performed by: FAMILY MEDICINE

## 2023-03-22 PROCEDURE — 99496 TRANSJ CARE MGMT HIGH F2F 7D: CPT | Mod: S$GLB,,, | Performed by: FAMILY MEDICINE

## 2023-03-22 PROCEDURE — 3008F BODY MASS INDEX DOCD: CPT | Mod: CPTII,S$GLB,, | Performed by: FAMILY MEDICINE

## 2023-03-22 PROCEDURE — 3008F PR BODY MASS INDEX (BMI) DOCUMENTED: ICD-10-PCS | Mod: CPTII,S$GLB,, | Performed by: FAMILY MEDICINE

## 2023-03-22 PROCEDURE — 3074F PR MOST RECENT SYSTOLIC BLOOD PRESSURE < 130 MM HG: ICD-10-PCS | Mod: CPTII,S$GLB,, | Performed by: FAMILY MEDICINE

## 2023-03-22 PROCEDURE — 3074F SYST BP LT 130 MM HG: CPT | Mod: CPTII,S$GLB,, | Performed by: FAMILY MEDICINE

## 2023-03-22 PROCEDURE — 99999 PR PBB SHADOW E&M-EST. PATIENT-LVL IV: ICD-10-PCS | Mod: PBBFAC,,, | Performed by: FAMILY MEDICINE

## 2023-03-22 PROCEDURE — 1159F MED LIST DOCD IN RCRD: CPT | Mod: CPTII,S$GLB,, | Performed by: FAMILY MEDICINE

## 2023-03-22 NOTE — PROGRESS NOTES
Tammy Aguero  03/22/2023  [id number]    Maria Teresa Aparicio MD  Patient Care Team:  Maria Teresa Aparicio MD as PCP - General (Family Medicine)    Transitional Care Note    Family and/or Caretaker present at visit?  No.  Diagnostic tests reviewed/disposition: I have reviewed all completed as well as pending diagnostic tests at the time of discharge.  Disease/illness education: Anemia  Home health/community services discussion/referrals: Patient does not have home health established from hospital visit.  They do not need home health.  If needed, we will set up home health for the patient.   Establishment or re-establishment of referral orders for community resources: No other necessary community resources.   Discussion with other health care providers:  Hematology, once CBC returns. .            Visit Type:a scheduled visit following a recent hospitalization    Chief Complaint:  Chief Complaint   Patient presents with    Hospital Follow Up       History of Present Illness:    HPI:   Ms. Aguero is a 51 yo female with hx of RA and sarcoidosis of the lung presented with black tarry stool this morning, this had progressed from dark stool over the last few days.  Patient states she slept most of the day on Tuesday and Wednesday but noted some shortness of breathe when ambulating on Wednesday.  She did not this much of it as she thought it was due to her being tired.  This morning she went to work and noted she was so dyspneic when going from her car to inside the building.  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  She proceeded to the hospital.  Of note, patient was recently scoped by Dr. Valdes and noted to have some gastritis.  Given her history of black stool and hbg 7.5, she may rescope if hbg drops.  Patient has been taking Aleve PM regularly which may contribute to the bleeding in conjunction with the gastritis.  Hospital medicine will admit for observation and repeat labs in  the AM.  NPO after midnight for possible scope.          Procedure(s) (LRB):  EGD (ESOPHAGOGASTRODUODENOSCOPY) (N/A)       Hospital Course:   53 y/o female admitted after having black stools for a couple of days and black tarry stool the morning of admission. She has been having some SOB when ambulating and having more fatigue.   She has seen Dr. Valdes a few months ago, had an EGD showing some gastritis.  Hgb 7.5 on admission, given 1 unit PRBCs. She does admit to taking Aleve PM regularly for joint pain. H&H improved on discharge, 7.7 and 24.9 on the day of discharge.     GI was consulted, Dr. Stevenson, repeated EGD 3/17/23 revealing an acute gastric ulcer in the fundus, biopsies taken and are pending. She was started on Carafate QID x 14 days and PPI BID on discharge. Prescriptions sent to her pharmacy on file. She was instructed to avoid NSAIDs and only take tylenol products.   Will need to follow up with GI and repeat EGD in 2 months. GI wants her to follow up on 3/28/23, will need to call office for time.     Patient denies any further tarry stools or abdominal pain and tolerating a diet. Discussed discharge plan with Dr. Valdes now 3/28  Plans was to follow up  Follow up with PCP in 2-3 days for repeat labs and hospital follow up.   Follow up with Dr. Arevalo 1-2 weeks JACKELYN.     Path is still pending from EDG and biopsy    Stool is less dark, improved.  She still does have shortness of breath with exertion.    She is on Tylenol arthritis, as night  Stiffness from her joint issues.      History:  Past Medical History:   Diagnosis Date    RA (rheumatoid arthritis)      Past Surgical History:   Procedure Laterality Date    COLONOSCOPY N/A 02/01/2023    Procedure: COLONOSCOPY;  Surgeon: Ángel Valdes MD;  Location: [facility];  Service: Gastroenterology;  Laterality: N/A;    ESOPHAGOGASTRODUODENOSCOPY N/A 02/01/2023    Procedure: EGD (ESOPHAGOGASTRODUODENOSCOPY);  Surgeon: Ángel Valdes MD;  Location:  [facility] ENDO;  Service: Gastroenterology;  Laterality: N/A;    ESOPHAGOGASTRODUODENOSCOPY N/A 3/17/2023    Procedure: EGD (ESOPHAGOGASTRODUODENOSCOPY);  Surgeon: Ashley Stevenson MD;  Location: [facility];  Service: Endoscopy;  Laterality: N/A;    INTRALUMINAL GASTROINTESTINAL TRACT IMAGING VIA CAPSULE N/A 3/1/2023    Procedure: IMAGING PROCEDURE, GI TRACT, INTRALUMINAL, VIA CAPSULE;  Surgeon: First Available [place];  Location: [facility] ENDO;  Service: Endoscopy;  Laterality: N/A;    Tubal Lig      TUBAL LIGATION       Family History   Problem Relation Age of Onset    Diabetes Sister     Cancer Sister     Breast cancer Sister     Asthma Sister     Diabetes Brother     Breast cancer Other     Alcohol abuse Mother     Asthma Mother     COPD Mother     Depression Mother     Heart disease Mother      Social History     Socioeconomic History    Marital status: Single   Occupational History    Occupation: [employer], supervisor   Tobacco Use    Smoking status: Former     Types: Vaping with nicotine, Cigarettes     Start date: 1/1/2020     Passive exposure: Past    Smokeless tobacco: Never    Tobacco comments:     Pt states that she quit smoking cigarettes 1 year ago, and stopped using the vape 6 months ago.    Substance and Sexual Activity    Alcohol use: Not Currently     Comment: I drank when i was younger but can no longer take the smell    Drug use: Never     Types: Marijuana    Sexual activity: Not Currently     Partners: Male     Birth control/protection: See Surgical Hx, None     Social Determinants of Health     Financial Resource Strain: Low Risk     Difficulty of Paying Living Expenses: Not very hard   Food Insecurity: No Food Insecurity    Worried About Running Out of Food in the Last Year: Never true    Ran Out of Food in the Last Year: Never true   Transportation Needs: No Transportation Needs    Lack of Transportation (Medical): No    Lack of Transportation (Non-Medical): No   Physical Activity: Inactive    Days  of Exercise per Week: 5 days    Minutes of Exercise per Session: 0 min   Stress: Stress Concern Present    Feeling of Stress : To some extent   Social Connections: Unknown    Frequency of Communication with Friends and Family: More than three times a week    Frequency of Social Gatherings with Friends and Family: More than three times a week    Active Member of Clubs or Organizations: No    Attends Club or Organization Meetings: Never    Marital Status:    Housing Stability: Low Risk     Unable to Pay for Housing in the Last Year: No    Number of Places Lived in the Last Year: 1    Unstable Housing in the Last Year: No     Patient Active Problem List   Diagnosis    Rheumatoid arthritis involving multiple sites with positive rheumatoid factor    Nicotine dependence, cigarettes, in remission    High risk medication use    Muscle pain    Positive skin test for tuberculosis    Pulmonary nodules    SOB (shortness of breath)    Iron deficiency anemia    Iron deficiency anemia due to chronic blood loss    Sarcoidosis of lung with sarcoidosis of lymph nodes    Encounter for medication monitoring    Acute gastric ulcer with hemorrhage     Review of patient's allergies indicates:  No Known Allergies    The following were reviewed at this visit: active problem list, medication list, allergies, family history, social history, and health maintenance.    Medications:  Current Outpatient Medications on File Prior to Visit   Medication Sig Dispense Refill    adalimumab (HUMIRA,CF, PEN) 40 mg/0.4 mL PnKt Inject 0.4 mLs (40 mg total) into the skin every 14 (fourteen) days. 2 pen 11    diphenhydrAMINE-acetaminophen (TYLENOL PM)  mg Tab Take 1 tablet by mouth nightly as needed.      pantoprazole (PROTONIX) 40 MG tablet Take 1 tablet (40 mg total) by mouth 2 (two) times daily. 60 tablet 0    sucralfate (CARAFATE) 100 mg/mL suspension Take 10 mLs (1 g total) by mouth 4 (four) times daily before meals and nightly. for 14  days 560 mL 0     No current facility-administered medications on file prior to visit.       Medications have been reviewed and reconciled with patient at this visit.  Barriers to medications reviewed with patient.    Adverse reactions to current medications reviewed with patient..    Over the counter medications reviewed and reconciled with patient.    Exam:  Wt Readings from Last 3 Encounters:   03/22/23 68 kg (149 lb 14.6 oz)   03/16/23 68.9 kg (151 lb 14.4 oz)   03/01/23 69.7 kg (153 lb 10.6 oz)     Temp Readings from Last 3 Encounters:   03/22/23 98.5 °F (36.9 °C) (Temporal)   03/18/23 98 °F (36.7 °C) (Oral)   03/01/23 98.7 °F (37.1 °C)     BP Readings from Last 3 Encounters:   03/22/23 102/62   03/18/23 (!) 109/54   03/01/23 116/72     Pulse Readings from Last 3 Encounters:   03/22/23 (!) 121   03/18/23 82   03/01/23 102     Body mass index is 23.48 kg/m².      Review of Systems   Constitutional: Negative.  Negative for chills and fever.   HENT: Negative.  Negative for congestion, sinus pain and sore throat.    Eyes:  Negative for blurred vision and double vision.   Respiratory:  Positive for shortness of breath. Negative for cough, sputum production and wheezing.    Cardiovascular:  Negative for chest pain, palpitations and leg swelling.   Gastrointestinal:  Negative for abdominal pain, constipation, diarrhea, heartburn, nausea and vomiting.   Genitourinary: Negative.    Musculoskeletal:  Positive for joint pain.   Skin: Negative.  Negative for rash.   Neurological: Negative.    Endo/Heme/Allergies: Negative.  Negative for polydipsia. Does not bruise/bleed easily.   Psychiatric/Behavioral:  Negative for depression and substance abuse. The patient does not have insomnia.    Physical Exam  Nursing note reviewed.   Cardiovascular:      Rate and Rhythm: Normal rate and regular rhythm.   Pulmonary:      Effort: Pulmonary effort is normal. No respiratory distress.   Neurological:      Mental Status: She is alert  and oriented to person, place, and time.   Psychiatric:         Mood and Affect: Mood normal.         Behavior: Behavior normal.         Thought Content: Thought content normal.         Judgment: Judgment normal.       Laboratory Reviewed ({Yes)  Lab Results   Component Value Date    WBC 4.37 03/18/2023    HGB 7.7 (L) 03/18/2023    HCT 24.9 (L) 03/18/2023     03/18/2023    CHOL 134 04/08/2019    TRIG 82 04/08/2019    HDL 45 04/08/2019    ALT 16 03/16/2023    AST 13 03/16/2023     03/17/2023    K 4.0 03/17/2023     (H) 03/17/2023    CREATININE 0.7 03/17/2023    BUN 14 03/17/2023    CO2 23 03/17/2023    INR 0.9 01/11/2023       Tammy was seen today for hospital follow up.    Diagnoses and all orders for this visit:    Hospital discharge follow-up    Iron deficiency anemia, unspecified iron deficiency anemia type  -     CBC Auto Differential; Future    Acute gastric ulcer with hemorrhage  -     CBC Auto Differential; Future        GI visit next week.  Heme visit next week  Was given only one unit of blood inpatient.  Lab Results   Component Value Date    WBC 4.37 03/18/2023    HGB 7.7 (L) 03/18/2023    HCT 24.9 (L) 03/18/2023    MCV 94 03/18/2023     03/18/2023   Can recheck for stability  Still with fatigue as still remains anemic  She is tachy when exerting herself.   Okay to stay off work until SOB improves  Check CBc          Care Plan/Goals: Reviewed    Goals    None         Follow up: No follow-ups on file.    After visit summary was printed and given to patient upon discharge today.  Patient goals and care plan are included in After Visit Summary.

## 2023-03-27 LAB
COMMENT: NORMAL
FINAL PATHOLOGIC DIAGNOSIS: NORMAL
GROSS: NORMAL
Lab: NORMAL

## 2023-03-28 ENCOUNTER — OFFICE VISIT (OUTPATIENT)
Dept: GASTROENTEROLOGY | Facility: CLINIC | Age: 53
End: 2023-03-28
Payer: COMMERCIAL

## 2023-03-28 ENCOUNTER — PATIENT OUTREACH (OUTPATIENT)
Dept: ADMINISTRATIVE | Facility: HOSPITAL | Age: 53
End: 2023-03-28
Payer: COMMERCIAL

## 2023-03-28 ENCOUNTER — LAB VISIT (OUTPATIENT)
Dept: LAB | Facility: HOSPITAL | Age: 53
End: 2023-03-28
Attending: INTERNAL MEDICINE
Payer: COMMERCIAL

## 2023-03-28 VITALS
HEIGHT: 67 IN | BODY MASS INDEX: 23.84 KG/M2 | SYSTOLIC BLOOD PRESSURE: 110 MMHG | HEART RATE: 74 BPM | DIASTOLIC BLOOD PRESSURE: 70 MMHG | WEIGHT: 151.88 LBS

## 2023-03-28 DIAGNOSIS — D50.9 IRON DEFICIENCY ANEMIA, UNSPECIFIED IRON DEFICIENCY ANEMIA TYPE: ICD-10-CM

## 2023-03-28 DIAGNOSIS — K27.9 PUD (PEPTIC ULCER DISEASE): Primary | ICD-10-CM

## 2023-03-28 DIAGNOSIS — K25.0 ACUTE GASTRIC ULCER WITH HEMORRHAGE: ICD-10-CM

## 2023-03-28 LAB
BASOPHILS # BLD AUTO: 0.12 K/UL (ref 0–0.2)
BASOPHILS NFR BLD: 2.3 % (ref 0–1.9)
DIFFERENTIAL METHOD: ABNORMAL
EOSINOPHIL # BLD AUTO: 0.3 K/UL (ref 0–0.5)
EOSINOPHIL NFR BLD: 5.2 % (ref 0–8)
ERYTHROCYTE [DISTWIDTH] IN BLOOD BY AUTOMATED COUNT: 15.4 % (ref 11.5–14.5)
FERRITIN SERPL-MCNC: 10 NG/ML (ref 20–300)
HCT VFR BLD AUTO: 29 % (ref 37–48.5)
HGB BLD-MCNC: 8.9 G/DL (ref 12–16)
IMM GRANULOCYTES # BLD AUTO: 0.02 K/UL (ref 0–0.04)
IMM GRANULOCYTES NFR BLD AUTO: 0.4 % (ref 0–0.5)
IRON SERPL-MCNC: 15 UG/DL (ref 30–160)
LYMPHOCYTES # BLD AUTO: 1.8 K/UL (ref 1–4.8)
LYMPHOCYTES NFR BLD: 35.2 % (ref 18–48)
MCH RBC QN AUTO: 27.3 PG (ref 27–31)
MCHC RBC AUTO-ENTMCNC: 30.7 G/DL (ref 32–36)
MCV RBC AUTO: 89 FL (ref 82–98)
MONOCYTES # BLD AUTO: 0.3 K/UL (ref 0.3–1)
MONOCYTES NFR BLD: 5.9 % (ref 4–15)
NEUTROPHILS # BLD AUTO: 2.7 K/UL (ref 1.8–7.7)
NEUTROPHILS NFR BLD: 51 % (ref 38–73)
NRBC BLD-RTO: 0 /100 WBC
PLATELET # BLD AUTO: 387 K/UL (ref 150–450)
PMV BLD AUTO: 9.7 FL (ref 9.2–12.9)
RBC # BLD AUTO: 3.26 M/UL (ref 4–5.4)
SATURATED IRON: 4 % (ref 20–50)
TOTAL IRON BINDING CAPACITY: 407 UG/DL (ref 250–450)
TRANSFERRIN SERPL-MCNC: 275 MG/DL (ref 200–375)
WBC # BLD AUTO: 5.23 K/UL (ref 3.9–12.7)

## 2023-03-28 PROCEDURE — 82728 ASSAY OF FERRITIN: CPT | Performed by: INTERNAL MEDICINE

## 2023-03-28 PROCEDURE — 84466 ASSAY OF TRANSFERRIN: CPT | Performed by: INTERNAL MEDICINE

## 2023-03-28 PROCEDURE — 3008F BODY MASS INDEX DOCD: CPT | Mod: CPTII,S$GLB,, | Performed by: INTERNAL MEDICINE

## 2023-03-28 PROCEDURE — 1160F RVW MEDS BY RX/DR IN RCRD: CPT | Mod: CPTII,S$GLB,, | Performed by: INTERNAL MEDICINE

## 2023-03-28 PROCEDURE — 1160F PR REVIEW ALL MEDS BY PRESCRIBER/CLIN PHARMACIST DOCUMENTED: ICD-10-PCS | Mod: CPTII,S$GLB,, | Performed by: INTERNAL MEDICINE

## 2023-03-28 PROCEDURE — 3074F PR MOST RECENT SYSTOLIC BLOOD PRESSURE < 130 MM HG: ICD-10-PCS | Mod: CPTII,S$GLB,, | Performed by: INTERNAL MEDICINE

## 2023-03-28 PROCEDURE — 3078F DIAST BP <80 MM HG: CPT | Mod: CPTII,S$GLB,, | Performed by: INTERNAL MEDICINE

## 2023-03-28 PROCEDURE — 99214 OFFICE O/P EST MOD 30 MIN: CPT | Mod: S$GLB,,, | Performed by: INTERNAL MEDICINE

## 2023-03-28 PROCEDURE — 3074F SYST BP LT 130 MM HG: CPT | Mod: CPTII,S$GLB,, | Performed by: INTERNAL MEDICINE

## 2023-03-28 PROCEDURE — 3078F PR MOST RECENT DIASTOLIC BLOOD PRESSURE < 80 MM HG: ICD-10-PCS | Mod: CPTII,S$GLB,, | Performed by: INTERNAL MEDICINE

## 2023-03-28 PROCEDURE — 99999 PR PBB SHADOW E&M-EST. PATIENT-LVL V: CPT | Mod: PBBFAC,,, | Performed by: INTERNAL MEDICINE

## 2023-03-28 PROCEDURE — 36415 COLL VENOUS BLD VENIPUNCTURE: CPT | Performed by: INTERNAL MEDICINE

## 2023-03-28 PROCEDURE — 3008F PR BODY MASS INDEX (BMI) DOCUMENTED: ICD-10-PCS | Mod: CPTII,S$GLB,, | Performed by: INTERNAL MEDICINE

## 2023-03-28 PROCEDURE — 1159F PR MEDICATION LIST DOCUMENTED IN MEDICAL RECORD: ICD-10-PCS | Mod: CPTII,S$GLB,, | Performed by: INTERNAL MEDICINE

## 2023-03-28 PROCEDURE — 99999 PR PBB SHADOW E&M-EST. PATIENT-LVL V: ICD-10-PCS | Mod: PBBFAC,,, | Performed by: INTERNAL MEDICINE

## 2023-03-28 PROCEDURE — 99214 PR OFFICE/OUTPT VISIT, EST, LEVL IV, 30-39 MIN: ICD-10-PCS | Mod: S$GLB,,, | Performed by: INTERNAL MEDICINE

## 2023-03-28 PROCEDURE — 85025 COMPLETE CBC W/AUTO DIFF WBC: CPT | Performed by: INTERNAL MEDICINE

## 2023-03-28 PROCEDURE — 1159F MED LIST DOCD IN RCRD: CPT | Mod: CPTII,S$GLB,, | Performed by: INTERNAL MEDICINE

## 2023-03-28 RX ORDER — DEXTROMETHORPHAN HYDROBROMIDE, GUAIFENESIN 5; 100 MG/5ML; MG/5ML
650 LIQUID ORAL EVERY 8 HOURS
COMMUNITY

## 2023-03-28 NOTE — PROGRESS NOTES
The biopsies of your stomach show no signs of infection. Please continue to take the acid blocker Protonix as prescribed and follow up with Dr. Valdes in the office 3/28/23 at 11:30am. Please continue to avoid non-steroidal anti-inflammatory drugs (NSAIDs) such as Ibuprofen, Aleve, Naproxen, Naprosyn, Excedrin, Flora daniels has aspirin in it, BC goody powder. Also, your blood work from 3/22/23 is looking better too. Remember you will need a repeat endoscopy in 2 months. Dr. Valdes can discuss this further with you tomorrow.

## 2023-03-28 NOTE — PROGRESS NOTES
"Clinic Progress Note:  [facility] Progress Note    Reason for Visit:  The primary encounter diagnosis was PUD (peptic ulcer disease). A diagnosis of Acute gastric ulcer with hemorrhage was also pertinent to this visit.    PCP: Maria Teresa Aparicio   [address] / [place] [address]    HPI:  This is a 52 y.o. female here for hospital follow-up.   Ms Aguero is a pleasant 52 year old female with past medical history of JACKELYN followed by  hematology/oncology.   She reported to hospital earlier this month with black stools for a couple of days.   This was accompanied with nausea vomiting.   Denied abdominal pain.   Endorsed taking Aleve PM every other night.   She had an EGD, colonoscopy and VCE performed by me in the last month that was negative for active bleeding.   S/p EGD that hospitalization that revealed an ulcer in the gastric fundus (-HP).   Patient was discharged on PPI and Carafate.   She is avoiding NSAIDs and only taking Tylenol.   Denies any more bleeding since leaving the hospital.     ROS:  As per HPI         Allergies: Reviewed    Home Medications:   Medication List with Changes/Refills   Current Medications    ACETAMINOPHEN (TYLENOL) 650 MG TBSR    Take 650 mg by mouth every 8 (eight) hours.    ADALIMUMAB (HUMIRA,CF, PEN) 40 MG/0.4 ML PNKT    Inject 0.4 mLs (40 mg total) into the skin every 14 (fourteen) days.    DIPHENHYDRAMINE-ACETAMINOPHEN (TYLENOL PM)  MG TAB    Take 1 tablet by mouth nightly as needed.    PANTOPRAZOLE (PROTONIX) 40 MG TABLET    Take 1 tablet (40 mg total) by mouth 2 (two) times daily.    SUCRALFATE (CARAFATE) 100 MG/ML SUSPENSION    Take 10 mLs (1 g total) by mouth 4 (four) times daily before meals and nightly. for 14 days         Physical Exam:  Vital Signs:  /70   Pulse 74   Ht 5' 7" (1.702 m)   Wt 68.9 kg (151 lb 14.4 oz)   LMP 12/01/2020 (Approximate)   BMI 23.79 kg/m²   Body mass index is 23.79 kg/m².    Physical Exam  Constitutional:  "      Appearance: Normal appearance.   HENT:      Head: Normocephalic.   Eyes:      Conjunctiva/sclera: Conjunctivae normal.   Pulmonary:      Effort: Pulmonary effort is normal.   Abdominal:      General: There is no distension.      Palpations: Abdomen is soft.      Tenderness: There is no abdominal tenderness. There is no guarding.   Neurological:      Mental Status: She is alert.        Labs: Pertinent labs reviewed.        Assessment:  Problem List Items Addressed This Visit          GI    Acute gastric ulcer with hemorrhage    Overview     EGD 3/17/23: clean base ulcer in fundus. Biopsies pending         Current Assessment & Plan     Plan:   -Repeat EGD in 12 weeks   -Avoid NSAIDs   -Continue PPI and carafate           Other Visit Diagnoses       PUD (peptic ulcer disease)    -  Primary          PUD (peptic ulcer disease)  -     Cancel: Ambulatory referral/consult to Endo Procedure ; Future; Expected date: 03/29/2023  -     Ambulatory referral/consult to Endo Procedure ; Future; Expected date: 06/02/2023    Acute gastric ulcer with hemorrhage                Follow up if symptoms worsen or fail to improve.    Order summary:  Orders Placed This Encounter   Procedures    Ambulatory referral/consult to Endo Procedure        Thank you so much for allowing me to participate in the care of Tammy Valdes MD  Gastroenterology and Hepatology  [facility]

## 2023-03-29 ENCOUNTER — OFFICE VISIT (OUTPATIENT)
Dept: HEMATOLOGY/ONCOLOGY | Facility: CLINIC | Age: 53
End: 2023-03-29
Payer: COMMERCIAL

## 2023-03-29 ENCOUNTER — PATIENT MESSAGE (OUTPATIENT)
Dept: PULMONOLOGY | Facility: HOSPITAL | Age: 53
End: 2023-03-29
Payer: COMMERCIAL

## 2023-03-29 ENCOUNTER — OFFICE VISIT (OUTPATIENT)
Dept: PULMONOLOGY | Facility: CLINIC | Age: 53
End: 2023-03-29
Payer: COMMERCIAL

## 2023-03-29 ENCOUNTER — HOSPITAL ENCOUNTER (OUTPATIENT)
Dept: RADIOLOGY | Facility: HOSPITAL | Age: 53
Discharge: HOME | End: 2023-03-29
Attending: INTERNAL MEDICINE
Payer: COMMERCIAL

## 2023-03-29 VITALS
HEIGHT: 67 IN | OXYGEN SATURATION: 99 % | DIASTOLIC BLOOD PRESSURE: 68 MMHG | BODY MASS INDEX: 24.12 KG/M2 | HEART RATE: 68 BPM | WEIGHT: 153.69 LBS | RESPIRATION RATE: 18 BRPM | SYSTOLIC BLOOD PRESSURE: 102 MMHG

## 2023-03-29 DIAGNOSIS — R91.8 PULMONARY NODULES: ICD-10-CM

## 2023-03-29 DIAGNOSIS — D50.9 IRON DEFICIENCY ANEMIA, UNSPECIFIED IRON DEFICIENCY ANEMIA TYPE: Primary | ICD-10-CM

## 2023-03-29 DIAGNOSIS — D86.2 SARCOIDOSIS OF LUNG WITH SARCOIDOSIS OF LYMPH NODES: Primary | ICD-10-CM

## 2023-03-29 DIAGNOSIS — F41.9 ANXIETY: ICD-10-CM

## 2023-03-29 DIAGNOSIS — E27.8 ADRENAL MASS, LEFT: ICD-10-CM

## 2023-03-29 DIAGNOSIS — K25.0 ACUTE GASTRIC ULCER WITH HEMORRHAGE: ICD-10-CM

## 2023-03-29 DIAGNOSIS — D86.2 SARCOIDOSIS OF LUNG WITH SARCOIDOSIS OF LYMPH NODES: ICD-10-CM

## 2023-03-29 DIAGNOSIS — D64.9 SYMPTOMATIC ANEMIA: ICD-10-CM

## 2023-03-29 DIAGNOSIS — E27.8 OTHER SPECIFIED DISORDERS OF ADRENAL GLAND: ICD-10-CM

## 2023-03-29 PROCEDURE — 3008F PR BODY MASS INDEX (BMI) DOCUMENTED: ICD-10-PCS | Mod: CPTII,S$GLB,, | Performed by: INTERNAL MEDICINE

## 2023-03-29 PROCEDURE — 99215 OFFICE O/P EST HI 40 MIN: CPT | Mod: 95,,, | Performed by: INTERNAL MEDICINE

## 2023-03-29 PROCEDURE — 71250 CT THORAX DX C-: CPT | Mod: TC

## 2023-03-29 PROCEDURE — 3008F BODY MASS INDEX DOCD: CPT | Mod: CPTII,S$GLB,, | Performed by: INTERNAL MEDICINE

## 2023-03-29 PROCEDURE — 99999 PR PBB SHADOW E&M-EST. PATIENT-LVL IV: CPT | Mod: PBBFAC,,, | Performed by: INTERNAL MEDICINE

## 2023-03-29 PROCEDURE — 99215 OFFICE O/P EST HI 40 MIN: CPT | Mod: 25,S$GLB,, | Performed by: INTERNAL MEDICINE

## 2023-03-29 PROCEDURE — 1159F PR MEDICATION LIST DOCUMENTED IN MEDICAL RECORD: ICD-10-PCS | Mod: CPTII,S$GLB,, | Performed by: INTERNAL MEDICINE

## 2023-03-29 PROCEDURE — 71250 CT CHEST WITHOUT CONTRAST: ICD-10-PCS | Mod: 26,,, | Performed by: RADIOLOGY

## 2023-03-29 PROCEDURE — 71250 CT THORAX DX C-: CPT | Mod: 26,,, | Performed by: RADIOLOGY

## 2023-03-29 PROCEDURE — 3074F SYST BP LT 130 MM HG: CPT | Mod: CPTII,S$GLB,, | Performed by: INTERNAL MEDICINE

## 2023-03-29 PROCEDURE — 3078F PR MOST RECENT DIASTOLIC BLOOD PRESSURE < 80 MM HG: ICD-10-PCS | Mod: CPTII,S$GLB,, | Performed by: INTERNAL MEDICINE

## 2023-03-29 PROCEDURE — 1159F MED LIST DOCD IN RCRD: CPT | Mod: CPTII,S$GLB,, | Performed by: INTERNAL MEDICINE

## 2023-03-29 PROCEDURE — 99999 PR PBB SHADOW E&M-EST. PATIENT-LVL IV: ICD-10-PCS | Mod: PBBFAC,,, | Performed by: INTERNAL MEDICINE

## 2023-03-29 PROCEDURE — 3074F PR MOST RECENT SYSTOLIC BLOOD PRESSURE < 130 MM HG: ICD-10-PCS | Mod: CPTII,S$GLB,, | Performed by: INTERNAL MEDICINE

## 2023-03-29 PROCEDURE — 99215 PR OFFICE/OUTPT VISIT, EST, LEVL V, 40-54 MIN: ICD-10-PCS | Mod: 25,S$GLB,, | Performed by: INTERNAL MEDICINE

## 2023-03-29 PROCEDURE — 3078F DIAST BP <80 MM HG: CPT | Mod: CPTII,S$GLB,, | Performed by: INTERNAL MEDICINE

## 2023-03-29 PROCEDURE — 99215 PR OFFICE/OUTPT VISIT, EST, LEVL V, 40-54 MIN: ICD-10-PCS | Mod: 95,,, | Performed by: INTERNAL MEDICINE

## 2023-03-29 RX ORDER — HEPARIN 100 UNIT/ML
500 SYRINGE INTRAVENOUS
Status: CANCELLED | OUTPATIENT
Start: 2023-03-29

## 2023-03-29 RX ORDER — SODIUM CHLORIDE 0.9 % (FLUSH) 0.9 %
10 SYRINGE (ML) INJECTION
Status: CANCELLED | OUTPATIENT
Start: 2023-03-29

## 2023-03-29 RX ORDER — DIAZEPAM 5 MG/1
5 TABLET ORAL
Qty: 1 TABLET | Refills: 0 | Status: SHIPPED | OUTPATIENT
Start: 2023-03-29 | End: 2023-06-06

## 2023-03-29 NOTE — PROGRESS NOTES
Subjective:       Patient ID: Tammy Aguero is a 52 y.o. female.    Chief Complaint:   She   presents for evaluation and treatment of dyspnea and anemia after being discharged from the hospital  10  days ago. Since discharge symptoms have gradually improving course since that time. Patient denies cough or sputum prodcution . Symptoms are aggravated by exercise. Symptoms improve withrest.  Respiratory: positive for dyspnea on exertion; Cardiovascular: no chest pain or palpitations.  Patient currently is not on home oxygen therapy..    Hx of ulcers and anemia      MEDICAL RECORDS FROM THE HOSPITAL REVIEWED:  [facility] - Telemetry ([facility])  [facility]  Discharge Summary        Patient Name: Tammy Aguero  MRN: [id number]  SHWETHA: [id number]  Patient Class: OP- Observation  Admission Date: 3/16/2023  Hospital Length of Stay: 0 days  Discharge Date and Time: 3/18/2023 12:50 PM  Attending Physician: No att. providers found   Discharging Provider: Mera Shah NP  Primary Care Provider: Maria Teresa Aparicio MD     Primary Care Team: Networked reference to record PCT      HPI:   Ms. Aguero is a 53 yo female with hx of RA and sarcoidosis of the lung presented with black tarry stool this morning, this had progressed from dark stool over the last few days.  Patient states she slept most of the day on Tuesday and Wednesday but noted some shortness of breathe when ambulating on Wednesday.  She did not this much of it as she thought it was due to her being tired.  This morning she went to work and noted she was so dyspneic when going from her car to inside the building.  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  She proceeded to the hospital.  Of note, patient was recently scoped by Dr. Valdes and noted to have some gastritis.  Given her history of black stool and hbg 7.5, she may rescope if hbg drops.  Patient has been taking Aleve PM regularly which may contribute to the  bleeding in conjunction with the gastritis.  Hospital medicine will admit for observation and repeat labs in the AM.  NPO after midnight for possible scope.          Procedure(s) (LRB):  EGD (ESOPHAGOGASTRODUODENOSCOPY) (N/A)       Hospital Course:   51 y/o female admitted after having black stools for a couple of days and black tarry stool the morning of admission. She has been having some SOB when ambulating and having more fatigue.   She has seen Dr. Valdes a few months ago, had an EGD showing some gastritis.  Hgb 7.5 on admission, given 1 unit PRBCs. She does admit to taking Aleve PM regularly for joint pain. H&H improved on discharge, 7.7 and 24.9 on the day of discharge.     GI was consulted, Dr. Stevenson, repeated EGD 3/17/23 revealing an acute gastric ulcer in the fundus, biopsies taken and are pending. She was started on Carafate QID x 14 days and PPI BID on discharge. Prescriptions sent to her pharmacy on file. She was instructed to avoid NSAIDs and only take tylenol products.   Will need to follow up with GI and repeat EGD in 2 months. GI wants her to follow up on 3/28/23, will need to call office for time.     Patient denies any further tarry stools or abdominal pain and tolerating a diet. Discussed discharge plan with Dr. Stevenson prior to discharge.     Follow up with PCP in 2-3 days for repeat labs and hospital follow up.   Follow up with Dr. Arevalo 1-2 weeks JACKELYN.      Patient seen and examined on the day of discharge.  All questions and concerns were addressed prior to discharge.     Face to face encounter with patient: 20 minutes        Goals of Care Treatment Preferences:  Code Status: Full Code        Consults:   Consults (From admission, onward)            Status Ordering Provider       Inpatient consult to Gastroenterology  Once        Provider:  (Not yet assigned)    Completed KHUSHBOO LUU                Pulmonary  Sarcoidosis of lung with sarcoidosis of lymph nodes  -NO home meds  -Primary  pulmonology: Dr. Cassidy, follow up OP     SOB (shortness of breath)  -Likely related to sarcoidosis of the lung and exacerbated by her anemia  -Patient is currently not on any home meds  -SOB at her baseline     Oncology  Iron deficiency anemia  -Patient's anemia is currently uncontrolled  -Has recieved 1 units of PRBCs on 3/16/23. Etiology likely d/t JACKELYN  -Current CBC reviewed-         Lab Results   Component Value Date     HGB 7.7 (L) 03/18/2023     HCT 24.9 (L) 03/18/2023      -Monitor serial CBC and transfuse if patient becomes hemodynamically unstable, symptomatic or H/H drops below 7.0/21.0  -follows Dr. Arevalo OP     GI  * Acute gastric ulcer with hemorrhage  -confirmed by EGD 3/17/23, Dr. Stevenson  -black tarry stools PTA, no further BM since admission  -cont Carafate suspension AC/HS, cont PPI BID on discharge  -will need follow up with GI and repeat EGD in 2 months   -avoid NSAIDs     Other  Rheumatoid arthritis involving multiple sites with positive rheumatoid factor  -On [facility]  -followed by Dr. Guajardo OP               Final Active Diagnoses:     Diagnosis Date Noted POA    PRINCIPAL PROBLEM:  Acute gastric ulcer with hemorrhage [K25.0] 03/17/2023 Yes    Iron deficiency anemia [D50.9] 12/20/2022 Yes    SOB (shortness of breath) [R06.02] 12/09/2022 Yes    Rheumatoid arthritis involving multiple sites with positive rheumatoid factor [M05.79] 04/16/2019 Yes    Sarcoidosis of lung with sarcoidosis of lymph nodes [D86.2] 01/27/2023 Yes       Problems Resolved During this Admission:     Diagnosis Date Noted Date Resolved POA    Black tarry stools [K92.1] 03/16/2023 03/17/2023 Yes         Discharged Condition: good     Disposition: Home or Self Care     Follow Up:    Follow-up Information         Maria Teresa Aparicio MD. Schedule an appointment as soon as possible for a visit in 3 day(s).    Specialty: Family Medicine  Why: call office and schedule a hospital follow up in 2-3 days for repeat labs to monitor  H&H  Contact information:  [address]  [place] [address]  [phone]                    Bryant Valdivia PA-C Follow up on 3/28/2023.    Specialty: Gastroenterology  Why: call office for appointment time, will need EGD in 2 months  Contact information:  [address] [facility]  [place] [address]  [phone]                    Eve Arevalo MD. Schedule an appointment as soon as possible for a visit in 1 week(s).    Specialty: Hematology and Oncology  Why: call office and schedule a hospital follow up in 1-2 weeks for JACKELYN  Contact information:  [address] [facility] [place] [id number] [phone]             Pulmonary Nodules and Sarcoidosis    HPI  Past Medical History:   Diagnosis Date    RA (rheumatoid arthritis)      Past Surgical History:   Procedure Laterality Date    COLONOSCOPY N/A 02/01/2023    Procedure: COLONOSCOPY;  Surgeon: Ángel Valdes MD;  Location: [facility];  Service: Gastroenterology;  Laterality: N/A;    ESOPHAGOGASTRODUODENOSCOPY N/A 02/01/2023    Procedure: EGD (ESOPHAGOGASTRODUODENOSCOPY);  Surgeon: Ángel Valdes MD;  Location: [facility];  Service: Gastroenterology;  Laterality: N/A;    ESOPHAGOGASTRODUODENOSCOPY N/A 3/17/2023    Procedure: EGD (ESOPHAGOGASTRODUODENOSCOPY);  Surgeon: Ashley Stevenson MD;  Location: [facility];  Service: Endoscopy;  Laterality: N/A;    INTRALUMINAL GASTROINTESTINAL TRACT IMAGING VIA CAPSULE N/A 3/1/2023    Procedure: IMAGING PROCEDURE, GI TRACT, INTRALUMINAL, VIA CAPSULE;  Surgeon: First Available [place];  Location: [facility];  Service: Endoscopy;  Laterality: N/A;    Tubal Lig      TUBAL LIGATION       Social History     Socioeconomic History    Marital status: Single   Occupational History    Occupation: [employer], supervisor   Tobacco Use    Smoking status: Former     Types: Vaping with nicotine, Cigarettes     Start date: 1/1/2020     Passive exposure: Past    Smokeless tobacco: Never    Tobacco comments:     Pt  "states that she quit smoking cigarettes 1 year ago, and stopped using the vape 6 months ago.    Substance and Sexual Activity    Alcohol use: Not Currently     Comment: I drank when i was younger but can no longer take the smell    Drug use: Never     Types: Marijuana    Sexual activity: Not Currently     Partners: Male     Birth control/protection: See Surgical Hx, None     Social Determinants of Health     Financial Resource Strain: Low Risk     Difficulty of Paying Living Expenses: Not very hard   Food Insecurity: No Food Insecurity    Worried About Running Out of Food in the Last Year: Never true    Ran Out of Food in the Last Year: Never true   Transportation Needs: No Transportation Needs    Lack of Transportation (Medical): No    Lack of Transportation (Non-Medical): No   Physical Activity: Sufficiently Active    Days of Exercise per Week: 5 days    Minutes of Exercise per Session: 150+ min   Stress: No Stress Concern Present    Feeling of Stress : Only a little   Social Connections: Unknown    Frequency of Communication with Friends and Family: Twice a week    Frequency of Social Gatherings with Friends and Family: Once a week    Active Member of Clubs or Organizations: No    Attends Club or Organization Meetings: Never    Marital Status:    Housing Stability: Low Risk     Unable to Pay for Housing in the Last Year: No    Number of Places Lived in the Last Year: 1    Unstable Housing in the Last Year: No     Review of Systems    Objective:   /68   Pulse 68   Resp 18   Ht 5' 7" (1.702 m)   Wt 69.7 kg (153 lb 10.6 oz)   LMP 12/01/2020 (Approximate)   SpO2 99%   BMI 24.07 kg/m²      Physical Exam  Personal Diagnostic Review  CT of chest noted  CT Chest Without Contrast  Narrative: EXAMINATION:  CT CHEST WITHOUT CONTRAST    CLINICAL HISTORY:  Lung nodule, > 8mm;    TECHNIQUE:  Standard chest CT protocol was performed without IV contrast.    COMPARISON:  Comparison is made to a CT examination " "of the chest performed on 11/30/2022    FINDINGS:  The size of heart is within normal limits.  There is no evidence of clinically significant coronary atherosclerosis.  There is a mild amount of atherosclerosis in the aorta.  Pulmonary nodules are again noted in both lungs.  Some of these nodules have benign appearing central calcification.  There has been no detrimental interval change in the size or appearance of these pulmonary nodules.  There are findings characteristic of scarring in the apex of both lungs.  There is no pneumothorax or pleural effusion.  There is an 8 mm mass in the lateral limb of the left adrenal.  Impression: 1. Pulmonary nodules are again noted in both lungs.  Some of these nodules have benign appearing central calcification. There has been no detrimental interval change in the size or appearance of these pulmonary nodules.  2. There is an 8 mm mass in the lateral limb of the left adrenal.  If additional imaging evaluation is clinically indicated, I recommend consideration of an MRI examination of the adrenals without and with IV contrast.  All CT scans at this facility use dose modulation, iterative reconstruction, and/or weight base dosing when appropriate to reduce radiation dose when appropriate to reduce radiation dose to as low as reasonably achievable.    Electronically signed by: Aram Dougherty MD  Date:    03/29/2023  Time:    08:54    .[email or web address]  No flowsheet data found.        Assessment:       Sarcoidosis of lung with sarcoidosis of lymph nodes  Stable CT of chest 3/29/2023  Continue: adalimumab (HUMIRA)    Pulmonary nodules  Stable CT of chest 3/29/2023  PATH:  LUNG, LEFT, "NODULE", CT-GUIDED BIOPSY:   - Focally necrotizing granulomatous inflammation   - Adjacent alveolated lung parenchyma with fibroelastotic changes and chronic   (lymphoplasmacytic) inflammation   - No evidence of acid-fast organisms or fungal elements on AFB and GMS   cytochemical stains, respectively   - No " evidence of malignancy   PLAN: continue adalimumab (HUMIRA)    1. Sarcoidosis of lung with sarcoidosis of lymph nodes    2. Pulmonary nodules    3. Other specified disorders of adrenal gland    4. Adrenal mass, left    5. Anxiety        Outpatient Encounter Medications as of 3/29/2023   Medication Sig Dispense Refill    acetaminophen (TYLENOL) 650 MG TbSR Take 650 mg by mouth every 8 (eight) hours.      adalimumab (HUMIRA,CF, PEN) 40 mg/0.4 mL PnKt Inject 0.4 mLs (40 mg total) into the skin every 14 (fourteen) days. 2 pen 11    diphenhydrAMINE-acetaminophen (TYLENOL PM)  mg Tab Take 1 tablet by mouth nightly as needed.      pantoprazole (PROTONIX) 40 MG tablet Take 1 tablet (40 mg total) by mouth 2 (two) times daily. 60 tablet 0    sucralfate (CARAFATE) 100 mg/mL suspension Take 10 mLs (1 g total) by mouth 4 (four) times daily before meals and nightly. for 14 days 560 mL 0    diazePAM (VALIUM) 5 MG tablet Take 1 tablet (5 mg total) by mouth On call Procedure for Anxiety. 1 tablet 0    [DISCONTINUED] albuterol (PROVENTIL) 2.5 mg /3 mL (0.083 %) nebulizer solution Take 3 mLs (2.5 mg total) by nebulization every 4 to 6 hours as needed for Wheezing or Shortness of Breath. 360 mL 11    [DISCONTINUED] albuterol (PROVENTIL/VENTOLIN HFA) 90 mcg/actuation inhaler Inhale 2 puffs into the lungs every 4 (four) hours as needed for Wheezing or Shortness of Breath. 18 g 11    [DISCONTINUED] albuterol-ipratropium (DUO-NEB) 2.5 mg-0.5 mg/3 mL nebulizer solution SMARTSI Ampule(s) Via Nebulizer Every 6 Hours PRN      [DISCONTINUED] predniSONE (DELTASONE) 20 MG tablet Take 1 tablet (20 mg total) by mouth once daily. 30 tablet 5    [DISCONTINUED] 0.9%  NaCl infusion (for blood administration)       [DISCONTINUED] acetaminophen tablet 650 mg       [DISCONTINUED] neomycin-bacitracin-polymyxin ointment       [DISCONTINUED] pantoprazole injection 40 mg       [DISCONTINUED] sodium chloride 0.9% flush 10 mL       [DISCONTINUED]  sucralfate 100 mg/mL suspension 1 g       [DISCONTINUED] sucralfate tablet 1 g        No facility-administered encounter medications on file as of 3/29/2023.     Orders Placed This Encounter   Procedures    MRI Abdomen W WO Contrast     Standing Status:   Future     Standing Expiration Date:   3/29/2024     Order Specific Question:   Does the patient have a pacemaker or a defibrillator (Note: Some facilities may not be able to schedule an MRI for patients with pacemakers and defibrillators. You should contact your local radiology department to determine if this is the case.)?     Answer:   No     Order Specific Question:   Does the patient have an aneurysm or surgical clip, pump, nerve/brain stimulator, middle/inner ear prosthesis, or other metal implant or foreign object (bullet, shrapnel)? If they have a card related to their implant, ask them to bring it. Issues related to the implant may cause the MRI to be delayed.     Answer:   No     Order Specific Question:   Is the patient claustrophobic?     Answer:   No     Order Specific Question:   Will the patient require sedation?     Answer:   No     Order Specific Question:   Does the patient have any of the following conditions? Diabetes, History of Renal Disease or Hypertension requiring medical therapy?     Answer:   No     Order Specific Question:   Is the patient pregnant?     Answer:   No     Order Specific Question:   May the Radiologist modify the order per protocol to meet the clinical needs of the patient?     Answer:   Yes     Order Specific Question:   Is this part of a Research Study?     Answer:   No     Order Specific Question:   Recist criteria?     Answer:   No     Order Specific Question:   Will this service be billed to a Worker's Comp policy?     Answer:   No     Order Specific Question:   Does the patient have on a skin patch for medication with aluminized backing?     Answer:   No    Angiotensin Converting Enzyme     DX: Sarcoidosis 135      Standing Status:   Future     Standing Expiration Date:   5/27/2024    INTERLEUKIN-2 RECEPTOR     Standing Status:   Future     Standing Expiration Date:   5/27/2024     Plan:       Requested Prescriptions     Signed Prescriptions Disp Refills    diazePAM (VALIUM) 5 MG tablet 1 tablet 0     Sig: Take 1 tablet (5 mg total) by mouth On call Procedure for Anxiety.     Sarcoidosis of lung with sarcoidosis of lymph nodes  -     Angiotensin Converting Enzyme; Future; Expected date: 03/29/2023  -     INTERLEUKIN-2 RECEPTOR; Future; Expected date: 03/29/2023    Pulmonary nodules    Other specified disorders of adrenal gland  -     MRI Abdomen W WO Contrast; Future; Expected date: 03/29/2023    Adrenal mass, left  -     MRI Abdomen W WO Contrast; Future; Expected date: 03/29/2023    Anxiety  -     diazePAM (VALIUM) 5 MG tablet; Take 1 tablet (5 mg total) by mouth On call Procedure for Anxiety.  Dispense: 1 tablet; Refill: 0           Follow up in about 6 months (around 9/29/2023) for Review progress.    Review of medical records from hospital. Medical records from hospital were reviewed during office visit - these included but were not limited to review of radiographic studies and /or radiologists reports, laboratory studies, discharge summaries, procedure notes, consultations and other transcribed notes.    MEDICAL DECISION MAKING: Moderate to high complexity.  Overall, the multiple problems listed are of moderate to high severity that may impact quality of life and activities of daily living. Side effects of medications, treatment plan as well as options and alternatives reviewed and discussed with patient. There was counseling of patient concerning these issues.    Total time spent in counseling and coordination of care - 40  minutes of total time spent on the encounter, which includes face to face time and non-face to face time preparing to see the patient (eg, review of tests), Obtaining and/or reviewing separately  obtained history, Documenting clinical information in the electronic or other health record, Independently interpreting results (not separately reported) and communicating results to the patient/family/caregiver, or Care coordination (not separately reported).    Time was used in discussion of prognosis, risks, benefits of treatment, instructions and compliance with regimen . Discussion with other physicians and/or health care providers - home health or for use of durable medical equipment (oxygen, nebulizers, CPAP, BiPAP) occurred.

## 2023-03-29 NOTE — ASSESSMENT & PLAN NOTE
"Stable CT of chest 3/29/2023  PATH:  LUNG, LEFT, "NODULE", CT-GUIDED BIOPSY:   - Focally necrotizing granulomatous inflammation   - Adjacent alveolated lung parenchyma with fibroelastotic changes and chronic   (lymphoplasmacytic) inflammation   - No evidence of acid-fast organisms or fungal elements on AFB and GMS   cytochemical stains, respectively   - No evidence of malignancy   PLAN: continue adalimumab (HUMIRA)  "

## 2023-03-29 NOTE — PROGRESS NOTES
The patient location is:  [place]  The chief complaint leading to consultation is:  Follow-up    Visit type: audiovisual    Face to Face time with patient:  10 minutes  Thirty minutes of total time spent on the encounter, which includes face to face time and non-face to face time preparing to see the patient (eg, review of tests), Obtaining and/or reviewing separately obtained history, Documenting clinical information in the electronic or other health record, Independently interpreting results (not separately reported) and communicating results to the patient/family/caregiver, or Care coordination (not separately reported).         Each patient to whom he or she provides medical services by telemedicine is:  (1) informed of the relationship between the physician and patient and the respective role of any other health care provider with respect to management of the patient; and (2) notified that he or she may decline to receive medical services by telemedicine and may withdraw from such care at any time.    Notes:     Subjective:      DATE OF VISIT: 3/29/23     ?  Patient ID:?Tammy Aguero is a 52 y.o. female.?? MR#: [id number]     ? Primary Care Providers:  Maria Teresa Aparicio MD, MD (General)     CHIEF COMPLAINT: ?Iron deficiency anemia??   ?   HPI    Recent emergency room presentation for acute symptomatic anemia status post 1 unit PRBC transfusion.  She had recent GI upper lower endoscopy and capsule endoscopy repeat upper endoscopy with nonbleeding gastric ulcer.  Recent follow-up with GI recommended avoiding NSAID continuing PPI on Carafate as well.  She denies any evidence of bleeding.  She continues have symptomatic anemia with fatigue limiting activities.    Review of Systems    ?   A comprehensive 14-point review of systems was reviewed with patient and was negative other than as specified above.   ?   PAST MEDICAL HISTORY:   Past Medical History:   Diagnosis Date    RA (rheumatoid arthritis)     ?      PAST SURGICAL HISTORY:   Past Surgical History:   Procedure Laterality Date    COLONOSCOPY N/A 02/01/2023    Procedure: COLONOSCOPY;  Surgeon: Ángel Valdes MD;  Location: [facility];  Service: Gastroenterology;  Laterality: N/A;    ESOPHAGOGASTRODUODENOSCOPY N/A 02/01/2023    Procedure: EGD (ESOPHAGOGASTRODUODENOSCOPY);  Surgeon: Ángel Valdes MD;  Location: [facility];  Service: Gastroenterology;  Laterality: N/A;    ESOPHAGOGASTRODUODENOSCOPY N/A 3/17/2023    Procedure: EGD (ESOPHAGOGASTRODUODENOSCOPY);  Surgeon: Ashely Stevenson MD;  Location: [facility];  Service: Endoscopy;  Laterality: N/A;    INTRALUMINAL GASTROINTESTINAL TRACT IMAGING VIA CAPSULE N/A 3/1/2023    Procedure: IMAGING PROCEDURE, GI TRACT, INTRALUMINAL, VIA CAPSULE;  Surgeon: First Available [place];  Location: [facility];  Service: Endoscopy;  Laterality: N/A;    Tubal Lig      TUBAL LIGATION        ?   ALLERGIES:   Allergies as of 03/29/2023    (No Known Allergies)      ?   MEDICATIONS:?   No outpatient medications have been marked as taking for the 3/29/23 encounter (Office Visit) with Eve Arevalo MD.      ?   SOCIAL HISTORY:?   Social History     Tobacco Use    Smoking status: Former     Types: Vaping with nicotine, Cigarettes     Start date: 1/1/2020     Passive exposure: Past    Smokeless tobacco: Never    Tobacco comments:     Pt states that she quit smoking cigarettes 1 year ago, and stopped using the vape 6 months ago.    Substance Use Topics    Alcohol use: Not Currently     Comment: I drank when i was younger but can no longer take the smell      ?     ?   FAMILY HISTORY:   family history includes Alcohol abuse in her mother; Asthma in her mother and sister; Breast cancer in her sister and another family member; COPD in her mother; Cancer in her sister; Depression in her mother; Diabetes in her brother and sister; Heart disease in her mother.   ?        Objective:      Physical Exam      ?   There  were no vitals filed for this visit.     ?   ECOG:?0   General appearance: Generally well appearing, in no acute distress, pallor.   Head, eyes, ears, nose, and throat: moist mucous membranes.   Respiratory:  Normal work of breathing  Psychiatric:  Normal mood and affect.    ?   Laboratory:  ?   No visits with results within 1 Day(s) from this visit.   Latest known visit with results is:   Lab Visit on 03/28/2023   Component Date Value Ref Range Status    Ferritin 03/28/2023 10 (L)  20.0 - 300.0 ng/mL Final    WBC 03/28/2023 5.23  3.90 - 12.70 K/uL Final    RBC 03/28/2023 3.26 (L)  4.00 - 5.40 M/uL Final    Hemoglobin 03/28/2023 8.9 (L)  12.0 - 16.0 g/dL Final    Hematocrit 03/28/2023 29.0 (L)  37.0 - 48.5 % Final    MCV 03/28/2023 89  82 - 98 fL Final    MCH 03/28/2023 27.3  27.0 - 31.0 pg Final    MCHC 03/28/2023 30.7 (L)  32.0 - 36.0 g/dL Final    RDW 03/28/2023 15.4 (H)  11.5 - 14.5 % Final    Platelets 03/28/2023 387  150 - 450 K/uL Final    MPV 03/28/2023 9.7  9.2 - 12.9 fL Final    Immature Granulocytes 03/28/2023 0.4  0.0 - 0.5 % Final    Gran # (ANC) 03/28/2023 2.7  1.8 - 7.7 K/uL Final    Immature Grans (Abs) 03/28/2023 0.02  0.00 - 0.04 K/uL Final    Lymph # 03/28/2023 1.8  1.0 - 4.8 K/uL Final    Mono # 03/28/2023 0.3  0.3 - 1.0 K/uL Final    Eos # 03/28/2023 0.3  0.0 - 0.5 K/uL Final    Baso # 03/28/2023 0.12  0.00 - 0.20 K/uL Final    nRBC 03/28/2023 0  0 /100 WBC Final    Gran % 03/28/2023 51.0  38.0 - 73.0 % Final    Lymph % 03/28/2023 35.2  18.0 - 48.0 % Final    Mono % 03/28/2023 5.9  4.0 - 15.0 % Final    Eosinophil % 03/28/2023 5.2  0.0 - 8.0 % Final    Basophil % 03/28/2023 2.3 (H)  0.0 - 1.9 % Final    Differential Method 03/28/2023 Automated   Final    Iron 03/28/2023 15 (L)  30 - 160 ug/dL Final    Transferrin 03/28/2023 275  200 - 375 mg/dL Final    TIBC 03/28/2023 407  250 - 450 ug/dL Final    Saturated Iron 03/28/2023 4 (L)  20 - 50 % Final      Lab Results    Component Value Date    IRON 15 (L) 03/28/2023    TRANSFERRIN 275 03/28/2023    TIBC 407 03/28/2023    FESATURATED 4 (L) 03/28/2023        Results for orders placed or performed during the hospital encounter of 01/11/23 (from the past 2160 hour(s))   CT Biopsy Lung w/ guidance    Narrative    EXAMINATION:  CT BIOPSY LUNG W/ GUIDANCE    CLINICAL HISTORY:  Solitary pulmonary nodulepulmonary nodule;    COMPARISON:  12/09/2022    FINDINGS:  Informed consent was obtained prior to the exam after discussion of risks and benefits of the procedure with the patient.  All questions were answered and signed informed consent was obtained.    All elements of maximal sterile barrier technique including cap and mask and sterile gown and sterile gloves and sterile full-body draped and hand hygiene and 2% chlorhexidine for cutaneous and sepsis.    5 mm CT images were obtained through the chest for localization which demonstrated  left lower lobe lung mass.  1% lidocaine was used for local anesthesia.  A 19 gauge needle was advanced to the lesion.  Position was confirmed with CT images.  Five core biopsies were obtained using a 20 gauge core biopsy set.  Needle was removed and hemostasis achieved.  Post images demonstrate no immediate complication.  Patient tolerated the procedure.      Impression    Successful CT-guided  left lower lobe lung biopsy.    All CT scans at this facility use dose modulation, iterative reconstruction, and/or weight based dosing when appropriate to reduce radiation dose to as low as reasonable achievable.      Electronically signed by: Tio Dockery MD  Date:    01/11/2023  Time:    11:28     No results found for this or any previous visit (from the past 2160 hour(s)).        ?   Assessment/Plan:   Iron deficiency anemia, unspecified iron deficiency anemia type    Symptomatic anemia    Other orders  -     ferumoxytoL (FERAHEME) 510 mg in dextrose 5 % (D5W) 100 mL IVPB  -     heparin, porcine (PF) 100  unit/mL injection flush 500 Units  -     sodium chloride 0.9% flush 10 mL  -     sodium chloride 0.9% 100 mL flush bag       1. Iron deficiency anemia, unspecified iron deficiency anemia type    2. Symptomatic anemia              Plan:     # iron deficiency anemia:  History of severe iron deficiency anemia with GI workup including upper lower endoscopies and capsule endoscopy early March 2023 and revealing; acute recurrent severe anemia with iron deficiency with ER presentation mid March 2023 status post 1 unit PRBC transfusion.  Repeat EGD with nonbleeding gastric ulcer.  Recommend follow-up with PPI Carafate and GI and additional IV iron therapy indicated at this time.  She tolerated her doses of IV therapy well proceed seem regimen and close follow-up and monitoring for any bleeding.          Follow-Up:   Route Chart for Scheduling    Med Onc Chart Routing      Follow up with physician    Follow up with WILFRIDO 2 months.   Infusion scheduling note Feraheme x2 doses 1 week apart   Injection scheduling note    Labs CBC, ferritin and iron and TIBC   Scheduling:  Preferred lab:  Lab interval:     Imaging    Pharmacy appointment    Other referrals           Therapy Plan Information  Medications  ferumoxytoL (FERAHEME) 510 mg in dextrose 5 % (D5W) 100 mL IVPB  510 mg, Intravenous, Every visit  Flushes  heparin, porcine (PF) 100 unit/mL injection flush 500 Units  500 Units, Intravenous, PRN  sodium chloride 0.9% flush 10 mL  10 mL, Intravenous, Every visit  sodium chloride 0.9% 100 mL flush bag  Intravenous, Every visit    There are no Patient Instructions on file for this visit.

## 2023-04-04 ENCOUNTER — PATIENT MESSAGE (OUTPATIENT)
Dept: INTERNAL MEDICINE | Facility: CLINIC | Age: 53
End: 2023-04-04
Payer: COMMERCIAL

## 2023-04-06 ENCOUNTER — HOSPITAL ENCOUNTER (OUTPATIENT)
Dept: RADIOLOGY | Facility: HOSPITAL | Age: 53
Discharge: HOME | End: 2023-04-06
Attending: INTERNAL MEDICINE
Payer: COMMERCIAL

## 2023-04-06 ENCOUNTER — HOSPITAL ENCOUNTER (OUTPATIENT)
Dept: PREADMISSION TESTING | Facility: HOSPITAL | Age: 53
Discharge: HOME | End: 2023-04-06
Attending: INTERNAL MEDICINE
Payer: COMMERCIAL

## 2023-04-06 DIAGNOSIS — E27.8 ADRENAL MASS, LEFT: ICD-10-CM

## 2023-04-06 DIAGNOSIS — E27.8 OTHER SPECIFIED DISORDERS OF ADRENAL GLAND: ICD-10-CM

## 2023-04-06 DIAGNOSIS — K27.9 PUD (PEPTIC ULCER DISEASE): ICD-10-CM

## 2023-04-06 PROCEDURE — 74183 MRI ABD W/O CNTR FLWD CNTR: CPT | Mod: 26,,, | Performed by: RADIOLOGY

## 2023-04-06 PROCEDURE — A9585 GADOBUTROL INJECTION: HCPCS | Mod: PN | Performed by: INTERNAL MEDICINE

## 2023-04-06 PROCEDURE — 74183 MRI ABDOMEN W WO CONTRAST: ICD-10-PCS | Mod: 26,,, | Performed by: RADIOLOGY

## 2023-04-06 PROCEDURE — 25500020 PHARM REV CODE 255: Mod: PN | Performed by: INTERNAL MEDICINE

## 2023-04-06 PROCEDURE — 74183 MRI ABD W/O CNTR FLWD CNTR: CPT | Mod: TC,PN

## 2023-04-06 RX ORDER — GADOBUTROL 604.72 MG/ML
7 INJECTION INTRAVENOUS
Status: COMPLETED | OUTPATIENT
Start: 2023-04-06 | End: 2023-04-06

## 2023-04-06 RX ADMIN — GADOBUTROL 7 ML: 604.72 INJECTION INTRAVENOUS at 12:04

## 2023-04-12 ENCOUNTER — PATIENT MESSAGE (OUTPATIENT)
Dept: INTERNAL MEDICINE | Facility: CLINIC | Age: 53
End: 2023-04-12
Payer: COMMERCIAL

## 2023-04-12 ENCOUNTER — TELEPHONE (OUTPATIENT)
Dept: INTERNAL MEDICINE | Facility: CLINIC | Age: 53
End: 2023-04-12
Payer: COMMERCIAL

## 2023-04-12 ENCOUNTER — TELEPHONE (OUTPATIENT)
Dept: INFUSION THERAPY | Facility: HOSPITAL | Age: 53
End: 2023-04-12

## 2023-04-12 NOTE — TELEPHONE ENCOUNTER
Leave paperwork faxed off again today with office notes and  recent labs faxed.    Your fax has been successfully sent to [phone] at [phone].  ------------------------------------------------------------  From: [id number]  ------------------------------------------------------------  4/12/2023 11:00:37 AM Transmission Record          Sent to [phone] with remote ID "          Result: (0/339;0/0) Success          Page record: 1 - 43          Elapsed time: 14:24 on channel 55

## 2023-04-12 NOTE — TELEPHONE ENCOUNTER
Spoke with Ms. Laci and informed her that per her insurance it may take up to 15 days for a decision to made. I informed her that the schedulers will call her to reschedule her appointment. Ms. Laci stated understanding.

## 2023-04-13 ENCOUNTER — PATIENT MESSAGE (OUTPATIENT)
Dept: INTERNAL MEDICINE | Facility: CLINIC | Age: 53
End: 2023-04-13
Payer: COMMERCIAL

## 2023-04-17 ENCOUNTER — PATIENT MESSAGE (OUTPATIENT)
Dept: INTERNAL MEDICINE | Facility: CLINIC | Age: 53
End: 2023-04-17
Payer: COMMERCIAL

## 2023-04-18 ENCOUNTER — PATIENT MESSAGE (OUTPATIENT)
Dept: INTERNAL MEDICINE | Facility: CLINIC | Age: 53
End: 2023-04-18
Payer: COMMERCIAL

## 2023-04-19 ENCOUNTER — HOSPITAL ENCOUNTER (OUTPATIENT)
Dept: PREADMISSION TESTING | Facility: HOSPITAL | Age: 53
Discharge: HOME | End: 2023-04-19
Attending: COLON & RECTAL SURGERY
Payer: COMMERCIAL

## 2023-04-19 DIAGNOSIS — K27.9 PUD (PEPTIC ULCER DISEASE): Primary | ICD-10-CM

## 2023-04-21 ENCOUNTER — INFUSION (OUTPATIENT)
Dept: INFUSION THERAPY | Facility: HOSPITAL | Age: 53
End: 2023-04-21
Attending: INTERNAL MEDICINE
Payer: COMMERCIAL

## 2023-04-21 VITALS
SYSTOLIC BLOOD PRESSURE: 118 MMHG | HEART RATE: 76 BPM | TEMPERATURE: 98 F | OXYGEN SATURATION: 98 % | RESPIRATION RATE: 16 BRPM | DIASTOLIC BLOOD PRESSURE: 62 MMHG

## 2023-04-21 DIAGNOSIS — D50.0 IRON DEFICIENCY ANEMIA DUE TO CHRONIC BLOOD LOSS: Primary | ICD-10-CM

## 2023-04-21 PROCEDURE — 96365 THER/PROPH/DIAG IV INF INIT: CPT

## 2023-04-21 PROCEDURE — 25000003 PHARM REV CODE 250: Performed by: INTERNAL MEDICINE

## 2023-04-21 PROCEDURE — 63600175 PHARM REV CODE 636 W HCPCS: Mod: JZ,JG | Performed by: INTERNAL MEDICINE

## 2023-04-21 RX ORDER — SODIUM CHLORIDE 0.9 % (FLUSH) 0.9 %
10 SYRINGE (ML) INJECTION
Status: CANCELLED | OUTPATIENT
Start: 2023-04-21

## 2023-04-21 RX ORDER — HEPARIN 100 UNIT/ML
500 SYRINGE INTRAVENOUS
Status: CANCELLED | OUTPATIENT
Start: 2023-04-21

## 2023-04-21 RX ADMIN — FERUMOXYTOL 510 MG: 510 INJECTION INTRAVENOUS at 01:04

## 2023-04-21 RX ADMIN — SODIUM CHLORIDE: 9 INJECTION, SOLUTION INTRAVENOUS at 01:04

## 2023-04-21 NOTE — PLAN OF CARE
Problem: Adult Inpatient Plan of Care  Goal: Plan of Care Review  Outcome: Ongoing, Progressing  Flowsheets (Taken 4/21/2023 1432)  Plan of Care Reviewed With: patient  Goal: Patient-Specific Goal (Individualized)  Outcome: Ongoing, Progressing  Flowsheets (Taken 4/21/2023 1432)  Anxieties, Fears or Concerns: patient reports shortness of breath  Individualized Care Needs: Feet elevated in recliner for comfort measures  Goal: Optimal Comfort and Wellbeing  Outcome: Ongoing, Progressing  Intervention: Provide Person-Centered Care  Flowsheets (Taken 4/21/2023 1432)  Trust Relationship/Rapport:   care explained   questions encouraged   choices provided   reassurance provided   emotional support provided   thoughts/feelings acknowledged   empathic listening provided   questions answered

## 2023-05-02 ENCOUNTER — INFUSION (OUTPATIENT)
Dept: INFUSION THERAPY | Facility: HOSPITAL | Age: 53
End: 2023-05-02
Attending: INTERNAL MEDICINE
Payer: COMMERCIAL

## 2023-05-02 VITALS
TEMPERATURE: 98 F | HEART RATE: 89 BPM | OXYGEN SATURATION: 100 % | DIASTOLIC BLOOD PRESSURE: 72 MMHG | RESPIRATION RATE: 16 BRPM | SYSTOLIC BLOOD PRESSURE: 121 MMHG

## 2023-05-02 DIAGNOSIS — D50.0 IRON DEFICIENCY ANEMIA DUE TO CHRONIC BLOOD LOSS: Primary | ICD-10-CM

## 2023-05-02 PROCEDURE — 96365 THER/PROPH/DIAG IV INF INIT: CPT

## 2023-05-02 PROCEDURE — 63600175 PHARM REV CODE 636 W HCPCS: Mod: JZ,JG | Performed by: INTERNAL MEDICINE

## 2023-05-02 PROCEDURE — 25000003 PHARM REV CODE 250: Performed by: INTERNAL MEDICINE

## 2023-05-02 RX ORDER — HEPARIN 100 UNIT/ML
500 SYRINGE INTRAVENOUS
Status: CANCELLED | OUTPATIENT
Start: 2023-05-02

## 2023-05-02 RX ORDER — SODIUM CHLORIDE 0.9 % (FLUSH) 0.9 %
10 SYRINGE (ML) INJECTION
Status: CANCELLED | OUTPATIENT
Start: 2023-05-02

## 2023-05-02 RX ADMIN — FERUMOXYTOL 510 MG: 510 INJECTION INTRAVENOUS at 10:05

## 2023-05-02 RX ADMIN — SODIUM CHLORIDE: 9 INJECTION, SOLUTION INTRAVENOUS at 10:05

## 2023-05-02 NOTE — PLAN OF CARE
Problem: Adult Inpatient Plan of Care  Goal: Plan of Care Review  Outcome: Ongoing, Progressing  Flowsheets (Taken 5/2/2023 1030)  Plan of Care Reviewed With: patient  Goal: Patient-Specific Goal (Individualized)  Outcome: Ongoing, Progressing  Flowsheets (Taken 5/2/2023 1030)  Anxieties, Fears or Concerns: none verbalized  Individualized Care Needs: reclining position, ginger ale     Problem: Anemia  Goal: Anemia Symptom Improvement  Outcome: Ongoing, Progressing  Intervention: Monitor and Manage Anemia  Flowsheets (Taken 5/2/2023 1030)  Oral Nutrition Promotion: rest periods promoted  Safety Promotion/Fall Prevention:   instructed to call staff for mobility   room near unit station  Fatigue Management: frequent rest breaks encouraged      Domestic partner

## 2023-05-12 PROBLEM — E83.19 HEMOSIDEROSIS: Status: ACTIVE | Noted: 2023-05-12

## 2023-05-30 ENCOUNTER — LAB VISIT (OUTPATIENT)
Dept: LAB | Facility: HOSPITAL | Age: 53
End: 2023-05-30
Attending: INTERNAL MEDICINE
Payer: COMMERCIAL

## 2023-05-30 DIAGNOSIS — D50.9 IRON DEFICIENCY ANEMIA, UNSPECIFIED IRON DEFICIENCY ANEMIA TYPE: ICD-10-CM

## 2023-05-30 DIAGNOSIS — D86.2 SARCOIDOSIS OF LUNG WITH SARCOIDOSIS OF LYMPH NODES: ICD-10-CM

## 2023-05-30 LAB
BASOPHILS # BLD AUTO: 0.06 K/UL (ref 0–0.2)
BASOPHILS NFR BLD: 1.4 % (ref 0–1.9)
DIFFERENTIAL METHOD: ABNORMAL
EOSINOPHIL # BLD AUTO: 0.1 K/UL (ref 0–0.5)
EOSINOPHIL NFR BLD: 2.6 % (ref 0–8)
ERYTHROCYTE [DISTWIDTH] IN BLOOD BY AUTOMATED COUNT: 20.6 % (ref 11.5–14.5)
FERRITIN SERPL-MCNC: 103 NG/ML (ref 20–300)
HCT VFR BLD AUTO: 34.2 % (ref 37–48.5)
HGB BLD-MCNC: 10.4 G/DL (ref 12–16)
IMM GRANULOCYTES # BLD AUTO: 0.01 K/UL (ref 0–0.04)
IMM GRANULOCYTES NFR BLD AUTO: 0.2 % (ref 0–0.5)
LYMPHOCYTES # BLD AUTO: 1.7 K/UL (ref 1–4.8)
LYMPHOCYTES NFR BLD: 41.5 % (ref 18–48)
MCH RBC QN AUTO: 28.3 PG (ref 27–31)
MCHC RBC AUTO-ENTMCNC: 30.4 G/DL (ref 32–36)
MCV RBC AUTO: 93 FL (ref 82–98)
MONOCYTES # BLD AUTO: 0.3 K/UL (ref 0.3–1)
MONOCYTES NFR BLD: 6 % (ref 4–15)
NEUTROPHILS # BLD AUTO: 2 K/UL (ref 1.8–7.7)
NEUTROPHILS NFR BLD: 48.3 % (ref 38–73)
NRBC BLD-RTO: 0 /100 WBC
PLATELET # BLD AUTO: 273 K/UL (ref 150–450)
PMV BLD AUTO: 9.9 FL (ref 9.2–12.9)
RBC # BLD AUTO: 3.68 M/UL (ref 4–5.4)
WBC # BLD AUTO: 4.17 K/UL (ref 3.9–12.7)

## 2023-05-30 PROCEDURE — 82728 ASSAY OF FERRITIN: CPT | Performed by: INTERNAL MEDICINE

## 2023-05-30 PROCEDURE — 36415 COLL VENOUS BLD VENIPUNCTURE: CPT | Performed by: INTERNAL MEDICINE

## 2023-05-30 PROCEDURE — 82164 ANGIOTENSIN I ENZYME TEST: CPT | Performed by: INTERNAL MEDICINE

## 2023-05-30 PROCEDURE — 85025 COMPLETE CBC W/AUTO DIFF WBC: CPT | Performed by: INTERNAL MEDICINE

## 2023-05-30 PROCEDURE — 84466 ASSAY OF TRANSFERRIN: CPT | Performed by: INTERNAL MEDICINE

## 2023-05-30 PROCEDURE — 83520 IMMUNOASSAY QUANT NOS NONAB: CPT | Performed by: INTERNAL MEDICINE

## 2023-05-31 LAB
IRON SERPL-MCNC: 36 UG/DL (ref 30–160)
SATURATED IRON: 12 % (ref 20–50)
TOTAL IRON BINDING CAPACITY: 296 UG/DL (ref 250–450)
TRANSFERRIN SERPL-MCNC: 200 MG/DL (ref 200–375)

## 2023-06-01 LAB — ACE SERPL-CCNC: 43 U/L (ref 16–85)

## 2023-06-02 LAB — SOL IL2 RECEP SERPL-MCNC: 1196.8 PG/ML (ref 175.3–858.2)

## 2023-06-06 ENCOUNTER — OFFICE VISIT (OUTPATIENT)
Dept: RHEUMATOLOGY | Facility: CLINIC | Age: 53
End: 2023-06-06
Payer: COMMERCIAL

## 2023-06-06 VITALS
DIASTOLIC BLOOD PRESSURE: 68 MMHG | SYSTOLIC BLOOD PRESSURE: 110 MMHG | WEIGHT: 157.88 LBS | HEART RATE: 83 BPM | HEIGHT: 67 IN | BODY MASS INDEX: 24.78 KG/M2

## 2023-06-06 DIAGNOSIS — D84.9 IMMUNOSUPPRESSED STATUS: ICD-10-CM

## 2023-06-06 DIAGNOSIS — M06.9 FLARE OF RHEUMATOID ARTHRITIS: ICD-10-CM

## 2023-06-06 DIAGNOSIS — D86.2 SARCOIDOSIS OF LUNG WITH SARCOIDOSIS OF LYMPH NODES: ICD-10-CM

## 2023-06-06 DIAGNOSIS — M05.79 RHEUMATOID ARTHRITIS INVOLVING MULTIPLE SITES WITH POSITIVE RHEUMATOID FACTOR: Primary | ICD-10-CM

## 2023-06-06 DIAGNOSIS — Z51.81 ENCOUNTER FOR MEDICATION MONITORING: ICD-10-CM

## 2023-06-06 PROCEDURE — 1159F MED LIST DOCD IN RCRD: CPT | Mod: CPTII,S$GLB,, | Performed by: INTERNAL MEDICINE

## 2023-06-06 PROCEDURE — 3074F PR MOST RECENT SYSTOLIC BLOOD PRESSURE < 130 MM HG: ICD-10-PCS | Mod: CPTII,S$GLB,, | Performed by: INTERNAL MEDICINE

## 2023-06-06 PROCEDURE — 1159F PR MEDICATION LIST DOCUMENTED IN MEDICAL RECORD: ICD-10-PCS | Mod: CPTII,S$GLB,, | Performed by: INTERNAL MEDICINE

## 2023-06-06 PROCEDURE — 3078F PR MOST RECENT DIASTOLIC BLOOD PRESSURE < 80 MM HG: ICD-10-PCS | Mod: CPTII,S$GLB,, | Performed by: INTERNAL MEDICINE

## 2023-06-06 PROCEDURE — 99215 OFFICE O/P EST HI 40 MIN: CPT | Mod: S$GLB,,, | Performed by: INTERNAL MEDICINE

## 2023-06-06 PROCEDURE — 3078F DIAST BP <80 MM HG: CPT | Mod: CPTII,S$GLB,, | Performed by: INTERNAL MEDICINE

## 2023-06-06 PROCEDURE — 99999 PR PBB SHADOW E&M-EST. PATIENT-LVL III: CPT | Mod: PBBFAC,,, | Performed by: INTERNAL MEDICINE

## 2023-06-06 PROCEDURE — 1160F RVW MEDS BY RX/DR IN RCRD: CPT | Mod: CPTII,S$GLB,, | Performed by: INTERNAL MEDICINE

## 2023-06-06 PROCEDURE — 1160F PR REVIEW ALL MEDS BY PRESCRIBER/CLIN PHARMACIST DOCUMENTED: ICD-10-PCS | Mod: CPTII,S$GLB,, | Performed by: INTERNAL MEDICINE

## 2023-06-06 PROCEDURE — 99215 PR OFFICE/OUTPT VISIT, EST, LEVL V, 40-54 MIN: ICD-10-PCS | Mod: S$GLB,,, | Performed by: INTERNAL MEDICINE

## 2023-06-06 PROCEDURE — 3008F BODY MASS INDEX DOCD: CPT | Mod: CPTII,S$GLB,, | Performed by: INTERNAL MEDICINE

## 2023-06-06 PROCEDURE — 3008F PR BODY MASS INDEX (BMI) DOCUMENTED: ICD-10-PCS | Mod: CPTII,S$GLB,, | Performed by: INTERNAL MEDICINE

## 2023-06-06 PROCEDURE — 3074F SYST BP LT 130 MM HG: CPT | Mod: CPTII,S$GLB,, | Performed by: INTERNAL MEDICINE

## 2023-06-06 PROCEDURE — 99999 PR PBB SHADOW E&M-EST. PATIENT-LVL III: ICD-10-PCS | Mod: PBBFAC,,, | Performed by: INTERNAL MEDICINE

## 2023-06-06 RX ORDER — PNEUMOCOCCAL 20-VALENT CONJUGATE VACCINE 2.2; 2.2; 2.2; 2.2; 2.2; 2.2; 2.2; 2.2; 2.2; 2.2; 2.2; 2.2; 2.2; 2.2; 2.2; 2.2; 4.4; 2.2; 2.2; 2.2 UG/.5ML; UG/.5ML; UG/.5ML; UG/.5ML; UG/.5ML; UG/.5ML; UG/.5ML; UG/.5ML; UG/.5ML; UG/.5ML; UG/.5ML; UG/.5ML; UG/.5ML; UG/.5ML; UG/.5ML; UG/.5ML; UG/.5ML; UG/.5ML; UG/.5ML; UG/.5ML
0.5 INJECTION, SUSPENSION INTRAMUSCULAR ONCE
Qty: 0.5 ML | Refills: 0 | Status: SHIPPED | OUTPATIENT
Start: 2023-06-06 | End: 2023-06-06

## 2023-06-06 RX ORDER — ZOSTER VACCINE RECOMBINANT, ADJUVANTED 50 MCG/0.5
0.5 KIT INTRAMUSCULAR
Qty: 1 EACH | Refills: 1 | Status: SHIPPED | OUTPATIENT
Start: 2023-06-06 | End: 2024-03-05

## 2023-06-06 RX ORDER — UPADACITINIB 15 MG/1
15 TABLET, EXTENDED RELEASE ORAL DAILY
Qty: 30 TABLET | Refills: 11 | Status: ACTIVE | OUTPATIENT
Start: 2023-06-06 | End: 2023-07-11 | Stop reason: SDUPTHER

## 2023-06-06 NOTE — PROGRESS NOTES
"                                                    RHEUMATOLOGY CLINIC FOLLOW UP VISIT  Chief complaints, HPI, ROS, EXAM, Assessment & Plans:-  Tammy Gray a 52 y.o. pleasant female comes in for worsening pain.  Severe case of seropositive nonerosive rheumatoid arthritis complicated by biopsy-proven pulmonary sarcoidosis comes in today with worsening joint pain.  Persistent joint pain, stiffness and swelling involving multiple joints including bilateral hands and feet.  No improvement since starting Humira.  Shortness of breath improved with iron infusion with correction of anemia.  Etiology of anemia was diagnosed to be upper gastrointestinal bleeding from gastric ulcer secondary to NSAID use.  Rheumatological review of system negative otherwise.  Physical examination shows active synovitis involving multiple MCP, PIP, bilateral ankles and wrists..    1. Rheumatoid arthritis involving multiple sites with positive rheumatoid factor    2. Encounter for medication monitoring    3. Immunosuppressed status    4. Sarcoidosis of lung with sarcoidosis of lymph nodes    5. Flare of rheumatoid arthritis      Problem List Items Addressed This Visit       Rheumatoid arthritis involving multiple sites with positive rheumatoid factor - Primary    Relevant Medications    upadacitinib (RINVOQ) 15 mg 24 hr tablet    Sarcoidosis of lung with sarcoidosis of lymph nodes    Overview     Lung Biopsy: LUNG, LEFT, "NODULE", CT-GUIDED BIOPSY:   - Focally necrotizing granulomatous inflammation   - Adjacent alveolated lung parenchyma with fibroelastotic changes and chronic   (lymphoplasmacytic) inflammation   - No evidence of acid-fast organisms or fungal elements on AFB and GMS   cytochemical stains, respectively   - No evidence of malignancy     From 12/20/2022 Normal Pulmonary Function Studies:Spirometry is normal. Spirometry remains unimproved following bronchodilator. Lung volume determination shows TLC is normal with evidence " air trapping is present. Airway mechanics show normal airway resistance and conductance. DLCO is normal. MVV is normal.          Encounter for medication monitoring    Immunosuppressed status    Relevant Medications    pneumoc 20-fariba conj-dip cr,PF, (PREVNAR 20, PF,) 0.5 mL Syrg injection    varicella-zoster gE-AS01B, PF, (SHINGRIX, PF,) 50 mcg/0.5 mL injection    Flare of rheumatoid arthritis    Relevant Medications    upadacitinib (RINVOQ) 15 mg 24 hr tablet       Labs reviewed today:-   Latest Reference Range & Units 05/30/23 08:47   WBC 3.90 - 12.70 K/uL 4.17   RBC 4.00 - 5.40 M/uL 3.68 (L)   Hemoglobin 12.0 - 16.0 g/dL 10.4 (L)   Hematocrit 37.0 - 48.5 % 34.2 (L)   MCV 82 - 98 fL 93   MCH 27.0 - 31.0 pg 28.3   MCHC 32.0 - 36.0 g/dL 30.4 (L)   RDW 11.5 - 14.5 % 20.6 (H)   Platelets 150 - 450 K/uL 273   MPV 9.2 - 12.9 fL 9.9   Gran % 38.0 - 73.0 % 48.3   Lymph % 18.0 - 48.0 % 41.5   Mono % 4.0 - 15.0 % 6.0   Eosinophil % 0.0 - 8.0 % 2.6   Basophil % 0.0 - 1.9 % 1.4   Immature Granulocytes 0.0 - 0.5 % 0.2   Gran # (ANC) 1.8 - 7.7 K/uL 2.0   Lymph # 1.0 - 4.8 K/uL 1.7   Mono # 0.3 - 1.0 K/uL 0.3   Eos # 0.0 - 0.5 K/uL 0.1   Baso # 0.00 - 0.20 K/uL 0.06   Immature Grans (Abs) 0.00 - 0.04 K/uL 0.01   nRBC 0 /100 WBC 0   Differential Method  Automated   Iron 30 - 160 ug/dL 36   TIBC 250 - 450 ug/dL 296   Saturated Iron 20 - 50 % 12 (L)   Transferrin 200 - 375 mg/dL 200   Ferritin 20.0 - 300.0 ng/mL 103   Angio Convert Enzyme 16 - 85 U/L 43   Interleukin 2 (IL-2) 175.3 - 858.2 pg/mL 1196.8 (H)   (L): Data is abnormally low  (H): Data is abnormally high        Severe worsening seropositive nonerosive rheumatoid arthritis with worsening sarcoid activity-high IL 2 levels.  Not responding to Humira.  Prednisone contraindicated because of recent upper gastrointestinal bleeding.  Discontinue Humira and try Rinvoq.  Compromised immune system secondary to autoimmune disease and use of immunosuppressive drugs. Monitor carefully  for infections. Advised to get immediate medical care if any infection. Also advised strict adherence to age appropriate vaccinations and cancer screenings with PCP.  Get pneumonia and shingles vaccine as soon as possible.    I have explained all of the above in detail and the patient understands all of the current recommendation(s). I have answered all questions to the best of my ability and to their complete satisfaction.     # Follow up in about 3 months (around 9/6/2023).      Disclaimer: This note was prepared using voice recognition system and is likely to have sound alike errors and is not proof read.  Please call me with any questions.

## 2023-06-07 ENCOUNTER — OFFICE VISIT (OUTPATIENT)
Dept: HEMATOLOGY/ONCOLOGY | Facility: CLINIC | Age: 53
End: 2023-06-07
Payer: COMMERCIAL

## 2023-06-07 VITALS
OXYGEN SATURATION: 99 % | DIASTOLIC BLOOD PRESSURE: 66 MMHG | TEMPERATURE: 98 F | BODY MASS INDEX: 24.53 KG/M2 | WEIGHT: 156.31 LBS | HEIGHT: 67 IN | HEART RATE: 77 BPM | SYSTOLIC BLOOD PRESSURE: 109 MMHG

## 2023-06-07 DIAGNOSIS — D50.9 IRON DEFICIENCY ANEMIA, UNSPECIFIED IRON DEFICIENCY ANEMIA TYPE: Primary | ICD-10-CM

## 2023-06-07 DIAGNOSIS — E83.19 HEMOSIDEROSIS: ICD-10-CM

## 2023-06-07 DIAGNOSIS — R91.8 PULMONARY NODULES: ICD-10-CM

## 2023-06-07 DIAGNOSIS — D84.9 IMMUNOSUPPRESSED STATUS: ICD-10-CM

## 2023-06-07 DIAGNOSIS — D50.0 IRON DEFICIENCY ANEMIA DUE TO CHRONIC BLOOD LOSS: ICD-10-CM

## 2023-06-07 DIAGNOSIS — M06.9 FLARE OF RHEUMATOID ARTHRITIS: ICD-10-CM

## 2023-06-07 DIAGNOSIS — D64.9 SYMPTOMATIC ANEMIA: ICD-10-CM

## 2023-06-07 DIAGNOSIS — K25.0 ACUTE GASTRIC ULCER WITH HEMORRHAGE: ICD-10-CM

## 2023-06-07 PROCEDURE — 1159F PR MEDICATION LIST DOCUMENTED IN MEDICAL RECORD: ICD-10-PCS | Mod: CPTII,S$GLB,, | Performed by: INTERNAL MEDICINE

## 2023-06-07 PROCEDURE — 3074F PR MOST RECENT SYSTOLIC BLOOD PRESSURE < 130 MM HG: ICD-10-PCS | Mod: CPTII,S$GLB,, | Performed by: INTERNAL MEDICINE

## 2023-06-07 PROCEDURE — 3078F DIAST BP <80 MM HG: CPT | Mod: CPTII,S$GLB,, | Performed by: INTERNAL MEDICINE

## 2023-06-07 PROCEDURE — 3008F BODY MASS INDEX DOCD: CPT | Mod: CPTII,S$GLB,, | Performed by: INTERNAL MEDICINE

## 2023-06-07 PROCEDURE — 3008F PR BODY MASS INDEX (BMI) DOCUMENTED: ICD-10-PCS | Mod: CPTII,S$GLB,, | Performed by: INTERNAL MEDICINE

## 2023-06-07 PROCEDURE — 3078F PR MOST RECENT DIASTOLIC BLOOD PRESSURE < 80 MM HG: ICD-10-PCS | Mod: CPTII,S$GLB,, | Performed by: INTERNAL MEDICINE

## 2023-06-07 PROCEDURE — 99214 OFFICE O/P EST MOD 30 MIN: CPT | Mod: S$GLB,,, | Performed by: INTERNAL MEDICINE

## 2023-06-07 PROCEDURE — 3074F SYST BP LT 130 MM HG: CPT | Mod: CPTII,S$GLB,, | Performed by: INTERNAL MEDICINE

## 2023-06-07 PROCEDURE — 1159F MED LIST DOCD IN RCRD: CPT | Mod: CPTII,S$GLB,, | Performed by: INTERNAL MEDICINE

## 2023-06-07 PROCEDURE — 99214 PR OFFICE/OUTPT VISIT, EST, LEVL IV, 30-39 MIN: ICD-10-PCS | Mod: S$GLB,,, | Performed by: INTERNAL MEDICINE

## 2023-06-07 PROCEDURE — 99999 PR PBB SHADOW E&M-EST. PATIENT-LVL III: ICD-10-PCS | Mod: PBBFAC,,, | Performed by: INTERNAL MEDICINE

## 2023-06-07 PROCEDURE — 99999 PR PBB SHADOW E&M-EST. PATIENT-LVL III: CPT | Mod: PBBFAC,,, | Performed by: INTERNAL MEDICINE

## 2023-06-07 NOTE — PROGRESS NOTES
Subjective:      DATE OF VISIT: 6/7/23     ?  Patient ID:?Tammy Aguero is a 52 y.o. female.?? MR#: [id number]     ? Primary Care Providers:  Maria Teresa Aparicio MD, MD (General)     CHIEF COMPLAINT: ?Iron deficiency anemia??   ?   HPI    She denies any evidence of bleeding including no hemoptysis hematemesis hematuria melena or hematochezia.  She is follow-up scope with GI planned later June 2023.  She follows with Rheumatology for flares of rheumatoid arthritis previously on methotrexate and Humira discontinued plan for alternative therapy due to nonresponse.  Most recent IV iron therapy on 05/02/2023 with some improvement in energy and hemoglobin.      Review of Systems    ?   A comprehensive 14-point review of systems was reviewed with patient and was negative other than as specified above.   ?   PAST MEDICAL HISTORY:   Past Medical History:   Diagnosis Date    RA (rheumatoid arthritis)     ?     PAST SURGICAL HISTORY:   Past Surgical History:   Procedure Laterality Date    COLONOSCOPY N/A 02/01/2023    Procedure: COLONOSCOPY;  Surgeon: Ángel Valdes MD;  Location: [facility];  Service: Gastroenterology;  Laterality: N/A;    ESOPHAGOGASTRODUODENOSCOPY N/A 02/01/2023    Procedure: EGD (ESOPHAGOGASTRODUODENOSCOPY);  Surgeon: Ángel Valeds MD;  Location: [facility];  Service: Gastroenterology;  Laterality: N/A;    ESOPHAGOGASTRODUODENOSCOPY N/A 3/17/2023    Procedure: EGD (ESOPHAGOGASTRODUODENOSCOPY);  Surgeon: Ashley Stevenson MD;  Location: [facility];  Service: Endoscopy;  Laterality: N/A;    INTRALUMINAL GASTROINTESTINAL TRACT IMAGING VIA CAPSULE N/A 3/1/2023    Procedure: IMAGING PROCEDURE, GI TRACT, INTRALUMINAL, VIA CAPSULE;  Surgeon: First Antonietta Luevano;  Location: [facility];  Service: Endoscopy;  Laterality: N/A;    Tubal Lig      TUBAL LIGATION        ?   ALLERGIES:   Allergies as of 06/07/2023 - Reviewed 06/07/2023   Allergen Reaction Noted    Nsaids (non-steroidal  anti-inflammatory drug) Other (See Comments) 06/06/2023      ?   MEDICATIONS:?   Outpatient Medications Marked as Taking for the 6/7/23 encounter (Office Visit) with Eve Arevalo MD   Medication Sig Dispense Refill    acetaminophen (TYLENOL) 650 MG TbSR Take 650 mg by mouth every 8 (eight) hours.      diphenhydrAMINE-acetaminophen (TYLENOL PM)  mg Tab Take 1 tablet by mouth nightly as needed.      upadacitinib (RINVOQ) 15 mg 24 hr tablet Take 1 tablet (15 mg total) by mouth once daily. 30 tablet 11    varicella-zoster gE-AS01B, PF, (SHINGRIX, PF,) 50 mcg/0.5 mL injection Inject 0.5 mLs into the muscle every 3 (three) months. 1 each 1      ?   SOCIAL HISTORY:?   Social History     Tobacco Use    Smoking status: Former     Types: Vaping with nicotine, Cigarettes     Start date: 1/1/2020     Passive exposure: Past    Smokeless tobacco: Never    Tobacco comments:     Pt states that she quit smoking cigarettes 1 year ago, and stopped using the vape 6 months ago.    Substance Use Topics    Alcohol use: Not Currently     Comment: I drank when i was younger but can no longer take the smell      ?     ?   FAMILY HISTORY:   family history includes Alcohol abuse in her mother; Asthma in her mother and sister; Breast cancer in her sister and another family member; COPD in her mother; Cancer in her sister; Depression in her mother; Diabetes in her brother and sister; Heart disease in her mother.   ?        Objective:      Physical Exam      ?   Vitals:    06/07/23 0806   BP: 109/66   Pulse: 77   Temp: 98.3 °F (36.8 °C)        ?   ECOG:?0   General appearance: Generally well appearing, in no acute distress.   Head, eyes, ears, nose, and throat: moist mucous membranes.   Respiratory:  Normal work of breathing  Abdomen: nontender, nondistended.   Extremities: Warm, without edema.   Neurologic: Alert and oriented. Grossly normal strength, coordination, and gait.   Skin: No rashes, ecchymoses or petechial lesion.    Psychiatric:  Normal mood and affect.      ?   Laboratory:  ?   No visits with results within 1 Day(s) from this visit.   Latest known visit with results is:   Lab Visit on 05/30/2023   Component Date Value Ref Range Status    Iron 05/30/2023 36  30 - 160 ug/dL Final    Transferrin 05/30/2023 200  200 - 375 mg/dL Final    TIBC 05/30/2023 296  250 - 450 ug/dL Final    Saturated Iron 05/30/2023 12 (L)  20 - 50 % Final    Ferritin 05/30/2023 103  20.0 - 300.0 ng/mL Final    WBC 05/30/2023 4.17  3.90 - 12.70 K/uL Final    RBC 05/30/2023 3.68 (L)  4.00 - 5.40 M/uL Final    Hemoglobin 05/30/2023 10.4 (L)  12.0 - 16.0 g/dL Final    Hematocrit 05/30/2023 34.2 (L)  37.0 - 48.5 % Final    MCV 05/30/2023 93  82 - 98 fL Final    MCH 05/30/2023 28.3  27.0 - 31.0 pg Final    MCHC 05/30/2023 30.4 (L)  32.0 - 36.0 g/dL Final    RDW 05/30/2023 20.6 (H)  11.5 - 14.5 % Final    Platelets 05/30/2023 273  150 - 450 K/uL Final    MPV 05/30/2023 9.9  9.2 - 12.9 fL Final    Immature Granulocytes 05/30/2023 0.2  0.0 - 0.5 % Final    Gran # (ANC) 05/30/2023 2.0  1.8 - 7.7 K/uL Final    Immature Grans (Abs) 05/30/2023 0.01  0.00 - 0.04 K/uL Final    Lymph # 05/30/2023 1.7  1.0 - 4.8 K/uL Final    Mono # 05/30/2023 0.3  0.3 - 1.0 K/uL Final    Eos # 05/30/2023 0.1  0.0 - 0.5 K/uL Final    Baso # 05/30/2023 0.06  0.00 - 0.20 K/uL Final    nRBC 05/30/2023 0  0 /100 WBC Final    Gran % 05/30/2023 48.3  38.0 - 73.0 % Final    Lymph % 05/30/2023 41.5  18.0 - 48.0 % Final    Mono % 05/30/2023 6.0  4.0 - 15.0 % Final    Eosinophil % 05/30/2023 2.6  0.0 - 8.0 % Final    Basophil % 05/30/2023 1.4  0.0 - 1.9 % Final    Differential Method 05/30/2023 Automated   Final    Angio Convert Enzyme 05/30/2023 43  16 - 85 U/L Final    Interleukin 2 (IL-2) 05/30/2023 1196.8 (H)  175.3 - 858.2 pg/mL Final      Lab Results   Component Value Date    IRON 36 05/30/2023    TRANSFERRIN 200 05/30/2023    TIBC 296 05/30/2023     FESATURATED 12 (L) 05/30/2023        Results for orders placed or performed during the hospital encounter of 03/29/23 (from the past 2160 hour(s))   CT Chest Without Contrast    Narrative    EXAMINATION:  CT CHEST WITHOUT CONTRAST    CLINICAL HISTORY:  Lung nodule, > 8mm;    TECHNIQUE:  Standard chest CT protocol was performed without IV contrast.    COMPARISON:  Comparison is made to a CT examination of the chest performed on 11/30/2022    FINDINGS:  The size of heart is within normal limits.  There is no evidence of clinically significant coronary atherosclerosis.  There is a mild amount of atherosclerosis in the aorta.  Pulmonary nodules are again noted in both lungs.  Some of these nodules have benign appearing central calcification.  There has been no detrimental interval change in the size or appearance of these pulmonary nodules.  There are findings characteristic of scarring in the apex of both lungs.  There is no pneumothorax or pleural effusion.  There is an 8 mm mass in the lateral limb of the left adrenal.      Impression    1. Pulmonary nodules are again noted in both lungs.  Some of these nodules have benign appearing central calcification. There has been no detrimental interval change in the size or appearance of these pulmonary nodules.  2. There is an 8 mm mass in the lateral limb of the left adrenal.  If additional imaging evaluation is clinically indicated, I recommend consideration of an MRI examination of the adrenals without and with IV contrast.  All CT scans at this facility use dose modulation, iterative reconstruction, and/or weight base dosing when appropriate to reduce radiation dose when appropriate to reduce radiation dose to as low as reasonably achievable.      Electronically signed by: Aram Dougherty MD  Date:    03/29/2023  Time:    08:54     No results found for this or any previous visit (from the past 2160 hour(s)).        ?   Assessment/Plan:   There are no diagnoses linked to  "this encounter.       No diagnosis found.            Plan:     # iron deficiency anemia:  History of severe iron deficiency anemia with GI workup including upper lower endoscopies and capsule endoscopy early March 2023 and revealing; acute recurrent severe anemia with iron deficiency with ER presentation mid March 2023 status post 1 unit PRBC transfusion.  Repeat EGD with nonbleeding gastric ulcer.  Prior iron deficiency has resolved status post transfusion and IV iron therapy last on 05/02/2023.  Improvement in anemia to hemoglobin 10.4, 12% saturation ferritin increased from .  Note: MRI noted "in phase sequence shows decreased signal intensity of the liver, spleen, and severe decreased signal intensity of the adrenal glands compared to the out of phase sequence indicating hemosiderosis." Current labs do not reflect iron overload.  No history of other known liver disease hemoglobinopathy.  Recommend follow-up with iron indices in 3 months and consideration of additional imaging for iron deposition or hepatology referral as indicated.    Follow-up with GI for history of bleeding ulcer repeat scope planned June 2023.    Rheumatoid arthritis recommend follow-up with Rheumatology planned for change in therapy     Pulmonary nodules:  Follow-up with Pulmonary      Follow-Up:   Route Chart for Scheduling    Med Onc Chart Routing      Follow up with physician 3 months.   Follow up with [facility]    Infusion scheduling note    Injection scheduling note    Labs CBC, ferritin, iron and TIBC and CMP   Scheduling:  Preferred lab:  Lab interval:  with other labs on 8/29/23   Imaging    Pharmacy appointment    Other referrals          Therapy Plan Information  Flushes  heparin, porcine (PF) 100 unit/mL injection flush 500 Units  500 Units, Intravenous, PRN  sodium chloride 0.9% flush 10 mL  10 mL, Intravenous, Every visit    There are no Patient Instructions on file for this visit.          "

## 2023-06-13 ENCOUNTER — SPECIALTY PHARMACY (OUTPATIENT)
Dept: PHARMACY | Facility: CLINIC | Age: 53
End: 2023-06-13
Payer: COMMERCIAL

## 2023-06-13 ENCOUNTER — TELEPHONE (OUTPATIENT)
Dept: PHARMACY | Facility: CLINIC | Age: 53
End: 2023-06-13
Payer: COMMERCIAL

## 2023-06-13 ENCOUNTER — PATIENT MESSAGE (OUTPATIENT)
Dept: PHARMACY | Facility: CLINIC | Age: 53
End: 2023-06-13
Payer: COMMERCIAL

## 2023-06-13 NOTE — TELEPHONE ENCOUNTER
Alyssia, this is Sunita Corcoran, clinical pharmacist with [facility] that is part of your care team.  We have begun working on your prescription that your doctor has sent us. Our next steps include:     Working with your insurance company to obtain approval for your medication  Working with you to ensure your medication is affordable     We will be calling you along the way with updates on your medication but if you have any concerns or receive information that you would like to discuss please reach us at [phone].    Welcome call outcome: Left voicemail.

## 2023-06-19 PROBLEM — K25.0 ACUTE GASTRIC ULCER WITH HEMORRHAGE: Status: RESOLVED | Noted: 2023-03-17 | Resolved: 2023-06-19

## 2023-06-22 ENCOUNTER — PATIENT MESSAGE (OUTPATIENT)
Dept: RHEUMATOLOGY | Facility: CLINIC | Age: 53
End: 2023-06-22
Payer: COMMERCIAL

## 2023-06-26 ENCOUNTER — HOSPITAL ENCOUNTER (OUTPATIENT)
Facility: HOSPITAL | Age: 53
Discharge: HOME | End: 2023-06-26
Attending: INTERNAL MEDICINE | Admitting: INTERNAL MEDICINE
Payer: COMMERCIAL

## 2023-06-26 ENCOUNTER — ANESTHESIA EVENT (OUTPATIENT)
Dept: ENDOSCOPY | Facility: HOSPITAL | Age: 53
End: 2023-06-26
Payer: COMMERCIAL

## 2023-06-26 ENCOUNTER — ANESTHESIA (OUTPATIENT)
Dept: ENDOSCOPY | Facility: HOSPITAL | Age: 53
End: 2023-06-26
Payer: COMMERCIAL

## 2023-06-26 VITALS
HEIGHT: 66 IN | OXYGEN SATURATION: 100 % | DIASTOLIC BLOOD PRESSURE: 59 MMHG | HEART RATE: 61 BPM | WEIGHT: 156 LBS | RESPIRATION RATE: 18 BRPM | SYSTOLIC BLOOD PRESSURE: 130 MMHG | BODY MASS INDEX: 25.07 KG/M2 | TEMPERATURE: 98 F

## 2023-06-26 DIAGNOSIS — K27.9 PUD (PEPTIC ULCER DISEASE): Primary | ICD-10-CM

## 2023-06-26 PROCEDURE — 25000003 PHARM REV CODE 250: Performed by: NURSE ANESTHETIST, CERTIFIED REGISTERED

## 2023-06-26 PROCEDURE — 63600175 PHARM REV CODE 636 W HCPCS: Performed by: NURSE ANESTHETIST, CERTIFIED REGISTERED

## 2023-06-26 PROCEDURE — 43235 PR EGD, FLEX, DIAGNOSTIC: ICD-10-PCS | Mod: ,,, | Performed by: INTERNAL MEDICINE

## 2023-06-26 PROCEDURE — 37000008 HC ANESTHESIA 1ST 15 MINUTES: Performed by: INTERNAL MEDICINE

## 2023-06-26 PROCEDURE — 43235 EGD DIAGNOSTIC BRUSH WASH: CPT | Mod: ,,, | Performed by: INTERNAL MEDICINE

## 2023-06-26 PROCEDURE — 43235 EGD DIAGNOSTIC BRUSH WASH: CPT | Performed by: INTERNAL MEDICINE

## 2023-06-26 RX ORDER — PROPOFOL 10 MG/ML
VIAL (ML) INTRAVENOUS
Status: DISCONTINUED | OUTPATIENT
Start: 2023-06-26 | End: 2023-06-26

## 2023-06-26 RX ORDER — OMEPRAZOLE 20 MG/1
20 CAPSULE, DELAYED RELEASE ORAL DAILY
Qty: 90 CAPSULE | Refills: 0 | Status: SHIPPED | OUTPATIENT
Start: 2023-06-26 | End: 2023-09-12

## 2023-06-26 RX ORDER — LIDOCAINE HYDROCHLORIDE 10 MG/ML
INJECTION, SOLUTION EPIDURAL; INFILTRATION; INTRACAUDAL; PERINEURAL
Status: DISCONTINUED | OUTPATIENT
Start: 2023-06-26 | End: 2023-06-26

## 2023-06-26 RX ADMIN — PROPOFOL 100 MG: 10 INJECTION, EMULSION INTRAVENOUS at 12:06

## 2023-06-26 RX ADMIN — SODIUM CHLORIDE, POTASSIUM CHLORIDE, SODIUM LACTATE AND CALCIUM CHLORIDE: 600; 310; 30; 20 INJECTION, SOLUTION INTRAVENOUS at 12:06

## 2023-06-26 RX ADMIN — LIDOCAINE HYDROCHLORIDE 100 MG: 10 SOLUTION INTRAVENOUS at 12:06

## 2023-06-26 NOTE — H&P
PRE PROCEDURE H&P    Patient Name: Tammy Aguero  MRN: [id number]  : 1970  Date of Procedure:  2023  Referring Physician: Ángel Valdes MD  Primary Physician: Maria Teresa Aparicio MD  Procedure Physician: Ángel Valdes MD       Planned Procedure: EGD  Diagnosis: PUD  Chief Complaint: Same as above    HPI:   This is a 52 y.o. female here for hospital follow-up.   Ms Aguero is a pleasant 52 year old female with past medical history of JACKELYN followed by  hematology/oncology.   She reported to hospital earlier this month with black stools for a couple of days.   This was accompanied with nausea vomiting.   Denied abdominal pain.   Endorsed taking Aleve PM every other night.   She had an EGD, colonoscopy and VCE performed by me in the last month that was negative for active bleeding.   S/p EGD that hospitalization that revealed an ulcer in the gastric fundus (-HP).   Patient was discharged on PPI and Carafate.   She is avoiding NSAIDs and only taking Tylenol.   Denies any more bleeding since leaving the hospital.       Past Medical History:   Past Medical History:   Diagnosis Date    RA (rheumatoid arthritis)         Past Surgical History:  Past Surgical History:   Procedure Laterality Date    COLONOSCOPY N/A 2023    Procedure: COLONOSCOPY;  Surgeon: Ángel Valdes MD;  Location: [facility];  Service: Gastroenterology;  Laterality: N/A;    ESOPHAGOGASTRODUODENOSCOPY N/A 2023    Procedure: EGD (ESOPHAGOGASTRODUODENOSCOPY);  Surgeon: Ángel Valdes MD;  Location: [facility];  Service: Gastroenterology;  Laterality: N/A;    ESOPHAGOGASTRODUODENOSCOPY N/A 3/17/2023    Procedure: EGD (ESOPHAGOGASTRODUODENOSCOPY);  Surgeon: Ashley Stevenson MD;  Location: [facility];  Service: Endoscopy;  Laterality: N/A;    INTRALUMINAL GASTROINTESTINAL TRACT IMAGING VIA CAPSULE N/A 3/1/2023    Procedure: IMAGING PROCEDURE, GI TRACT, INTRALUMINAL, VIA CAPSULE;  Surgeon: First Available Roman Luevano;   Location: [facility];  Service: Endoscopy;  Laterality: N/A;    Tubal Lig      TUBAL LIGATION          Home Medications:  Prior to Admission medications    Medication Sig Start Date End Date Taking? Authorizing Provider   acetaminophen (TYLENOL) 650 MG TbSR Take 650 mg by mouth every 8 (eight) hours.   Yes Historical Provider   diphenhydrAMINE-acetaminophen (TYLENOL PM)  mg Tab Take 1 tablet by mouth nightly as needed.   Yes Historical Provider   upadacitinib (RINVOQ) 15 mg 24 hr tablet Take 1 tablet (15 mg total) by mouth once daily. 6/6/23   Gama Guajardo MD   varicella-zoster gE-AS01B, PF, (SHINGRIX, PF,) 50 mcg/0.5 mL injection Inject 0.5 mLs into the muscle every 3 (three) months. 6/6/23   Gama Guajardo MD        Allergies:  Review of patient's allergies indicates:   Allergen Reactions    Nsaids (non-steroidal anti-inflammatory drug) Other (See Comments)     Gastric ulcer        Social History:   Social History     Socioeconomic History    Marital status: Single   Occupational History    Occupation: [employer], supervisor   Tobacco Use    Smoking status: Former     Types: Vaping with nicotine, Cigarettes     Start date: 1/1/2020     Passive exposure: Past    Smokeless tobacco: Never    Tobacco comments:     Pt states that she quit smoking cigarettes 1 year ago, and stopped using the vape 6 months ago.    Substance and Sexual Activity    Alcohol use: Not Currently     Comment: I drank when i was younger but can no longer take the smell    Drug use: Never     Types: Marijuana    Sexual activity: Not Currently     Partners: Male     Birth control/protection: See Surgical Hx, None     Social Determinants of Health     Financial Resource Strain: Low Risk     Difficulty of Paying Living Expenses: Not very hard   Food Insecurity: No Food Insecurity    Worried About Running Out of Food in the Last Year: Never true    Ran Out of Food in the Last Year: Never true   Transportation Needs: No  "Transportation Needs    Lack of Transportation (Medical): No    Lack of Transportation (Non-Medical): No   Physical Activity: Sufficiently Active    Days of Exercise per Week: 5 days    Minutes of Exercise per Session: 150+ min   Stress: No Stress Concern Present    Feeling of Stress : Only a little   Social Connections: Unknown    Frequency of Communication with Friends and Family: Twice a week    Frequency of Social Gatherings with Friends and Family: Once a week    Active Member of Clubs or Organizations: No    Attends Club or Organization Meetings: Never    Marital Status:    Housing Stability: Low Risk     Unable to Pay for Housing in the Last Year: No    Number of Places Lived in the Last Year: 1    Unstable Housing in the Last Year: No       Family History:  Family History   Problem Relation Age of Onset    Diabetes Sister     Cancer Sister     Breast cancer Sister     Asthma Sister     Diabetes Brother     Breast cancer Other     Alcohol abuse Mother     Asthma Mother     COPD Mother     Depression Mother     Heart disease Mother        ROS: No acute cardiac events, no acute respiratory complaints.     Physical Exam (all patients):    BP (!) 120/58 (BP Location: Left arm, Patient Position: Lying)   Pulse (!) 57   Temp 97.7 °F (36.5 °C) (Temporal)   Resp 18   Ht 5' 6" (1.676 m)   Wt 70.8 kg (156 lb)   LMP 12/01/2020 (Approximate)   SpO2 100%   Breastfeeding No   BMI 25.18 kg/m²   Lungs: Clear to auscultation bilaterally, respirations unlabored  Heart: Regular rate and rhythm, S1 and S2 normal, no obvious murmurs  Abdomen:         Soft, non-tender, bowel sounds normal, no masses, no organomegaly    Lab Results   Component Value Date    WBC 4.17 05/30/2023    MCV 93 05/30/2023    RDW 20.6 (H) 05/30/2023     05/30/2023    INR 0.9 01/11/2023    GLU 88 03/17/2023    BUN 14 03/17/2023     03/17/2023    K 4.0 03/17/2023     (H) 03/17/2023        SEDATION PLAN: per anesthesia  "     History reviewed, vital signs satisfactory, cardiopulmonary status satisfactory, sedation options, risks and plans have been discussed with the patient  All their questions were answered and the patient agrees to the sedation procedures as planned and the patient is deemed an appropriate candidate for the sedation as planned.    Procedure explained to patient, informed consent obtained and placed in chart.    Ángel Valdes  6/26/2023  12:41 PM

## 2023-06-26 NOTE — PROVATION PATIENT INSTRUCTIONS
Discharge Summary/Instructions after an Endoscopic Procedure  Patient Name: Tammy Aguero  Patient MRN: [id number]  Patient YOB: 1970  Monday, June 26, 2023 Ángel Valdes MD  Dear patient,  As a result of recent federal legislation (The Federal Cures Act), you may   receive lab or pathology results from your procedure in your [facility]   account before your physician is able to contact you. Your physician or   their representative will relay the results to you with their   recommendations at their soonest availability.  Thank you,  RESTRICTIONS:  During your procedure today, you received medications for sedation.  These   medications may affect your judgment, balance and coordination.  Therefore,   for 24 hours, you have the following restrictions:   - DO NOT drive a car, operate machinery, make legal/financial decisions,   sign important papers or drink alcohol.    ACTIVITY:  Today: no heavy lifting, straining or running due to procedural   sedation/anesthesia.  The following day: return to full activity including work.  DIET:  Eat and drink normally unless instructed otherwise.     TREATMENT FOR COMMON SIDE EFFECTS:  - Mild abdominal pain, nausea, belching, bloating or excessive gas:  rest,   eat lightly and use a heating pad.  - Sore Throat: treat with throat lozenges and/or gargle with warm salt   water.  - Because air was used during the procedure, expelling large amounts of air   from your rectum or belching is normal.  - If a bowel prep was taken, you may not have a bowel movement for 1-3 days.    This is normal.  SYMPTOMS TO WATCH FOR AND REPORT TO YOUR PHYSICIAN:  1. Abdominal pain or bloating, other than gas cramps.  2. Chest pain.  3. Back pain.  4. Signs of infection such as: chills or fever occurring within 24 hours   after the procedure.  5. Rectal bleeding, which would show as bright red, maroon, or black stools.   (A tablespoon of blood from the rectum is not serious, especially if    hemorrhoids are present.)  6. Vomiting.  7. Weakness or dizziness.  GO DIRECTLY TO THE NEAREST EMERGENCY ROOM IF YOU HAVE ANY OF THE FOLLOWING:      Difficulty breathing              Chills and/or fever over 101 F   Persistent vomiting and/or vomiting blood   Severe abdominal pain   Severe chest pain   Black, tarry stools   Bleeding- more than one tablespoon   Any other symptom or condition that you feel may need urgent attention  Your doctor recommends these additional instructions:  If any biopsies were taken, your doctors clinic will contact you in 1 to 2   weeks with any results.  - Discharge patient to home.   - Resume previous diet.   - Continue present medications.   - Return to referring physician.   - Avoid NSAIDs.   -PPI PO daily.   -Previous gastric biopsies have been negative for H. pylori x 2.  For questions, problems or results please call your physician Ángel Valdes MD at Work:  [phone]  If you have any questions about the above instructions, call the GI   department at [phone] or call the endoscopy unit at [phone]   from 7am until 3 pm.  [facility], EMERGENCY ROOM PHONE NUMBER:   [phone]  IF A COMPLICATION OR EMERGENCY SITUATION ARISES AND YOU ARE UNABLE TO REACH   YOUR PHYSICIAN - GO DIRECTLY TO THE EMERGENCY ROOM.  I have read or have had read to me these discharge instructions for my   procedure and have received a written copy.  I understand these   instructions and will follow-up with my physician if I have any questions.     __________________________________       _____________________________________  Nurse Signature                                          Patient/Designated   Responsible Party Signature  Ángel Valdes MD  6/26/2023 1:05:59 PM  This report has been verified and signed electronically.  Dear patient,  As a result of recent federal legislation (The Federal Cures Act), you may   receive lab or pathology results  from your procedure in your [facility]   account before your physician is able to contact you. Your physician or   their representative will relay the results to you with their   recommendations at their soonest availability.  Thank you,  PROVATION

## 2023-06-26 NOTE — ANESTHESIA POSTPROCEDURE EVALUATION
Anesthesia Post Evaluation    Patient: Tammy Aguero    Procedure(s) Performed: Procedure(s) (LRB):  EGD (ESOPHAGOGASTRODUODENOSCOPY) (N/A)    Final Anesthesia Type: MAC      Patient location during evaluation: GI PACU  Patient participation: Yes- Able to Participate  Level of consciousness: awake and alert  Post-procedure vital signs: reviewed and stable  Pain management: adequate  Airway patency: patent    PONV status at discharge: No PONV  Anesthetic complications: no      Cardiovascular status: hemodynamically stable  Respiratory status: unassisted, room air and spontaneous ventilation  Hydration status: euvolemic  Follow-up not needed.          Vitals Value Taken Time   /53 06/26/23 1302   Temp 36.6 °C (97.9 °F) 06/26/23 1302   Pulse 71 06/26/23 1302   Resp 16 06/26/23 1302   SpO2 99 % 06/26/23 1302         No case tracking events are documented in the log.      Pain/Regine Score: Regine Score: 6 (6/26/2023  1:02 PM)

## 2023-06-26 NOTE — TRANSFER OF CARE
"Anesthesia Transfer of Care Note    Patient: Tammy Aguero    Procedure(s) Performed: Procedure(s) (LRB):  EGD (ESOPHAGOGASTRODUODENOSCOPY) (N/A)    Patient location: GI    Anesthesia Type: MAC    Transport from OR: Transported from OR on room air with adequate spontaneous ventilation    Post pain: adequate analgesia    Post assessment: no apparent anesthetic complications    Post vital signs: stable    Level of consciousness: awake, alert and oriented    Nausea/Vomiting: no nausea/vomiting    Complications: none    Transfer of care protocol was followed      Last vitals:   Visit Vitals  BP (!) 120/58 (BP Location: Left arm, Patient Position: Lying)   Pulse (!) 57   Temp 36.5 °C (97.7 °F) (Temporal)   Resp 18   Ht 5' 6" (1.676 m)   Wt 70.8 kg (156 lb)   LMP 12/01/2020 (Approximate)   SpO2 100%   Breastfeeding No   BMI 25.18 kg/m²     "

## 2023-06-26 NOTE — ANESTHESIA PREPROCEDURE EVALUATION
06/26/2023  Tammy Aguero is a 52 y.o., female.  HPI:  This is a 52 y.o. female here for hospital follow-up.   Ms Aguero is a pleasant 52 year old female with past medical history of JACKELYN followed by  hematology/oncology.   She reported to hospital earlier this month with black stools for a couple of days.   This was accompanied with nausea vomiting.   Denied abdominal pain.   Endorsed taking Aleve PM every other night.   She had an EGD, colonoscopy and VCE performed by me in the last month that was negative for active bleeding.   S/p EGD that hospitalization that revealed an ulcer in the gastric fundus (-HP).   Patient was discharged on PPI and Carafate.   She is avoiding NSAIDs and only taking Tylenol.   Denies any more bleeding since leaving the hospital.     Pre-op Assessment    I have reviewed the Patient Summary Reports.     I have reviewed the Nursing Notes. I have reviewed the NPO Status.   I have reviewed the Medications.     Review of Systems  Social:  Former Smoker    Hematology/Oncology:         -- Anemia:   Cardiovascular:  Cardiovascular Normal  Summary    ? The left ventricle is normal in size with normal systolic function.  ? Normal left ventricular diastolic function.  ? Normal right ventricular size with normal right ventricular systolic function.  ? The estimated ejection fraction is 60-65%.     Sinus tachycardia   Otherwise normal ECG   When compared with ECG of 29-NOV-2022 14:19,   Nonspecific T wave abnormality now evident in Inferior leads   Confirmed by RADHA DOVE MD ([id number]) on 3/18/2023 12:36:44 PM    Pulmonary:   Shortness of breath pulm nodules   sarcoidosis    Renal/:  Renal/ Normal     Musculoskeletal:   RA   Neurological:  Neurology Normal    Endocrine:  Endocrine Normal        Physical Exam  General: Well nourished, Cooperative, Alert and  Oriented    Airway:  Mallampati: II   Mouth Opening: Normal  TM Distance: Normal  Tongue: Normal  Neck ROM: Normal ROM    Dental:  Intact        Anesthesia Plan  Type of Anesthesia, risks & benefits discussed:    Anesthesia Type: Gen Natural Airway, MAC  Intra-op Monitoring Plan: Standard ASA Monitors  Post Op Pain Control Plan: IV/PO Opioids PRN  Induction:  IV  Informed Consent: Informed consent signed with the Patient and all parties understand the risks and agree with anesthesia plan.  All questions answered.   ASA Score: 2  Day of Surgery Review of History & Physical: H&P Update referred to the surgeon/provider.I have interviewed and examined the patient. I have reviewed the patient's H&P dated: There are no significant changes.     Ready For Surgery From Anesthesia Perspective.     .

## 2023-06-27 ENCOUNTER — PATIENT OUTREACH (OUTPATIENT)
Dept: ADMINISTRATIVE | Facility: HOSPITAL | Age: 53
End: 2023-06-27
Payer: COMMERCIAL

## 2023-06-27 NOTE — PROGRESS NOTES
Attempted to contact patient by phone for Mammogram visit, was able to leave voice mail message. Updated Care Everywhere and Team.

## 2023-07-11 DIAGNOSIS — M06.9 FLARE OF RHEUMATOID ARTHRITIS: ICD-10-CM

## 2023-07-11 DIAGNOSIS — M05.79 RHEUMATOID ARTHRITIS INVOLVING MULTIPLE SITES WITH POSITIVE RHEUMATOID FACTOR: ICD-10-CM

## 2023-07-12 RX ORDER — UPADACITINIB 15 MG/1
15 TABLET, EXTENDED RELEASE ORAL DAILY
Qty: 30 TABLET | Refills: 0 | Status: SHIPPED | OUTPATIENT
Start: 2023-07-12 | End: 2023-08-25

## 2023-08-24 DIAGNOSIS — M05.79 RHEUMATOID ARTHRITIS INVOLVING MULTIPLE SITES WITH POSITIVE RHEUMATOID FACTOR: ICD-10-CM

## 2023-08-24 DIAGNOSIS — M06.9 FLARE OF RHEUMATOID ARTHRITIS: ICD-10-CM

## 2023-08-25 RX ORDER — UPADACITINIB 15 MG/1
15 TABLET, EXTENDED RELEASE ORAL
Qty: 30 TABLET | Refills: 0 | Status: SHIPPED | OUTPATIENT
Start: 2023-08-25 | End: 2023-09-05

## 2023-08-29 ENCOUNTER — LAB VISIT (OUTPATIENT)
Dept: LAB | Facility: HOSPITAL | Age: 53
End: 2023-08-29
Attending: INTERNAL MEDICINE
Payer: COMMERCIAL

## 2023-08-29 DIAGNOSIS — D84.9 IMMUNOSUPPRESSED STATUS: ICD-10-CM

## 2023-08-29 DIAGNOSIS — M05.79 RHEUMATOID ARTHRITIS INVOLVING MULTIPLE SITES WITH POSITIVE RHEUMATOID FACTOR: ICD-10-CM

## 2023-08-29 LAB
ALBUMIN SERPL BCP-MCNC: 4.3 G/DL (ref 3.5–5.2)
ALP SERPL-CCNC: 75 U/L (ref 55–135)
ALT SERPL W/O P-5'-P-CCNC: 18 U/L (ref 10–44)
ANION GAP SERPL CALC-SCNC: 10 MMOL/L (ref 8–16)
AST SERPL-CCNC: 22 U/L (ref 10–40)
BASOPHILS # BLD AUTO: 0.03 K/UL (ref 0–0.2)
BASOPHILS NFR BLD: 0.7 % (ref 0–1.9)
BILIRUB SERPL-MCNC: 0.3 MG/DL (ref 0.1–1)
BUN SERPL-MCNC: 12 MG/DL (ref 6–20)
CALCIUM SERPL-MCNC: 8.9 MG/DL (ref 8.7–10.5)
CHLORIDE SERPL-SCNC: 108 MMOL/L (ref 95–110)
CO2 SERPL-SCNC: 23 MMOL/L (ref 23–29)
CREAT SERPL-MCNC: 0.9 MG/DL (ref 0.5–1.4)
CRP SERPL-MCNC: 0.3 MG/L (ref 0–8.2)
DIFFERENTIAL METHOD: ABNORMAL
EOSINOPHIL # BLD AUTO: 0 K/UL (ref 0–0.5)
EOSINOPHIL NFR BLD: 0.9 % (ref 0–8)
ERYTHROCYTE [DISTWIDTH] IN BLOOD BY AUTOMATED COUNT: 14.8 % (ref 11.5–14.5)
ERYTHROCYTE [SEDIMENTATION RATE] IN BLOOD BY WESTERGREN METHOD: 29 MM/HR (ref 0–20)
EST. GFR  (NO RACE VARIABLE): >60 ML/MIN/1.73 M^2
GLUCOSE SERPL-MCNC: 87 MG/DL (ref 70–110)
HCT VFR BLD AUTO: 31.8 % (ref 37–48.5)
HGB BLD-MCNC: 10.2 G/DL (ref 12–16)
IMM GRANULOCYTES # BLD AUTO: 0.02 K/UL (ref 0–0.04)
IMM GRANULOCYTES NFR BLD AUTO: 0.5 % (ref 0–0.5)
LYMPHOCYTES # BLD AUTO: 1.8 K/UL (ref 1–4.8)
LYMPHOCYTES NFR BLD: 42 % (ref 18–48)
MCH RBC QN AUTO: 30.8 PG (ref 27–31)
MCHC RBC AUTO-ENTMCNC: 32.1 G/DL (ref 32–36)
MCV RBC AUTO: 96 FL (ref 82–98)
MONOCYTES # BLD AUTO: 0.4 K/UL (ref 0.3–1)
MONOCYTES NFR BLD: 8.7 % (ref 4–15)
NEUTROPHILS # BLD AUTO: 2 K/UL (ref 1.8–7.7)
NEUTROPHILS NFR BLD: 47.2 % (ref 38–73)
NRBC BLD-RTO: 0 /100 WBC
PLATELET # BLD AUTO: 337 K/UL (ref 150–450)
PMV BLD AUTO: 9.9 FL (ref 9.2–12.9)
POTASSIUM SERPL-SCNC: 4.6 MMOL/L (ref 3.5–5.1)
PROT SERPL-MCNC: 7.4 G/DL (ref 6–8.4)
RBC # BLD AUTO: 3.31 M/UL (ref 4–5.4)
SODIUM SERPL-SCNC: 141 MMOL/L (ref 136–145)
WBC # BLD AUTO: 4.26 K/UL (ref 3.9–12.7)

## 2023-08-29 PROCEDURE — 80053 COMPREHEN METABOLIC PANEL: CPT | Performed by: INTERNAL MEDICINE

## 2023-08-29 PROCEDURE — 85651 RBC SED RATE NONAUTOMATED: CPT | Performed by: INTERNAL MEDICINE

## 2023-08-29 PROCEDURE — 85025 COMPLETE CBC W/AUTO DIFF WBC: CPT | Performed by: INTERNAL MEDICINE

## 2023-08-29 PROCEDURE — 86140 C-REACTIVE PROTEIN: CPT | Performed by: INTERNAL MEDICINE

## 2023-09-05 ENCOUNTER — OFFICE VISIT (OUTPATIENT)
Dept: RHEUMATOLOGY | Facility: CLINIC | Age: 53
End: 2023-09-05
Payer: COMMERCIAL

## 2023-09-05 DIAGNOSIS — D84.821 DRUG-INDUCED IMMUNODEFICIENCY: ICD-10-CM

## 2023-09-05 DIAGNOSIS — M06.9 FLARE OF RHEUMATOID ARTHRITIS: ICD-10-CM

## 2023-09-05 DIAGNOSIS — M05.79 RHEUMATOID ARTHRITIS INVOLVING MULTIPLE SITES WITH POSITIVE RHEUMATOID FACTOR: Primary | ICD-10-CM

## 2023-09-05 DIAGNOSIS — Z79.899 DRUG-INDUCED IMMUNODEFICIENCY: ICD-10-CM

## 2023-09-05 DIAGNOSIS — D86.2 SARCOIDOSIS OF LUNG WITH SARCOIDOSIS OF LYMPH NODES: ICD-10-CM

## 2023-09-05 PROCEDURE — 1159F MED LIST DOCD IN RCRD: CPT | Mod: CPTII,95,, | Performed by: INTERNAL MEDICINE

## 2023-09-05 PROCEDURE — 1160F RVW MEDS BY RX/DR IN RCRD: CPT | Mod: CPTII,95,, | Performed by: INTERNAL MEDICINE

## 2023-09-05 PROCEDURE — 1159F PR MEDICATION LIST DOCUMENTED IN MEDICAL RECORD: ICD-10-PCS | Mod: CPTII,95,, | Performed by: INTERNAL MEDICINE

## 2023-09-05 PROCEDURE — 99214 PR OFFICE/OUTPT VISIT, EST, LEVL IV, 30-39 MIN: ICD-10-PCS | Mod: 95,,, | Performed by: INTERNAL MEDICINE

## 2023-09-05 PROCEDURE — 1160F PR REVIEW ALL MEDS BY PRESCRIBER/CLIN PHARMACIST DOCUMENTED: ICD-10-PCS | Mod: CPTII,95,, | Performed by: INTERNAL MEDICINE

## 2023-09-05 PROCEDURE — 99214 OFFICE O/P EST MOD 30 MIN: CPT | Mod: 95,,, | Performed by: INTERNAL MEDICINE

## 2023-09-05 RX ORDER — UPADACITINIB 15 MG/1
15 TABLET, EXTENDED RELEASE ORAL DAILY
Qty: 30 TABLET | Refills: 11 | Status: ACTIVE | OUTPATIENT
Start: 2023-09-05 | End: 2023-09-29

## 2023-09-05 NOTE — PROGRESS NOTES
"RHEUMATOLOGY FOLLOW UP - TELE VISIT     The patient location is: [place]  The chief complaint leading to consultation is: Arthritis follow up  Visit type: Virtual visit with synchronous audio and video  Total time spent with patient:  5 minutes  Each patient to whom he or she provides medical services by telemedicine is:  (1) informed of the relationship between the physician and patient and the respective role of any other health care provider with respect to management of the patient; and (2) notified that he or she may decline to receive medical services by telemedicine and may withdraw from such care at any time.    Chief complaints, HPI, ROS, EXAM, Assessment & Plans:-  Tammy Gray a 52 y.o. pleasant female seen today for follow-up visit.  Seropositive rheumatoid and biopsy-proven sarcoid on Rinvoq since June.  Failed Humira and methotrexate.  Reports doing well today.  Other than mild pain she denies any significant joint problems.  No shortness of breath.  No dry cough.  No adverse effects to Rinvoq.  Rheumatological review of system negative otherwise.  100% fist formation bilateral hands..    1. Rheumatoid arthritis involving multiple sites with positive rheumatoid factor    2. Drug-induced immunodeficiency    3. Sarcoidosis of lung with sarcoidosis of lymph nodes    4. Flare of rheumatoid arthritis      Problem List Items Addressed This Visit       Rheumatoid arthritis involving multiple sites with positive rheumatoid factor - Primary    Relevant Medications    upadacitinib (RINVOQ) 15 mg 24 hr tablet    Other Relevant Orders    CBC Auto Differential    Comprehensive Metabolic Panel    C-Reactive Protein    Sedimentation rate    Sarcoidosis of lung with sarcoidosis of lymph nodes    Overview     Lung Biopsy: LUNG, LEFT, "NODULE", CT-GUIDED BIOPSY:   - Focally necrotizing granulomatous inflammation   - Adjacent alveolated lung parenchyma with fibroelastotic changes and chronic   (lymphoplasmacytic) " inflammation   - No evidence of acid-fast organisms or fungal elements on AFB and GMS   cytochemical stains, respectively   - No evidence of malignancy     From 12/20/2022 Normal Pulmonary Function Studies:Spirometry is normal. Spirometry remains unimproved following bronchodilator. Lung volume determination shows TLC is normal with evidence air trapping is present. Airway mechanics show normal airway resistance and conductance. DLCO is normal. MVV is normal.          Relevant Orders    CBC Auto Differential    Comprehensive Metabolic Panel    C-Reactive Protein    Sedimentation rate    Flare of rheumatoid arthritis    Relevant Medications    upadacitinib (RINVOQ) 15 mg 24 hr tablet    Drug-induced immunodeficiency    Relevant Orders    CBC Auto Differential    Comprehensive Metabolic Panel    C-Reactive Protein    Sedimentation rate      Latest Reference Range & Units 08/29/23 08:09   WBC 3.90 - 12.70 K/uL 4.26   RBC 4.00 - 5.40 M/uL 3.31 (L)   Hemoglobin 12.0 - 16.0 g/dL 10.2 (L)   Hematocrit 37.0 - 48.5 % 31.8 (L)   MCV 82 - 98 fL 96   MCH 27.0 - 31.0 pg 30.8   MCHC 32.0 - 36.0 g/dL 32.1   RDW 11.5 - 14.5 % 14.8 (H)   Platelets 150 - 450 K/uL 337   MPV 9.2 - 12.9 fL 9.9   Gran % 38.0 - 73.0 % 47.2   Lymph % 18.0 - 48.0 % 42.0   Mono % 4.0 - 15.0 % 8.7   Eosinophil % 0.0 - 8.0 % 0.9   Basophil % 0.0 - 1.9 % 0.7   Immature Granulocytes 0.0 - 0.5 % 0.5   Gran # (ANC) 1.8 - 7.7 K/uL 2.0   Lymph # 1.0 - 4.8 K/uL 1.8   Mono # 0.3 - 1.0 K/uL 0.4   Eos # 0.0 - 0.5 K/uL 0.0   Baso # 0.00 - 0.20 K/uL 0.03   Immature Grans (Abs) 0.00 - 0.04 K/uL 0.02   nRBC 0 /100 WBC 0   Differential Method  Automated   Iron 30 - 160 ug/dL 33   TIBC 250 - 450 ug/dL 441   Saturated Iron 20 - 50 % 7 (L)   Transferrin 200 - 375 mg/dL 298   Ferritin 20.0 - 300.0 ng/mL 12 (L)   Sed Rate 0 - 20 mm/Hr 29 (H)   Sodium 136 - 145 mmol/L 141   Potassium 3.5 - 5.1 mmol/L 4.6   Chloride 95 - 110 mmol/L 108   CO2 23 - 29 mmol/L 23   Anion Gap 8 - 16  mmol/L 10   BUN 6 - 20 mg/dL 12   Creatinine 0.5 - 1.4 mg/dL 0.9   eGFR >60 mL/min/1.73 m^2 >60   Glucose 70 - 110 mg/dL 87   Calcium 8.7 - 10.5 mg/dL 8.9   Alkaline Phosphatase 55 - 135 U/L 75   PROTEIN TOTAL 6.0 - 8.4 g/dL 7.4   Albumin 3.5 - 5.2 g/dL 4.3   BILIRUBIN TOTAL 0.1 - 1.0 mg/dL 0.3   AST 10 - 40 U/L 22   ALT 10 - 44 U/L 18   CRP 0.0 - 8.2 mg/L 0.3   (L): Data is abnormally low  (H): Data is abnormally high    Excellent improvement of seropositive rheumatoid on Rinvoq.  Might work again sarcoidosis as well.  Continue Rinvoq daily.  Drug induced immunodeficiency due to use of immunosuppressive drugs. Monitor carefully for infections. Advised to get immediate medical care if any infection. Also advised strict adherence to age appropriate vaccinations and cancer screenings with PCP.  Hold Rinvoq if any infection.  All 4 labs before next visit in 6 months.  I have explained all of the above in detail and the patient understands all of the current recommendation(s). I have answered all questions to the best of my ability and to their complete satisfaction.   # Follow up in about 6 months (around 3/5/2024).      Past Medical History:   Diagnosis Date    RA (rheumatoid arthritis)        Past Surgical History:   Procedure Laterality Date    COLONOSCOPY N/A 02/01/2023    Procedure: COLONOSCOPY;  Surgeon: Ángel Valdes MD;  Location: [facility];  Service: Gastroenterology;  Laterality: N/A;    ESOPHAGOGASTRODUODENOSCOPY N/A 02/01/2023    Procedure: EGD (ESOPHAGOGASTRODUODENOSCOPY);  Surgeon: Ángel Valdes MD;  Location: [facility];  Service: Gastroenterology;  Laterality: N/A;    ESOPHAGOGASTRODUODENOSCOPY N/A 3/17/2023    Procedure: EGD (ESOPHAGOGASTRODUODENOSCOPY);  Surgeon: Ashley Stevenson MD;  Location: [facility];  Service: Endoscopy;  Laterality: N/A;    ESOPHAGOGASTRODUODENOSCOPY N/A 6/26/2023    Procedure: EGD (ESOPHAGOGASTRODUODENOSCOPY);  Surgeon: Ángel Valdes MD;  Location: [facility];   Service: Gastroenterology;  Laterality: N/A;    INTRALUMINAL GASTROINTESTINAL TRACT IMAGING VIA CAPSULE N/A 3/1/2023    Procedure: IMAGING PROCEDURE, GI TRACT, INTRALUMINAL, VIA CAPSULE;  Surgeon: First Available Roman Luevano;  Location: [facility];  Service: Endoscopy;  Laterality: N/A;    Tubal Lig      TUBAL LIGATION          Social History     Tobacco Use    Smoking status: Former     Types: Vaping with nicotine, Cigarettes     Start date: 1/1/2020     Passive exposure: Past    Smokeless tobacco: Never    Tobacco comments:     Pt states that she quit smoking cigarettes 1 year ago, and stopped using the vape 6 months ago.    Substance Use Topics    Alcohol use: Not Currently     Comment: I drank when i was younger but can no longer take the smell    Drug use: Never     Types: Marijuana       Family History   Problem Relation Age of Onset    Diabetes Sister     Cancer Sister     Breast cancer Sister     Asthma Sister     Diabetes Brother     Breast cancer Other     Alcohol abuse Mother     Asthma Mother     COPD Mother     Depression Mother     Heart disease Mother        Review of patient's allergies indicates:   Allergen Reactions    Nsaids (non-steroidal anti-inflammatory drug) Other (See Comments)     Gastric ulcer       Medication List with Changes/Refills   Current Medications    ACETAMINOPHEN (TYLENOL) 650 MG TBSR    Take 650 mg by mouth every 8 (eight) hours.    DIPHENHYDRAMINE-ACETAMINOPHEN (TYLENOL PM)  MG TAB    Take 1 tablet by mouth nightly as needed.    OMEPRAZOLE (PRILOSEC) 20 MG CAPSULE    Take 1 capsule (20 mg total) by mouth once daily.    VARICELLA-ZOSTER GE-AS01B, PF, (SHINGRIX, PF,) 50 MCG/0.5 ML INJECTION    Inject 0.5 mLs into the muscle every 3 (three) months.   Changed and/or Refilled Medications    Modified Medication Previous Medication    UPADACITINIB (RINVOQ) 15 MG 24 HR TABLET RINVOQ 15 mg 24 hr tablet       Take 1 tablet (15 mg total) by mouth once daily.    TAKE 1 TABLET ONCE  DAILY       Disclaimer: This note was prepared using voice recognition system and is likely to have sound alike errors and is not proof read.  Please call me with any questions.

## 2023-09-06 ENCOUNTER — OFFICE VISIT (OUTPATIENT)
Dept: HEMATOLOGY/ONCOLOGY | Facility: CLINIC | Age: 53
End: 2023-09-06
Payer: COMMERCIAL

## 2023-09-06 ENCOUNTER — PATIENT MESSAGE (OUTPATIENT)
Dept: GASTROENTEROLOGY | Facility: CLINIC | Age: 53
End: 2023-09-06
Payer: COMMERCIAL

## 2023-09-06 DIAGNOSIS — D50.9 IRON DEFICIENCY ANEMIA, UNSPECIFIED IRON DEFICIENCY ANEMIA TYPE: Primary | ICD-10-CM

## 2023-09-06 DIAGNOSIS — K25.0 ACUTE GASTRIC ULCER WITH HEMORRHAGE: ICD-10-CM

## 2023-09-06 PROCEDURE — 99214 PR OFFICE/OUTPT VISIT, EST, LEVL IV, 30-39 MIN: ICD-10-PCS | Mod: 95,,, | Performed by: INTERNAL MEDICINE

## 2023-09-06 PROCEDURE — 99214 OFFICE O/P EST MOD 30 MIN: CPT | Mod: 95,,, | Performed by: INTERNAL MEDICINE

## 2023-09-06 RX ORDER — HEPARIN 100 UNIT/ML
500 SYRINGE INTRAVENOUS
Status: CANCELLED | OUTPATIENT
Start: 2023-09-06

## 2023-09-06 RX ORDER — SODIUM CHLORIDE 0.9 % (FLUSH) 0.9 %
10 SYRINGE (ML) INJECTION
Status: CANCELLED | OUTPATIENT
Start: 2023-09-06

## 2023-09-06 RX ORDER — HEPARIN 100 UNIT/ML
500 SYRINGE INTRAVENOUS
OUTPATIENT
Start: 2023-10-04

## 2023-09-06 RX ORDER — SODIUM CHLORIDE 0.9 % (FLUSH) 0.9 %
10 SYRINGE (ML) INJECTION
OUTPATIENT
Start: 2023-10-04

## 2023-09-06 NOTE — Clinical Note
Patient with recurrent iron deficiency anemia, would you be able to get her back in for GI evaluation please

## 2023-09-06 NOTE — PROGRESS NOTES
Subjective:      DATE OF VISIT: 9/6/23   The patient location is:  [place]  The chief complaint leading to consultation is:  Follow-up    Visit type: audiovisual    Face to Face time with patient:  10 minutes  Twenty minutes of total time spent on the encounter, which includes face to face time and non-face to face time preparing to see the patient (eg, review of tests), Obtaining and/or reviewing separately obtained history, Documenting clinical information in the electronic or other health record, Independently interpreting results (not separately reported) and communicating results to the patient/family/caregiver, or Care coordination (not separately reported).         Each patient to whom he or she provides medical services by telemedicine is:  (1) informed of the relationship between the physician and patient and the respective role of any other health care provider with respect to management of the patient; and (2) notified that he or she may decline to receive medical services by telemedicine and may withdraw from such care at any time.    Notes:     ?  Patient ID:?Tammy Aguero is a 52 y.o. female.?? MR#: [id number]     ? Primary Care Providers:  Maria Teresa Aparicio MD, MD (General)     CHIEF COMPLAINT: ?Iron deficiency anemia??   ?   HPI    Last GI scope June 2023.  She has been taking PPI while taking medication she notes improvement in GERD.  No melena hematochezia hemoptysis hematemesis.  Review of Systems    ?   A comprehensive 14-point review of systems was reviewed with patient and was negative other than as specified above.   ?   PAST MEDICAL HISTORY:   Past Medical History:   Diagnosis Date    RA (rheumatoid arthritis)     ?     PAST SURGICAL HISTORY:   Past Surgical History:   Procedure Laterality Date    COLONOSCOPY N/A 02/01/2023    Procedure: COLONOSCOPY;  Surgeon: Ángel Valdes MD;  Location[facility];  Service: Gastroenterology;  Laterality: N/A;    ESOPHAGOGASTRODUODENOSCOPY N/A  02/01/2023    Procedure: EGD (ESOPHAGOGASTRODUODENOSCOPY);  Surgeon: Ángel Valdes MD;  Location: [facility];  Service: Gastroenterology;  Laterality: N/A;    ESOPHAGOGASTRODUODENOSCOPY N/A 3/17/2023    Procedure: EGD (ESOPHAGOGASTRODUODENOSCOPY);  Surgeon: Ashley Stevenson MD;  Location: [facility];  Service: Endoscopy;  Laterality: N/A;    ESOPHAGOGASTRODUODENOSCOPY N/A 6/26/2023    Procedure: EGD (ESOPHAGOGASTRODUODENOSCOPY);  Surgeon: Ángel Valdes MD;  Location: [facility];  Service: Gastroenterology;  Laterality: N/A;    INTRALUMINAL GASTROINTESTINAL TRACT IMAGING VIA CAPSULE N/A 3/1/2023    Procedure: IMAGING PROCEDURE, GI TRACT, INTRALUMINAL, VIA CAPSULE;  Surgeon: First Available Roman Luevano;  Location: [facility];  Service: Endoscopy;  Laterality: N/A;    Tubal Lig      TUBAL LIGATION        ?   ALLERGIES:   Allergies as of 09/06/2023 - Reviewed 09/05/2023   Allergen Reaction Noted    Nsaids (non-steroidal anti-inflammatory drug) Other (See Comments) 06/06/2023      ?   MEDICATIONS:?   No outpatient medications have been marked as taking for the 9/6/23 encounter (Office Visit) with Eve Arevalo MD.      ?   SOCIAL HISTORY:?   Social History     Tobacco Use    Smoking status: Former     Types: Vaping with nicotine, Cigarettes     Start date: 1/1/2020     Passive exposure: Past    Smokeless tobacco: Never    Tobacco comments:     Pt states that she quit smoking cigarettes 1 year ago, and stopped using the vape 6 months ago.    Substance Use Topics    Alcohol use: Not Currently     Comment: I drank when i was younger but can no longer take the smell      ?     ?   FAMILY HISTORY:   family history includes Alcohol abuse in her mother; Asthma in her mother and sister; Breast cancer in her sister and another family member; COPD in her mother; Cancer in her sister; Depression in her mother; Diabetes in her brother and sister; Heart disease in her mother.   ?        Objective:      Physical Exam       ?   There were no vitals filed for this visit.       ?   ECOG:?0   General appearance: Generally well appearing, in no acute distress.   Head, eyes, ears, nose, and throat: moist mucous membranes.   Respiratory:  Normal work of breathing  Abdomen: nontender, nondistended.   Extremities: Warm, without edema.   Neurologic: Alert and oriented. Grossly normal strength, coordination, and gait.   Skin: No rashes, ecchymoses or petechial lesion.   Psychiatric:  Normal mood and affect.      ?   Laboratory:  ?   No visits with results within 1 Day(s) from this visit.   Latest known visit with results is:   Lab Visit on 08/29/2023   Component Date Value Ref Range Status    Ferritin 08/29/2023 12 (L)  20.0 - 300.0 ng/mL Final    Iron 08/29/2023 33  30 - 160 ug/dL Final    Transferrin 08/29/2023 298  200 - 375 mg/dL Final    TIBC 08/29/2023 441  250 - 450 ug/dL Final    Saturated Iron 08/29/2023 7 (L)  20 - 50 % Final      Lab Results   Component Value Date    IRON 33 08/29/2023    TRANSFERRIN 298 08/29/2023    TIBC 441 08/29/2023    FESATURATED 7 (L) 08/29/2023        No results found for this or any previous visit (from the past 2160 hour(s)).    No results found for this or any previous visit (from the past 2160 hour(s)).        ?   Assessment/Plan:   Iron deficiency anemia, unspecified iron deficiency anemia type    Acute gastric ulcer with hemorrhage    Other orders  -     ferumoxytoL (FERAHEME) 510 mg in dextrose 5 % (D5W) 100 mL IVPB  -     sodium chloride 0.9% 250 mL flush bag  -     sodium chloride 0.9% flush 10 mL  -     heparin, porcine (PF) 100 unit/mL injection flush 500 Units  -     alteplase injection 2 mg  -     ferumoxytoL (FERAHEME) 510 mg in dextrose 5 % (D5W) 100 mL IVPB  -     sodium chloride 0.9% 250 mL flush bag  -     sodium chloride 0.9% flush 10 mL  -     heparin, porcine (PF) 100 unit/mL injection flush 500 Units  -     alteplase injection 2 mg           1. Iron deficiency anemia, unspecified iron  "deficiency anemia type    2. Acute gastric ulcer with hemorrhage                Plan:     # iron deficiency anemia:  History of severe iron deficiency anemia with GI workup including upper lower endoscopies and capsule endoscopy early March 2023 and revealing; acute recurrent severe anemia with iron deficiency with ER presentation mid March 2023 status post 1 unit PRBC transfusion.  Repeat EGD with nonbleeding gastric ulcer.  Prior iron deficiency has resolved status post transfusion and IV iron therapy last on 05/02/2023.  Improvement in anemia to hemoglobin 10.4, 12% saturation ferritin increased from .  Note: MRI noted "in phase sequence shows decreased signal intensity of the liver, spleen, and severe decreased signal intensity of the adrenal glands compared to the out of phase sequence indicating hemosiderosis." Current labs do not reflect iron overload.  No history of other known liver disease hemoglobinopathy.     Follow-up with GI for history of bleeding ulcer repeat scope June 2023.  Due to recurrent iron deficiency anemia with ferritin declined from 103 to now 12 on labs August 2023 recommend follow-up with GI and repeat IV iron therapy    Rheumatoid arthritis follow-up with Rheumatology    Pulmonary nodules:  Follow-up with Pulmonary      Follow-Up:   Route Chart for Scheduling    Med Onc Chart Routing      Follow up with physician    Follow up with WILFRIDO 3 months. Virtual okay   Infusion scheduling note   Feraheme x2 doses weekly   Injection scheduling note    Labs CBC, ferritin and iron and TIBC   Scheduling:  Preferred lab:  Lab interval:     Imaging    Pharmacy appointment    Other referrals     Additional referrals needed  Follow-up with GI     Supportive Plan Information  OP FERUMOXYTOL   Eve Arevalo MD   Upcoming Treatment Dates - OP FERUMOXYTOL    9/6/2023       Medications       ferumoxytoL (FERAHEME) 510 mg in dextrose 5 % (D5W) 100 mL IVPB  9/13/2023       Medications       " ferumoxytoL (FERAHEME) 510 mg in dextrose 5 % (D5W) 100 mL IVPB    There are no Patient Instructions on file for this visit.

## 2023-09-12 ENCOUNTER — PATIENT MESSAGE (OUTPATIENT)
Dept: GASTROENTEROLOGY | Facility: CLINIC | Age: 53
End: 2023-09-12

## 2023-09-12 ENCOUNTER — OFFICE VISIT (OUTPATIENT)
Dept: GASTROENTEROLOGY | Facility: CLINIC | Age: 53
End: 2023-09-12
Payer: COMMERCIAL

## 2023-09-12 VITALS — BODY MASS INDEX: 25.07 KG/M2 | HEIGHT: 66 IN | WEIGHT: 156 LBS

## 2023-09-12 DIAGNOSIS — K29.50 CHRONIC GASTRITIS, PRESENCE OF BLEEDING UNSPECIFIED, UNSPECIFIED GASTRITIS TYPE: ICD-10-CM

## 2023-09-12 DIAGNOSIS — K21.00 GASTROESOPHAGEAL REFLUX DISEASE WITH ESOPHAGITIS WITHOUT HEMORRHAGE: ICD-10-CM

## 2023-09-12 DIAGNOSIS — D50.9 IRON DEFICIENCY ANEMIA, UNSPECIFIED IRON DEFICIENCY ANEMIA TYPE: Primary | ICD-10-CM

## 2023-09-12 DIAGNOSIS — Z87.11 HISTORY OF GASTRIC ULCER: ICD-10-CM

## 2023-09-12 PROCEDURE — 1159F PR MEDICATION LIST DOCUMENTED IN MEDICAL RECORD: ICD-10-PCS | Mod: CPTII,95,, | Performed by: PHYSICIAN ASSISTANT

## 2023-09-12 PROCEDURE — 99213 OFFICE O/P EST LOW 20 MIN: CPT | Mod: 95,,, | Performed by: PHYSICIAN ASSISTANT

## 2023-09-12 PROCEDURE — 1159F MED LIST DOCD IN RCRD: CPT | Mod: CPTII,95,, | Performed by: PHYSICIAN ASSISTANT

## 2023-09-12 PROCEDURE — 3008F PR BODY MASS INDEX (BMI) DOCUMENTED: ICD-10-PCS | Mod: CPTII,95,, | Performed by: PHYSICIAN ASSISTANT

## 2023-09-12 PROCEDURE — 3008F BODY MASS INDEX DOCD: CPT | Mod: CPTII,95,, | Performed by: PHYSICIAN ASSISTANT

## 2023-09-12 PROCEDURE — 99213 PR OFFICE/OUTPT VISIT, EST, LEVL III, 20-29 MIN: ICD-10-PCS | Mod: 95,,, | Performed by: PHYSICIAN ASSISTANT

## 2023-09-12 RX ORDER — OMEPRAZOLE 40 MG/1
40 CAPSULE, DELAYED RELEASE ORAL DAILY
Qty: 30 CAPSULE | Refills: 5 | Status: SHIPPED | OUTPATIENT
Start: 2023-09-12 | End: 2024-02-23 | Stop reason: SDUPTHER

## 2023-09-12 NOTE — PATIENT INSTRUCTIONS
Increase Omeprazole to 40 mg nightly. Take on an empty stomach.   Decrease caffeine intake by half.   Nothing to eat or drink within two hours of bed.   Check in after repeat labs done in December or sooner if these interventions are not resolving your symptoms.

## 2023-09-12 NOTE — PROGRESS NOTES
Subjective:      Patient ID: Tammy Aguero is a 52 y.o. female.    Chief Complaint: Anemia (Hem/Oc referred)    The patient location is: [place]  The chief complaint leading to consultation is: Anemia    Visit type: audiovisual    Face to Face time with patient: 20 minutes  25 minutes of total time spent on the encounter, which includes face to face time and non-face to face time preparing to see the patient (eg, review of tests), Obtaining and/or reviewing separately obtained history, Documenting clinical information in the electronic or other health record, Independently interpreting results (not separately reported) and communicating results to the patient/family/caregiver, or Care coordination (not separately reported).         Each patient to whom he or she provides medical services by telemedicine is:  (1) informed of the relationship between the physician and patient and the respective role of any other health care provider with respect to management of the patient; and (2) notified that he or she may decline to receive medical services by telemedicine and may withdraw from such care at any time.    Notes:     HPI  The patient has a history of iron deficiency anemia and gastric ulcer. The anemia is a chronic problem. She had an EGD and colonoscopy in February 2023 followed by VCE in March. The only finding was erosive gastropathy. She was admitted in March after a drop in Hgb. Repeat EGD was done and showed a non-bleeding gastric ulcer and 2 cm hiatal hernia. She was put on Carafate and Protonix. A repeat was done in June to check healing. She was noted to have erosive gastropathy without bleeding and LA grade A esophagitis. Pathology has been negative for H. Pylori.     The patient has been referred back to GI for anemia. Her Hgb has been stable and gradually improved since her bleeding in March. However, her saturated iron and ferritin were recently low. She is followed by Dr. Arevalo and is planning an iron  "infusion next month. The patient denies hematemesis, hematochezia or melena. She has avoided NSAIDs. She is on Omeprazole 20 mg daily which she takes at bedtime due to reflux symptoms. She describes waking from sleep 2-3 times a week with regurgitation and the feeling of needing to vomit. At least one of these times she will vomit. In general, episodes are not as frequent as they used to be. She tries not to eat late, but sleeps flat. When asked about caffeinated drinks she said, "I go to bed with one and I wake up with one."   She denies other GI symptoms.     Review of Systems  As per HPI.     Objective:     Physical Exam  Constitutional:       General: She is not in acute distress.     Appearance: She is well-developed.   HENT:      Head: Normocephalic and atraumatic.   Pulmonary:      Effort: Pulmonary effort is normal.   Neurological:      Mental Status: She is alert and oriented to person, place, and time.      Cranial Nerves: No cranial nerve deficit.   Psychiatric:         Behavior: Behavior normal.         Assessment:     1. Iron deficiency anemia, unspecified iron deficiency anemia type    2. History of gastric ulcer    3. Gastroesophageal reflux disease with esophagitis without hemorrhage    4. Chronic gastritis, presence of bleeding unspecified, unspecified gastritis type        Plan:     Increase Omeprazole to 40 mg nightly.   Decrease caffeine intake by half.   Nothing to eat or drink within two hours of bed.   Check in after repeat labs done in December or sooner if these interventions are not resolving your symptoms.   No plan for repeat scope at this time, but will try to escalate therapy based on sx and previous findings, and reassess.     Medications Ordered This Encounter   Medications    omeprazole (PRILOSEC) 40 MG capsule     Sig: Take 1 capsule (40 mg total) by mouth once daily.     Dispense:  30 capsule     Refill:  5     Follow up in about 4 months (around 1/12/2024).    Thank you for the " opportunity to participate in the care of this patient.   Bryant Valdivia PA-C.

## 2023-09-20 NOTE — PLAN OF CARE
2nd Req-MARTIN faxed to Dr. Rosas for Pap Smear     Dr Valdes at  to discuss findings. VSS. No  Pain, no GI bleeding. Pt to be discharged from unit.

## 2023-09-29 DIAGNOSIS — M05.79 RHEUMATOID ARTHRITIS INVOLVING MULTIPLE SITES WITH POSITIVE RHEUMATOID FACTOR: ICD-10-CM

## 2023-09-29 DIAGNOSIS — M06.9 FLARE OF RHEUMATOID ARTHRITIS: ICD-10-CM

## 2023-09-29 RX ORDER — UPADACITINIB 15 MG/1
15 TABLET, EXTENDED RELEASE ORAL
Qty: 30 TABLET | Refills: 11 | Status: SHIPPED | OUTPATIENT
Start: 2023-09-29

## 2023-10-03 ENCOUNTER — INFUSION (OUTPATIENT)
Dept: INFUSION THERAPY | Facility: HOSPITAL | Age: 53
End: 2023-10-03
Attending: INTERNAL MEDICINE
Payer: COMMERCIAL

## 2023-10-03 VITALS
RESPIRATION RATE: 16 BRPM | HEART RATE: 65 BPM | TEMPERATURE: 98 F | OXYGEN SATURATION: 100 % | SYSTOLIC BLOOD PRESSURE: 131 MMHG | DIASTOLIC BLOOD PRESSURE: 62 MMHG

## 2023-10-03 DIAGNOSIS — D50.0 IRON DEFICIENCY ANEMIA DUE TO CHRONIC BLOOD LOSS: Primary | ICD-10-CM

## 2023-10-03 PROCEDURE — 25000003 PHARM REV CODE 250: Performed by: INTERNAL MEDICINE

## 2023-10-03 PROCEDURE — 96365 THER/PROPH/DIAG IV INF INIT: CPT

## 2023-10-03 PROCEDURE — 63600175 PHARM REV CODE 636 W HCPCS: Mod: JZ,JG | Performed by: INTERNAL MEDICINE

## 2023-10-03 RX ADMIN — SODIUM CHLORIDE: 9 INJECTION, SOLUTION INTRAVENOUS at 01:10

## 2023-10-03 RX ADMIN — FERUMOXYTOL 510 MG: 510 INJECTION INTRAVENOUS at 01:10

## 2023-10-10 ENCOUNTER — INFUSION (OUTPATIENT)
Dept: INFUSION THERAPY | Facility: HOSPITAL | Age: 53
End: 2023-10-10
Attending: INTERNAL MEDICINE
Payer: COMMERCIAL

## 2023-10-10 VITALS
DIASTOLIC BLOOD PRESSURE: 60 MMHG | SYSTOLIC BLOOD PRESSURE: 120 MMHG | RESPIRATION RATE: 16 BRPM | TEMPERATURE: 98 F | HEART RATE: 68 BPM | OXYGEN SATURATION: 100 %

## 2023-10-10 DIAGNOSIS — D50.0 IRON DEFICIENCY ANEMIA DUE TO CHRONIC BLOOD LOSS: Primary | ICD-10-CM

## 2023-10-10 PROCEDURE — 25000003 PHARM REV CODE 250: Performed by: INTERNAL MEDICINE

## 2023-10-10 PROCEDURE — 63600175 PHARM REV CODE 636 W HCPCS: Mod: JZ,JG | Performed by: INTERNAL MEDICINE

## 2023-10-10 PROCEDURE — 96365 THER/PROPH/DIAG IV INF INIT: CPT

## 2023-10-10 RX ADMIN — SODIUM CHLORIDE: 9 INJECTION, SOLUTION INTRAVENOUS at 06:10

## 2023-10-10 RX ADMIN — FERUMOXYTOL 510 MG: 510 INJECTION INTRAVENOUS at 06:10

## 2023-10-10 NOTE — PLAN OF CARE
Discussed plan of care with pt. Addressed any and ongoing concerns. Pt denies   Problem: Adult Inpatient Plan of Care  Goal: Plan of Care Review  Outcome: Ongoing, Progressing  Flowsheets (Taken 10/10/2023 0932)  Plan of Care Reviewed With: patient  Goal: Patient-Specific Goal (Individualized)  Outcome: Ongoing, Progressing  Flowsheets (Taken 10/10/2023 0932)  Anxieties, Fears or Concerns: none  Individualized Care Needs: none  Goal: Absence of Hospital-Acquired Illness or Injury  Outcome: Ongoing, Progressing  Intervention: Identify and Manage Fall Risk  Flowsheets (Taken 10/10/2023 0932)  Safety Promotion/Fall Prevention: in recliner, wheels locked  Intervention: Prevent Infection  Flowsheets (Taken 10/10/2023 0932)  Infection Prevention:   equipment surfaces disinfected   hand hygiene promoted   personal protective equipment utilized  Goal: Optimal Comfort and Wellbeing  Outcome: Ongoing, Progressing  Intervention: Provide Person-Centered Care  Flowsheets (Taken 10/10/2023 0932)  Trust Relationship/Rapport:   care explained   choices provided   emotional support provided   empathic listening provided   questions answered   questions encouraged   reassurance provided   thoughts/feelings acknowledged

## 2023-12-05 ENCOUNTER — TELEPHONE (OUTPATIENT)
Dept: RHEUMATOLOGY | Facility: CLINIC | Age: 53
End: 2023-12-05
Payer: COMMERCIAL

## 2023-12-07 ENCOUNTER — LAB VISIT (OUTPATIENT)
Dept: LAB | Facility: HOSPITAL | Age: 53
End: 2023-12-07
Attending: INTERNAL MEDICINE
Payer: COMMERCIAL

## 2023-12-07 DIAGNOSIS — D50.9 IRON DEFICIENCY ANEMIA, UNSPECIFIED IRON DEFICIENCY ANEMIA TYPE: ICD-10-CM

## 2023-12-07 LAB
BASOPHILS # BLD AUTO: 0.05 K/UL (ref 0–0.2)
BASOPHILS NFR BLD: 0.9 % (ref 0–1.9)
DIFFERENTIAL METHOD: ABNORMAL
EOSINOPHIL # BLD AUTO: 0.1 K/UL (ref 0–0.5)
EOSINOPHIL NFR BLD: 1.1 % (ref 0–8)
ERYTHROCYTE [DISTWIDTH] IN BLOOD BY AUTOMATED COUNT: 14.6 % (ref 11.5–14.5)
FERRITIN SERPL-MCNC: 148 NG/ML (ref 20–300)
HCT VFR BLD AUTO: 37.3 % (ref 37–48.5)
HGB BLD-MCNC: 12.2 G/DL (ref 12–16)
IMM GRANULOCYTES # BLD AUTO: 0.03 K/UL (ref 0–0.04)
IMM GRANULOCYTES NFR BLD AUTO: 0.5 % (ref 0–0.5)
IRON SERPL-MCNC: 102 UG/DL (ref 30–160)
LYMPHOCYTES # BLD AUTO: 2.4 K/UL (ref 1–4.8)
LYMPHOCYTES NFR BLD: 42 % (ref 18–48)
MCH RBC QN AUTO: 31.8 PG (ref 27–31)
MCHC RBC AUTO-ENTMCNC: 32.7 G/DL (ref 32–36)
MCV RBC AUTO: 97 FL (ref 82–98)
MONOCYTES # BLD AUTO: 0.5 K/UL (ref 0.3–1)
MONOCYTES NFR BLD: 8.3 % (ref 4–15)
NEUTROPHILS # BLD AUTO: 2.7 K/UL (ref 1.8–7.7)
NEUTROPHILS NFR BLD: 47.2 % (ref 38–73)
NRBC BLD-RTO: 0 /100 WBC
PLATELET # BLD AUTO: 320 K/UL (ref 150–450)
PMV BLD AUTO: 10.3 FL (ref 9.2–12.9)
RBC # BLD AUTO: 3.84 M/UL (ref 4–5.4)
SATURATED IRON: 31 % (ref 20–50)
TOTAL IRON BINDING CAPACITY: 333 UG/DL (ref 250–450)
TRANSFERRIN SERPL-MCNC: 225 MG/DL (ref 200–375)
WBC # BLD AUTO: 5.69 K/UL (ref 3.9–12.7)

## 2023-12-07 PROCEDURE — 82728 ASSAY OF FERRITIN: CPT | Performed by: INTERNAL MEDICINE

## 2023-12-07 PROCEDURE — 85025 COMPLETE CBC W/AUTO DIFF WBC: CPT | Performed by: INTERNAL MEDICINE

## 2023-12-07 PROCEDURE — 36415 COLL VENOUS BLD VENIPUNCTURE: CPT | Performed by: INTERNAL MEDICINE

## 2023-12-07 PROCEDURE — 84466 ASSAY OF TRANSFERRIN: CPT | Performed by: INTERNAL MEDICINE

## 2023-12-07 PROCEDURE — 83540 ASSAY OF IRON: CPT | Performed by: INTERNAL MEDICINE

## 2023-12-11 ENCOUNTER — OFFICE VISIT (OUTPATIENT)
Dept: HEMATOLOGY/ONCOLOGY | Facility: CLINIC | Age: 53
End: 2023-12-11
Payer: COMMERCIAL

## 2023-12-11 VITALS
DIASTOLIC BLOOD PRESSURE: 77 MMHG | SYSTOLIC BLOOD PRESSURE: 115 MMHG | HEIGHT: 67 IN | OXYGEN SATURATION: 98 % | BODY MASS INDEX: 25.55 KG/M2 | TEMPERATURE: 98 F | WEIGHT: 162.81 LBS | HEART RATE: 74 BPM

## 2023-12-11 DIAGNOSIS — D84.9 IMMUNOSUPPRESSED STATUS: ICD-10-CM

## 2023-12-11 DIAGNOSIS — D50.0 IRON DEFICIENCY ANEMIA DUE TO CHRONIC BLOOD LOSS: ICD-10-CM

## 2023-12-11 DIAGNOSIS — R91.8 PULMONARY NODULES: Primary | ICD-10-CM

## 2023-12-11 PROCEDURE — 99999 PR PBB SHADOW E&M-EST. PATIENT-LVL III: ICD-10-PCS | Mod: PBBFAC,,,

## 2023-12-11 PROCEDURE — 1159F MED LIST DOCD IN RCRD: CPT | Mod: CPTII,S$GLB,,

## 2023-12-11 PROCEDURE — 3074F PR MOST RECENT SYSTOLIC BLOOD PRESSURE < 130 MM HG: ICD-10-PCS | Mod: CPTII,S$GLB,,

## 2023-12-11 PROCEDURE — 1159F PR MEDICATION LIST DOCUMENTED IN MEDICAL RECORD: ICD-10-PCS | Mod: CPTII,S$GLB,,

## 2023-12-11 PROCEDURE — 3078F PR MOST RECENT DIASTOLIC BLOOD PRESSURE < 80 MM HG: ICD-10-PCS | Mod: CPTII,S$GLB,,

## 2023-12-11 PROCEDURE — 99214 PR OFFICE/OUTPT VISIT, EST, LEVL IV, 30-39 MIN: ICD-10-PCS | Mod: S$GLB,,,

## 2023-12-11 PROCEDURE — 99999 PR PBB SHADOW E&M-EST. PATIENT-LVL III: CPT | Mod: PBBFAC,,,

## 2023-12-11 PROCEDURE — 3008F PR BODY MASS INDEX (BMI) DOCUMENTED: ICD-10-PCS | Mod: CPTII,S$GLB,,

## 2023-12-11 PROCEDURE — 3078F DIAST BP <80 MM HG: CPT | Mod: CPTII,S$GLB,,

## 2023-12-11 PROCEDURE — 3008F BODY MASS INDEX DOCD: CPT | Mod: CPTII,S$GLB,,

## 2023-12-11 PROCEDURE — 99214 OFFICE O/P EST MOD 30 MIN: CPT | Mod: S$GLB,,,

## 2023-12-11 PROCEDURE — 3074F SYST BP LT 130 MM HG: CPT | Mod: CPTII,S$GLB,,

## 2023-12-11 NOTE — PROGRESS NOTES
Subjective:       Patient ID: Tammy Aguero is a 53 y.o. female.    Chief Complaint: Anemia    HPI: Ms. Aguero is a 53 year old female who is following up for her iron def anemia. She has had IV iron, feraheme, last one 10/10/23. She has hx of acute recurrent severe anemia with iron deficiency with ER presentation mid March 2023 status post 1 unit PRBC transfusion. Repeat EGD with nonbleeding gastric ulcer. She had repeat EGD 6/26/23 showing erosive gastropathy with no bleeding. Last colonoscopy 2/1/23 normal, recommended to repeat in 10 years.    Today: She states she has been well. She denies any fatigue, sob, bleeding, n/v/d/c, fevers. She has not taken oral iron previously. She denies any issues with IV iron. She states she has decreased soda intake to one Dr. Pepper a day.     Social History     Socioeconomic History    Marital status: Single   Occupational History    Occupation: [employer], supervisor   Tobacco Use    Smoking status: Former     Types: Vaping with nicotine, Cigarettes     Start date: 1/1/2020     Passive exposure: Past    Smokeless tobacco: Never    Tobacco comments:     Pt states that she quit smoking cigarettes 1 year ago, and stopped using the vape 6 months ago.    Substance and Sexual Activity    Alcohol use: Not Currently     Comment: I drank when i was younger but can no longer take the smell    Drug use: Never     Types: Marijuana    Sexual activity: Not Currently     Partners: Male     Birth control/protection: See Surgical Hx, None     Social Determinants of Health     Financial Resource Strain: Low Risk  (12/7/2023)    Overall Financial Resource Strain (CARDIA)     Difficulty of Paying Living Expenses: Not very hard   Food Insecurity: No Food Insecurity (12/7/2023)    Hunger Vital Sign     Worried About Running Out of Food in the Last Year: Never true     Ran Out of Food in the Last Year: Never true   Transportation Needs: No Transportation Needs (12/7/2023)    PRAPARE - Transportation      Lack of Transportation (Medical): No     Lack of Transportation (Non-Medical): No   Physical Activity: Sufficiently Active (12/7/2023)    Exercise Vital Sign     Days of Exercise per Week: 5 days     Minutes of Exercise per Session: 120 min   Stress: Stress Concern Present (12/7/2023)    [employer] [place] [employer] - Occupational Stress Questionnaire     Feeling of Stress : Rather much   Social Connections: Unknown (12/7/2023)    Social Connection and Isolation Panel [NHANES]     Frequency of Communication with Friends and Family: Three times a week     Frequency of Social Gatherings with Friends and Family: Once a week     Active Member of Clubs or Organizations: No     Attends Club or Organization Meetings: Never     Marital Status:    Housing Stability: Low Risk  (12/7/2023)    Housing Stability Vital Sign     Unable to Pay for Housing in the Last Year: No     Number of Places Lived in the Last Year: 1     Unstable Housing in the Last Year: No       Past Medical History:   Diagnosis Date    RA (rheumatoid arthritis)        Family History   Problem Relation Age of Onset    Diabetes Sister     Cancer Sister     Breast cancer Sister     Asthma Sister     Diabetes Brother     Breast cancer Other     Alcohol abuse Mother     Asthma Mother     COPD Mother     Depression Mother     Heart disease Mother        Past Surgical History:   Procedure Laterality Date    COLONOSCOPY N/A 02/01/2023    Procedure: COLONOSCOPY;  Surgeon: Ángel Valdes MD;  Location: [facility];  Service: Gastroenterology;  Laterality: N/A;    ESOPHAGOGASTRODUODENOSCOPY N/A 02/01/2023    Procedure: EGD (ESOPHAGOGASTRODUODENOSCOPY);  Surgeon: Ángel Valdes MD;  Location: [facility];  Service: Gastroenterology;  Laterality: N/A;    ESOPHAGOGASTRODUODENOSCOPY N/A 3/17/2023    Procedure: EGD (ESOPHAGOGASTRODUODENOSCOPY);  Surgeon: Ashley Stevenson MD;  Location: [facility];  Service: Endoscopy;  Laterality: N/A;     ESOPHAGOGASTRODUODENOSCOPY N/A 6/26/2023    Procedure: EGD (ESOPHAGOGASTRODUODENOSCOPY);  Surgeon: Ángel Valdes MD;  Location: [facility];  Service: Gastroenterology;  Laterality: N/A;    INTRALUMINAL GASTROINTESTINAL TRACT IMAGING VIA CAPSULE N/A 3/1/2023    Procedure: IMAGING PROCEDURE, GI TRACT, INTRALUMINAL, VIA CAPSULE;  Surgeon: First Available Roman Luevano;  Location: [facility];  Service: Endoscopy;  Laterality: N/A;    Tubal Lig      TUBAL LIGATION         Review of Systems   Constitutional:  Negative for activity change, appetite change, chills, diaphoresis, fatigue, fever and unexpected weight change.   HENT:  Negative for congestion, nosebleeds and rhinorrhea.    Respiratory:  Negative for cough and shortness of breath.    Cardiovascular:  Negative for chest pain and leg swelling.   Gastrointestinal:  Negative for abdominal pain, anal bleeding, blood in stool, constipation, diarrhea, nausea and vomiting.   Genitourinary:  Negative for hematuria.   Skin:  Negative for color change and pallor.         Medication List with Changes/Refills   Current Medications    ACETAMINOPHEN (TYLENOL) 650 MG TBSR    Take 650 mg by mouth every 8 (eight) hours.    DIPHENHYDRAMINE-ACETAMINOPHEN (TYLENOL PM)  MG TAB    Take 1 tablet by mouth nightly as needed.    OMEPRAZOLE (PRILOSEC) 40 MG CAPSULE    Take 1 capsule (40 mg total) by mouth once daily.    RINVOQ 15 MG 24 HR TABLET    TAKE 1 TABLET DAILY    VARICELLA-ZOSTER GE-AS01B, PF, (SHINGRIX, PF,) 50 MCG/0.5 ML INJECTION    Inject 0.5 mLs into the muscle every 3 (three) months.     Objective:     Vitals:    12/11/23 1452   BP: 115/77   Pulse: 74   Temp: 97.6 °F (36.4 °C)       Physical Exam  Vitals reviewed.   Constitutional:       General: She is not in acute distress.     Appearance: She is not ill-appearing, toxic-appearing or diaphoretic.   HENT:      Head: Normocephalic and atraumatic.   Cardiovascular:      Rate and Rhythm: Normal rate.   Musculoskeletal:       Right lower leg: No edema.      Left lower leg: No edema.   Skin:     General: Skin is warm.      Coloration: Skin is not jaundiced or pale.      Findings: No bruising, erythema, lesion or rash.   Neurological:      Mental Status: She is alert.      Motor: No weakness.      Gait: Gait normal.   Psychiatric:         Mood and Affect: Mood normal.         Behavior: Behavior normal.         Thought Content: Thought content normal.          Labs/Results:  Lab Results   Component Value Date    WBC 5.69 12/07/2023    RBC 3.84 (L) 12/07/2023    HGB 12.2 12/07/2023    HCT 37.3 12/07/2023    MCV 97 12/07/2023    MCH 31.8 (H) 12/07/2023    MCHC 32.7 12/07/2023    RDW 14.6 (H) 12/07/2023     12/07/2023    MPV 10.3 12/07/2023    GRAN 2.7 12/07/2023    GRAN 47.2 12/07/2023    LYMPH 2.4 12/07/2023    LYMPH 42.0 12/07/2023    MONO 0.5 12/07/2023    MONO 8.3 12/07/2023    EOS 0.1 12/07/2023    BASO 0.05 12/07/2023    EOSINOPHIL 1.1 12/07/2023    BASOPHIL 0.9 12/07/2023      Latest Reference Range & Units 12/07/23 13:11   Iron 30 - 160 ug/dL 102   TIBC 250 - 450 ug/dL 333   Saturated Iron 20 - 50 % 31   Transferrin 200 - 375 mg/dL 225   Ferritin 20.0 - 300.0 ng/mL 148     CT chest 3/29/23  Impression:  1. Pulmonary nodules are again noted in both lungs.  Some of these nodules have benign appearing central calcification. There has been no detrimental interval change in the size or appearance of these pulmonary nodules.  2. There is an 8 mm mass in the lateral limb of the left adrenal.  If additional imaging evaluation is clinically indicated, I recommend consideration of an MRI examination of the adrenals without and with IV contrast.    Assessment:     Problem List Items Addressed This Visit          Pulmonary    Pulmonary nodules - Primary       Immunology/Multi System    Immunosuppressed status       Oncology    Iron deficiency anemia due to chronic blood loss    Relevant Orders    CBC Auto Differential    Comprehensive  Metabolic Panel    Ferritin    Iron and TIBC     Plan:     Pulmonary nodules  --followed by pulmonology    Iron deficiency anemia due to chronic blood loss  --continue to follow with GI. Avoid NSAIDs  --Last colonoscopy 2/1/23 normal, recommended to repeat in 10 years.  --She had repeat EGD 6/26/23 showing erosive gastropathy with no bleeding.  --last IV iron feraheme, 10/10/23  --hgb: 12.2g/dL  --iron: 102, sat: 31%, ferritin: 148-iron repleted    Follow-Up: 4 months with cbc iron/tibc ferritin prior    Mei Ponce PA-C  Hematology Oncology    Route Chart for Scheduling    Med Onc Chart Routing      Follow up with physician    Follow up with WILFRIDO 4 months. with cbc cmp iron/tibc ferritin prior-VV ok   Infusion scheduling note    Injection scheduling note    Labs CMP, ferritin, CBC and iron and TIBC   Scheduling:  Preferred lab:  Lab interval:     Imaging    Pharmacy appointment    Other referrals                    Supportive Plan Information  OP FERUMOXYTOL   Eve Arevalo MD   Upcoming Treatment Dates - OP FERUMOXYTOL    No upcoming days in selected categories.

## 2024-02-23 ENCOUNTER — OFFICE VISIT (OUTPATIENT)
Dept: INTERNAL MEDICINE | Facility: CLINIC | Age: 54
End: 2024-02-23
Payer: COMMERCIAL

## 2024-02-23 VITALS
WEIGHT: 159.19 LBS | TEMPERATURE: 98 F | DIASTOLIC BLOOD PRESSURE: 76 MMHG | BODY MASS INDEX: 24.99 KG/M2 | SYSTOLIC BLOOD PRESSURE: 106 MMHG | HEART RATE: 79 BPM | HEIGHT: 67 IN | OXYGEN SATURATION: 96 %

## 2024-02-23 DIAGNOSIS — K25.0 ACUTE GASTRIC ULCER WITH HEMORRHAGE: ICD-10-CM

## 2024-02-23 DIAGNOSIS — N30.01 ACUTE CYSTITIS WITH HEMATURIA: Primary | ICD-10-CM

## 2024-02-23 LAB
BACTERIA #/AREA URNS HPF: ABNORMAL /HPF
BILIRUB UR QL STRIP: NEGATIVE
CLARITY UR: ABNORMAL
COLOR UR: YELLOW
GLUCOSE UR QL STRIP: NEGATIVE
HGB UR QL STRIP: ABNORMAL
HYALINE CASTS #/AREA URNS LPF: 0 /LPF
KETONES UR QL STRIP: NEGATIVE
LEUKOCYTE ESTERASE UR QL STRIP: ABNORMAL
MICROSCOPIC COMMENT: ABNORMAL
NITRITE UR QL STRIP: NEGATIVE
PH UR STRIP: 7 [PH] (ref 5–8)
PROT UR QL STRIP: ABNORMAL
RBC #/AREA URNS HPF: 5 /HPF (ref 0–4)
SP GR UR STRIP: 1.02 (ref 1–1.03)
SQUAMOUS #/AREA URNS HPF: 2 /HPF
URN SPEC COLLECT METH UR: ABNORMAL
UROBILINOGEN UR STRIP-ACNC: NEGATIVE EU/DL
WBC #/AREA URNS HPF: >100 /HPF (ref 0–5)

## 2024-02-23 PROCEDURE — 87086 URINE CULTURE/COLONY COUNT: CPT

## 2024-02-23 PROCEDURE — 81000 URINALYSIS NONAUTO W/SCOPE: CPT

## 2024-02-23 PROCEDURE — 3078F DIAST BP <80 MM HG: CPT | Mod: CPTII,S$GLB,,

## 2024-02-23 PROCEDURE — 99214 OFFICE O/P EST MOD 30 MIN: CPT | Mod: S$GLB,,,

## 2024-02-23 PROCEDURE — 1160F RVW MEDS BY RX/DR IN RCRD: CPT | Mod: CPTII,S$GLB,,

## 2024-02-23 PROCEDURE — 99999 PR PBB SHADOW E&M-EST. PATIENT-LVL IV: CPT | Mod: PBBFAC,,,

## 2024-02-23 PROCEDURE — 87077 CULTURE AEROBIC IDENTIFY: CPT

## 2024-02-23 PROCEDURE — 87186 SC STD MICRODIL/AGAR DIL: CPT

## 2024-02-23 PROCEDURE — 3074F SYST BP LT 130 MM HG: CPT | Mod: CPTII,S$GLB,,

## 2024-02-23 PROCEDURE — 87088 URINE BACTERIA CULTURE: CPT

## 2024-02-23 PROCEDURE — 3008F BODY MASS INDEX DOCD: CPT | Mod: CPTII,S$GLB,,

## 2024-02-23 PROCEDURE — 1159F MED LIST DOCD IN RCRD: CPT | Mod: CPTII,S$GLB,,

## 2024-02-23 RX ORDER — OMEPRAZOLE 40 MG/1
40 CAPSULE, DELAYED RELEASE ORAL DAILY
Qty: 30 CAPSULE | Refills: 5 | Status: SHIPPED | OUTPATIENT
Start: 2024-02-23 | End: 2025-02-22

## 2024-02-23 RX ORDER — CIPROFLOXACIN 500 MG/1
500 TABLET ORAL EVERY 12 HOURS
Qty: 14 TABLET | Refills: 0 | Status: SHIPPED | OUTPATIENT
Start: 2024-02-23 | End: 2024-03-05

## 2024-02-23 NOTE — PROGRESS NOTES
Tammy Aguero  02/23/2024  [id number]    Maria Teresa Aparicio MD  Patient Care Team:  Maria Teresa Aparicio MD as PCP - General (Family Medicine)          Visit Type:an urgent visit for a new problem    Chief Complaint:  Chief Complaint   Patient presents with    Dysuria        History of Present Illness:    Dysuria, urgency, and frequency  started a week ago   Denies fever, body aches or chills     History:  Past Medical History:   Diagnosis Date    RA (rheumatoid arthritis)      Past Surgical History:   Procedure Laterality Date    COLONOSCOPY N/A 02/01/2023    Procedure: COLONOSCOPY;  Surgeon: Ángel Valdes MD;  Location: [facility];  Service: Gastroenterology;  Laterality: N/A;    ESOPHAGOGASTRODUODENOSCOPY N/A 02/01/2023    Procedure: EGD (ESOPHAGOGASTRODUODENOSCOPY);  Surgeon: Ángel Valdes MD;  Location: [facility];  Service: Gastroenterology;  Laterality: N/A;    ESOPHAGOGASTRODUODENOSCOPY N/A 3/17/2023    Procedure: EGD (ESOPHAGOGASTRODUODENOSCOPY);  Surgeon: Ashley Stevenson MD;  Location: [facility];  Service: Endoscopy;  Laterality: N/A;    ESOPHAGOGASTRODUODENOSCOPY N/A 6/26/2023    Procedure: EGD (ESOPHAGOGASTRODUODENOSCOPY);  Surgeon: Ángel Valdes MD;  Location: [facility];  Service: Gastroenterology;  Laterality: N/A;    INTRALUMINAL GASTROINTESTINAL TRACT IMAGING VIA CAPSULE N/A 3/1/2023    Procedure: IMAGING PROCEDURE, GI TRACT, INTRALUMINAL, VIA CAPSULE;  Surgeon: First Antonietta Luevano;  Location: [facility];  Service: Endoscopy;  Laterality: N/A;    Tubal Lig      TUBAL LIGATION       Family History   Problem Relation Age of Onset    Diabetes Sister     Cancer Sister     Breast cancer Sister     Asthma Sister     Diabetes Brother     Breast cancer Other     Alcohol abuse Mother     Asthma Mother     COPD Mother     Depression Mother     Heart disease Mother      Social History     Socioeconomic History    Marital status: Single   Occupational History    Occupation: [employer],  supervisor   Tobacco Use    Smoking status: Former     Types: Vaping with nicotine, Cigarettes     Start date: 1/1/2020     Passive exposure: Past    Smokeless tobacco: Never    Tobacco comments:     Pt states that she quit smoking cigarettes 1 year ago, and stopped using the vape 6 months ago.    Substance and Sexual Activity    Alcohol use: Not Currently     Comment: I drank when i was younger but can no longer take the smell    Drug use: Never     Types: Marijuana    Sexual activity: Not Currently     Partners: Male     Birth control/protection: See Surgical Hx, None     Social Determinants of Health     Financial Resource Strain: Low Risk  (12/7/2023)    Overall Financial Resource Strain (CARDIA)     Difficulty of Paying Living Expenses: Not very hard   Food Insecurity: No Food Insecurity (12/7/2023)    Hunger Vital Sign     Worried About Running Out of Food in the Last Year: Never true     Ran Out of Food in the Last Year: Never true   Transportation Needs: No Transportation Needs (12/7/2023)    PRAPARE - Transportation     Lack of Transportation (Medical): No     Lack of Transportation (Non-Medical): No   Physical Activity: Sufficiently Active (12/7/2023)    Exercise Vital Sign     Days of Exercise per Week: 5 days     Minutes of Exercise per Session: 120 min   Stress: Stress Concern Present (12/7/2023)    [employer] - Occupational Stress Questionnaire     Feeling of Stress : Rather much   Social Connections: Unknown (12/7/2023)    Social Connection and Isolation Panel [NHANES]     Frequency of Communication with Friends and Family: Three times a week     Frequency of Social Gatherings with Friends and Family: Once a week     Active Member of Clubs or Organizations: No     Attends Club or Organization Meetings: Never     Marital Status:    Housing Stability: Low Risk  (12/7/2023)    Housing Stability Vital Sign     Unable to Pay for Housing in the Last Year: No     Number  of Places Lived in the Last Year: 1     Unstable Housing in the Last Year: No     Patient Active Problem List   Diagnosis    Rheumatoid arthritis involving multiple sites with positive rheumatoid factor    Nicotine dependence, cigarettes, in remission    High risk medication use    Muscle pain    Positive skin test for tuberculosis    Pulmonary nodules    SOB (shortness of breath)    Iron deficiency anemia    Iron deficiency anemia due to chronic blood loss    Sarcoidosis of lung with sarcoidosis of lymph nodes    Encounter for medication monitoring    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    Medications:  Current Outpatient Medications on File Prior to Visit   Medication Sig Dispense Refill    acetaminophen (TYLENOL) 650 MG TbSR Take 650 mg by mouth every 8 (eight) hours.      diphenhydrAMINE-acetaminophen (TYLENOL PM)  mg Tab Take 1 tablet by mouth nightly as needed.      omeprazole (PRILOSEC) 40 MG capsule Take 1 capsule (40 mg total) by mouth once daily. 30 capsule 5    RINVOQ 15 mg 24 hr tablet TAKE 1 TABLET DAILY 30 tablet 11    varicella-zoster gE-AS01B, PF, (SHINGRIX, PF,) 50 mcg/0.5 mL injection Inject 0.5 mLs into the muscle every 3 (three) months. (Patient not taking: Reported on 9/12/2023) 1 each 1     No current facility-administered medications on file prior to visit.       Medications have been reviewed and reconciled with patient at this visit.  Barriers to medications reviewed with patient.    Adverse reactions to current medications reviewed with patient..    Over the counter medications reviewed and  reconciled with patient.    Exam:  Wt Readings from Last 3 Encounters:   12/11/23 73.9 kg (162 lb 13 oz)   09/12/23 70.8 kg (156 lb)   06/26/23 70.8 kg (156 lb)     Temp Readings from Last 3 Encounters:   12/11/23 97.6 °F (36.4 °C) (Temporal)   10/10/23 97.8 °F (36.6 °C)   10/03/23 97.6 °F (36.4 °C)     BP Readings from Last 3 Encounters:   12/11/23 115/77   10/10/23 120/60   10/03/23 131/62     Pulse Readings from Last 3 Encounters:   12/11/23 74   10/10/23 68   10/03/23 65     There is no height or weight on file to calculate BMI.      Review of Systems   Constitutional:  Negative for chills and weight loss.   Gastrointestinal:  Negative for constipation, nausea and vomiting.   Genitourinary:  Positive for dysuria, frequency and urgency. Negative for flank pain and hematuria.   Skin:  Negative for rash.     Physical Exam  Nursing note reviewed.   HENT:      Head: Normocephalic and atraumatic.   Cardiovascular:      Rate and Rhythm: Normal rate and regular rhythm.   Pulmonary:      Effort: Pulmonary effort is normal. No respiratory distress.      Breath sounds: Normal breath sounds.   Abdominal:      General: Bowel sounds are normal.      Tenderness: There is abdominal tenderness in the suprapubic area. There is no right CVA tenderness or left CVA tenderness.   Skin:     General: Skin is dry.   Neurological:      Mental Status: She is alert and oriented to person, place, and time.   Psychiatric:         Mood and Affect: Mood normal.         Behavior: Behavior normal.         Thought Content: Thought content normal.         Judgment: Judgment normal.         Laboratory Reviewed ({Yes)  Lab Results   Component Value Date    WBC 5.69 12/07/2023    HGB 12.2 12/07/2023    HCT 37.3 12/07/2023     12/07/2023    CHOL 134 04/08/2019    TRIG 82 04/08/2019    HDL 45 04/08/2019    ALT 18 08/29/2023    AST 22 08/29/2023     08/29/2023    K 4.6 08/29/2023     08/29/2023    CREATININE 0.9 08/29/2023    BUN 12  08/29/2023    CO2 23 08/29/2023    INR 0.9 01/11/2023       Tammy was seen today for dysuria.    Diagnoses and all orders for this visit:    Acute cystitis with hematuria  -     Urinalysis, Reflex to Urine Culture; Future  -     Urinalysis, Reflex to Urine Culture  -     ciprofloxacin HCl (CIPRO) 500 MG tablet; Take 1 tablet (500 mg total) by mouth every 12 (twelve) hours.    Acute gastric ulcer with hemorrhage  -     omeprazole (PRILOSEC) 40 MG capsule; Take 1 capsule (40 mg total) by mouth once daily.    Other orders  -     Urinalysis Microscopic  -     Urine culture    Pt declines MMG at this time  Discussed risk vs benefit of MMG    Schedule well woman examine with Dr. Aparicio in May         Care Plan/Goals: Reviewed    Goals    None         Follow up: No follow-ups on file.    After visit summary was printed and given to patient upon discharge today.  Patient goals and care plan are included in After Visit Summary.      Answers submitted by the patient for this visit:  Painful Urination Questionnaire (Submitted on 2/23/2024)  Chief Complaint: Dysuria  Chronicity: new  Onset: 1 to 4 weeks ago  Frequency: every urination  Progression since onset: gradually worsening  Pain quality: stabbing  Pain - numeric: 6/10  Fever: no fever  Sexually active?: No  History of pyelonephritis?: No  discharge: No  hesitancy: No  possible pregnancy: No  sweats: No  withholding: Yes  behavior changes: No  Treatments tried: nothing  Improvement on treatment: no relief  Pain severity: moderate  catheterization: No  diabetes insipidus: No  diabetes mellitus: No  genitourinary reflux: No  hypertension: No  recurrent UTIs: No  single kidney: No  STD: No  urinary stasis: No  urological procedure: No  kidney stones: No

## 2024-02-25 LAB — BACTERIA UR CULT: ABNORMAL

## 2024-03-05 ENCOUNTER — OFFICE VISIT (OUTPATIENT)
Dept: RHEUMATOLOGY | Facility: CLINIC | Age: 54
End: 2024-03-05
Payer: COMMERCIAL

## 2024-03-05 DIAGNOSIS — D86.2 SARCOIDOSIS OF LUNG WITH SARCOIDOSIS OF LYMPH NODES: ICD-10-CM

## 2024-03-05 DIAGNOSIS — Z51.81 ENCOUNTER FOR MEDICATION MONITORING: ICD-10-CM

## 2024-03-05 DIAGNOSIS — Z79.899 DRUG-INDUCED IMMUNODEFICIENCY: ICD-10-CM

## 2024-03-05 DIAGNOSIS — D84.821 DRUG-INDUCED IMMUNODEFICIENCY: ICD-10-CM

## 2024-03-05 DIAGNOSIS — M05.79 RHEUMATOID ARTHRITIS INVOLVING MULTIPLE SITES WITH POSITIVE RHEUMATOID FACTOR: Primary | ICD-10-CM

## 2024-03-05 PROCEDURE — 99214 OFFICE O/P EST MOD 30 MIN: CPT | Mod: 95,,, | Performed by: INTERNAL MEDICINE

## 2024-03-05 PROCEDURE — 1160F RVW MEDS BY RX/DR IN RCRD: CPT | Mod: CPTII,95,, | Performed by: INTERNAL MEDICINE

## 2024-03-05 PROCEDURE — 1159F MED LIST DOCD IN RCRD: CPT | Mod: CPTII,95,, | Performed by: INTERNAL MEDICINE

## 2024-03-05 NOTE — PROGRESS NOTES
"  RHEUMATOLOGY FOLLOW UP - TELE VISIT     The patient location is: [place]  The chief complaint leading to consultation is: Arthritis follow up  Visit type: Virtual visit with synchronous audio and video  Total time spent with patient:  5 minutes  Each patient to whom he or she provides medical services by telemedicine is:  (1) informed of the relationship between the physician and patient and the respective role of any other health care provider with respect to management of the patient; and (2) notified that he or she may decline to receive medical services by telemedicine and may withdraw from such care at any time.    Chief complaints, HPI, ROS, EXAM, Assessment & Plans:-  Tammy Gray a 53 y.o. pleasant female seen today for follow-up visit.  Seropositive rheumatoid and biopsy-proven sarcoid on Rinvoq since June 2023.  Failed Humira and methotrexate.  Reports doing well today.  No significant pain.  No shortness of breath.  No dry cough.  No adverse effects to Rinvoq.  Rheumatological review of system negative otherwise.  100% fist formation bilateral hands..    1. Rheumatoid arthritis involving multiple sites with positive rheumatoid factor    2. Sarcoidosis of lung with sarcoidosis of lymph nodes    3. Drug-induced immunodeficiency    4. Encounter for medication monitoring      Problem List Items Addressed This Visit       Rheumatoid arthritis involving multiple sites with positive rheumatoid factor - Primary    Relevant Orders    CBC Auto Differential    Comprehensive Metabolic Panel    C-Reactive Protein    Sedimentation rate    INTERLEUKIN-2 RECEPTOR    Sarcoidosis of lung with sarcoidosis of lymph nodes    Overview     Lung Biopsy: LUNG, LEFT, "NODULE", CT-GUIDED BIOPSY:   - Focally necrotizing granulomatous inflammation   - Adjacent alveolated lung parenchyma with fibroelastotic changes and chronic   (lymphoplasmacytic) inflammation   - No evidence of acid-fast organisms or fungal elements on AFB and " GMS   cytochemical stains, respectively   - No evidence of malignancy     From 12/20/2022 Normal Pulmonary Function Studies:Spirometry is normal. Spirometry remains unimproved following bronchodilator. Lung volume determination shows TLC is normal with evidence air trapping is present. Airway mechanics show normal airway resistance and conductance. DLCO is normal. MVV is normal.          Relevant Orders    CBC Auto Differential    Comprehensive Metabolic Panel    C-Reactive Protein    Sedimentation rate    INTERLEUKIN-2 RECEPTOR    Encounter for medication monitoring    Relevant Orders    CBC Auto Differential    Comprehensive Metabolic Panel    C-Reactive Protein    Sedimentation rate    INTERLEUKIN-2 RECEPTOR    Drug-induced immunodeficiency    Relevant Orders    CBC Auto Differential    Comprehensive Metabolic Panel    C-Reactive Protein    Sedimentation rate    INTERLEUKIN-2 RECEPTOR      Latest Reference Range & Units 12/07/23 13:11   WBC 3.90 - 12.70 K/uL 5.69   RBC 4.00 - 5.40 M/uL 3.84 (L)   Hemoglobin 12.0 - 16.0 g/dL 12.2   Hematocrit 37.0 - 48.5 % 37.3   MCV 82 - 98 fL 97   MCH 27.0 - 31.0 pg 31.8 (H)   MCHC 32.0 - 36.0 g/dL 32.7   RDW 11.5 - 14.5 % 14.6 (H)   Platelet Count 150 - 450 K/uL 320   MPV 9.2 - 12.9 fL 10.3   Gran % 38.0 - 73.0 % 47.2   Lymph % 18.0 - 48.0 % 42.0   Mono % 4.0 - 15.0 % 8.3   Eos % 0.0 - 8.0 % 1.1   Basophil % 0.0 - 1.9 % 0.9   Immature Granulocytes 0.0 - 0.5 % 0.5   Gran # (ANC) 1.8 - 7.7 K/uL 2.7   Lymph # 1.0 - 4.8 K/uL 2.4   Mono # 0.3 - 1.0 K/uL 0.5   Eos # 0.0 - 0.5 K/uL 0.1   Baso # 0.00 - 0.20 K/uL 0.05   Immature Grans (Abs) 0.00 - 0.04 K/uL 0.03   nRBC 0 /100 WBC 0   Differential Method  Automated   Iron 30 - 160 ug/dL 102   TIBC 250 - 450 ug/dL 333   Saturated Iron 20 - 50 % 31   Transferrin 200 - 375 mg/dL 225   Ferritin 20.0 - 300.0 ng/mL 148   (L): Data is abnormally low  (H): Data is abnormally high      Well controlled seropositive nonerosive rheumatoid on Rinvoq.   Continue Rinvoq daily.  Hold Rinvoq if any infection.  Continue follow-up with pulmonologist for pulmonary sarcoidosis.  No active symptoms.  MARIA GUADALUPE inhibition might work on her sarcoidosis as well.    Drug induced immunodeficiency due to use of immunosuppressive drugs. Monitor carefully for infections. Advised to get immediate medical care if any infection. Also advised strict adherence to age appropriate vaccinations and cancer screenings with PCP.  Hold Rinvoq if any infection.  All 4 labs before next visit in 8 months.  I have explained all of the above in detail and the patient understands all of the current recommendation(s). I have answered all questions to the best of my ability and to their complete satisfaction.   # Follow up in about 8 months (around 11/5/2024).      Past Medical History:   Diagnosis Date    RA (rheumatoid arthritis)        Past Surgical History:   Procedure Laterality Date    COLONOSCOPY N/A 02/01/2023    Procedure: COLONOSCOPY;  Surgeon: Ángel Valdes MD;  Location: [facility];  Service: Gastroenterology;  Laterality: N/A;    ESOPHAGOGASTRODUODENOSCOPY N/A 02/01/2023    Procedure: EGD (ESOPHAGOGASTRODUODENOSCOPY);  Surgeon: Ángel Valdes MD;  Location: [facility];  Service: Gastroenterology;  Laterality: N/A;    ESOPHAGOGASTRODUODENOSCOPY N/A 3/17/2023    Procedure: EGD (ESOPHAGOGASTRODUODENOSCOPY);  Surgeon: Ashley Stevenson MD;  Location: [facility];  Service: Endoscopy;  Laterality: N/A;    ESOPHAGOGASTRODUODENOSCOPY N/A 6/26/2023    Procedure: EGD (ESOPHAGOGASTRODUODENOSCOPY);  Surgeon: Ángel Valdes MD;  Location: [facility];  Service: Gastroenterology;  Laterality: N/A;    INTRALUMINAL GASTROINTESTINAL TRACT IMAGING VIA CAPSULE N/A 3/1/2023    Procedure: IMAGING PROCEDURE, GI TRACT, INTRALUMINAL, VIA CAPSULE;  Surgeon: First Available [place];  Location: [facility];  Service: Endoscopy;  Laterality: N/A;    Tubal Lig      TUBAL LIGATION          Social History      Tobacco Use    Smoking status: Former     Types: Vaping with nicotine, Cigarettes     Start date: 1/1/2020     Passive exposure: Past    Smokeless tobacco: Never    Tobacco comments:     Pt states that she quit smoking cigarettes 1 year ago, and stopped using the vape 6 months ago.    Substance Use Topics    Alcohol use: Not Currently     Comment: I drank when i was younger but can no longer take the smell    Drug use: Never     Types: Marijuana       Family History   Problem Relation Age of Onset    Diabetes Sister     Cancer Sister     Breast cancer Sister     Asthma Sister     Diabetes Brother     Breast cancer Other     Alcohol abuse Mother     Asthma Mother     COPD Mother     Depression Mother     Heart disease Mother        Review of patient's allergies indicates:   Allergen Reactions    Nsaids (non-steroidal anti-inflammatory drug) Other (See Comments)     Gastric ulcer       Medication List with Changes/Refills   Current Medications    ACETAMINOPHEN (TYLENOL) 650 MG TBSR    Take 650 mg by mouth every 8 (eight) hours.    DIPHENHYDRAMINE-ACETAMINOPHEN (TYLENOL PM)  MG TAB    Take 1 tablet by mouth nightly as needed.    OMEPRAZOLE (PRILOSEC) 40 MG CAPSULE    Take 1 capsule (40 mg total) by mouth once daily.    RINVOQ 15 MG 24 HR TABLET    TAKE 1 TABLET DAILY   Discontinued Medications    CIPROFLOXACIN HCL (CIPRO) 500 MG TABLET    Take 1 tablet (500 mg total) by mouth every 12 (twelve) hours.    VARICELLA-ZOSTER GE-AS01B, PF, (SHINGRIX, PF,) 50 MCG/0.5 ML INJECTION    Inject 0.5 mLs into the muscle every 3 (three) months.       Disclaimer: This note was prepared using voice recognition system and is likely to have sound alike errors and is not proof read.  Please call me with any questions.

## 2024-03-07 ENCOUNTER — TELEPHONE (OUTPATIENT)
Dept: RHEUMATOLOGY | Facility: CLINIC | Age: 54
End: 2024-03-07
Payer: COMMERCIAL

## 2024-03-12 ENCOUNTER — PATIENT MESSAGE (OUTPATIENT)
Dept: INTERNAL MEDICINE | Facility: CLINIC | Age: 54
End: 2024-03-12
Payer: COMMERCIAL

## 2024-03-12 DIAGNOSIS — Z12.31 SCREENING MAMMOGRAM, ENCOUNTER FOR: Primary | ICD-10-CM

## 2024-04-03 ENCOUNTER — PATIENT MESSAGE (OUTPATIENT)
Dept: PULMONOLOGY | Facility: CLINIC | Age: 54
End: 2024-04-03
Payer: COMMERCIAL

## 2024-04-09 ENCOUNTER — LAB VISIT (OUTPATIENT)
Dept: LAB | Facility: HOSPITAL | Age: 54
End: 2024-04-09
Payer: COMMERCIAL

## 2024-04-09 DIAGNOSIS — M05.79 RHEUMATOID ARTHRITIS INVOLVING MULTIPLE SITES WITH POSITIVE RHEUMATOID FACTOR: ICD-10-CM

## 2024-04-09 DIAGNOSIS — D86.2 SARCOIDOSIS OF LUNG WITH SARCOIDOSIS OF LYMPH NODES: ICD-10-CM

## 2024-04-09 DIAGNOSIS — D84.821 DRUG-INDUCED IMMUNODEFICIENCY: ICD-10-CM

## 2024-04-09 DIAGNOSIS — D50.0 IRON DEFICIENCY ANEMIA DUE TO CHRONIC BLOOD LOSS: ICD-10-CM

## 2024-04-09 DIAGNOSIS — Z51.81 ENCOUNTER FOR MEDICATION MONITORING: ICD-10-CM

## 2024-04-09 DIAGNOSIS — Z79.899 DRUG-INDUCED IMMUNODEFICIENCY: ICD-10-CM

## 2024-04-09 LAB
ALBUMIN SERPL BCP-MCNC: 4.1 G/DL (ref 3.5–5.2)
ALP SERPL-CCNC: 70 U/L (ref 55–135)
ALT SERPL W/O P-5'-P-CCNC: 14 U/L (ref 10–44)
ANION GAP SERPL CALC-SCNC: 9 MMOL/L (ref 8–16)
AST SERPL-CCNC: 16 U/L (ref 10–40)
BASOPHILS # BLD AUTO: 0.06 K/UL (ref 0–0.2)
BASOPHILS NFR BLD: 1 % (ref 0–1.9)
BILIRUB SERPL-MCNC: 0.4 MG/DL (ref 0.1–1)
BUN SERPL-MCNC: 11 MG/DL (ref 6–20)
CALCIUM SERPL-MCNC: 9.2 MG/DL (ref 8.7–10.5)
CHLORIDE SERPL-SCNC: 108 MMOL/L (ref 95–110)
CO2 SERPL-SCNC: 23 MMOL/L (ref 23–29)
CREAT SERPL-MCNC: 0.9 MG/DL (ref 0.5–1.4)
CRP SERPL-MCNC: 0.5 MG/L (ref 0–8.2)
DIFFERENTIAL METHOD BLD: ABNORMAL
EOSINOPHIL # BLD AUTO: 0.1 K/UL (ref 0–0.5)
EOSINOPHIL NFR BLD: 0.8 % (ref 0–8)
ERYTHROCYTE [DISTWIDTH] IN BLOOD BY AUTOMATED COUNT: 12.9 % (ref 11.5–14.5)
ERYTHROCYTE [SEDIMENTATION RATE] IN BLOOD BY WESTERGREN METHOD: 9 MM/HR (ref 0–20)
EST. GFR  (NO RACE VARIABLE): >60 ML/MIN/1.73 M^2
FERRITIN SERPL-MCNC: 58 NG/ML (ref 20–300)
GLUCOSE SERPL-MCNC: 92 MG/DL (ref 70–110)
HCT VFR BLD AUTO: 36.7 % (ref 37–48.5)
HGB BLD-MCNC: 12 G/DL (ref 12–16)
IMM GRANULOCYTES # BLD AUTO: 0.05 K/UL (ref 0–0.04)
IMM GRANULOCYTES NFR BLD AUTO: 0.8 % (ref 0–0.5)
IRON SERPL-MCNC: 102 UG/DL (ref 30–160)
LYMPHOCYTES # BLD AUTO: 1.3 K/UL (ref 1–4.8)
LYMPHOCYTES NFR BLD: 21.4 % (ref 18–48)
MCH RBC QN AUTO: 32.6 PG (ref 27–31)
MCHC RBC AUTO-ENTMCNC: 32.7 G/DL (ref 32–36)
MCV RBC AUTO: 100 FL (ref 82–98)
MONOCYTES # BLD AUTO: 0.4 K/UL (ref 0.3–1)
MONOCYTES NFR BLD: 6.6 % (ref 4–15)
NEUTROPHILS # BLD AUTO: 4.3 K/UL (ref 1.8–7.7)
NEUTROPHILS NFR BLD: 69.4 % (ref 38–73)
NRBC BLD-RTO: 0 /100 WBC
PLATELET # BLD AUTO: 339 K/UL (ref 150–450)
PMV BLD AUTO: 9.5 FL (ref 9.2–12.9)
POTASSIUM SERPL-SCNC: 4.1 MMOL/L (ref 3.5–5.1)
PROT SERPL-MCNC: 7.2 G/DL (ref 6–8.4)
RBC # BLD AUTO: 3.68 M/UL (ref 4–5.4)
SATURATED IRON: 29 % (ref 20–50)
SODIUM SERPL-SCNC: 140 MMOL/L (ref 136–145)
TOTAL IRON BINDING CAPACITY: 349 UG/DL (ref 250–450)
TRANSFERRIN SERPL-MCNC: 236 MG/DL (ref 200–375)
WBC # BLD AUTO: 6.18 K/UL (ref 3.9–12.7)

## 2024-04-09 PROCEDURE — 83520 IMMUNOASSAY QUANT NOS NONAB: CPT | Performed by: INTERNAL MEDICINE

## 2024-04-09 PROCEDURE — 86140 C-REACTIVE PROTEIN: CPT | Performed by: INTERNAL MEDICINE

## 2024-04-09 PROCEDURE — 83540 ASSAY OF IRON: CPT

## 2024-04-09 PROCEDURE — 85651 RBC SED RATE NONAUTOMATED: CPT | Performed by: INTERNAL MEDICINE

## 2024-04-09 PROCEDURE — 80053 COMPREHEN METABOLIC PANEL: CPT

## 2024-04-09 PROCEDURE — 82728 ASSAY OF FERRITIN: CPT

## 2024-04-09 PROCEDURE — 85025 COMPLETE CBC W/AUTO DIFF WBC: CPT

## 2024-04-14 LAB — SOL IL2 RECEP SERPL-MCNC: 330.7 PG/ML (ref 175.3–858.2)

## 2024-04-17 ENCOUNTER — OFFICE VISIT (OUTPATIENT)
Dept: HEMATOLOGY/ONCOLOGY | Facility: CLINIC | Age: 54
End: 2024-04-17
Payer: COMMERCIAL

## 2024-04-17 DIAGNOSIS — D86.2 SARCOIDOSIS OF LUNG WITH SARCOIDOSIS OF LYMPH NODES: ICD-10-CM

## 2024-04-17 DIAGNOSIS — D50.0 IRON DEFICIENCY ANEMIA DUE TO CHRONIC BLOOD LOSS: Primary | ICD-10-CM

## 2024-04-17 PROCEDURE — 99214 OFFICE O/P EST MOD 30 MIN: CPT | Mod: 95,,,

## 2024-04-17 NOTE — PROGRESS NOTES
Subjective:       Patient ID: Tammy Aguero is a 53 y.o. female.    Chief Complaint: iron def anemia    HPI: Ms. Aguero is a 53 year old female who is following up for her iron def anemia. She has had IV iron, feraheme, last one 10/10/23. She has hx of acute recurrent severe anemia with iron deficiency with ER presentation mid March 2023 status post 1 unit PRBC transfusion. Repeat EGD with nonbleeding gastric ulcer. She had repeat EGD 6/26/23 showing erosive gastropathy with no bleeding. Last colonoscopy 2/1/23 normal, recommended to repeat in 10 years. She has not taken oral iron previously.    Today:  She states she has decreased soda intake to one Dr. Pepper a day. She denies any increased fatigue, sob, bleeding, ha, fevers, weight loss, night sweats. She is dealing with her arthritis, stable. She has no menstrual cycles, has stopped for some time.     Social History     Socioeconomic History    Marital status: Single   Occupational History    Occupation: [email or web address], supervisor   Tobacco Use    Smoking status: Former     Types: Vaping with nicotine, Cigarettes     Start date: 1/1/2020     Passive exposure: Past    Smokeless tobacco: Never    Tobacco comments:     Pt states that she quit smoking cigarettes 1 year ago, and stopped using the vape 6 months ago.    Substance and Sexual Activity    Alcohol use: Not Currently     Comment: I drank when i was younger but can no longer take the smell    Drug use: Never     Types: Marijuana    Sexual activity: Not Currently     Partners: Male     Birth control/protection: See Surgical Hx, None     Social Determinants of Health     Financial Resource Strain: Low Risk  (12/7/2023)    Overall Financial Resource Strain (CARDIA)     Difficulty of Paying Living Expenses: Not very hard   Food Insecurity: No Food Insecurity (12/7/2023)    Hunger Vital Sign     Worried About Running Out of Food in the Last Year: Never true     Ran Out of Food in the Last Year: Never true    Transportation Needs: No Transportation Needs (12/7/2023)    PRAPARE - Transportation     Lack of Transportation (Medical): No     Lack of Transportation (Non-Medical): No   Physical Activity: Sufficiently Active (12/7/2023)    Exercise Vital Sign     Days of Exercise per Week: 5 days     Minutes of Exercise per Session: 120 min   Stress: Stress Concern Present (12/7/2023)    [employer] [place] [employer] - Occupational Stress Questionnaire     Feeling of Stress : Rather much   Social Connections: Unknown (12/7/2023)    Social Connection and Isolation Panel [NHANES]     Frequency of Communication with Friends and Family: Three times a week     Frequency of Social Gatherings with Friends and Family: Once a week     Active Member of Clubs or Organizations: No     Attends Club or Organization Meetings: Never     Marital Status:    Housing Stability: Low Risk  (12/7/2023)    Housing Stability Vital Sign     Unable to Pay for Housing in the Last Year: No     Number of Places Lived in the Last Year: 1     Unstable Housing in the Last Year: No       Past Medical History:   Diagnosis Date    RA (rheumatoid arthritis)        Family History   Problem Relation Name Age of Onset    Diabetes Sister Machelle Younger     Cancer Sister Machelle Younger     Breast cancer Sister Machelle Younger     Asthma Sister Machelle Younger     Diabetes Brother Melinda Gann     Breast cancer Other maternal great grandmoth     Alcohol abuse Mother Jeanette Grey     Asthma Mother Jeanette Grey     COPD Mother Jeanette Grey     Depression Mother Jeanette Grey     Heart disease Mother Jeanette Grey        Past Surgical History:   Procedure Laterality Date    COLONOSCOPY N/A 02/01/2023    Procedure: COLONOSCOPY;  Surgeon: Ángel Valdes MD;  Location: [facility];  Service: Gastroenterology;  Laterality: N/A;    ESOPHAGOGASTRODUODENOSCOPY N/A 02/01/2023    Procedure: EGD (ESOPHAGOGASTRODUODENOSCOPY);  Surgeon: Ángel Valdes MD;  Location:  [facility];  Service: Gastroenterology;  Laterality: N/A;    ESOPHAGOGASTRODUODENOSCOPY N/A 3/17/2023    Procedure: EGD (ESOPHAGOGASTRODUODENOSCOPY);  Surgeon: Ashley Stevenson MD;  Location: [facility];  Service: Endoscopy;  Laterality: N/A;    ESOPHAGOGASTRODUODENOSCOPY N/A 6/26/2023    Procedure: EGD (ESOPHAGOGASTRODUODENOSCOPY);  Surgeon: Ángel Valdes MD;  Location: [facility];  Service: Gastroenterology;  Laterality: N/A;    INTRALUMINAL GASTROINTESTINAL TRACT IMAGING VIA CAPSULE N/A 3/1/2023    Procedure: IMAGING PROCEDURE, GI TRACT, INTRALUMINAL, VIA CAPSULE;  Surgeon: First Available [place];  Location: [facility];  Service: Endoscopy;  Laterality: N/A;    Tubal Lig      TUBAL LIGATION         Review of Systems   Constitutional:  Negative for activity change, appetite change, chills, diaphoresis, fatigue, fever and unexpected weight change.   HENT:  Negative for congestion, nosebleeds and rhinorrhea.    Respiratory:  Negative for cough and shortness of breath.    Cardiovascular:  Negative for chest pain and leg swelling.   Gastrointestinal:  Negative for abdominal pain, anal bleeding, blood in stool, constipation, diarrhea, nausea and vomiting.   Genitourinary:  Negative for hematuria.   Musculoskeletal:  Positive for arthralgias.   Skin:  Negative for color change and pallor.         Medication List with Changes/Refills   Current Medications    ACETAMINOPHEN (TYLENOL) 650 MG TBSR    Take 650 mg by mouth every 8 (eight) hours.    DIPHENHYDRAMINE-ACETAMINOPHEN (TYLENOL PM)  MG TAB    Take 1 tablet by mouth nightly as needed.    OMEPRAZOLE (PRILOSEC) 40 MG CAPSULE    Take 1 capsule (40 mg total) by mouth once daily.    RINVOQ 15 MG 24 HR TABLET    TAKE 1 TABLET DAILY     Objective:     There were no vitals filed for this visit.      Physical Exam  Constitutional:       General: She is not in acute distress.     Appearance: She is not ill-appearing, toxic-appearing or diaphoretic.   Neurological:       Mental Status: She is alert.   Psychiatric:         Mood and Affect: Mood normal.          Physical exam limited due to video visit    Labs/Results:  Lab Results   Component Value Date    WBC 6.18 04/09/2024    RBC 3.68 (L) 04/09/2024    HGB 12.0 04/09/2024    HCT 36.7 (L) 04/09/2024     (H) 04/09/2024    MCH 32.6 (H) 04/09/2024    MCHC 32.7 04/09/2024    RDW 12.9 04/09/2024     04/09/2024    MPV 9.5 04/09/2024    GRAN 4.3 04/09/2024    GRAN 69.4 04/09/2024    LYMPH 1.3 04/09/2024    LYMPH 21.4 04/09/2024    MONO 0.4 04/09/2024    MONO 6.6 04/09/2024    EOS 0.1 04/09/2024    BASO 0.06 04/09/2024    EOSINOPHIL 0.8 04/09/2024    BASOPHIL 1.0 04/09/2024      Latest Reference Range & Units 04/09/24 10:11   Iron 30 - 160 ug/dL 102   TIBC 250 - 450 ug/dL 349   Saturated Iron 20 - 50 % 29   Transferrin 200 - 375 mg/dL 236   Ferritin 20.0 - 300.0 ng/mL 58   Sed Rate 0 - 20 mm/Hr 9     CMP  Sodium   Date Value Ref Range Status   04/09/2024 140 136 - 145 mmol/L Final     Potassium   Date Value Ref Range Status   04/09/2024 4.1 3.5 - 5.1 mmol/L Final     Chloride   Date Value Ref Range Status   04/09/2024 108 95 - 110 mmol/L Final     CO2   Date Value Ref Range Status   04/09/2024 23 23 - 29 mmol/L Final     Glucose   Date Value Ref Range Status   04/09/2024 92 70 - 110 mg/dL Final     BUN   Date Value Ref Range Status   04/09/2024 11 6 - 20 mg/dL Final     Creatinine   Date Value Ref Range Status   04/09/2024 0.9 0.5 - 1.4 mg/dL Final     Calcium   Date Value Ref Range Status   04/09/2024 9.2 8.7 - 10.5 mg/dL Final     Total Protein   Date Value Ref Range Status   04/09/2024 7.2 6.0 - 8.4 g/dL Final     Albumin   Date Value Ref Range Status   04/09/2024 4.1 3.5 - 5.2 g/dL Final     Total Bilirubin   Date Value Ref Range Status   04/09/2024 0.4 0.1 - 1.0 mg/dL Final     Comment:     For infants and newborns, interpretation of results should be based  on gestational age, weight and in agreement with  clinical  observations.    Premature Infant recommended reference ranges:  Up to 24 hours.............<8.0 mg/dL  Up to 48 hours............<12.0 mg/dL  3-5 days..................<15.0 mg/dL  6-29 days.................<15.0 mg/dL       Alkaline Phosphatase   Date Value Ref Range Status   04/09/2024 70 55 - 135 U/L Final     AST   Date Value Ref Range Status   04/09/2024 16 10 - 40 U/L Final     ALT   Date Value Ref Range Status   04/09/2024 14 10 - 44 U/L Final     Anion Gap   Date Value Ref Range Status   04/09/2024 9 8 - 16 mmol/L Final     eGFR   Date Value Ref Range Status   04/09/2024 >60 >60 mL/min/1.73 m^2 Final     CT chest 3/29/23  Impression:  1. Pulmonary nodules are again noted in both lungs.  Some of these nodules have benign appearing central calcification. There has been no detrimental interval change in the size or appearance of these pulmonary nodules.  2. There is an 8 mm mass in the lateral limb of the left adrenal.  If additional imaging evaluation is clinically indicated, I recommend consideration of an MRI examination of the adrenals without and with IV contrast.    Assessment:     Problem List Items Addressed This Visit          Pulmonary    Sarcoidosis of lung with sarcoidosis of lymph nodes       Oncology    Iron deficiency anemia due to chronic blood loss - Primary    Relevant Orders    CBC Auto Differential    Comprehensive Metabolic Panel    Ferritin    Iron and TIBC     Plan:     Pulmonary nodules  --followed by pulmonology  --needs repeat CT chest    Iron deficiency anemia due to chronic blood loss  --continue to follow with GI. Avoid NSAIDs  --Last colonoscopy 2/1/23 normal, recommended to repeat in 10 years.  --She had repeat EGD 6/26/23 showing erosive gastropathy with no bleeding.  --last IV iron feraheme, 10/10/23  --hgb: 12.0g/dL  --iron: 102, sat: 29%, ferritin: 58-iron repleted    Follow-Up: 6 months with cbc iron/tibc ferritin prior    Mei Ponce PA-C  Hematology  Oncology    Route Chart for Scheduling    Med Onc Chart Routing      Follow up with physician    Follow up with WILFRIDO . 6 months with cbc cmp iron/tibc ferritin prior--VV ok   Infusion scheduling note    Injection scheduling note    Labs CMP, ferritin, iron and TIBC and CBC   Scheduling:  Preferred lab:  Lab interval:     Imaging    Pharmacy appointment    Other referrals                    Supportive Plan Information  OP FERUMOXYTOL   Eve Arevalo MD   Upcoming Treatment Dates - OP FERUMOXYTOL    No upcoming days in selected categories.    The patient location is: home  The chief complaint leading to consultation is: anemia  Visit type:  Synchronous audio video  Face to Face time with patient: 15 minutes of total time spent on the encounter, which includes face to face time and non-face to face time preparing to see the patient (eg, review of tests), Obtaining and/or reviewing separately obtained history, Documenting clinical information in the electronic or other health record, Independently interpreting results (not separately reported) and communicating results to the patient/family/caregiver, or Care coordination (not separately reported).   Each patient to whom he or she provides medical services by telemedicine is:  (1) informed of the relationship between the provider and patient and the respective role of any other health care provider with respect to management of the patient; and (2) notified that he or she may decline to receive medical services

## 2024-04-19 ENCOUNTER — TELEPHONE (OUTPATIENT)
Dept: PULMONOLOGY | Facility: CLINIC | Age: 54
End: 2024-04-19
Payer: COMMERCIAL

## 2024-04-19 DIAGNOSIS — R06.02 SOB (SHORTNESS OF BREATH): Primary | ICD-10-CM

## 2024-04-26 ENCOUNTER — PATIENT MESSAGE (OUTPATIENT)
Dept: PULMONOLOGY | Facility: CLINIC | Age: 54
End: 2024-04-26

## 2024-04-26 ENCOUNTER — HOSPITAL ENCOUNTER (OUTPATIENT)
Dept: RADIOLOGY | Facility: HOSPITAL | Age: 54
Discharge: HOME | End: 2024-04-26
Attending: INTERNAL MEDICINE
Payer: COMMERCIAL

## 2024-04-26 ENCOUNTER — OFFICE VISIT (OUTPATIENT)
Dept: PULMONOLOGY | Facility: CLINIC | Age: 54
End: 2024-04-26
Payer: COMMERCIAL

## 2024-04-26 VITALS
DIASTOLIC BLOOD PRESSURE: 78 MMHG | WEIGHT: 158.19 LBS | SYSTOLIC BLOOD PRESSURE: 110 MMHG | BODY MASS INDEX: 24.83 KG/M2 | HEIGHT: 67 IN | RESPIRATION RATE: 17 BRPM | HEART RATE: 92 BPM | OXYGEN SATURATION: 97 %

## 2024-04-26 DIAGNOSIS — J84.9 INTERSTITIAL PULMONARY DISEASE, UNSPECIFIED: ICD-10-CM

## 2024-04-26 DIAGNOSIS — M05.79 RHEUMATOID ARTHRITIS INVOLVING MULTIPLE SITES WITH POSITIVE RHEUMATOID FACTOR: ICD-10-CM

## 2024-04-26 DIAGNOSIS — R06.02 SOB (SHORTNESS OF BREATH): ICD-10-CM

## 2024-04-26 DIAGNOSIS — R91.8 PULMONARY NODULES: ICD-10-CM

## 2024-04-26 DIAGNOSIS — D86.2 SARCOIDOSIS OF LUNG WITH SARCOIDOSIS OF LYMPH NODES: Primary | ICD-10-CM

## 2024-04-26 PROCEDURE — 1159F MED LIST DOCD IN RCRD: CPT | Mod: CPTII,S$GLB,, | Performed by: INTERNAL MEDICINE

## 2024-04-26 PROCEDURE — 99999 PR PBB SHADOW E&M-EST. PATIENT-LVL III: CPT | Mod: PBBFAC,,, | Performed by: INTERNAL MEDICINE

## 2024-04-26 PROCEDURE — 71046 X-RAY EXAM CHEST 2 VIEWS: CPT | Mod: 26,,, | Performed by: RADIOLOGY

## 2024-04-26 PROCEDURE — 3078F DIAST BP <80 MM HG: CPT | Mod: CPTII,S$GLB,, | Performed by: INTERNAL MEDICINE

## 2024-04-26 PROCEDURE — 71046 X-RAY EXAM CHEST 2 VIEWS: CPT | Mod: TC

## 2024-04-26 PROCEDURE — 99214 OFFICE O/P EST MOD 30 MIN: CPT | Mod: S$GLB,,, | Performed by: INTERNAL MEDICINE

## 2024-04-26 PROCEDURE — 3008F BODY MASS INDEX DOCD: CPT | Mod: CPTII,S$GLB,, | Performed by: INTERNAL MEDICINE

## 2024-04-26 PROCEDURE — 3074F SYST BP LT 130 MM HG: CPT | Mod: CPTII,S$GLB,, | Performed by: INTERNAL MEDICINE

## 2024-04-26 NOTE — PROGRESS NOTES
Subjective:     Patient ID: Tammy Aguero is a 53 y.o. female.    Chief Complaint:      HPI 54 y/o with RA     Dyspnea  Patient complains of shortness of breath. Symptoms occur with one block walking.   Lung Nodule  She presents for evaluation and treatment of a lung nodule. The patient reports that the imaging was performed to evaluate symptoms of dyspnea on exertion and shortness of breath which have been present for 4 months and are gradually worsening. Symptoms are exacerbated by exercise and relieved by rest. The patient denies other associated symptoms. She has a history of 32 pack years of smoking.  The patient has a positive PPD. Untreated in 1998 . Recent TB gold is negative. The patient does not have a history of cancer. History of RA - no flare ups in over a year    Past Medical History:   Diagnosis Date    RA (rheumatoid arthritis)      Past Surgical History:   Procedure Laterality Date    COLONOSCOPY N/A 02/01/2023    Procedure: COLONOSCOPY;  Surgeon: Ángel Valdes MD;  Location: [facility];  Service: Gastroenterology;  Laterality: N/A;    ESOPHAGOGASTRODUODENOSCOPY N/A 02/01/2023    Procedure: EGD (ESOPHAGOGASTRODUODENOSCOPY);  Surgeon: Ángel Valdes MD;  Location: [facility];  Service: Gastroenterology;  Laterality: N/A;    ESOPHAGOGASTRODUODENOSCOPY N/A 3/17/2023    Procedure: EGD (ESOPHAGOGASTRODUODENOSCOPY);  Surgeon: Ashley Stevenson MD;  Location: [facility];  Service: Endoscopy;  Laterality: N/A;    ESOPHAGOGASTRODUODENOSCOPY N/A 6/26/2023    Procedure: EGD (ESOPHAGOGASTRODUODENOSCOPY);  Surgeon: Ángel Valdes MD;  Location: [facility];  Service: Gastroenterology;  Laterality: N/A;    INTRALUMINAL GASTROINTESTINAL TRACT IMAGING VIA CAPSULE N/A 3/1/2023    Procedure: IMAGING PROCEDURE, GI TRACT, INTRALUMINAL, VIA CAPSULE;  Surgeon: First Available Rena Lara;  Location: [facility];  Service: Endoscopy;  Laterality: N/A;    Tubal Lig      TUBAL LIGATION       Review of patient's  allergies indicates:   Allergen Reactions    Nsaids (non-steroidal anti-inflammatory drug) Other (See Comments)     Gastric ulcer     Current Outpatient Medications on File Prior to Visit   Medication Sig Dispense Refill    acetaminophen (TYLENOL) 650 MG TbSR Take 650 mg by mouth every 8 (eight) hours.      diphenhydrAMINE-acetaminophen (TYLENOL PM)  mg Tab Take 1 tablet by mouth nightly as needed.      omeprazole (PRILOSEC) 40 MG capsule Take 1 capsule (40 mg total) by mouth once daily. 30 capsule 5    RINVOQ 15 mg 24 hr tablet TAKE 1 TABLET DAILY 30 tablet 11     No current facility-administered medications on file prior to visit.     Social History     Socioeconomic History    Marital status: Single   Occupational History    Occupation: [employer], supervisor   Tobacco Use    Smoking status: Former     Types: Vaping with nicotine, Cigarettes     Start date: 1/1/2020     Passive exposure: Past    Smokeless tobacco: Never    Tobacco comments:     Pt states that she quit smoking cigarettes 1 year ago, and stopped using the vape 6 months ago.    Substance and Sexual Activity    Alcohol use: Not Currently     Comment: I drank when i was younger but can no longer take the smell    Drug use: Never     Types: Marijuana    Sexual activity: Not Currently     Partners: Male     Birth control/protection: See Surgical Hx, None     Social Determinants of Health     Financial Resource Strain: Low Risk  (12/7/2023)    Overall Financial Resource Strain (CARDIA)     Difficulty of Paying Living Expenses: Not very hard   Food Insecurity: No Food Insecurity (12/7/2023)    Hunger Vital Sign     Worried About Running Out of Food in the Last Year: Never true     Ran Out of Food in the Last Year: Never true   Transportation Needs: No Transportation Needs (12/7/2023)    PRAPARE - Transportation     Lack of Transportation (Medical): No     Lack of Transportation (Non-Medical): No   Physical Activity: Sufficiently Active (12/7/2023)     "Exercise Vital Sign     Days of Exercise per Week: 5 days     Minutes of Exercise per Session: 120 min   Stress: Stress Concern Present (12/7/2023)    [employer] - Occupational Stress Questionnaire     Feeling of Stress : Rather much   Housing Stability: Low Risk  (12/7/2023)    Housing Stability Vital Sign     Unable to Pay for Housing in the Last Year: No     Number of Places Lived in the Last Year: 1     Unstable Housing in the Last Year: No     Family History   Problem Relation Name Age of Onset    Diabetes Sister Machelle Younger     Cancer Sister Machelle Younger     Breast cancer Sister Machelle Younger     Asthma Sister Machelle Younger     Diabetes Brother Melinda Gann     Breast cancer Other maternal great grandmoth     Alcohol abuse Mother Jeanette Grey     Asthma Mother Jeanette Grey     COPD Mother Jeanette Grey     Depression Mother Jeanette Grey     Heart disease Mother Jeanetet Grey        Review of Systems   Constitutional:  Positive for fatigue.   HENT:  Positive for postnasal drip.    Respiratory:  Positive for cough, shortness of breath, dyspnea on extertion and use of rescue inhaler.    Musculoskeletal:  Positive for arthralgias.       Objective:      /78   Pulse 92   Resp 17   Ht 5' 7" (1.702 m)   Wt 71.7 kg (158 lb 2.9 oz)   LMP 12/01/2020 (Approximate)   SpO2 97%   BMI 24.77 kg/m²   Physical Exam  Vitals and nursing note reviewed.   Constitutional:       Appearance: Normal appearance. She is well-developed.   HENT:      Head: Normocephalic and atraumatic.      Nose: Nose normal.   Eyes:      Conjunctiva/sclera: Conjunctivae normal.      Pupils: Pupils are equal, round, and reactive to light.   Neck:      Thyroid: No thyromegaly.      Vascular: No JVD.      Trachea: No tracheal deviation.   Cardiovascular:      Rate and Rhythm: Normal rate and regular rhythm.      Heart sounds: Normal heart sounds.   Pulmonary:      Effort: Pulmonary effort is normal. No respiratory " "distress.      Breath sounds: Wheezing and rales present.   Chest:      Chest wall: No tenderness.   Abdominal:      General: Bowel sounds are normal.      Palpations: Abdomen is soft.   Musculoskeletal:         General: Normal range of motion.      Cervical back: Neck supple.   Lymphadenopathy:      Cervical: No cervical adenopathy.   Skin:     General: Skin is warm and dry.   Neurological:      Mental Status: She is alert and oriented to person, place, and time.       Personal Diagnostic Review  Chest x-ray: stable         4/26/2024     9:08 AM   Pulmonary Studies Review   SpO2 97 %   Height 5' 7" (1.702 m)   Weight 71.7 kg (158 lb 2.9 oz)   BMI (Calculated) 24.8   Predicted Distance 425.92   Predicted Distance Meters (Calculated) 567.03 meters       X-Ray Chest PA And Lateral  Narrative: EXAMINATION:  XR CHEST PA AND LATERAL    CLINICAL HISTORY:  SOB; Shortness of breath    TECHNIQUE:  PA and lateral views of the chest were performed.    COMPARISON:  03/16/2023    FINDINGS:  No infiltrates or effusions.  There appear to be some vague scattered pulmonary nodules within either lung particularly within the mid to upper lung zones bilaterally.  The overall appearance is similar to the prior exam.  The heart is not enlarged.  Impression: As above    Electronically signed by: Ventura Bolanos DO  Date:    04/26/2024  Time:    08:29      Office Spirometry Results:         4/26/2024     9:08 AM 2/23/2024    11:32 AM 12/11/2023     2:52 PM 10/10/2023     7:45 AM 10/10/2023     6:46 AM 10/3/2023     2:17 PM 10/3/2023     1:00 PM   Pulmonary Function Tests   SpO2 97 % 96 % 98 % 100 % 100 % 100 % 100 %   Height 5' 7" (1.702 m) 5' 7" (1.702 m) 5' 7" (1.702 m)       Weight 71.7 kg (158 lb 2.9 oz) 72.2 kg (159 lb 2.8 oz) 73.9 kg (162 lb 13 oz)       BMI (Calculated) 24.8 24.9 25.5             4/26/2024     9:08 AM   Pulmonary Studies Review   SpO2 97 %   Height 5' 7" (1.702 m)   Weight 71.7 kg (158 lb 2.9 oz)   BMI (Calculated) " 24.8   Predicted Distance 425.92   Predicted Distance Meters (Calculated) 567.03 meters           No results found for this or any previous visit (from the past 336 hour(s)).    Assessment:            Sarcoidosis of lung with sarcoidosis of lymph nodes  -     Complete PFT with bronchodilator; Future; Expected date: 04/26/2024  -     CT Chest Without Contrast; Future; Expected date: 04/26/2024  -     Angiotensin Converting Enzyme; Future; Expected date: 04/26/2024  -     INTERLEUKIN-2 RECEPTOR; Future; Expected date: 04/26/2024    Rheumatoid arthritis involving multiple sites with positive rheumatoid factor    SOB (shortness of breath)    Pulmonary nodules    Interstitial pulmonary disease, unspecified  -     CT Chest Without Contrast; Future; Expected date: 04/26/2024          Outpatient Encounter Medications as of 4/26/2024   Medication Sig Dispense Refill    acetaminophen (TYLENOL) 650 MG TbSR Take 650 mg by mouth every 8 (eight) hours.      diphenhydrAMINE-acetaminophen (TYLENOL PM)  mg Tab Take 1 tablet by mouth nightly as needed.      omeprazole (PRILOSEC) 40 MG capsule Take 1 capsule (40 mg total) by mouth once daily. 30 capsule 5    RINVOQ 15 mg 24 hr tablet TAKE 1 TABLET DAILY 30 tablet 11     No facility-administered encounter medications on file as of 4/26/2024.     Plan:       Requested Prescriptions      No prescriptions requested or ordered in this encounter     Problem List Items Addressed This Visit       Pulmonary nodules    Overview     11/30/2022 CT chest MA multiple bilateral pulmonary nodules, 1 of which is cavitary in the right lower lobe.  The right lower lobe pulmonary nodule which is cavitary it measures 1.2 x 1.1 cm trans axially (series 5, image 312) the largest nodule is in the left lower lobe and involves the pleura and measures 1.7 by 1.4 cm (series 5, image 295).  No mediastinal or hilar lymphadenopathy.  No axillary lymphadenopathy.  No acute findings in the upper abdomen.  PET  "CT chest reviewed - see [email or web address]  CT guided lung bx - rescheduled as per radiology           Rheumatoid arthritis involving multiple sites with positive rheumatoid factor    Sarcoidosis of lung with sarcoidosis of lymph nodes - Primary    Overview     Lung Biopsy: LUNG, LEFT, "NODULE", CT-GUIDED BIOPSY:   - Focally necrotizing granulomatous inflammation   - Adjacent alveolated lung parenchyma with fibroelastotic changes and chronic   (lymphoplasmacytic) inflammation   - No evidence of acid-fast organisms or fungal elements on AFB and GMS   cytochemical stains, respectively   - No evidence of malignancy     From 12/20/2022 Normal Pulmonary Function Studies:Spirometry is normal. Spirometry remains unimproved following bronchodilator. Lung volume determination shows TLC is normal with evidence air trapping is present. Airway mechanics show normal airway resistance and conductance. DLCO is normal. MVV is normal.          Relevant Orders    Complete PFT with bronchodilator    CT Chest Without Contrast    Angiotensin Converting Enzyme    INTERLEUKIN-2 RECEPTOR    SOB (shortness of breath)    Overview     Severe shortness of breath with activity x 6-7 months  11/30/2022 cavitary lung lesion, pending PET CT chest and bx.  Pending CPFT next week  Former 32 pack year smoker. Quit 2020 12/9/2022 6MWD O2 sats 100% during walk, No desaturations requiring supplemental oxygen at rest or exertion.  12/9/2022 FeNO 13, no inflammation of lung suggested  No wheezing, no cough, no mucous.   Chest xray 12/9/2022 no acute findings, prior pul nodule unchanged.  Suspect anxiety about medical condition in part.   Keep follow up with Dr. Quarles for continued work up and CPFT evaluate for COPD in smoker.  No indication to add on ICS.  Continue Albuterol inhaler if needed  Has franciscock and pred per dr quarles began yesterday okay to complete                Other Visit Diagnoses       Interstitial pulmonary disease, unspecified        Relevant Orders "    CT Chest Without Contrast               Follow up in about 1 year (around 4/26/2025) for PFT -return, CT - on return visit.    MEDICAL DECISION MAKING: Moderate to high complexity.  Overall, the multiple problems listed are of moderate to high severity that may impact quality of life and activities of daily living. Side effects of medications, treatment plan as well as options and alternatives reviewed and discussed with patient. There was counseling of patient concerning these issues.    Total time spent in counseling and coordination of care - 35  minutes of total time spent on the encounter, which includes face to face time and non-face to face time preparing to see the patient (eg, review of tests), Obtaining and/or reviewing separately obtained history, Documenting clinical information in the electronic or other health record, Independently interpreting results (not separately reported) and communicating results to the patient/family/caregiver, or Care coordination (not separately reported).    Time was used in discussion of prognosis, risks, benefits of treatment, instructions and compliance with regimen . Discussion with other physicians and/or health care providers - home health or for use of durable medical equipment (oxygen, nebulizers, CPAP, BiPAP) occurred.

## 2024-06-05 ENCOUNTER — PATIENT OUTREACH (OUTPATIENT)
Dept: ADMINISTRATIVE | Facility: HOSPITAL | Age: 54
End: 2024-06-05
Payer: COMMERCIAL

## 2024-06-05 NOTE — PROGRESS NOTES
VBHM Score: 2     Cervical Cancer Screening  Mammogram             Health Maintenance Topic(s) Outreach Outcomes & Actions Taken:    Cervical Cancer Screening - Outreach Outcomes & Actions Taken  : patient has appt on 7/18/24 with PCP for well woman exam    Breast Cancer Screening - Outreach Outcomes & Actions Taken  : attempted to contact the patient to discuss/schedule mammogram, no answer, no voicemail       Additional Notes:  Patient has appointment with PCP on 7/18/24            Care Management, Digital Medicine, and/or Education Referrals    OPCM Risk Score: 15.6         Next Steps - Referral Actions: no referrals        Additional Notes:  [facility] reviewed 12/2023

## 2024-07-18 ENCOUNTER — PATIENT MESSAGE (OUTPATIENT)
Dept: INTERNAL MEDICINE | Facility: CLINIC | Age: 54
End: 2024-07-18
Payer: COMMERCIAL

## 2024-07-18 ENCOUNTER — HOSPITAL ENCOUNTER (OUTPATIENT)
Dept: RADIOLOGY | Facility: HOSPITAL | Age: 54
Discharge: HOME | End: 2024-07-18
Attending: FAMILY MEDICINE
Payer: COMMERCIAL

## 2024-07-18 DIAGNOSIS — Z12.31 SCREENING MAMMOGRAM, ENCOUNTER FOR: ICD-10-CM

## 2024-07-18 PROCEDURE — 77063 BREAST TOMOSYNTHESIS BI: CPT | Mod: TC

## 2024-07-18 PROCEDURE — 77063 BREAST TOMOSYNTHESIS BI: CPT | Mod: 26,,, | Performed by: RADIOLOGY

## 2024-07-18 PROCEDURE — 77067 SCR MAMMO BI INCL CAD: CPT | Mod: 26,,, | Performed by: RADIOLOGY

## 2024-08-01 ENCOUNTER — OFFICE VISIT (OUTPATIENT)
Dept: INTERNAL MEDICINE | Facility: CLINIC | Age: 54
End: 2024-08-01
Payer: COMMERCIAL

## 2024-08-01 VITALS
DIASTOLIC BLOOD PRESSURE: 82 MMHG | TEMPERATURE: 98 F | BODY MASS INDEX: 26.64 KG/M2 | HEIGHT: 67 IN | OXYGEN SATURATION: 99 % | SYSTOLIC BLOOD PRESSURE: 110 MMHG | WEIGHT: 169.75 LBS | HEART RATE: 67 BPM

## 2024-08-01 DIAGNOSIS — D17.5 LIPOMA OF INTRA-ABDOMINAL ORGANS: ICD-10-CM

## 2024-08-01 DIAGNOSIS — Z01.419 WELL WOMAN EXAM WITH ROUTINE GYNECOLOGICAL EXAM: Primary | ICD-10-CM

## 2024-08-01 PROCEDURE — 3079F DIAST BP 80-89 MM HG: CPT | Mod: CPTII,S$GLB,, | Performed by: FAMILY MEDICINE

## 2024-08-01 PROCEDURE — 3008F BODY MASS INDEX DOCD: CPT | Mod: CPTII,S$GLB,, | Performed by: FAMILY MEDICINE

## 2024-08-01 PROCEDURE — 99396 PREV VISIT EST AGE 40-64: CPT | Mod: S$GLB,,, | Performed by: FAMILY MEDICINE

## 2024-08-01 PROCEDURE — 99999 PR PBB SHADOW E&M-EST. PATIENT-LVL III: CPT | Mod: PBBFAC,,, | Performed by: FAMILY MEDICINE

## 2024-08-01 PROCEDURE — 3074F SYST BP LT 130 MM HG: CPT | Mod: CPTII,S$GLB,, | Performed by: FAMILY MEDICINE

## 2024-08-01 PROCEDURE — 1159F MED LIST DOCD IN RCRD: CPT | Mod: CPTII,S$GLB,, | Performed by: FAMILY MEDICINE

## 2024-08-01 NOTE — PROGRESS NOTES
"Tammy Aguero  08/01/2024  [id number]    Maria Teresa Aparicio MD  Patient Care Team:  Maria Teresa Aparicio MD as PCP - General (Family Medicine)          Visit Type:a scheduled routine follow-up visit    Chief Complaint:  Chief Complaint   Patient presents with    Well Woman       History of Present Illness:  53 year old  Here for annual pap  She had not had abnl pap.  She stopped menstruating.    MMG done 7/18    History of JACKELYN  Had to have 1 unit of blood  EDG with erosive Gastropathy.  Lab Results   Component Value Date    WBC 6.18 04/09/2024    HGB 12.0 04/09/2024    HCT 36.7 (L) 04/09/2024     (H) 04/09/2024     04/09/2024         History of RA/Sarcoid of lung  Followed with Pulm and Rheum  On Rinvoq  Seropositive rheumatoid and biopsy-proven sarcoid on Rinvoq since June 2023. Failed Humira and methotrexate.   Sarcoidosis of lung with sarcoidosis of lymph nodes      Overview       Lung Biopsy: LUNG, LEFT, "NODULE", CT-GUIDED BIOPSY:   - Focally necrotizing granulomatous inflammation   - Adjacent alveolated lung parenchyma with fibroelastotic changes and chronic   (lymphoplasmacytic) inflammation   - No evidence of acid-fast organisms or fungal elements on AFB and GMS   cytochemical stains, respectively   - No evidence of malignancy      From 12/20/2022 Normal Pulmonary Function Studies:Spirometry is normal. Spirometry remains unimproved following bronchodilator. Lung volume determination shows TLC is normal with evidence air trapping is present. Airway mechanics show normal airway resistance and conductance. DLCO is normal. MVV is normal.             History:  Past Medical History:   Diagnosis Date    RA (rheumatoid arthritis)      Past Surgical History:   Procedure Laterality Date    COLONOSCOPY N/A 02/01/2023    Procedure: COLONOSCOPY;  Surgeon: Ángel Valdes MD;  Location: [facility];  Service: Gastroenterology;  Laterality: N/A;    ESOPHAGOGASTRODUODENOSCOPY N/A 02/01/2023    Procedure: EGD " (ESOPHAGOGASTRODUODENOSCOPY);  Surgeon: Ángel Valdes MD;  Location: [facility];  Service: Gastroenterology;  Laterality: N/A;    ESOPHAGOGASTRODUODENOSCOPY N/A 3/17/2023    Procedure: EGD (ESOPHAGOGASTRODUODENOSCOPY);  Surgeon: Ashley Stevenson MD;  Location: [facility];  Service: Endoscopy;  Laterality: N/A;    ESOPHAGOGASTRODUODENOSCOPY N/A 6/26/2023    Procedure: EGD (ESOPHAGOGASTRODUODENOSCOPY);  Surgeon: Ángel Valdes MD;  Location: [facility] END[facility];  Service: Gastroenterology;  Laterality: N/A;    INTRALUMINAL GASTROINTESTINAL TRACT IMAGING VIA CAPSULE N/A 3/1/2023    Procedure: IMAGING PROCEDURE, GI TRACT, INTRALUMINAL, VIA CAPSULE;  Surgeon: First Available [place];  Location: [facility];  Service: Endoscopy;  Laterality: N/A;    Tubal Lig      TUBAL LIGATION       Family History   Problem Relation Name Age of Onset    Diabetes Sister Machelle Younger     Cancer Sister Machelle Younger     Breast cancer Sister Machelle Younger     Asthma Sister Machelle Younger     Diabetes Brother Melinda Gann     Breast cancer Other maternal great grandmoth     Alcohol abuse Mother Jeanette Grey     Asthma Mother Jeanette Grey     COPD Mother Jeanette Grey     Depression Mother Jeanette Grey     Heart disease Mother Jeanette Grey      Social History     Socioeconomic History    Marital status: Single   Occupational History    Occupation: [employer], supervisor   Tobacco Use    Smoking status: Former     Types: Vaping with nicotine, Cigarettes     Start date: 1/1/2020     Passive exposure: Past    Smokeless tobacco: Never    Tobacco comments:     Pt states that she quit smoking cigarettes 1 year ago, and stopped using the vape 6 months ago.    Substance and Sexual Activity    Alcohol use: Not Currently     Comment: I drank when i was younger but can no longer take the smell    Drug use: Never     Types: Marijuana    Sexual activity: Not Currently     Partners: Male     Birth control/protection: See Surgical Hx, None     Social Determinants  of Health     Financial Resource Strain: Low Risk  (12/7/2023)    Overall Financial Resource Strain (CARDIA)     Difficulty of Paying Living Expenses: Not very hard   Food Insecurity: No Food Insecurity (12/7/2023)    Hunger Vital Sign     Worried About Running Out of Food in the Last Year: Never true     Ran Out of Food in the Last Year: Never true   Transportation Needs: No Transportation Needs (12/7/2023)    PRAPARE - Transportation     Lack of Transportation (Medical): No     Lack of Transportation (Non-Medical): No   Physical Activity: Sufficiently Active (12/7/2023)    Exercise Vital Sign     Days of Exercise per Week: 5 days     Minutes of Exercise per Session: 120 min   Stress: Stress Concern Present (12/7/2023)    [employer] - Occupational Stress Questionnaire     Feeling of Stress : Rather much   Housing Stability: Low Risk  (12/7/2023)    Housing Stability Vital Sign     Unable to Pay for Housing in the Last Year: No     Number of Places Lived in the Last Year: 1     Unstable Housing in the Last Year: No     Patient Active Problem List   Diagnosis    Rheumatoid arthritis involving multiple sites with positive rheumatoid factor    Nicotine dependence, cigarettes, in remission    High risk medication use    Muscle pain    Positive skin test for tuberculosis    Pulmonary nodules    SOB (shortness of breath)    Iron deficiency anemia    Iron deficiency anemia due to chronic blood loss    Sarcoidosis of lung with sarcoidosis of lymph nodes    Encounter for medication monitoring    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    Medications:  Current Outpatient Medications on File Prior to Visit   Medication Sig Dispense Refill    acetaminophen (TYLENOL) 650 MG TbSR Take 650 mg by mouth every 8 (eight) hours.      diphenhydrAMINE-acetaminophen (TYLENOL PM)  mg Tab Take 1 tablet by mouth nightly as needed.      omeprazole (PRILOSEC) 40 MG capsule Take 1 capsule (40 mg total) by mouth once daily. 30 capsule 5    RINVOQ 15 mg 24 hr tablet TAKE 1 TABLET DAILY 30 tablet 11     No current facility-administered medications on file prior to visit.       Medications have been reviewed and reconciled with patient at this visit.  Barriers to medications reviewed with patient.    Adverse reactions to current medications reviewed with patient..    Over the counter medications reviewed and reconciled with patient.    Exam:  Wt Readings from Last 3 Encounters:   08/01/24 77 kg (169 lb 12.1 oz)   04/26/24 71.7 kg (158 lb 2.9 oz)   02/23/24 72.2 kg (159 lb 2.8 oz)     Temp Readings from Last 3 Encounters:   08/01/24 97.5 °F (36.4 °C) (Tympanic)   02/23/24 97.6 °F (36.4 °C) (Tympanic)   12/11/23 97.6 °F (36.4 °C) (Temporal)     BP Readings from Last 3 Encounters:   08/01/24 110/82   04/26/24 110/78   02/23/24 106/76     Pulse Readings from Last 3 Encounters:   08/01/24 67   04/26/24 92   02/23/24 79     Body mass index is 26.59 kg/m².      Review of Systems   Constitutional: Negative.  Negative for chills and fever.   HENT: Negative.  Negative for congestion, sinus pain and sore throat.    Eyes:  Negative for blurred vision and double vision.   Respiratory:  Negative for cough, sputum production, shortness of breath and wheezing.    Cardiovascular:  Negative for chest pain, palpitations and leg swelling.   Gastrointestinal:  Negative for abdominal pain, constipation, diarrhea, heartburn, nausea and vomiting.   Genitourinary: Negative.    Musculoskeletal: Negative.    Skin: Negative.  Negative for rash.    Neurological: Negative.    Endo/Heme/Allergies: Negative.  Negative for polydipsia. Does not bruise/bleed easily.   Psychiatric/Behavioral:  Negative for depression and substance abuse.      Physical Exam  Nursing note reviewed.   Pulmonary:      Effort: Pulmonary effort is normal. No respiratory distress.   Neurological:      Mental Status: She is alert and oriented to person, place, and time.   Psychiatric:         Mood and Affect: Mood normal.         Behavior: Behavior normal.         Thought Content: Thought content normal.         Judgment: Judgment normal.         Laboratory Reviewed ({Yes)  Lab Results   Component Value Date    WBC 6.18 04/09/2024    HGB 12.0 04/09/2024    HCT 36.7 (L) 04/09/2024     04/09/2024    CHOL 134 04/08/2019    TRIG 82 04/08/2019    HDL 45 04/08/2019    ALT 14 04/09/2024    AST 16 04/09/2024     04/09/2024    K 4.1 04/09/2024     04/09/2024    CREATININE 0.9 04/09/2024    BUN 11 04/09/2024    CO2 23 04/09/2024    INR 0.9 01/11/2023       Tammy was seen today for well woman.    Diagnoses and all orders for this visit:    Well woman exam with routine gynecological exam  -     Liquid-Based Pap Smear, Screening  -     HPV High Risk Genotypes, PCR  -     Comprehensive Metabolic Panel; Future  -     Lipid Panel; Future  -     Hemoglobin A1C; Future      Annual labs ordered    Discussed Insomnia  Will look into medicinal marijuana to help        Care Plan/Goals: Reviewed    Goals    None         Follow up: No follow-ups on file.    After visit summary was printed and given to patient upon discharge today.  Patient goals and care plan are included in After Visit Summary.

## 2024-08-02 ENCOUNTER — LAB VISIT (OUTPATIENT)
Dept: LAB | Facility: HOSPITAL | Age: 54
End: 2024-08-02
Attending: FAMILY MEDICINE
Payer: COMMERCIAL

## 2024-08-02 DIAGNOSIS — Z01.419 WELL WOMAN EXAM WITH ROUTINE GYNECOLOGICAL EXAM: ICD-10-CM

## 2024-08-02 LAB
ALBUMIN SERPL BCP-MCNC: 4.2 G/DL (ref 3.5–5.2)
ALP SERPL-CCNC: 64 U/L (ref 55–135)
ALT SERPL W/O P-5'-P-CCNC: 30 U/L (ref 10–44)
ANION GAP SERPL CALC-SCNC: 6 MMOL/L (ref 8–16)
AST SERPL-CCNC: 27 U/L (ref 10–40)
BILIRUB SERPL-MCNC: 0.5 MG/DL (ref 0.1–1)
BUN SERPL-MCNC: 17 MG/DL (ref 6–20)
CALCIUM SERPL-MCNC: 9.1 MG/DL (ref 8.7–10.5)
CHLORIDE SERPL-SCNC: 108 MMOL/L (ref 95–110)
CHOLEST SERPL-MCNC: 231 MG/DL (ref 120–199)
CHOLEST/HDLC SERPL: 4 {RATIO} (ref 2–5)
CO2 SERPL-SCNC: 25 MMOL/L (ref 23–29)
CREAT SERPL-MCNC: 1 MG/DL (ref 0.5–1.4)
EST. GFR  (NO RACE VARIABLE): >60 ML/MIN/1.73 M^2
ESTIMATED AVG GLUCOSE: 105 MG/DL (ref 68–131)
GLUCOSE SERPL-MCNC: 87 MG/DL (ref 70–110)
HBA1C MFR BLD: 5.3 % (ref 4–5.6)
HDLC SERPL-MCNC: 58 MG/DL (ref 40–75)
HDLC SERPL: 25.1 % (ref 20–50)
LDLC SERPL CALC-MCNC: 146.2 MG/DL (ref 63–159)
NONHDLC SERPL-MCNC: 173 MG/DL
POTASSIUM SERPL-SCNC: 4.3 MMOL/L (ref 3.5–5.1)
PROT SERPL-MCNC: 7.2 G/DL (ref 6–8.4)
SODIUM SERPL-SCNC: 139 MMOL/L (ref 136–145)
TRIGL SERPL-MCNC: 134 MG/DL (ref 30–150)

## 2024-08-02 PROCEDURE — 36415 COLL VENOUS BLD VENIPUNCTURE: CPT | Performed by: FAMILY MEDICINE

## 2024-08-02 PROCEDURE — 80061 LIPID PANEL: CPT | Performed by: FAMILY MEDICINE

## 2024-08-02 PROCEDURE — 83036 HEMOGLOBIN GLYCOSYLATED A1C: CPT | Performed by: FAMILY MEDICINE

## 2024-08-02 PROCEDURE — 80053 COMPREHEN METABOLIC PANEL: CPT | Performed by: FAMILY MEDICINE

## 2024-08-14 ENCOUNTER — OFFICE VISIT (OUTPATIENT)
Dept: INTERNAL MEDICINE | Facility: CLINIC | Age: 54
End: 2024-08-14
Payer: COMMERCIAL

## 2024-08-14 ENCOUNTER — HOSPITAL ENCOUNTER (OUTPATIENT)
Dept: RADIOLOGY | Facility: HOSPITAL | Age: 54
Discharge: HOME | End: 2024-08-14
Attending: FAMILY MEDICINE
Payer: COMMERCIAL

## 2024-08-14 DIAGNOSIS — R07.81 RIB PAIN: Primary | ICD-10-CM

## 2024-08-14 DIAGNOSIS — R07.81 RIB PAIN: ICD-10-CM

## 2024-08-14 PROCEDURE — 99213 OFFICE O/P EST LOW 20 MIN: CPT | Mod: 95,,, | Performed by: FAMILY MEDICINE

## 2024-08-14 PROCEDURE — 71100 X-RAY EXAM RIBS UNI 2 VIEWS: CPT | Mod: TC,RT

## 2024-08-14 PROCEDURE — 3044F HG A1C LEVEL LT 7.0%: CPT | Mod: CPTII,95,, | Performed by: FAMILY MEDICINE

## 2024-08-14 PROCEDURE — 71100 X-RAY EXAM RIBS UNI 2 VIEWS: CPT | Mod: 26,RT,, | Performed by: RADIOLOGY

## 2024-08-14 RX ORDER — TRAMADOL HYDROCHLORIDE 50 MG/1
50 TABLET ORAL EVERY 12 HOURS PRN
Qty: 14 EACH | Refills: 0 | Status: SHIPPED | OUTPATIENT
Start: 2024-08-14

## 2024-08-14 NOTE — PROGRESS NOTES
Tammy Aguero  08/14/2024  [id number]    Maria Teresa Aparicio MD  Patient Care Team:  Maria Teresa Aparicio MD as PCP - General (Family Medicine)      The patient location is: home  The chief complaint leading to consultation is: rib pain    Visit type: audiovisual    Face to Face time with patient: 10  10 minutes of total time spent on the encounter, which includes face to face time and non-face to face time preparing to see the patient (eg, review of tests), Obtaining and/or reviewing separately obtained history, Documenting clinical information in the electronic or other health record, Independently interpreting results (not separately reported) and communicating results to the patient/family/caregiver, or Care coordination (not separately reported).         Each patient to whom he or she provides medical services by telemedicine is:  (1) informed of the relationship between the physician and patient and the respective role of any other health care provider with respect to management of the patient; and (2) notified that he or she may decline to receive medical services by telemedicine and may withdraw from such care at any time.    Notes:       Visit Type:an urgent visit for a new problem    Chief Complaint:Rib pain    History of Present Illness:  Rib pain    She bent over a railing and she felt a pain, and it really hurts.  There is no bruise to the area.  She injured this am.    She said on a scale 1-10  Rest 3  Stretching 8  She reports that she is not SOB with it.    She did take TYlenol 30 min after it happened.      Answers submitted by the patient for this visit:  Review of Systems Questionnaire (Submitted on 8/14/2024)  activity change: No  unexpected weight change: No  rhinorrhea: No  trouble swallowing: No  visual disturbance: No  chest tightness: No  polyuria: No  difficulty urinating: No  menstrual problem: No  joint swelling: No  arthralgias: No  confusion: No  dysphoric mood: No        History:  Past  Medical History:   Diagnosis Date    RA (rheumatoid arthritis)      Past Surgical History:   Procedure Laterality Date    COLONOSCOPY N/A 02/01/2023    Procedure: COLONOSCOPY;  Surgeon: Ángel Valdes MD;  Location: [facility];  Service: Gastroenterology;  Laterality: N/A;    ESOPHAGOGASTRODUODENOSCOPY N/A 02/01/2023    Procedure: EGD (ESOPHAGOGASTRODUODENOSCOPY);  Surgeon: Ángel Valdes MD;  Location: [facility];  Service: Gastroenterology;  Laterality: N/A;    ESOPHAGOGASTRODUODENOSCOPY N/A 3/17/2023    Procedure: EGD (ESOPHAGOGASTRODUODENOSCOPY);  Surgeon: Ashley Stevenson MD;  Location: [facility];  Service: Endoscopy;  Laterality: N/A;    ESOPHAGOGASTRODUODENOSCOPY N/A 6/26/2023    Procedure: EGD (ESOPHAGOGASTRODUODENOSCOPY);  Surgeon: Ángel Valdes MD;  Location: [facility];  Service: Gastroenterology;  Laterality: N/A;    INTRALUMINAL GASTROINTESTINAL TRACT IMAGING VIA CAPSULE N/A 3/1/2023    Procedure: IMAGING PROCEDURE, GI TRACT, INTRALUMINAL, VIA CAPSULE;  Surgeon: First Available [place];  Location: [facility];  Service: Endoscopy;  Laterality: N/A;    Tubal Lig      TUBAL LIGATION       Family History   Problem Relation Name Age of Onset    Diabetes Sister Machelle Younger     Cancer Sister Machelle Younger     Breast cancer Sister Machelle Younger     Asthma Sister Machelle Younger     Diabetes Brother Melinda Gann     Breast cancer Other maternal great grandmoth     Alcohol abuse Mother Jeanette Grey     Asthma Mother Jeanette Grey     COPD Mother Jeanette Grey     Depression Mother Jeanette Grey     Heart disease Mother Jeanette Grey      Social History     Socioeconomic History    Marital status: Single   Occupational History    Occupation: [employer], supervisor   Tobacco Use    Smoking status: Former     Types: Vaping with nicotine, Cigarettes     Start date: 1/1/2020     Passive exposure: Past    Smokeless tobacco: Never    Tobacco comments:     Pt states that she quit smoking cigarettes 1 year ago, and  stopped using the vape 6 months ago.    Substance and Sexual Activity    Alcohol use: Not Currently     Comment: I drank when i was younger but can no longer take the smell    Drug use: Never     Types: Marijuana    Sexual activity: Not Currently     Partners: Male     Birth control/protection: See Surgical Hx, None     Social Determinants of Health     Financial Resource Strain: Low Risk  (12/7/2023)    Overall Financial Resource Strain (CARDIA)     Difficulty of Paying Living Expenses: Not very hard   Food Insecurity: No Food Insecurity (12/7/2023)    Hunger Vital Sign     Worried About Running Out of Food in the Last Year: Never true     Ran Out of Food in the Last Year: Never true   Transportation Needs: No Transportation Needs (12/7/2023)    PRAPARE - Transportation     Lack of Transportation (Medical): No     Lack of Transportation (Non-Medical): No   Physical Activity: Sufficiently Active (12/7/2023)    Exercise Vital Sign     Days of Exercise per Week: 5 days     Minutes of Exercise per Session: 120 min   Stress: Stress Concern Present (12/7/2023)    [employer] - Occupational Stress Questionnaire     Feeling of Stress : Rather much   Housing Stability: Low Risk  (12/7/2023)    Housing Stability Vital Sign     Unable to Pay for Housing in the Last Year: No     Number of Places Lived in the Last Year: 1     Unstable Housing in the Last Year: No     Patient Active Problem List   Diagnosis    Rheumatoid arthritis involving multiple sites with positive rheumatoid factor    Nicotine dependence, cigarettes, in remission    High risk medication use    Muscle pain    Positive skin test for tuberculosis    Pulmonary nodules    SOB (shortness of breath)    Iron deficiency anemia    Iron deficiency anemia due to chronic blood loss    Sarcoidosis of lung with sarcoidosis of lymph nodes    Encounter for medication monitoring    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    Medications:  Current Outpatient Medications on File Prior to Visit   Medication Sig Dispense Refill    acetaminophen (TYLENOL) 650 MG TbSR Take 650 mg by mouth every 8 (eight) hours.      diphenhydrAMINE-acetaminophen (TYLENOL PM)  mg Tab Take 1 tablet by mouth nightly as needed.      omeprazole (PRILOSEC) 40 MG capsule Take 1 capsule (40 mg total) by mouth once daily. 30 capsule 5    RINVOQ 15 mg 24 hr tablet TAKE 1 TABLET DAILY 30 tablet 11     No current facility-administered medications on file prior to visit.       Medications have been reviewed and reconciled with patient at this visit.  Barriers to medications reviewed with patient.    Adverse reactions to current medications reviewed with patient..    Over the counter medications reviewed and reconciled with patient.    Exam:  Wt Readings from Last 3 Encounters:   08/01/24 77 kg (169 lb 12.1 oz)   04/26/24 71.7 kg (158 lb 2.9 oz)   02/23/24 72.2 kg (159 lb 2.8 oz)     Temp Readings from Last 3 Encounters:   08/01/24 97.5 °F (36.4 °C) (Tympanic)   02/23/24 97.6 °F (36.4 °C) (Tympanic)   12/11/23 97.6 °F (36.4 °C) (Temporal)     BP Readings from Last 3 Encounters:   08/01/24 110/82   04/26/24 110/78   02/23/24 106/76     Pulse Readings from Last 3 Encounters:   08/01/24 67   04/26/24 92   02/23/24 79     There is no height or weight on file to calculate BMI.      Review of Systems   HENT:  Negative for hearing loss.    Eyes:  Negative for discharge.   Respiratory:  Negative for wheezing.    Cardiovascular:  Negative for chest pain (Rib pain) and  palpitations.   Gastrointestinal:  Negative for blood in stool, constipation, diarrhea and vomiting.   Genitourinary:  Negative for dysuria and hematuria.   Musculoskeletal:  Negative for neck pain.   Neurological:  Negative for weakness and headaches.   Endo/Heme/Allergies:  Negative for polydipsia.     Physical Exam  Nursing note reviewed.   Pulmonary:      Effort: Pulmonary effort is normal. No respiratory distress.   Neurological:      Mental Status: She is alert and oriented to person, place, and time.   Psychiatric:         Mood and Affect: Mood normal.         Behavior: Behavior normal.         Thought Content: Thought content normal.         Judgment: Judgment normal.         Laboratory Reviewed ({Yes)  Lab Results   Component Value Date    WBC 6.18 04/09/2024    HGB 12.0 04/09/2024    HCT 36.7 (L) 04/09/2024     04/09/2024    CHOL 231 (H) 08/02/2024    TRIG 134 08/02/2024    HDL 58 08/02/2024    ALT 30 08/02/2024    AST 27 08/02/2024     08/02/2024    K 4.3 08/02/2024     08/02/2024    CREATININE 1.0 08/02/2024    BUN 17 08/02/2024    CO2 25 08/02/2024    INR 0.9 01/11/2023    HGBA1C 5.3 08/02/2024       Diagnoses and all orders for this visit:    Rib pain  -     X-Ray Ribs 2 View Right; Future    Other orders  -     traMADoL (ULTRAM) 50 mg tablet; Take 1 tablet (50 mg total) by mouth every 12 (twelve) hours as needed for Pain.                Care Plan/Goals: Reviewed    Goals    None         Follow up: No follow-ups on file.    After visit summary was printed and given to patient upon discharge today.  Patient goals and care plan are included in After Visit Summary.

## 2024-09-14 DIAGNOSIS — K25.0 ACUTE GASTRIC ULCER WITH HEMORRHAGE: ICD-10-CM

## 2024-09-18 ENCOUNTER — E-VISIT (OUTPATIENT)
Dept: GASTROENTEROLOGY | Facility: CLINIC | Age: 54
End: 2024-09-18
Payer: COMMERCIAL

## 2024-09-18 ENCOUNTER — OFFICE VISIT (OUTPATIENT)
Dept: INTERNAL MEDICINE | Facility: CLINIC | Age: 54
End: 2024-09-18
Payer: COMMERCIAL

## 2024-09-18 ENCOUNTER — HOSPITAL ENCOUNTER (OUTPATIENT)
Dept: RADIOLOGY | Facility: HOSPITAL | Age: 54
Discharge: HOME | End: 2024-09-18
Payer: COMMERCIAL

## 2024-09-18 ENCOUNTER — PATIENT MESSAGE (OUTPATIENT)
Dept: RHEUMATOLOGY | Facility: CLINIC | Age: 54
End: 2024-09-18
Payer: COMMERCIAL

## 2024-09-18 DIAGNOSIS — M05.79 RHEUMATOID ARTHRITIS INVOLVING MULTIPLE SITES WITH POSITIVE RHEUMATOID FACTOR: ICD-10-CM

## 2024-09-18 DIAGNOSIS — K27.9 PUD (PEPTIC ULCER DISEASE): Primary | ICD-10-CM

## 2024-09-18 DIAGNOSIS — R22.42 LOCALIZED SWELLING OF LEFT FOOT: Primary | ICD-10-CM

## 2024-09-18 DIAGNOSIS — Z79.899 IMMUNOCOMPROMISED STATE DUE TO DRUG THERAPY: ICD-10-CM

## 2024-09-18 DIAGNOSIS — D84.821 IMMUNOCOMPROMISED STATE DUE TO DRUG THERAPY: ICD-10-CM

## 2024-09-18 DIAGNOSIS — R22.42 LOCALIZED SWELLING OF LEFT FOOT: ICD-10-CM

## 2024-09-18 DIAGNOSIS — M20.12 HALLUX VALGUS OF LEFT FOOT: ICD-10-CM

## 2024-09-18 PROCEDURE — 1159F MED LIST DOCD IN RCRD: CPT | Mod: CPTII,S$GLB,,

## 2024-09-18 PROCEDURE — 3074F SYST BP LT 130 MM HG: CPT | Mod: CPTII,S$GLB,,

## 2024-09-18 PROCEDURE — 73630 X-RAY EXAM OF FOOT: CPT | Mod: 26,LT,, | Performed by: RADIOLOGY

## 2024-09-18 PROCEDURE — 3044F HG A1C LEVEL LT 7.0%: CPT | Mod: CPTII,S$GLB,,

## 2024-09-18 PROCEDURE — G2211 COMPLEX E/M VISIT ADD ON: HCPCS | Mod: S$GLB,,,

## 2024-09-18 PROCEDURE — 99214 OFFICE O/P EST MOD 30 MIN: CPT | Mod: S$GLB,,,

## 2024-09-18 PROCEDURE — 1160F RVW MEDS BY RX/DR IN RCRD: CPT | Mod: CPTII,S$GLB,,

## 2024-09-18 PROCEDURE — 99999 PR PBB SHADOW E&M-EST. PATIENT-LVL IV: CPT | Mod: PBBFAC,,,

## 2024-09-18 PROCEDURE — 73630 X-RAY EXAM OF FOOT: CPT | Mod: TC,LT

## 2024-09-18 PROCEDURE — 3008F BODY MASS INDEX DOCD: CPT | Mod: CPTII,S$GLB,,

## 2024-09-18 PROCEDURE — 3079F DIAST BP 80-89 MM HG: CPT | Mod: CPTII,S$GLB,,

## 2024-09-18 RX ORDER — OMEPRAZOLE 40 MG/1
40 CAPSULE, DELAYED RELEASE ORAL
Qty: 30 CAPSULE | Refills: 0 | OUTPATIENT
Start: 2024-09-18

## 2024-09-18 NOTE — PROGRESS NOTES
Tammy Aguero  09/19/2024  [id number]    Maria Teresa Aparicio MD  Patient Care Team:  Maria Teresa Aparicio MD as PCP - General (Family Medicine)          Visit Type:an urgent visit for a new problem    Chief Complaint:  Chief Complaint   Patient presents with    Foot Pain     Left foot    Swelling     Both hands       History of Present Illness:    History of Present Illness    CHIEF COMPLAINT:  Ms. Aguero presents today with pain in the left foot.    LEFT FOOT PAIN:  She reports left foot pain that started about a week ago. She describes a sharp, shooting pain between her toes that occurs when walking. The pain is present with all shoes, but she can walk barefoot without discomfort. She denies any falls or injuries to the foot. No swelling of the left foot is reported.    RHEUMATOID ARTHRITIS:  She has a history of rheumatoid arthritis and receives medication every three months. She reports a history of flare-ups, typically affecting arm movement and causing difficulty moving her arm without assistance. However, she denies any recent flare-ups, stating it has been a few years since her last significant episode. She recently started a new medication over three months ago and has experienced hand swelling twice since then, with the first occurrence resolving on its own. She was previously on methotrexate but discontinued use due to lack of insurance, during which time she took Aleve for symptom management. She denies recent use of steroids, stating it has been years since her last steroid treatment.    MEDICAL HISTORY:  She reports a history of GI bleed last year. She also mentions a past episode of severe anemia, which occurred after discontinuing methotrexate due to lack of insurance and taking Aleve regularly.    ALLERGIES:  She reports avoiding salt intake.      ROS:  General: -fever, -chills, -fatigue, -weight gain, -weight loss  Eyes: -vision changes, -redness, -discharge  ENT: -ear pain, -nasal congestion, -sore  throat  Cardiovascular: -chest pain, -palpitations, -lower extremity edema  Respiratory: -cough, -shortness of breath  Gastrointestinal: -abdominal pain, -nausea, -vomiting, -diarrhea, -constipation, -blood in stool  Genitourinary: -dysuria, -hematuria, -frequency  Musculoskeletal: +joint pain, -muscle pain, -joint swelling  Skin: -rash, -lesion  Neurological: -headache, -dizziness, -numbness, -tingling  Psychiatric: -anxiety, -depression, -sleep difficulty            History:  Past Medical History:   Diagnosis Date    RA (rheumatoid arthritis)      Past Surgical History:   Procedure Laterality Date    COLONOSCOPY N/A 02/01/2023    Procedure: COLONOSCOPY;  Surgeon: Ángel Valdes MD;  Location: [facility];  Service: Gastroenterology;  Laterality: N/A;    ESOPHAGOGASTRODUODENOSCOPY N/A 02/01/2023    Procedure: EGD (ESOPHAGOGASTRODUODENOSCOPY);  Surgeon: Ángel Valdes MD;  Location: [facility];  Service: Gastroenterology;  Laterality: N/A;    ESOPHAGOGASTRODUODENOSCOPY N/A 3/17/2023    Procedure: EGD (ESOPHAGOGASTRODUODENOSCOPY);  Surgeon: Ashley Stevenson MD;  Location: [facility];  Service: Endoscopy;  Laterality: N/A;    ESOPHAGOGASTRODUODENOSCOPY N/A 6/26/2023    Procedure: EGD (ESOPHAGOGASTRODUODENOSCOPY);  Surgeon: Ángel Valdes MD;  Location: [facility];  Service: Gastroenterology;  Laterality: N/A;    INTRALUMINAL GASTROINTESTINAL TRACT IMAGING VIA CAPSULE N/A 3/1/2023    Procedure: IMAGING PROCEDURE, GI TRACT, INTRALUMINAL, VIA CAPSULE;  Surgeon: First Available [place];  Location: [facility];  Service: Endoscopy;  Laterality: N/A;    Tubal Lig      TUBAL LIGATION       Family History   Problem Relation Name Age of Onset    Diabetes Sister Machelle Younger     Cancer Sister Machelle Younger     Breast cancer Sister Machelle Younger     Asthma Sister Machelle Younger     Diabetes Brother Melinda Gann     Breast cancer Other maternal great grandmoth     Alcohol abuse Mother Jeanette Grey     Asthma Mother  Jeanette Grey     COPD Mother Jeanette Grey     Depression Mother Jeanette Grey     Heart disease Mother Jeanette Grey      Social History     Socioeconomic History    Marital status: Single   Occupational History    Occupation: [employer], supervisor   Tobacco Use    Smoking status: Former     Types: Vaping with nicotine, Cigarettes     Start date: 1/1/2020     Passive exposure: Past    Smokeless tobacco: Never    Tobacco comments:     Pt states that she quit smoking cigarettes 1 year ago, and stopped using the vape 6 months ago.    Substance and Sexual Activity    Alcohol use: Not Currently     Comment: I drank when i was younger but can no longer take the smell    Drug use: Never     Types: Marijuana    Sexual activity: Not Currently     Partners: Male     Birth control/protection: See Surgical Hx, None     Social Determinants of Health     Financial Resource Strain: Low Risk  (12/7/2023)    Overall Financial Resource Strain (CARDIA)     Difficulty of Paying Living Expenses: Not very hard   Food Insecurity: No Food Insecurity (12/7/2023)    Hunger Vital Sign     Worried About Running Out of Food in the Last Year: Never true     Ran Out of Food in the Last Year: Never true   Transportation Needs: No Transportation Needs (12/7/2023)    PRAPARE - Transportation     Lack of Transportation (Medical): No     Lack of Transportation (Non-Medical): No   Physical Activity: Sufficiently Active (12/7/2023)    Exercise Vital Sign     Days of Exercise per Week: 5 days     Minutes of Exercise per Session: 120 min   Stress: Stress Concern Present (12/7/2023)    [employer] - Occupational Stress Questionnaire     Feeling of Stress : Rather much   Housing Stability: Low Risk  (12/7/2023)    Housing Stability Vital Sign     Unable to Pay for Housing in the Last Year: No     Number of Places Lived in the Last Year: 1     Unstable Housing in the Last Year: No     Patient Active Problem List   Diagnosis     Rheumatoid arthritis involving multiple sites with positive rheumatoid factor    Nicotine dependence, cigarettes, in remission    High risk medication use    Muscle pain    Positive skin test for tuberculosis    Pulmonary nodules    SOB (shortness of breath)    Iron deficiency anemia    Iron deficiency anemia due to chronic blood loss    Sarcoidosis of lung with sarcoidosis of lymph nodes    Encounter for medication monitoring    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    Medications:  Current Outpatient Medications on File Prior to Visit   Medication Sig Dispense Refill    acetaminophen (TYLENOL) 650 MG TbSR Take 650 mg by mouth every 8 (eight) hours.      diphenhydrAMINE-acetaminophen (TYLENOL PM)  mg Tab Take 1 tablet by mouth nightly as needed.      omeprazole (PRILOSEC) 40 MG capsule Take 1 capsule (40 mg total) by mouth once daily. 30 capsule 5    RINVOQ 15 mg 24 hr tablet TAKE 1 TABLET DAILY 30 tablet 11    traMADoL (ULTRAM) 50 mg tablet Take 1 tablet (50 mg total) by mouth every 12 (twelve) hours as needed for Pain. 14 each 0     No current facility-administered medications on file prior to visit.       Medications have been reviewed and reconciled with patient at this visit.  Barriers to medications reviewed with patient.    Adverse reactions to current medications reviewed with patient..    Over the counter medications reviewed and reconciled with patient.    Exam:  Wt Readings from Last 3 Encounters:   09/18/24 79 kg (174 lb 2.6 oz)   08/01/24 77 kg (169 lb 12.1 oz)   04/26/24  71.7 kg (158 lb 2.9 oz)     Temp Readings from Last 3 Encounters:   08/01/24 97.5 °F (36.4 °C) (Tympanic)   02/23/24 97.6 °F (36.4 °C) (Tympanic)   12/11/23 97.6 °F (36.4 °C) (Temporal)     BP Readings from Last 3 Encounters:   09/18/24 (!) 122/92   08/01/24 110/82   04/26/24 110/78     Pulse Readings from Last 3 Encounters:   09/18/24 82   08/01/24 67   04/26/24 92     Body mass index is 27.28 kg/m².    Physical Exam  Nursing note reviewed.   HENT:      Head: Normocephalic and atraumatic.   Cardiovascular:      Rate and Rhythm: Normal rate and regular rhythm.      Pulses:           Radial pulses are 2+ on the right side and 2+ on the left side.        Dorsalis pedis pulses are 2+ on the right side and 2+ on the left side.      Heart sounds: Normal heart sounds.   Pulmonary:      Effort: Pulmonary effort is normal. No respiratory distress.      Breath sounds: Normal breath sounds.   Musculoskeletal:         General: Swelling and tenderness present.      Right hand: Swelling present.      Left hand: Swelling present.      Left foot: Swelling and tenderness present.        Legs:       Comments: Swelling/tenderness in left foot    Neurological:      Mental Status: She is alert and oriented to person, place, and time.   Psychiatric:         Mood and Affect: Mood normal.         Behavior: Behavior normal.         Thought Content: Thought content normal.         Judgment: Judgment normal.           Laboratory Reviewed ({Yes)  Lab Results   Component Value Date    WBC 6.18 04/09/2024    HGB 12.0 04/09/2024    HCT 36.7 (L) 04/09/2024     04/09/2024    CHOL 231 (H) 08/02/2024    TRIG 134 08/02/2024    HDL 58 08/02/2024    ALT 30 08/02/2024    AST 27 08/02/2024     08/02/2024    K 4.3 08/02/2024     08/02/2024    CREATININE 1.0 08/02/2024    BUN 17 08/02/2024    CO2 25 08/02/2024    INR 0.9 01/11/2023    HGBA1C 5.3 08/02/2024         Tammy was seen today for foot pain and swelling.    Diagnoses and all  orders for this visit:    Localized swelling of left foot  -     X-Ray Foot Complete 3 view Left; Future    Hallux valgus of left foot  -     Ambulatory referral/consult to Podiatry; Future    Rheumatoid arthritis involving multiple sites with positive rheumatoid factor    Immunocompromised state due to drug therapy            Assessment & Plan    RHEUMATOID ARTHRITIS:  - Considered possibility of rheumatoid arthritis flare-up due to patient's swollen hands and foot pain.  - Recommend patient inform Dr. AGARWAL (rheumatologist) about hand swelling for potential medication adjustments.  - Ms. Aguero to message Dr. AGARWAL (rheumatologist) about hand swelling and potential flare-up.  - Contact Dr. AGARWAL (rheumatologist) if hand swelling does not improve.    LEFT FOOT PAIN:  - Evaluated left foot pain and swelling, noting good pulses.  - Will order x-ray of left foot to assess for potential causes of pain.  - X-ray of left foot ordered.    PODIATRY REFERRAL:  - Considered podiatry referral if foot pain persists and is not related to rheumatoid arthritis.    UPPER GI BLEED:  - Noted prednisone contraindicated due to recent upper GI bleed.    FOLLOW UP:  - Follow up with the office if foot pain persists after x-ray results and Dr. AGARWAL's assessment.        Visit today included increased complexity associated with the care of the episodic problem left foot pain , which was addressed while instituting co-management of the longitudinal care of the patient due to the serious and/or complex managed problem(s) .    I have evaluated and discussed management associated with medical care services that serve as the continuing focal point for all needed health care services and/or with medical care services that are part of ongoing care related to my patient's single, serious condition or a complex condition(s).    I am providing ongoing care and I am the primary care provider for this patient, and they are being managed, monitored, and/or observed  for their chronic conditions over time.     I have addressed their ongoing health maintenance requirements and needs for all health care services and reviewed co-management plans provided by specialty providers when available.    Health Maintenance Due   Topic Date Due    HIV Screening  Never done    TETANUS VACCINE  02/17/2015    COVID-19 Vaccine (2 - Jagdish risk series) 06/23/2021    Influenza Vaccine (1) 09/01/2024        Care Plan/Goals: Reviewed    Goals    None         Follow up: No follow-ups on file.    After visit summary was printed and given to patient upon discharge today.  Patient goals and care plan are included in After Visit Summary.      Answers submitted by the patient for this visit:  Review of Systems Questionnaire (Submitted on 9/18/2024)  activity change: No  unexpected weight change: No  neck pain: No  hearing loss: No  rhinorrhea: No  trouble swallowing: No  eye discharge: No  visual disturbance: No  chest tightness: No  wheezing: No  chest pain: No  palpitations: No  blood in stool: No  constipation: No  vomiting: No  diarrhea: No  polydipsia: No  polyuria: No  difficulty urinating: No  hematuria: No  menstrual problem: No  dysuria: No  joint swelling: No  arthralgias: No  headaches: No  weakness: No  confusion: No  dysphoric mood: No

## 2024-09-19 ENCOUNTER — PATIENT MESSAGE (OUTPATIENT)
Dept: INTERNAL MEDICINE | Facility: CLINIC | Age: 54
End: 2024-09-19
Payer: COMMERCIAL

## 2024-09-19 VITALS
OXYGEN SATURATION: 98 % | BODY MASS INDEX: 27.34 KG/M2 | SYSTOLIC BLOOD PRESSURE: 122 MMHG | HEIGHT: 67 IN | HEART RATE: 82 BPM | DIASTOLIC BLOOD PRESSURE: 82 MMHG | WEIGHT: 174.19 LBS

## 2024-09-19 NOTE — PROGRESS NOTES
Patient ID: Tammy Aguero is a 53 y.o. female.    Chief Complaint: Medication Management (Entered automatically based on patient selection in [employer].)    The patient initiated a request through [employer] on 9/18/2024 for evaluation and management with a chief complaint of Medication Management (Entered automatically based on patient selection in [employer].)     I evaluated the questionnaire submission on 09/19/2024.   Patient addressing non-GI issue in questionnaire. See [facility] message for details. Recommend following up with Rheumatology.     [facility] Peq Evisit Medication    9/18/2024 10:50 AM CDT - Filed by Patient   Do you agree to participate in an E-Visit? Yes   If you have any of the following symptoms, please present to your local emergency room or call 911:  I acknowledge   Medication requests for narcotics will not be addressed via an E-Visit.  Please schedule an appointment. I acknowledge   Choose the state of your primary residence [place]   Are you pregnant, could you be pregnant, or are you breast feeding? None of the above   Do you want to address a new or existing medication? I would like to address a medication I currently take   What is the main issue you would like addressed today? My foot hurts when i walk   Would you like to change or continue your medication? Continue medication   What medication would you like to continue?  N/a   Are you taking it as prescribed? Yes    What medical condition is the  medication intended to treat? Ra   Is the medication helping your condition? Yes   Are you having any side effects from the medication? No   Provide any additional information you feel is important. When i put weight on the frint of my foot (my toes) its a sharp pain   Please attach any relevant images or files    Are you able to take your vital signs? No         Encounter Diagnosis   Name Primary?    PUD (peptic ulcer disease) Yes        No orders of the defined types were placed in this  encounter.           Follow up if symptoms worsen or fail to improve.      E-Visit Time Tracking:

## 2024-09-25 ENCOUNTER — HOSPITAL ENCOUNTER (OUTPATIENT)
Dept: RADIOLOGY | Facility: HOSPITAL | Age: 54
Discharge: HOME | End: 2024-09-25
Attending: PODIATRIST
Payer: COMMERCIAL

## 2024-09-25 ENCOUNTER — OFFICE VISIT (OUTPATIENT)
Dept: PODIATRY | Facility: CLINIC | Age: 54
End: 2024-09-25
Payer: COMMERCIAL

## 2024-09-25 DIAGNOSIS — M85.80 OSTEOPENIA, UNSPECIFIED LOCATION: ICD-10-CM

## 2024-09-25 DIAGNOSIS — M79.672 LEFT FOOT PAIN: Primary | ICD-10-CM

## 2024-09-25 DIAGNOSIS — M05.79 RHEUMATOID ARTHRITIS INVOLVING MULTIPLE SITES WITH POSITIVE RHEUMATOID FACTOR: ICD-10-CM

## 2024-09-25 DIAGNOSIS — M79.672 LEFT FOOT PAIN: ICD-10-CM

## 2024-09-25 DIAGNOSIS — M20.12 HALLUX VALGUS OF LEFT FOOT: ICD-10-CM

## 2024-09-25 PROCEDURE — 73630 X-RAY EXAM OF FOOT: CPT | Mod: 26,LT,, | Performed by: RADIOLOGY

## 2024-09-25 PROCEDURE — 1160F RVW MEDS BY RX/DR IN RCRD: CPT | Mod: CPTII,S$GLB,, | Performed by: PODIATRIST

## 2024-09-25 PROCEDURE — 99214 OFFICE O/P EST MOD 30 MIN: CPT | Mod: S$GLB,,, | Performed by: PODIATRIST

## 2024-09-25 PROCEDURE — 99999 PR PBB SHADOW E&M-EST. PATIENT-LVL III: CPT | Mod: PBBFAC,,, | Performed by: PODIATRIST

## 2024-09-25 PROCEDURE — 3044F HG A1C LEVEL LT 7.0%: CPT | Mod: CPTII,S$GLB,, | Performed by: PODIATRIST

## 2024-09-25 PROCEDURE — 1159F MED LIST DOCD IN RCRD: CPT | Mod: CPTII,S$GLB,, | Performed by: PODIATRIST

## 2024-09-25 PROCEDURE — 73630 X-RAY EXAM OF FOOT: CPT | Mod: TC,LT

## 2024-09-25 NOTE — PROGRESS NOTES
Subjective:       Patient ID: Tammy Aguero is a 53 y.o. female.    Chief Complaint: Bunions (Patient complains of 5/10 pain at present to left 1st MTP. She states the pain has increased recently and has difficulty performing daily activities. )      HPI:  Tammy Aguero presents to the office for evaluation of the left foot.  States she has noticed some increase in swelling to the dorsal aspect of the left foot.  She denies any recent trauma or injury.  This is been ongoing for few weeks now.  States that with walking, she is limping and having 5/10 pain.  Does have history of rheumatoid arthritis and osteopenia.  Did have previous x-rays.  Limiting weight-bearing and ambulation does seem to alleviate the discomfort.    Review of patient's allergies indicates:   Allergen Reactions    Nsaids (non-steroidal anti-inflammatory drug) Other (See Comments)     Gastric ulcer       Past Medical History:   Diagnosis Date    RA (rheumatoid arthritis)        Family History   Problem Relation Name Age of Onset    Diabetes Sister Machelle Younger     Cancer Sister Machelle Younger     Breast cancer Sister Machelle Younger     Asthma Sister Machelle Younger     Diabetes Brother Melinda Gann     Breast cancer Other maternal great grandmoth     Alcohol abuse Mother Jeanette Grey     Asthma Mother Jeanette Grey     COPD Mother Jeanette Grey     Depression Mother Jeanette Grey     Heart disease Mother Jeanette Grey        Social History     Socioeconomic History    Marital status: Single   Occupational History    Occupation: [employer], supervisor   Tobacco Use    Smoking status: Former     Types: Vaping with nicotine, Cigarettes     Start date: 1/1/2020     Passive exposure: Past    Smokeless tobacco: Never    Tobacco comments:     Pt states that she quit smoking cigarettes 1 year ago, and stopped using the vape 6 months ago.    Substance and Sexual Activity    Alcohol use: Not Currently     Comment: I drank when i was younger but can no longer take  the smell    Drug use: Never     Types: Marijuana    Sexual activity: Not Currently     Partners: Male     Birth control/protection: See Surgical Hx, None     Social Determinants of Health     Financial Resource Strain: Low Risk  (12/7/2023)    Overall Financial Resource Strain (CARDIA)     Difficulty of Paying Living Expenses: Not very hard   Food Insecurity: No Food Insecurity (12/7/2023)    Hunger Vital Sign     Worried About Running Out of Food in the Last Year: Never true     Ran Out of Food in the Last Year: Never true   Transportation Needs: No Transportation Needs (12/7/2023)    PRAPARE - Transportation     Lack of Transportation (Medical): No     Lack of Transportation (Non-Medical): No   Physical Activity: Sufficiently Active (12/7/2023)    Exercise Vital Sign     Days of Exercise per Week: 5 days     Minutes of Exercise per Session: 120 min   Stress: Stress Concern Present (12/7/2023)    [employer] - Occupational Stress Questionnaire     Feeling of Stress : Rather much   Housing Stability: Low Risk  (12/7/2023)    Housing Stability Vital Sign     Unable to Pay for Housing in the Last Year: No     Number of Places Lived in the Last Year: 1     Unstable Housing in the Last Year: No       Past Surgical History:   Procedure Laterality Date    COLONOSCOPY N/A 02/01/2023    Procedure: COLONOSCOPY;  Surgeon: Ángel Valdes MD;  Location: [facility];  Service: Gastroenterology;  Laterality: N/A;    ESOPHAGOGASTRODUODENOSCOPY N/A 02/01/2023    Procedure: EGD (ESOPHAGOGASTRODUODENOSCOPY);  Surgeon: Ángel Valdes MD;  Location: [facility];  Service: Gastroenterology;  Laterality: N/A;    ESOPHAGOGASTRODUODENOSCOPY N/A 3/17/2023    Procedure: EGD (ESOPHAGOGASTRODUODENOSCOPY);  Surgeon: Ashley Stevenson MD;  Location: [facility];  Service: Endoscopy;  Laterality: N/A;    ESOPHAGOGASTRODUODENOSCOPY N/A 6/26/2023    Procedure: EGD (ESOPHAGOGASTRODUODENOSCOPY);  Surgeon: Ángel LOYOLA  MD Lucio;  Location: [facility] ENDO;  Service: Gastroenterology;  Laterality: N/A;    INTRALUMINAL GASTROINTESTINAL TRACT IMAGING VIA CAPSULE N/A 3/1/2023    Procedure: IMAGING PROCEDURE, GI TRACT, INTRALUMINAL, VIA CAPSULE;  Surgeon: First Available Roman Luevano;  Location: [facility] ENDO;  Service: Endoscopy;  Laterality: N/A;    Tubal Lig      TUBAL LIGATION         Review of Systems       Objective:   LMP 12/01/2020 (Approximate)     X-Ray Foot Complete 3 view Left  Narrative: EXAM: XR FOOT COMPLETE 3 VIEW LEFT    CLINICAL HISTORY: Left foot pain.    FINDINGS: Mild to moderate hallux valgus.  Osteopenia.  No fracture is identified.  Joint alignment is anatomic.  No significant arthritic changes are present.  Impression:  No acute radiographic abnormality of the left foot.    Finalized on: 9/18/2024 12:24 PM By:  Tio Dockery MD  [facility]# [id number]      2024-09-18 12:26:[id number]    [facility]       Physical Exam  LOWER EXTREMITY PHYSICAL EXAMINATION    ORTHOPEDIC:  Hallux abductovalgus deformity with osteoarthritis 1st metatarsophalangeal joint of the left foot.  Slight crepitus with range of motion.  There is increased pain on palpation of the 2nd metatarsal shaft.  No pain distally to the metatarsophalangeal joint.  Pain noted to the central metatarsal bone.  Pain localized to this area.  With plantar flexion versus resistance, there is moderate discomfort.  Patient ambulating with a moderately antalgic gait and limp.     Assessment:     1. Left foot pain    2. Osteopenia, unspecified location    3. Rheumatoid arthritis involving multiple sites with positive rheumatoid factor    4. Hallux valgus of left foot        Plan:     Left foot pain  -     X-Ray Foot Complete Left; Future; Expected date: 09/25/2024    Osteopenia, unspecified location    Rheumatoid arthritis involving multiple sites with positive rheumatoid factor    Hallux valgus of left foot  -     Ambulatory referral/consult to Podiatry      Thorough discussion is had  with the patient today, concerning the diagnosis, its etiology, and the treatment algorithm at present.    Previous x-rays were reviewed to show some abnormal changes to the medial aspect of the 2nd metatarsal shaft at proximally 40.8 mm from the distal metatarsal head.  There appears to be an abnormal step-off notably on the medial oblique image at this location.  This does correlate to patient's increased pain.    New x-rays were also taken and compared to previous x-rays which shows a similar appearance at the same location of the medial aspect of the 2nd metatarsal bone.    Given patient's history of osteopenia and pain, I would be concerned about a potential stress fracture.  Will plan to treat patient as a stress fracture to the 2nd metatarsal bone this protective weight-bearing in a Cam boot for proximally 4 weeks.    Cam boot is dispensed to the patient. The Cam boot is appropriately fitted and customized to the patient's lower extremity physique by the LPN/MA. Patient to ambulate with the Cam boot at all times. The patient should not sleep with the device or shower with the device. The patient should exercise caution when driving with the device, if dispensed for right ankle/foot pathology.         Future Appointments   Date Time Provider Department Center   10/23/2024  2:45 PM Radhika Bynum DPM [facility]   11/8/2024 11:00 AM LABORATORY, SELENE GONZALES [facility]   11/14/2024  3:15 PM Gama Guajardo MD [facility]

## 2024-10-17 DIAGNOSIS — K25.0 ACUTE GASTRIC ULCER WITH HEMORRHAGE: ICD-10-CM

## 2024-10-17 RX ORDER — OMEPRAZOLE 40 MG/1
40 CAPSULE, DELAYED RELEASE ORAL DAILY
Qty: 30 CAPSULE | Refills: 5 | Status: SHIPPED | OUTPATIENT
Start: 2024-10-17 | End: 2025-10-17

## 2024-10-17 NOTE — TELEPHONE ENCOUNTER
Refill Routing Note   Medication(s) are not appropriate for processing by [facility] for the following reason(s):        Non-participating provider  Responsible provider unclear    [facility] action(s):  Route             Appointments  past 12m or future 3m with PCP    Date Provider   Last Visit   9/18/2024 Vilma Reynoso NP   Next Visit   Visit date not found Vilma Reynoso NP   ED visits in past 90 days: 0        Note composed:9:42 AM 10/17/2024

## 2024-10-22 DIAGNOSIS — M06.9 FLARE OF RHEUMATOID ARTHRITIS: ICD-10-CM

## 2024-10-22 DIAGNOSIS — M05.79 RHEUMATOID ARTHRITIS INVOLVING MULTIPLE SITES WITH POSITIVE RHEUMATOID FACTOR: ICD-10-CM

## 2024-10-22 RX ORDER — UPADACITINIB 15 MG/1
15 TABLET, EXTENDED RELEASE ORAL
Qty: 90 TABLET | Refills: 3 | Status: SHIPPED | OUTPATIENT
Start: 2024-10-22

## 2024-10-23 ENCOUNTER — OFFICE VISIT (OUTPATIENT)
Dept: PODIATRY | Facility: CLINIC | Age: 54
End: 2024-10-23
Payer: COMMERCIAL

## 2024-10-23 DIAGNOSIS — M79.672 LEFT FOOT PAIN: Primary | ICD-10-CM

## 2024-10-23 DIAGNOSIS — M05.79 RHEUMATOID ARTHRITIS INVOLVING MULTIPLE SITES WITH POSITIVE RHEUMATOID FACTOR: ICD-10-CM

## 2024-10-23 DIAGNOSIS — M85.80 OSTEOPENIA, UNSPECIFIED LOCATION: ICD-10-CM

## 2024-10-23 PROCEDURE — 99213 OFFICE O/P EST LOW 20 MIN: CPT | Mod: S$GLB,,, | Performed by: PODIATRIST

## 2024-10-23 PROCEDURE — 3044F HG A1C LEVEL LT 7.0%: CPT | Mod: CPTII,S$GLB,, | Performed by: PODIATRIST

## 2024-10-23 PROCEDURE — 99999 PR PBB SHADOW E&M-EST. PATIENT-LVL III: CPT | Mod: PBBFAC,,, | Performed by: PODIATRIST

## 2024-10-23 PROCEDURE — 1159F MED LIST DOCD IN RCRD: CPT | Mod: CPTII,S$GLB,, | Performed by: PODIATRIST

## 2024-10-23 PROCEDURE — 1160F RVW MEDS BY RX/DR IN RCRD: CPT | Mod: CPTII,S$GLB,, | Performed by: PODIATRIST

## 2024-10-23 NOTE — PROGRESS NOTES
Subjective:       Patient ID: Tammy Aguero is a 53 y.o. female.    Chief Complaint: Foot Injury (Left stress fracture: pt denies pain, pt is nondiabetic, states without boot foot is fine but the ankle is starting to hurt and is about a 5/10 pain.)      HPI:  Tammy Aguero presents to the office for follow-up foot pain.  States she has had considerable improvement having no pain to the foot but now having some mild pain to the ankle due to confinement in a Cam boot.  Has been consistent with utilizing Cam boot for long distance and ambulation.  Denies any subsequent falls or injury   Does have history of rheumatoid arthritis and osteopenia.  Did have previous x-rays.  Limiting weight-bearing and ambulation does seem to alleviate the discomfort.    Review of patient's allergies indicates:   Allergen Reactions    Nsaids (non-steroidal anti-inflammatory drug) Other (See Comments)     Gastric ulcer       Past Medical History:   Diagnosis Date    RA (rheumatoid arthritis)        Family History   Problem Relation Name Age of Onset    Diabetes Sister Machelle Younger     Cancer Sister Machelle Younger     Breast cancer Sister Machelle Younger     Asthma Sister Machelle Younger     Diabetes Brother Melinda Gann     Breast cancer Other maternal great grandmoth     Alcohol abuse Mother Jeanette Grey     Asthma Mother Jeanette Grey     COPD Mother Jeanette Grey     Depression Mother Jeanette Grey     Heart disease Mother Jeanette Grey        Social History     Socioeconomic History    Marital status: Single   Occupational History    Occupation: [employer], supervisor   Tobacco Use    Smoking status: Former     Types: Vaping with nicotine, Cigarettes     Start date: 1/1/2020     Passive exposure: Past    Smokeless tobacco: Never    Tobacco comments:     Pt states that she quit smoking cigarettes 1 year ago, and stopped using the vape 6 months ago.    Substance and Sexual Activity    Alcohol use: Not Currently     Comment: I drank when i was  younger but can no longer take the smell    Drug use: Never     Types: Marijuana    Sexual activity: Not Currently     Partners: Male     Birth control/protection: See Surgical Hx, None     Social Drivers of Health     Financial Resource Strain: Low Risk  (12/7/2023)    Overall Financial Resource Strain (CARDIA)     Difficulty of Paying Living Expenses: Not very hard   Food Insecurity: No Food Insecurity (12/7/2023)    Hunger Vital Sign     Worried About Running Out of Food in the Last Year: Never true     Ran Out of Food in the Last Year: Never true   Transportation Needs: No Transportation Needs (12/7/2023)    PRAPARE - Transportation     Lack of Transportation (Medical): No     Lack of Transportation (Non-Medical): No   Physical Activity: Sufficiently Active (12/7/2023)    Exercise Vital Sign     Days of Exercise per Week: 5 days     Minutes of Exercise per Session: 120 min   Stress: Stress Concern Present (12/7/2023)    [employer] - Occupational Stress Questionnaire     Feeling of Stress : Rather much   Housing Stability: Low Risk  (12/7/2023)    Housing Stability Vital Sign     Unable to Pay for Housing in the Last Year: No     Number of Places Lived in the Last Year: 1     Unstable Housing in the Last Year: No       Past Surgical History:   Procedure Laterality Date    COLONOSCOPY N/A 02/01/2023    Procedure: COLONOSCOPY;  Surgeon: Ángel Valdes MD;  Location: [facility];  Service: Gastroenterology;  Laterality: N/A;    ESOPHAGOGASTRODUODENOSCOPY N/A 02/01/2023    Procedure: EGD (ESOPHAGOGASTRODUODENOSCOPY);  Surgeon: Ángel Valdes MD;  Location: [facility];  Service: Gastroenterology;  Laterality: N/A;    ESOPHAGOGASTRODUODENOSCOPY N/A 3/17/2023    Procedure: EGD (ESOPHAGOGASTRODUODENOSCOPY);  Surgeon: Ashley Stevenson MD;  Location: [facility];  Service: Endoscopy;  Laterality: N/A;    ESOPHAGOGASTRODUODENOSCOPY N/A 6/26/2023    Procedure: EGD  (ESOPHAGOGASTRODUODENOSCOPY);  Surgeon: Ángel Valdes MD;  Location: [facility];  Service: Gastroenterology;  Laterality: N/A;    INTRALUMINAL GASTROINTESTINAL TRACT IMAGING VIA CAPSULE N/A 3/1/2023    Procedure: IMAGING PROCEDURE, GI TRACT, INTRALUMINAL, VIA CAPSULE;  Surgeon: First Available Roman Luevano;  Location: [facility] ENDO;  Service: Endoscopy;  Laterality: N/A;    Tubal Lig      TUBAL LIGATION         Review of Systems       Objective:   LMP 12/01/2020 (Approximate)     X-Ray Foot Complete Left  Narrative: EXAM: XR FOOT COMPLETE 3 VIEW LEFT    CLINICAL HISTORY: Pain    COMPARISON: 09/18/2024    TECHNIQUE:  3 standard views of the left foot were performed.    FINDINGS: No fracture, dislocation or radiopaque foreign bodies are identified.  No significant soft tissue swelling is visible.  A mild hallux valgus deformity is visible at the first MTP joint.  Impression: 1.  No evidence of acute or recent injury.  2.  Mild hallux valgus deformity.    Finalized on: 9/25/2024 3:41 PM By:  Vitaliy Gomez  RG# [id number]      2024-09-25 15:43:08.475    [facility]       Physical Exam  LOWER EXTREMITY PHYSICAL EXAMINATION    ORTHOPEDIC:  H there is no pain on palpation of the 2nd metatarsal shaft.  No pain distally to the metatarsophalangeal joint.  No pain progressing proximally along the metatarsal shaft and base.  Plantar flexion versus resistance shows no pain or discomfort.    Assessment:     1. Left foot pain    2. Osteopenia, unspecified location    3. Rheumatoid arthritis involving multiple sites with positive rheumatoid factor          Plan:     Left foot pain    Osteopenia, unspecified location    Rheumatoid arthritis involving multiple sites with positive rheumatoid factor        Thorough discussion is had with the patient today, concerning the diagnosis, its etiology, and the treatment algorithm at present.    Discussed with the patient that as she is resolved with pain to the foot, would recommend transition to  a stiff-soled shoe for ambulation working.    Patient to follow-up as needed for new worsening pathology.          Future Appointments   Date Time Provider Department Center   10/28/2024 10:40 AM LABORATORY, [facility]   10/30/2024  3:00 PM Mei Ponce PA-C [facility] HEM [facility]   11/8/2024 11:00 AM LABORATORY, JERE GONZALES [facility] LAB [place]   11/14/2024  3:15 PM [facility]

## 2024-10-28 ENCOUNTER — LAB VISIT (OUTPATIENT)
Dept: LAB | Facility: HOSPITAL | Age: 54
End: 2024-10-28
Payer: COMMERCIAL

## 2024-10-28 DIAGNOSIS — D50.0 IRON DEFICIENCY ANEMIA DUE TO CHRONIC BLOOD LOSS: ICD-10-CM

## 2024-10-28 LAB
ALBUMIN SERPL BCP-MCNC: 4.5 G/DL (ref 3.5–5.2)
ALP SERPL-CCNC: 67 U/L (ref 40–150)
ALT SERPL W/O P-5'-P-CCNC: 30 U/L (ref 10–44)
ANION GAP SERPL CALC-SCNC: 8 MMOL/L (ref 8–16)
AST SERPL-CCNC: 25 U/L (ref 10–40)
BASOPHILS # BLD AUTO: 0.05 K/UL (ref 0–0.2)
BASOPHILS NFR BLD: 0.9 % (ref 0–1.9)
BILIRUB SERPL-MCNC: 0.4 MG/DL (ref 0.1–1)
BUN SERPL-MCNC: 9 MG/DL (ref 6–20)
CALCIUM SERPL-MCNC: 9.2 MG/DL (ref 8.7–10.5)
CHLORIDE SERPL-SCNC: 106 MMOL/L (ref 95–110)
CO2 SERPL-SCNC: 25 MMOL/L (ref 23–29)
CREAT SERPL-MCNC: 0.9 MG/DL (ref 0.5–1.4)
DIFFERENTIAL METHOD BLD: ABNORMAL
EOSINOPHIL # BLD AUTO: 0.1 K/UL (ref 0–0.5)
EOSINOPHIL NFR BLD: 0.9 % (ref 0–8)
ERYTHROCYTE [DISTWIDTH] IN BLOOD BY AUTOMATED COUNT: 13.3 % (ref 11.5–14.5)
EST. GFR  (NO RACE VARIABLE): >60 ML/MIN/1.73 M^2
FERRITIN SERPL-MCNC: 55 NG/ML (ref 20–300)
GLUCOSE SERPL-MCNC: 92 MG/DL (ref 70–110)
HCT VFR BLD AUTO: 36.2 % (ref 37–48.5)
HGB BLD-MCNC: 11.7 G/DL (ref 12–16)
IMM GRANULOCYTES # BLD AUTO: 0.03 K/UL (ref 0–0.04)
IMM GRANULOCYTES NFR BLD AUTO: 0.5 % (ref 0–0.5)
IRON SERPL-MCNC: 93 UG/DL (ref 30–160)
LYMPHOCYTES # BLD AUTO: 1.7 K/UL (ref 1–4.8)
LYMPHOCYTES NFR BLD: 29.3 % (ref 18–48)
MCH RBC QN AUTO: 32.1 PG (ref 27–31)
MCHC RBC AUTO-ENTMCNC: 32.3 G/DL (ref 32–36)
MCV RBC AUTO: 99 FL (ref 82–98)
MONOCYTES # BLD AUTO: 0.5 K/UL (ref 0.3–1)
MONOCYTES NFR BLD: 8.5 % (ref 4–15)
NEUTROPHILS # BLD AUTO: 3.4 K/UL (ref 1.8–7.7)
NEUTROPHILS NFR BLD: 59.9 % (ref 38–73)
NRBC BLD-RTO: 0 /100 WBC
PLATELET # BLD AUTO: 392 K/UL (ref 150–450)
PMV BLD AUTO: 9.6 FL (ref 9.2–12.9)
POTASSIUM SERPL-SCNC: 4 MMOL/L (ref 3.5–5.1)
PROT SERPL-MCNC: 7.6 G/DL (ref 6–8.4)
RBC # BLD AUTO: 3.65 M/UL (ref 4–5.4)
SATURATED IRON: 25 % (ref 20–50)
SODIUM SERPL-SCNC: 139 MMOL/L (ref 136–145)
TOTAL IRON BINDING CAPACITY: 377 UG/DL (ref 250–450)
TRANSFERRIN SERPL-MCNC: 255 MG/DL (ref 200–375)
WBC # BLD AUTO: 5.63 K/UL (ref 3.9–12.7)

## 2024-10-28 PROCEDURE — 82728 ASSAY OF FERRITIN: CPT

## 2024-10-28 PROCEDURE — 36415 COLL VENOUS BLD VENIPUNCTURE: CPT | Mod: PO

## 2024-10-28 PROCEDURE — 80053 COMPREHEN METABOLIC PANEL: CPT

## 2024-10-28 PROCEDURE — 83540 ASSAY OF IRON: CPT

## 2024-10-28 PROCEDURE — 85025 COMPLETE CBC W/AUTO DIFF WBC: CPT

## 2024-10-30 ENCOUNTER — OFFICE VISIT (OUTPATIENT)
Dept: HEMATOLOGY/ONCOLOGY | Facility: CLINIC | Age: 54
End: 2024-10-30
Payer: COMMERCIAL

## 2024-10-30 DIAGNOSIS — D50.0 IRON DEFICIENCY ANEMIA DUE TO CHRONIC BLOOD LOSS: Primary | ICD-10-CM

## 2024-10-30 DIAGNOSIS — D84.9 IMMUNOSUPPRESSED STATUS: ICD-10-CM

## 2024-10-30 PROCEDURE — 3044F HG A1C LEVEL LT 7.0%: CPT | Mod: CPTII,95,,

## 2024-10-30 PROCEDURE — 99214 OFFICE O/P EST MOD 30 MIN: CPT | Mod: 95,,,

## 2024-11-05 ENCOUNTER — TELEPHONE (OUTPATIENT)
Dept: RHEUMATOLOGY | Facility: CLINIC | Age: 54
End: 2024-11-05
Payer: COMMERCIAL

## 2024-11-08 ENCOUNTER — LAB VISIT (OUTPATIENT)
Dept: LAB | Facility: HOSPITAL | Age: 54
End: 2024-11-08
Attending: FAMILY MEDICINE
Payer: COMMERCIAL

## 2024-11-08 DIAGNOSIS — D84.821 DRUG-INDUCED IMMUNODEFICIENCY: ICD-10-CM

## 2024-11-08 DIAGNOSIS — D86.2 SARCOIDOSIS OF LUNG WITH SARCOIDOSIS OF LYMPH NODES: ICD-10-CM

## 2024-11-08 DIAGNOSIS — M05.79 RHEUMATOID ARTHRITIS INVOLVING MULTIPLE SITES WITH POSITIVE RHEUMATOID FACTOR: ICD-10-CM

## 2024-11-08 DIAGNOSIS — Z79.899 DRUG-INDUCED IMMUNODEFICIENCY: ICD-10-CM

## 2024-11-08 DIAGNOSIS — Z51.81 ENCOUNTER FOR MEDICATION MONITORING: ICD-10-CM

## 2024-11-08 LAB
ALBUMIN SERPL BCP-MCNC: 4.1 G/DL (ref 3.5–5.2)
ALP SERPL-CCNC: 70 U/L (ref 40–150)
ALT SERPL W/O P-5'-P-CCNC: 28 U/L (ref 10–44)
ANION GAP SERPL CALC-SCNC: 5 MMOL/L (ref 8–16)
AST SERPL-CCNC: 24 U/L (ref 10–40)
BASOPHILS # BLD AUTO: 0.04 K/UL (ref 0–0.2)
BASOPHILS NFR BLD: 0.5 % (ref 0–1.9)
BILIRUB SERPL-MCNC: 0.4 MG/DL (ref 0.1–1)
BUN SERPL-MCNC: 11 MG/DL (ref 6–20)
CALCIUM SERPL-MCNC: 8.6 MG/DL (ref 8.7–10.5)
CHLORIDE SERPL-SCNC: 107 MMOL/L (ref 95–110)
CO2 SERPL-SCNC: 26 MMOL/L (ref 23–29)
CREAT SERPL-MCNC: 0.9 MG/DL (ref 0.5–1.4)
CRP SERPL-MCNC: 0.4 MG/L (ref 0–8.2)
DIFFERENTIAL METHOD BLD: ABNORMAL
EOSINOPHIL # BLD AUTO: 0.1 K/UL (ref 0–0.5)
EOSINOPHIL NFR BLD: 0.8 % (ref 0–8)
ERYTHROCYTE [DISTWIDTH] IN BLOOD BY AUTOMATED COUNT: 13.5 % (ref 11.5–14.5)
ERYTHROCYTE [SEDIMENTATION RATE] IN BLOOD BY WESTERGREN METHOD: 5 MM/HR (ref 0–20)
EST. GFR  (NO RACE VARIABLE): >60 ML/MIN/1.73 M^2
GLUCOSE SERPL-MCNC: 92 MG/DL (ref 70–110)
HCT VFR BLD AUTO: 36.8 % (ref 37–48.5)
HGB BLD-MCNC: 12.2 G/DL (ref 12–16)
IMM GRANULOCYTES # BLD AUTO: 0.06 K/UL (ref 0–0.04)
IMM GRANULOCYTES NFR BLD AUTO: 0.8 % (ref 0–0.5)
LYMPHOCYTES # BLD AUTO: 1.6 K/UL (ref 1–4.8)
LYMPHOCYTES NFR BLD: 21.6 % (ref 18–48)
MCH RBC QN AUTO: 32.4 PG (ref 27–31)
MCHC RBC AUTO-ENTMCNC: 33.2 G/DL (ref 32–36)
MCV RBC AUTO: 98 FL (ref 82–98)
MONOCYTES # BLD AUTO: 0.6 K/UL (ref 0.3–1)
MONOCYTES NFR BLD: 7.6 % (ref 4–15)
NEUTROPHILS # BLD AUTO: 5.2 K/UL (ref 1.8–7.7)
NEUTROPHILS NFR BLD: 68.7 % (ref 38–73)
NRBC BLD-RTO: 0 /100 WBC
PLATELET # BLD AUTO: 322 K/UL (ref 150–450)
PMV BLD AUTO: 9.2 FL (ref 9.2–12.9)
POTASSIUM SERPL-SCNC: 4.1 MMOL/L (ref 3.5–5.1)
PROT SERPL-MCNC: 7.2 G/DL (ref 6–8.4)
RBC # BLD AUTO: 3.77 M/UL (ref 4–5.4)
SODIUM SERPL-SCNC: 138 MMOL/L (ref 136–145)
WBC # BLD AUTO: 7.61 K/UL (ref 3.9–12.7)

## 2024-11-08 PROCEDURE — 36415 COLL VENOUS BLD VENIPUNCTURE: CPT | Performed by: INTERNAL MEDICINE

## 2024-11-08 PROCEDURE — 85651 RBC SED RATE NONAUTOMATED: CPT | Performed by: INTERNAL MEDICINE

## 2024-11-08 PROCEDURE — 86140 C-REACTIVE PROTEIN: CPT | Performed by: INTERNAL MEDICINE

## 2024-11-08 PROCEDURE — 85025 COMPLETE CBC W/AUTO DIFF WBC: CPT | Performed by: INTERNAL MEDICINE

## 2024-11-08 PROCEDURE — 80053 COMPREHEN METABOLIC PANEL: CPT | Performed by: INTERNAL MEDICINE

## 2024-11-14 ENCOUNTER — OFFICE VISIT (OUTPATIENT)
Dept: RHEUMATOLOGY | Facility: CLINIC | Age: 54
End: 2024-11-14
Payer: COMMERCIAL

## 2024-11-14 DIAGNOSIS — D84.821 DRUG-INDUCED IMMUNODEFICIENCY: ICD-10-CM

## 2024-11-14 DIAGNOSIS — M05.79 RHEUMATOID ARTHRITIS INVOLVING MULTIPLE SITES WITH POSITIVE RHEUMATOID FACTOR: Primary | ICD-10-CM

## 2024-11-14 DIAGNOSIS — D86.2 SARCOIDOSIS OF LUNG WITH SARCOIDOSIS OF LYMPH NODES: ICD-10-CM

## 2024-11-14 DIAGNOSIS — Z51.81 ENCOUNTER FOR MEDICATION MONITORING: ICD-10-CM

## 2024-11-14 DIAGNOSIS — Z79.899 DRUG-INDUCED IMMUNODEFICIENCY: ICD-10-CM

## 2024-11-14 PROCEDURE — G2211 COMPLEX E/M VISIT ADD ON: HCPCS | Mod: 95,,, | Performed by: INTERNAL MEDICINE

## 2024-11-14 PROCEDURE — 1160F RVW MEDS BY RX/DR IN RCRD: CPT | Mod: CPTII,95,, | Performed by: INTERNAL MEDICINE

## 2024-11-14 PROCEDURE — 99214 OFFICE O/P EST MOD 30 MIN: CPT | Mod: 95,,, | Performed by: INTERNAL MEDICINE

## 2024-11-14 PROCEDURE — 1159F MED LIST DOCD IN RCRD: CPT | Mod: CPTII,95,, | Performed by: INTERNAL MEDICINE

## 2024-11-14 PROCEDURE — 3044F HG A1C LEVEL LT 7.0%: CPT | Mod: CPTII,95,, | Performed by: INTERNAL MEDICINE

## 2024-11-14 NOTE — PROGRESS NOTES
"  RHEUMATOLOGY FOLLOW UP - TELE VISIT     The patient location is: [place]  The chief complaint leading to consultation is: Arthritis follow up  Visit type: Virtual visit with synchronous audio and video  Total time spent with patient:  10 minutes  Each patient to whom he or she provides medical services by telemedicine is:  (1) informed of the relationship between the physician and patient and the respective role of any other health care provider with respect to management of the patient; and (2) notified that he or she may decline to receive medical services by telemedicine and may withdraw from such care at any time.    Chief complaints, HPI, ROS, EXAM, Assessment & Plans:-  Tammy Gray a 54 y.o. pleasant female seen today for follow-up visit.  Seropositive rheumatoid and biopsy-proven sarcoid on Rinvoq since June 2023.  Failed Humira and methotrexate.  Reports doing well today.  No significant pain.  No shortness of breath.  No dry cough.  No adverse effects to Rinvoq.  Rheumatological review of system negative otherwise.  100% fist formation bilateral hands..    1. Rheumatoid arthritis involving multiple sites with positive rheumatoid factor    2. Sarcoidosis of lung with sarcoidosis of lymph nodes    3. Drug-induced immunodeficiency    4. Encounter for medication monitoring      Problem List Items Addressed This Visit       Drug-induced immunodeficiency    Encounter for medication monitoring    Rheumatoid arthritis involving multiple sites with positive rheumatoid factor - Primary    Sarcoidosis of lung with sarcoidosis of lymph nodes    Overview     Lung Biopsy: LUNG, LEFT, "NODULE", CT-GUIDED BIOPSY:   - Focally necrotizing granulomatous inflammation   - Adjacent alveolated lung parenchyma with fibroelastotic changes and chronic   (lymphoplasmacytic) inflammation   - No evidence of acid-fast organisms or fungal elements on AFB and GMS   cytochemical stains, respectively   - No evidence of malignancy "     From 12/20/2022 Normal Pulmonary Function Studies:Spirometry is normal. Spirometry remains unimproved following bronchodilator. Lung volume determination shows TLC is normal with evidence air trapping is present. Airway mechanics show normal airway resistance and conductance. DLCO is normal. MVV is normal.            Latest Reference Range & Units 11/08/24 08:59   WBC 3.90 - 12.70 K/uL 7.61   RBC 4.00 - 5.40 M/uL 3.77 (L)   Hemoglobin 12.0 - 16.0 g/dL 12.2   Hematocrit 37.0 - 48.5 % 36.8 (L)   MCV 82 - 98 fL 98   MCH 27.0 - 31.0 pg 32.4 (H)   MCHC 32.0 - 36.0 g/dL 33.2   RDW 11.5 - 14.5 % 13.5   Platelet Count 150 - 450 K/uL 322   MPV 9.2 - 12.9 fL 9.2   Gran % 38.0 - 73.0 % 68.7   Lymph % 18.0 - 48.0 % 21.6   Mono % 4.0 - 15.0 % 7.6   Eos % 0.0 - 8.0 % 0.8   Basophil % 0.0 - 1.9 % 0.5   Immature Granulocytes 0.0 - 0.5 % 0.8 (H)   Gran # (ANC) 1.8 - 7.7 K/uL 5.2   Lymph # 1.0 - 4.8 K/uL 1.6   Mono # 0.3 - 1.0 K/uL 0.6   Eos # 0.0 - 0.5 K/uL 0.1   Baso # 0.00 - 0.20 K/uL 0.04   Immature Grans (Abs) 0.00 - 0.04 K/uL 0.06 (H)   nRBC 0 /100 WBC 0   Differential Method  Automated   Sed Rate 0 - 20 mm/Hr 5   Sodium 136 - 145 mmol/L 138   Potassium 3.5 - 5.1 mmol/L 4.1   Chloride 95 - 110 mmol/L 107   CO2 23 - 29 mmol/L 26   Anion Gap 8 - 16 mmol/L 5 (L)   BUN 6 - 20 mg/dL 11   Creatinine 0.5 - 1.4 mg/dL 0.9   eGFR >60 mL/min/1.73 m^2 >60   Glucose 70 - 110 mg/dL 92   Calcium 8.7 - 10.5 mg/dL 8.6 (L)   ALP 40 - 150 U/L 70   PROTEIN TOTAL 6.0 - 8.4 g/dL 7.2   Albumin 3.5 - 5.2 g/dL 4.1   BILIRUBIN TOTAL 0.1 - 1.0 mg/dL 0.4   AST 10 - 40 U/L 24   ALT 10 - 44 U/L 28   CRP 0.0 - 8.2 mg/L 0.4   (L): Data is abnormally low  (H): Data is abnormally high      Well controlled seropositive nonerosive rheumatoid on Rinvoq.  Continue Rinvoq daily.  Hold Rinvoq if any infection.  Continue follow-up with pulmonologist for pulmonary sarcoidosis.  No active symptoms.  MARIA GUADALUPE inhibition might work on her sarcoidosis as well.    Drug  induced immunodeficiency due to use of immunosuppressive drugs. Monitor carefully for infections. Advised to get immediate medical care if any infection. Also advised strict adherence to age appropriate vaccinations and cancer screenings with PCP.  Hold Rinvoq if any infection.  Safety labs every visit.  I have explained all of the above in detail and the patient understands all of the current recommendation(s). I have answered all questions to the best of my ability and to their complete satisfaction.   # Follow up in about 6 months (around 5/14/2025).      Past Medical History:   Diagnosis Date    RA (rheumatoid arthritis)        Past Surgical History:   Procedure Laterality Date    COLONOSCOPY N/A 02/01/2023    Procedure: COLONOSCOPY;  Surgeon: Ángel Valdes MD;  Location: [facility];  Service: Gastroenterology;  Laterality: N/A;    ESOPHAGOGASTRODUODENOSCOPY N/A 02/01/2023    Procedure: EGD (ESOPHAGOGASTRODUODENOSCOPY);  Surgeon: Ángel Valdes MD;  Location: [facility];  Service: Gastroenterology;  Laterality: N/A;    ESOPHAGOGASTRODUODENOSCOPY N/A 3/17/2023    Procedure: EGD (ESOPHAGOGASTRODUODENOSCOPY);  Surgeon: Ashley Stevenson MD;  Location: [facility];  Service: Endoscopy;  Laterality: N/A;    ESOPHAGOGASTRODUODENOSCOPY N/A 6/26/2023    Procedure: EGD (ESOPHAGOGASTRODUODENOSCOPY);  Surgeon: Ángel Valdes MD;  Location: [facility];  Service: Gastroenterology;  Laterality: N/A;    INTRALUMINAL GASTROINTESTINAL TRACT IMAGING VIA CAPSULE N/A 3/1/2023    Procedure: IMAGING PROCEDURE, GI TRACT, INTRALUMINAL, VIA CAPSULE;  Surgeon: First Available Curran;  Location: [facility];  Service: Endoscopy;  Laterality: N/A;    Tubal Lig      TUBAL LIGATION          Social History     Tobacco Use    Smoking status: Former     Types: Vaping with nicotine, Cigarettes     Start date: 1/1/2020     Passive exposure: Past    Smokeless tobacco: Never    Tobacco comments:     Pt states that she quit smoking  cigarettes 1 year ago, and stopped using the vape 6 months ago.    Substance Use Topics    Alcohol use: Not Currently     Comment: I drank when i was younger but can no longer take the smell    Drug use: Never     Types: Marijuana       Family History   Problem Relation Name Age of Onset    Diabetes Sister Machelle Younger     Cancer Sister Machelle Younger     Breast cancer Sister Machelle Younger     Asthma Sister Machelle Younger     Diabetes Brother Melinda Gann     Breast cancer Other maternal great grandmoth     Alcohol abuse Mother Jeanette Grey     Asthma Mother Jeanette Grey     COPD Mother Jeanette Grey     Depression Mother Jeanette Grey     Heart disease Mother Jeanette Grey        Review of patient's allergies indicates:   Allergen Reactions    Nsaids (non-steroidal anti-inflammatory drug) Other (See Comments)     Gastric ulcer       Medication List with Changes/Refills   Current Medications    ACETAMINOPHEN (TYLENOL) 650 MG TBSR    Take 650 mg by mouth every 8 (eight) hours.    DIPHENHYDRAMINE-ACETAMINOPHEN (TYLENOL PM)  MG TAB    Take 1 tablet by mouth nightly as needed.    OMEPRAZOLE (PRILOSEC) 40 MG CAPSULE    Take 1 capsule (40 mg total) by mouth once daily.    TRAMADOL (ULTRAM) 50 MG TABLET    Take 1 tablet (50 mg total) by mouth every 12 (twelve) hours as needed for Pain.    UPADACITINIB (RINVOQ) 15 MG 24 HR TABLET    TAKE 1 TABLET DAILY       Disclaimer: This note was prepared using voice recognition system and is likely to have sound alike errors and is not proof read.  Please call me with any questions.

## 2025-02-16 ENCOUNTER — PATIENT MESSAGE (OUTPATIENT)
Dept: INTERNAL MEDICINE | Facility: CLINIC | Age: 55
End: 2025-02-16
Payer: COMMERCIAL

## 2025-02-17 ENCOUNTER — PATIENT MESSAGE (OUTPATIENT)
Dept: PULMONOLOGY | Facility: CLINIC | Age: 55
End: 2025-02-17
Payer: COMMERCIAL

## 2025-02-20 ENCOUNTER — OFFICE VISIT (OUTPATIENT)
Dept: PULMONOLOGY | Facility: CLINIC | Age: 55
End: 2025-02-20
Payer: COMMERCIAL

## 2025-02-20 VITALS
SYSTOLIC BLOOD PRESSURE: 122 MMHG | HEIGHT: 67 IN | HEART RATE: 84 BPM | BODY MASS INDEX: 28.13 KG/M2 | OXYGEN SATURATION: 98 % | DIASTOLIC BLOOD PRESSURE: 72 MMHG | RESPIRATION RATE: 18 BRPM | WEIGHT: 179.25 LBS

## 2025-02-20 DIAGNOSIS — D86.2 SARCOIDOSIS OF LUNG WITH SARCOIDOSIS OF LYMPH NODES: ICD-10-CM

## 2025-02-20 DIAGNOSIS — J84.9 INTERSTITIAL PULMONARY DISEASE, UNSPECIFIED: ICD-10-CM

## 2025-02-20 DIAGNOSIS — G47.30 SLEEP-DISORDERED BREATHING: Primary | ICD-10-CM

## 2025-02-20 NOTE — PROGRESS NOTES
Subjective:       Patient ID: Tammy Aguero is a 54 y.o. female.    Chief Complaint: Snoring    2/20/25  Established patient history of sarcoidosis, currently stable and asymptomatic, followed by Dr. 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  (>25 is overweight, >30 is obese)    Blood Pressure = normal blood pressure  (PreHTN 120-139/80-89, Stg1 140-159/90-99, Stg2 >160/>100)  Cedar = 2 of three CRICKET categories are positive (high risk is 2-3 positive categories)     Magnolia Sleepiness Scale TOTAL =   15  (validated sleepiness questionnaire with a higher score indicating greater sleepiness; range 0-24)      2/19/2025   EPWORTH SLEEPINESS SCALE   Sitting and reading 3   Watching TV 3   Sitting, inactive in a public place (e.g. a theatre or a meeting) 1   As a passenger in a car for an hour without a break 2   Lying down to rest in the afternoon when circumstances permit 3   Sitting and talking to someone 0   Sitting quietly after a lunch without alcohol 3   In a car, while stopped for a few minutes in traffic 0   Total score 15        Patient-reported         STOP-Bang Questionnaire (validated CIRCKET screening questionnaire)  Negative unless checked off.  [x] Snoring    [x]  Tired/Fatigued/Sleepy  [] Obstruction (apneas/choking)  [] Pressure (HTN)  [] BMI >35  [x] Age >50  [x] Neck >40 cm  [] Gender male   STOP-Bang = 4 (low risk 0-2,high risk 3-8)      Immunization History   Administered Date(s) Administered    COVID-19,  "vector-nr, rS-Ad26, PF (Jagdish) 05/26/2021    DTP 02/10/1973, 04/01/1977, 10/01/1979    Hepatitis B, Adult 02/17/2005, 07/25/2006    MMR 02/07/1977, 02/17/2005    OPV 02/10/1973, 04/01/1979, 10/01/1979, 02/06/1981    Pneumococcal Conjugate - 20 Valent 06/12/2023    Td (ADULT) 02/06/1981, 02/17/2005    Zoster Recombinant 06/12/2023, 09/14/2023      Tobacco Use: Medium Risk (2/20/2025)    Patient History     Smoking Tobacco Use: Former     Smokeless Tobacco Use: Never     Passive Exposure: Past      Past Medical History:   Diagnosis Date    RA (rheumatoid arthritis)       Medications Ordered Prior to Encounter[1]     Review of Systems   Constitutional:  Negative for fever, weight loss, appetite change, fatigue and weakness.   HENT:  Negative for postnasal drip, rhinorrhea, sinus pressure, trouble swallowing and congestion.    Respiratory:  Positive for snoring and somnolence. Negative for cough, sputum production, choking, chest tightness, shortness of breath, wheezing and dyspnea on extertion.    Cardiovascular:  Negative for chest pain and leg swelling.   Musculoskeletal:  Negative for arthralgias, gait problem and joint swelling.   Gastrointestinal:  Negative for nausea, vomiting and abdominal pain.   Neurological:  Negative for dizziness, weakness and headaches.   All other systems reviewed and are negative.      Objective:       Vitals:    02/20/25 1501   BP: 122/72   Patient Position: Sitting   Pulse: 84   Resp: 18   SpO2: 98%   Weight: 81.3 kg (179 lb 3.7 oz)   Height: 5' 7" (1.702 m)       Physical Exam   Constitutional: She is oriented to person, place, and time. She appears well-developed and well-nourished. No distress.   HENT:   Head: Normocephalic.   Mouth/Throat: Oropharynx is clear and moist. Mallampati Score: IV.   Cardiovascular: Normal rate and regular rhythm.   Pulmonary/Chest: Effort normal. No respiratory distress. She has no wheezes. She has no rhonchi. She has no rales.   Musculoskeletal:       " "  General: No edema.      Cervical back: Normal range of motion and neck supple.   Neurological: She is alert and oriented to person, place, and time. Gait normal.   Skin: Skin is warm and dry.   Psychiatric: She has a normal mood and affect.   Vitals reviewed.    Personal Diagnostic Review            2/20/2025     3:01 PM 9/18/2024    11:29 AM 8/1/2024     9:08 AM 4/26/2024     9:08 AM 2/23/2024    11:32 AM 12/11/2023     2:52 PM 10/10/2023     7:45 AM   Pulmonary Function Tests   SpO2 98 % 98 % 99 % 97 % 96 % 98 % 100 %   Height 5' 7" (1.702 m) 5' 7" (1.702 m) 5' 7" (1.702 m) 5' 7" (1.702 m) 5' 7" (1.702 m) 5' 7" (1.702 m)    Weight 81.3 kg (179 lb 3.7 oz) 79 kg (174 lb 2.6 oz) 77 kg (169 lb 12.1 oz) 71.7 kg (158 lb 2.9 oz) 72.2 kg (159 lb 2.8 oz) 73.9 kg (162 lb 13 oz)    BMI (Calculated) 28.1 27.3 26.6 24.8 24.9 25.5          Assessment/Plan:       Problem List Items Addressed This Visit          Pulmonary    Sarcoidosis of lung with sarcoidosis of lymph nodes    Stable, continue follow up plan per Dr. Cassidy         Interstitial pulmonary disease, unspecified       Other    Sleep-disordered breathing - Primary    Relevant Orders    Home Sleep Study       Follow up in about 4 weeks (around 3/20/2025) for review sleep study.    Discussed diagnosis, its evaluation, treatment and usual course. All questions answered.    Patient verbalized understanding of plan and left in no acute distress    Thank you for the courtesy of participating in the care of this patient    ROMAN GranadosB[facility]         [1]   Current Outpatient Medications on File Prior to Visit   Medication Sig Dispense Refill    acetaminophen (TYLENOL) 650 MG TbSR Take 650 mg by mouth every 8 (eight) hours.      diphenhydrAMINE-acetaminophen (TYLENOL PM)  mg Tab Take 1 tablet by mouth nightly as needed.      omeprazole (PRILOSEC) 40 MG capsule Take 1 capsule (40 mg total) by mouth once daily. 30 capsule 5    upadacitinib " (RINVOQ) 15 mg 24 hr tablet TAKE 1 TABLET DAILY 90 tablet 3     No current facility-administered medications on file prior to visit.

## 2025-02-21 ENCOUNTER — TELEPHONE (OUTPATIENT)
Dept: SLEEP MEDICINE | Facility: CLINIC | Age: 55
End: 2025-02-21
Payer: COMMERCIAL

## 2025-02-21 NOTE — TELEPHONE ENCOUNTER
----- Message from Maxwell Barahona sent at 2/21/2025  6:44 AM CST -----  [facility]

## 2025-03-06 ENCOUNTER — PATIENT MESSAGE (OUTPATIENT)
Dept: PULMONOLOGY | Facility: CLINIC | Age: 55
End: 2025-03-06
Payer: COMMERCIAL

## 2025-03-24 ENCOUNTER — HOSPITAL ENCOUNTER (OUTPATIENT)
Dept: SLEEP MEDICINE | Facility: HOSPITAL | Age: 55
Discharge: HOME | End: 2025-03-24
Attending: PHYSICIAN ASSISTANT
Payer: COMMERCIAL

## 2025-03-24 DIAGNOSIS — G47.33 OSA (OBSTRUCTIVE SLEEP APNEA): Primary | ICD-10-CM

## 2025-03-24 DIAGNOSIS — G47.30 SLEEP-DISORDERED BREATHING: ICD-10-CM

## 2025-03-24 PROCEDURE — 95806 SLEEP STUDY UNATT&RESP EFFT: CPT | Performed by: INTERNAL MEDICINE

## 2025-03-25 NOTE — PROCEDURES
PHYSICIAN INTERPRETATION AND COMMENTS: Findings are consistent with severe, non-positional obstructive sleep  apnea(CRICKET), with indications of respiratory control instability. Indications of cardiac dysrhythmia are recorded.  Correlation with Cardiology evaluation. In-lab titration  CLINICAL HISTORY: 54 year old female presented with: 14 inch neck, BMI of 28, an Rosine sleepiness score of 14, no comorbidities  and symptoms of nocturnal snoring, waking up choking and witnessed apneas. Based on the clinical history,  the patient has a high pre-test probability of having Mild CRICKET.  SLEEP STUDY FINDINGS: Patient underwent a 2 night Home Sleep Test and by behavioral criteria, slept for approximately  14.4 hours , with a sleep efficiency of 95.2%. Severe sleep disordered breathing (AHI=36) is noted based on a 4% hypopnea  desaturation criteria, with indications of respiratory control instability. The patient slept supine 27.1% of the night based  on valid recording time of 14.51 hours. The apneas/hypopneas are accompanied by minimal oxygen desaturation (percent  time below 90% SpO2: 0%, Min SpO2: 84.1%). The average desaturation across all sleep disordered breathing events is 3.7%.  Snoring occurs for 5.3% (30 dB) of the study, 2.6% is very loud. The mean pulse rate is 74.2 BPM, with very frequent pulse  rate variability (71 events with >= 6 BPM increase/decrease per hour).  TREATMENT CONSIDERATIONS: Consider an attended CPAP titration study. Presence of respiratory control instability  should be evaluated by a sleep specialist prior to initiating positive pressure treatment. Consider nasal continuous  positive airway pressure for Severe obstructive sleep apnea.  DISEASE MANAGEMENT CONSIDERATIONS: Irregularity of the pulse rate signals indicates possible cardiac dysrhythmia. If  clinically appropriate, further cardiac evaluation is suggested. Morning headaches are symptoms that can be associated  with untreated  "CRICKET      Dear Nancy Lemon, PASCOT  [address] [place] [address]/Maria Teresa Aparicio MD         The sleep study that you ordered is complete.  You have ordered sleep LAB services to perform the sleep study for Tammy Aguero .      Please find Sleep Study result in  the "Media tab" of Chart Review menu.        You can look  for the report in the  Media by the document type "Sleep Study Documents". Alphabetizing  "Document type" column helps to find the SLEEP STUDY report  Faster.       As the ordering provider, you are responsible for reviewing the results and implementing a treatment plan with your patient.    If you need a Sleep Medicine provider to explain the sleep study findings and arrange treatment for the patient, please refer patient for consultation to our Sleep Clinic via [facility] with Ambulatory Consult Sleep.     To do that please place an order for an  "Ambulatory Consult Sleep" -  order , it will go to our clinic work queue for our staff  to contact the patient for an appointment.      For any questions, please contact our sleep lab  staff at [phone] to talk to clinical staff          Buster Lynn MD    "

## 2025-04-02 ENCOUNTER — PATIENT MESSAGE (OUTPATIENT)
Dept: INTERNAL MEDICINE | Facility: CLINIC | Age: 55
End: 2025-04-02
Payer: COMMERCIAL

## 2025-04-03 ENCOUNTER — PATIENT MESSAGE (OUTPATIENT)
Dept: RHEUMATOLOGY | Facility: CLINIC | Age: 55
End: 2025-04-03
Payer: COMMERCIAL

## 2025-04-03 ENCOUNTER — OFFICE VISIT (OUTPATIENT)
Dept: PULMONOLOGY | Facility: CLINIC | Age: 55
End: 2025-04-03
Payer: COMMERCIAL

## 2025-04-03 VITALS
WEIGHT: 184.5 LBS | OXYGEN SATURATION: 95 % | HEIGHT: 67 IN | DIASTOLIC BLOOD PRESSURE: 72 MMHG | BODY MASS INDEX: 28.96 KG/M2 | SYSTOLIC BLOOD PRESSURE: 126 MMHG | RESPIRATION RATE: 18 BRPM | HEART RATE: 87 BPM

## 2025-04-03 DIAGNOSIS — G47.33 OSA (OBSTRUCTIVE SLEEP APNEA): Primary | ICD-10-CM

## 2025-04-03 DIAGNOSIS — M05.79 RHEUMATOID ARTHRITIS INVOLVING MULTIPLE SITES WITH POSITIVE RHEUMATOID FACTOR: ICD-10-CM

## 2025-04-03 DIAGNOSIS — D86.2 SARCOIDOSIS OF LUNG WITH SARCOIDOSIS OF LYMPH NODES: ICD-10-CM

## 2025-04-03 DIAGNOSIS — M05.79 RHEUMATOID ARTHRITIS INVOLVING MULTIPLE SITES WITH POSITIVE RHEUMATOID FACTOR: Primary | ICD-10-CM

## 2025-04-03 PROCEDURE — 3078F DIAST BP <80 MM HG: CPT | Mod: CPTII,S$GLB,, | Performed by: PHYSICIAN ASSISTANT

## 2025-04-03 PROCEDURE — 99999 PR PBB SHADOW E&M-EST. PATIENT-LVL III: CPT | Mod: PBBFAC,,, | Performed by: PHYSICIAN ASSISTANT

## 2025-04-03 PROCEDURE — 1159F MED LIST DOCD IN RCRD: CPT | Mod: CPTII,S$GLB,, | Performed by: PHYSICIAN ASSISTANT

## 2025-04-03 PROCEDURE — 3074F SYST BP LT 130 MM HG: CPT | Mod: CPTII,S$GLB,, | Performed by: PHYSICIAN ASSISTANT

## 2025-04-03 PROCEDURE — 3008F BODY MASS INDEX DOCD: CPT | Mod: CPTII,S$GLB,, | Performed by: PHYSICIAN ASSISTANT

## 2025-04-03 PROCEDURE — 99213 OFFICE O/P EST LOW 20 MIN: CPT | Mod: S$GLB,,, | Performed by: PHYSICIAN ASSISTANT

## 2025-04-03 PROCEDURE — 1160F RVW MEDS BY RX/DR IN RCRD: CPT | Mod: CPTII,S$GLB,, | Performed by: PHYSICIAN ASSISTANT

## 2025-04-03 RX ORDER — METHYLPREDNISOLONE 4 MG/1
TABLET ORAL
Qty: 21 TABLET | Refills: 0 | Status: SHIPPED | OUTPATIENT
Start: 2025-04-03

## 2025-04-03 NOTE — PROGRESS NOTES
Subjective:       Patient ID: Tammy Aguero is a 54 y.o. female.    Chief Complaint: CRICKET    4/3/25  Established patient here for CRICKET follow up    Completed home sleep study:  Severe sleep disordered breathing (AHI=36) is noted based on a 4% hypopnea  desaturation criteria, with indications of respiratory control instability. The patient slept supine 27.1% of the night based  on valid recording time of 14.51 hours. The apneas/hypopneas are accompanied by minimal oxygen desaturation (percent  time below 90% SpO2: 0%, Min SpO2: 84.1%). The average desaturation across all sleep disordered breathing events is 3.7%.  Snoring occurs for 5.3% (30 dB) of the study, 2.6% is very loud. The mean pulse rate is 74.2 BPM, with very frequent pulse  rate variability (71 events with >= 6 BPM increase/decrease per hour).  TREATMENT CONSIDERATIONS: Consider an attended CPAP titration study. Presence of respiratory control instability  should be evaluated by a sleep specialist prior to initiating positive pressure treatment. Consider nasal continuous  positive airway pressure for Severe obstructive sleep apnea.    She is not sure about CPAP but willing to do titration study        2/20/25  Established patient history of sarcoidosis, currently stable and asymptomatic, followed by Dr. 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  (>25 is overweight, >30 is obese)    Blood Pressure = normal blood pressure  (PreHTN 120-139/80-89, Stg1 140-159/90-99,  Stg2 >160/>100)  [place] = 2 of three CRICKET categories are positive (high risk is 2-3 positive categories)     [place] Sleepiness Scale TOTAL =   15  (validated sleepiness questionnaire with a higher score indicating greater sleepiness; range 0-24)      3/27/2025   EPWORTH SLEEPINESS SCALE   Sitting and reading 2   Watching TV 3   Sitting, inactive in a public place (e.g. a theatre or a meeting) 1   As a passenger in a car for an hour without a break 2   Lying down to rest in the afternoon when circumstances permit 3   Sitting and talking to someone 1   Sitting quietly after a lunch without alcohol 2   In a car, while stopped for a few minutes in traffic 0   Total score 14        Patient-reported         STOP-Bang Questionnaire (validated CRICKET screening questionnaire)  Negative unless checked off.  [x] Snoring    [x]  Tired/Fatigued/Sleepy  [] Obstruction (apneas/choking)  [] Pressure (HTN)  [] BMI >35  [x] Age >50  [x] Neck >40 cm  [] Gender male   STOP-Bang = 4 (low risk 0-2,high risk 3-8)      Immunization History   Administered Date(s) Administered    COVID-19, vector-nr, rS-Ad26, PF (Jagdish) 05/26/2021    DTP 02/10/1973, 04/01/1977, 10/01/1979    Hepatitis B, Adult 02/17/2005, 07/25/2006    MMR 02/07/1977, 02/17/2005    OPV 02/10/1973, 04/01/1979, 10/01/1979, 02/06/1981    Pneumococcal Conjugate - 20 Valent 06/12/2023    Td (ADULT) 02/06/1981, 02/17/2005    Zoster Recombinant 06/12/2023, 09/14/2023      Tobacco Use: Medium Risk (4/3/2025)    Patient History     Smoking Tobacco Use: Former     Smokeless Tobacco Use: Never     Passive Exposure: Past      Past Medical History:   Diagnosis Date    RA (rheumatoid arthritis)       Medications Ordered Prior to Encounter[1]     Review of Systems   Constitutional:  Negative for fever, weight loss, appetite change, fatigue and weakness.   HENT:  Negative for postnasal drip, rhinorrhea, sinus pressure, trouble swallowing and congestion.    Respiratory:  Positive for snoring  "and somnolence. Negative for cough, sputum production, choking, chest tightness, shortness of breath, wheezing and dyspnea on extertion.    Cardiovascular:  Negative for chest pain and leg swelling.   Musculoskeletal:  Negative for arthralgias, gait problem and joint swelling.   Gastrointestinal:  Negative for nausea, vomiting and abdominal pain.   Neurological:  Negative for dizziness, weakness and headaches.   All other systems reviewed and are negative.      Objective:       Vitals:    04/03/25 1425   BP: 126/72   Patient Position: Sitting   Pulse: 87   Resp: 18   SpO2: 95%   Weight: 83.7 kg (184 lb 8.4 oz)   Height: 5' 7" (1.702 m)       Physical Exam   Constitutional: She is oriented to person, place, and time. She appears well-developed and well-nourished. No distress.   HENT:   Head: Normocephalic.   Mouth/Throat: Oropharynx is clear and moist. Mallampati Score: IV.   Cardiovascular: Normal rate and regular rhythm.   Pulmonary/Chest: Effort normal. No respiratory distress. She has no wheezes. She has no rhonchi. She has no rales.   Musculoskeletal:         General: No edema.      Cervical back: Normal range of motion and neck supple.   Neurological: She is alert and oriented to person, place, and time. Gait normal.   Skin: Skin is warm and dry.   Psychiatric: She has a normal mood and affect.   Vitals reviewed.    Personal Diagnostic Review            4/3/2025     2:25 PM 2/20/2025     3:01 PM 9/18/2024    11:29 AM 8/1/2024     9:08 AM 4/26/2024     9:08 AM 2/23/2024    11:32 AM 12/11/2023     2:52 PM   Pulmonary Function Tests   SpO2 95 % 98 % 98 % 99 % 97 % 96 % 98 %   Height 5' 7" (1.702 m) 5' 7" (1.702 m) 5' 7" (1.702 m) 5' 7" (1.702 m) 5' 7" (1.702 m) 5' 7" (1.702 m) 5' 7" (1.702 m)   Weight 83.7 kg (184 lb 8.4 oz) 81.3 kg (179 lb 3.7 oz) 79 kg (174 lb 2.6 oz) 77 kg (169 lb 12.1 oz) 71.7 kg (158 lb 2.9 oz) 72.2 kg (159 lb 2.8 oz) 73.9 kg (162 lb 13 oz)   BMI (Calculated) 28.9 28.1 27.3 26.6 24.8 24.9 " 25.5         Assessment/Plan:       Problem List Items Addressed This Visit          Pulmonary    Sarcoidosis of lung with sarcoidosis of lymph nodes    Stable, continue follow up plan per Dr. Cassidy            Immunology/Multi System    Rheumatoid arthritis involving multiple sites with positive rheumatoid factor    -On humara  -followed by Dr. Guajardo OP            Other    CRICKET (obstructive sleep apnea) - Primary    Severe CRICKET  Agrees to CPAP titration study   Follow up after study to review and further plan         Relevant Orders    BiPAP/CPAP Titration ((Must have dx of CRICKET from previous sleep study)         Discussed diagnosis, its evaluation, treatment and usual course. All questions answered.    Patient verbalized understanding of plan and left in no acute distress    Thank you for the courtesy of participating in the care of this patient    Nancy Lemon PA-C  [facility]           [1]   Current Outpatient Medications on File Prior to Visit   Medication Sig Dispense Refill    acetaminophen (TYLENOL) 650 MG TbSR Take 650 mg by mouth every 8 (eight) hours.      diphenhydrAMINE-acetaminophen (TYLENOL PM)  mg Tab Take 1 tablet by mouth nightly as needed.      omeprazole (PRILOSEC) 40 MG capsule Take 1 capsule (40 mg total) by mouth once daily. 30 capsule 5    upadacitinib (RINVOQ) 15 mg 24 hr tablet TAKE 1 TABLET DAILY 90 tablet 3     No current facility-administered medications on file prior to visit.

## 2025-04-21 DIAGNOSIS — K25.0 ACUTE GASTRIC ULCER WITH HEMORRHAGE: ICD-10-CM

## 2025-04-22 RX ORDER — OMEPRAZOLE 40 MG/1
40 CAPSULE, DELAYED RELEASE ORAL
Qty: 90 CAPSULE | Refills: 1 | Status: SHIPPED | OUTPATIENT
Start: 2025-04-22

## 2025-04-22 NOTE — TELEPHONE ENCOUNTER
Refill Routing Note   Medication(s) are not appropriate for processing by [facility] for the following reason(s):        No active prescription written by provider    [facility] action(s):  Defer             Appointments  past 12m or future 3m with PCP    Date Provider   Last Visit   8/14/2024 Maria Teresa Aparicio MD   Next Visit   Visit date not found Maria Teresa Aparicio MD   ED visits in past 90 days: 0        Note composed:9:02 PM 04/21/2025

## 2025-04-22 NOTE — TELEPHONE ENCOUNTER
No care due was identified.  [facility] Embedded Care Due Messages. Reference number: [id number].   4/21/2025 8:45:41 PM CDT

## 2025-05-15 ENCOUNTER — PATIENT MESSAGE (OUTPATIENT)
Dept: RHEUMATOLOGY | Facility: CLINIC | Age: 55
End: 2025-05-15
Payer: COMMERCIAL

## 2025-05-15 ENCOUNTER — PATIENT MESSAGE (OUTPATIENT)
Dept: PULMONOLOGY | Facility: CLINIC | Age: 55
End: 2025-05-15
Payer: COMMERCIAL

## 2025-05-15 ENCOUNTER — PATIENT MESSAGE (OUTPATIENT)
Dept: GASTROENTEROLOGY | Facility: CLINIC | Age: 55
End: 2025-05-15
Payer: COMMERCIAL

## 2025-05-15 ENCOUNTER — PATIENT MESSAGE (OUTPATIENT)
Dept: HEMATOLOGY/ONCOLOGY | Facility: CLINIC | Age: 55
End: 2025-05-15
Payer: COMMERCIAL

## 2025-05-16 ENCOUNTER — LAB VISIT (OUTPATIENT)
Dept: LAB | Facility: HOSPITAL | Age: 55
End: 2025-05-16
Attending: OBSTETRICS & GYNECOLOGY
Payer: COMMERCIAL

## 2025-05-16 DIAGNOSIS — D84.9 IMMUNOSUPPRESSED STATUS: ICD-10-CM

## 2025-05-16 DIAGNOSIS — Z79.899 DRUG-INDUCED IMMUNODEFICIENCY: ICD-10-CM

## 2025-05-16 DIAGNOSIS — M05.79 RHEUMATOID ARTHRITIS INVOLVING MULTIPLE SITES WITH POSITIVE RHEUMATOID FACTOR: ICD-10-CM

## 2025-05-16 DIAGNOSIS — D86.2 SARCOIDOSIS OF LUNG WITH SARCOIDOSIS OF LYMPH NODES: ICD-10-CM

## 2025-05-16 DIAGNOSIS — D84.821 DRUG-INDUCED IMMUNODEFICIENCY: ICD-10-CM

## 2025-05-16 DIAGNOSIS — Z51.81 ENCOUNTER FOR MEDICATION MONITORING: ICD-10-CM

## 2025-05-16 LAB
ABSOLUTE EOSINOPHIL (OHS): 0.11 K/UL
ABSOLUTE MONOCYTE (OHS): 0.5 K/UL (ref 0.3–1)
ABSOLUTE NEUTROPHIL COUNT (OHS): 4.4 K/UL (ref 1.8–7.7)
ALBUMIN SERPL BCP-MCNC: 4 G/DL (ref 3.5–5.2)
ALP SERPL-CCNC: 88 UNIT/L (ref 40–150)
ALT SERPL W/O P-5'-P-CCNC: 66 UNIT/L (ref 10–44)
ANION GAP (OHS): 5 MMOL/L (ref 8–16)
AST SERPL-CCNC: 44 UNIT/L (ref 11–45)
BASOPHILS # BLD AUTO: 0.05 K/UL
BASOPHILS NFR BLD AUTO: 0.8 %
BILIRUB SERPL-MCNC: 0.4 MG/DL (ref 0.1–1)
BUN SERPL-MCNC: 16 MG/DL (ref 6–20)
CALCIUM SERPL-MCNC: 8.8 MG/DL (ref 8.7–10.5)
CHLORIDE SERPL-SCNC: 111 MMOL/L (ref 95–110)
CO2 SERPL-SCNC: 23 MMOL/L (ref 23–29)
CREAT SERPL-MCNC: 0.9 MG/DL (ref 0.5–1.4)
CRP SERPL-MCNC: 0.9 MG/L
ERYTHROCYTE [DISTWIDTH] IN BLOOD BY AUTOMATED COUNT: 14.4 % (ref 11.5–14.5)
ERYTHROCYTE [SEDIMENTATION RATE] IN BLOOD: 10 MM/HR
GFR SERPLBLD CREATININE-BSD FMLA CKD-EPI: >60 ML/MIN/1.73/M2
GLUCOSE SERPL-MCNC: 93 MG/DL (ref 70–110)
HCT VFR BLD AUTO: 35 % (ref 37–48.5)
HGB BLD-MCNC: 11.2 GM/DL (ref 12–16)
IMM GRANULOCYTES # BLD AUTO: 0.03 K/UL (ref 0–0.04)
IMM GRANULOCYTES NFR BLD AUTO: 0.5 % (ref 0–0.5)
LYMPHOCYTES # BLD AUTO: 1.56 K/UL (ref 1–4.8)
MCH RBC QN AUTO: 30.4 PG (ref 27–31)
MCHC RBC AUTO-ENTMCNC: 32 G/DL (ref 32–36)
MCV RBC AUTO: 95 FL (ref 82–98)
NUCLEATED RBC (/100WBC) (OHS): 0 /100 WBC
PLATELET # BLD AUTO: 302 K/UL (ref 150–450)
PMV BLD AUTO: 9.8 FL (ref 9.2–12.9)
POTASSIUM SERPL-SCNC: 3.9 MMOL/L (ref 3.5–5.1)
PROT SERPL-MCNC: 7.4 GM/DL (ref 6–8.4)
RBC # BLD AUTO: 3.69 M/UL (ref 4–5.4)
RELATIVE EOSINOPHIL (OHS): 1.7 %
RELATIVE LYMPHOCYTE (OHS): 23.5 % (ref 18–48)
RELATIVE MONOCYTE (OHS): 7.5 % (ref 4–15)
RELATIVE NEUTROPHIL (OHS): 66 % (ref 38–73)
SODIUM SERPL-SCNC: 139 MMOL/L (ref 136–145)
WBC # BLD AUTO: 6.65 K/UL (ref 3.9–12.7)

## 2025-05-16 PROCEDURE — 86140 C-REACTIVE PROTEIN: CPT

## 2025-05-16 PROCEDURE — 36415 COLL VENOUS BLD VENIPUNCTURE: CPT

## 2025-05-16 PROCEDURE — 85651 RBC SED RATE NONAUTOMATED: CPT

## 2025-05-16 PROCEDURE — 82040 ASSAY OF SERUM ALBUMIN: CPT

## 2025-05-16 PROCEDURE — 85025 COMPLETE CBC W/AUTO DIFF WBC: CPT

## 2025-05-19 ENCOUNTER — RESULTS FOLLOW-UP (OUTPATIENT)
Dept: RHEUMATOLOGY | Facility: CLINIC | Age: 55
End: 2025-05-19

## 2025-05-20 ENCOUNTER — PATIENT MESSAGE (OUTPATIENT)
Dept: INTERNAL MEDICINE | Facility: CLINIC | Age: 55
End: 2025-05-20
Payer: COMMERCIAL

## 2025-05-20 NOTE — PROGRESS NOTES
Your lab results requires no change in medications.  Continue medications as advised in the last visit. Please discuss with PCP regarding elevated liver enzyme.    Sincerely  Dr. CALDERON

## 2025-05-26 ENCOUNTER — OFFICE VISIT (OUTPATIENT)
Dept: INTERNAL MEDICINE | Facility: CLINIC | Age: 55
End: 2025-05-26
Payer: COMMERCIAL

## 2025-05-26 ENCOUNTER — LAB VISIT (OUTPATIENT)
Dept: LAB | Facility: HOSPITAL | Age: 55
End: 2025-05-26
Attending: FAMILY MEDICINE
Payer: COMMERCIAL

## 2025-05-26 VITALS
BODY MASS INDEX: 29 KG/M2 | TEMPERATURE: 97 F | WEIGHT: 184.75 LBS | HEIGHT: 67 IN | DIASTOLIC BLOOD PRESSURE: 76 MMHG | HEART RATE: 72 BPM | OXYGEN SATURATION: 99 % | SYSTOLIC BLOOD PRESSURE: 118 MMHG

## 2025-05-26 DIAGNOSIS — D50.9 IRON DEFICIENCY ANEMIA, UNSPECIFIED IRON DEFICIENCY ANEMIA TYPE: ICD-10-CM

## 2025-05-26 DIAGNOSIS — D84.821 DRUG-INDUCED IMMUNODEFICIENCY: ICD-10-CM

## 2025-05-26 DIAGNOSIS — M05.79 RHEUMATOID ARTHRITIS INVOLVING MULTIPLE SITES WITH POSITIVE RHEUMATOID FACTOR: ICD-10-CM

## 2025-05-26 DIAGNOSIS — J84.9 INTERSTITIAL PULMONARY DISEASE, UNSPECIFIED: ICD-10-CM

## 2025-05-26 DIAGNOSIS — R79.89 ELEVATED LIVER FUNCTION TESTS: ICD-10-CM

## 2025-05-26 DIAGNOSIS — Z79.899 DRUG-INDUCED IMMUNODEFICIENCY: ICD-10-CM

## 2025-05-26 DIAGNOSIS — Z12.31 SCREENING MAMMOGRAM, ENCOUNTER FOR: Primary | ICD-10-CM

## 2025-05-26 DIAGNOSIS — D86.2 SARCOIDOSIS OF LUNG WITH SARCOIDOSIS OF LYMPH NODES: ICD-10-CM

## 2025-05-26 LAB
ABSOLUTE EOSINOPHIL (OHS): 0.08 K/UL
ABSOLUTE MONOCYTE (OHS): 0.54 K/UL (ref 0.3–1)
ABSOLUTE NEUTROPHIL COUNT (OHS): 3.45 K/UL (ref 1.8–7.7)
ALBUMIN SERPL BCP-MCNC: 4.1 G/DL (ref 3.5–5.2)
ALP SERPL-CCNC: 93 UNIT/L (ref 40–150)
ALT SERPL W/O P-5'-P-CCNC: 72 UNIT/L (ref 10–44)
ANION GAP (OHS): 11 MMOL/L (ref 8–16)
AST SERPL-CCNC: 47 UNIT/L (ref 11–45)
BASOPHILS # BLD AUTO: 0.05 K/UL
BASOPHILS NFR BLD AUTO: 0.8 %
BILIRUB SERPL-MCNC: 0.3 MG/DL (ref 0.1–1)
BUN SERPL-MCNC: 9 MG/DL (ref 6–20)
CALCIUM SERPL-MCNC: 8.7 MG/DL (ref 8.7–10.5)
CHLORIDE SERPL-SCNC: 105 MMOL/L (ref 95–110)
CO2 SERPL-SCNC: 24 MMOL/L (ref 23–29)
CREAT SERPL-MCNC: 0.9 MG/DL (ref 0.5–1.4)
ERYTHROCYTE [DISTWIDTH] IN BLOOD BY AUTOMATED COUNT: 14.6 % (ref 11.5–14.5)
GFR SERPLBLD CREATININE-BSD FMLA CKD-EPI: >60 ML/MIN/1.73/M2
GLUCOSE SERPL-MCNC: 82 MG/DL (ref 70–110)
HCT VFR BLD AUTO: 37.5 % (ref 37–48.5)
HGB BLD-MCNC: 11.5 GM/DL (ref 12–16)
IMM GRANULOCYTES # BLD AUTO: 0.03 K/UL (ref 0–0.04)
IMM GRANULOCYTES NFR BLD AUTO: 0.5 % (ref 0–0.5)
LYMPHOCYTES # BLD AUTO: 2.15 K/UL (ref 1–4.8)
MCH RBC QN AUTO: 30 PG (ref 27–31)
MCHC RBC AUTO-ENTMCNC: 30.7 G/DL (ref 32–36)
MCV RBC AUTO: 98 FL (ref 82–98)
NUCLEATED RBC (/100WBC) (OHS): 0 /100 WBC
PLATELET # BLD AUTO: 369 K/UL (ref 150–450)
PMV BLD AUTO: 10.7 FL (ref 9.2–12.9)
POTASSIUM SERPL-SCNC: 4.1 MMOL/L (ref 3.5–5.1)
PROT SERPL-MCNC: 7.5 GM/DL (ref 6–8.4)
RBC # BLD AUTO: 3.83 M/UL (ref 4–5.4)
RELATIVE EOSINOPHIL (OHS): 1.3 %
RELATIVE LYMPHOCYTE (OHS): 34.1 % (ref 18–48)
RELATIVE MONOCYTE (OHS): 8.6 % (ref 4–15)
RELATIVE NEUTROPHIL (OHS): 54.7 % (ref 38–73)
SODIUM SERPL-SCNC: 140 MMOL/L (ref 136–145)
WBC # BLD AUTO: 6.3 K/UL (ref 3.9–12.7)

## 2025-05-26 PROCEDURE — 1159F MED LIST DOCD IN RCRD: CPT | Mod: CPTII,S$GLB,, | Performed by: FAMILY MEDICINE

## 2025-05-26 PROCEDURE — 1160F RVW MEDS BY RX/DR IN RCRD: CPT | Mod: CPTII,S$GLB,, | Performed by: FAMILY MEDICINE

## 2025-05-26 PROCEDURE — 3074F SYST BP LT 130 MM HG: CPT | Mod: CPTII,S$GLB,, | Performed by: FAMILY MEDICINE

## 2025-05-26 PROCEDURE — 99999 PR PBB SHADOW E&M-EST. PATIENT-LVL IV: CPT | Mod: PBBFAC,,, | Performed by: FAMILY MEDICINE

## 2025-05-26 PROCEDURE — 36415 COLL VENOUS BLD VENIPUNCTURE: CPT

## 2025-05-26 PROCEDURE — 80053 COMPREHEN METABOLIC PANEL: CPT

## 2025-05-26 PROCEDURE — 3008F BODY MASS INDEX DOCD: CPT | Mod: CPTII,S$GLB,, | Performed by: FAMILY MEDICINE

## 2025-05-26 PROCEDURE — 85025 COMPLETE CBC W/AUTO DIFF WBC: CPT

## 2025-05-26 PROCEDURE — 3078F DIAST BP <80 MM HG: CPT | Mod: CPTII,S$GLB,, | Performed by: FAMILY MEDICINE

## 2025-05-26 PROCEDURE — G2211 COMPLEX E/M VISIT ADD ON: HCPCS | Mod: S$GLB,,, | Performed by: FAMILY MEDICINE

## 2025-05-26 PROCEDURE — 99214 OFFICE O/P EST MOD 30 MIN: CPT | Mod: S$GLB,,, | Performed by: FAMILY MEDICINE

## 2025-05-26 NOTE — PROGRESS NOTES
Tammy Aguero  05/26/2025  [id number]    Maria Teresa Aparicio MD  Patient Care Team:  Maria Teresa Aparicio MD as PCP - General (Family Medicine)          Visit Type:a scheduled routine follow-up visit    Chief Complaint:  Chief Complaint   Patient presents with    Follow-up     Elevated lab readings       History of Present Illness:    History of Present Illness    CHIEF COMPLAINT:  Ms. Aguero presents today for follow up    JOINT SYMPTOMS:  She reports significant hand swelling during labs. She experiences frequent left ankle swelling without associated pain and bilateral knee swelling.    DIET AND GERD:  She eats one meal per day when working and frequently snacks throughout the day on items such as apples and chocolate bars. Due to reflux symptoms, she does not eat after 3:30 PM.    SLEEP:  She completed initial home sleep study and requires titration study which has not yet been completed.    LABS:  CBC was relatively stable with hemoglobin 11.2 and hematocrit 35, compared to 12 and 36 six months ago. ALT was elevated at 66 (normal <44). CRP and sed rate were normal.          History of JACKELYN  Had to have 1 unit of blood  EDG with erosive Gastropathy.        Lab Results   Component Value Date     WBC 6.18 04/09/2024     HGB 12.0 04/09/2024     HCT 36.7 (L) 04/09/2024      (H) 04/09/2024      04/09/2024      Lab Results   Component Value Date    WBC 6.65 05/16/2025    HGB 11.2 (L) 05/16/2025    HCT 35.0 (L) 05/16/2025    MCV 95 05/16/2025     05/16/2025         SHe reports feels more tired again  Wants to check labs.- had them done with Rheum on 16th.  Stable CBC. Inflammatory markers are fine.    She did have some elevation in liver functin.  Lab Results   Component Value Date    ALT 66 (H) 05/16/2025    AST 44 05/16/2025    ALKPHOS 88 05/16/2025    BILITOT 0.4 05/16/2025     She has gained weight  She reports that she is snacking.    She has not eaten after 3:30.    Knees, left ankle swelling.      "     History of RA/Sarcoid of lung  Followed with Pulm and Rheum  On Rinvoq  Seropositive rheumatoid and biopsy-proven sarcoid on Rinvoq since June 2023. Failed Humira and methotrexate.        Sarcoidosis of lung with sarcoidosis of lymph nodes        Overview        Lung Biopsy: LUNG, LEFT, "NODULE", CT-GUIDED BIOPSY:   - Focally necrotizing granulomatous inflammation   - Adjacent alveolated lung parenchyma with fibroelastotic changes and chronic   (lymphoplasmacytic) inflammation   - No evidence of acid-fast organisms or fungal elements on AFB and GMS   cytochemical stains, respectively   - No evidence of malignancy      From 12/20/2022 Normal Pulmonary Function Studies:Spirometry is normal. Spirometry remains unimproved following bronchodilator. Lung volume determination shows TLC is normal with evidence air trapping is present. Airway mechanics show normal airway resistance and conductance. DLCO is normal. MVV is normal.             The following were reviewed at this visit: active problem list, medication list, allergies, family history, social history, and health maintenance.  History:  Past Medical History:   Diagnosis Date    RA (rheumatoid arthritis)      Past Surgical History:   Procedure Laterality Date    COLONOSCOPY N/A 02/01/2023    Procedure: COLONOSCOPY;  Surgeon: Ángel Valdes MD;  Location: [facility];  Service: Gastroenterology;  Laterality: N/A;    ESOPHAGOGASTRODUODENOSCOPY N/A 02/01/2023    Procedure: EGD (ESOPHAGOGASTRODUODENOSCOPY);  Surgeon: Ángel Valdes MD;  Location: [facility];  Service: Gastroenterology;  Laterality: N/A;    ESOPHAGOGASTRODUODENOSCOPY N/A 3/17/2023    Procedure: EGD (ESOPHAGOGASTRODUODENOSCOPY);  Surgeon: Ashley Stevenson MD;  Location: [facility];  Service: Endoscopy;  Laterality: N/A;    ESOPHAGOGASTRODUODENOSCOPY N/A 6/26/2023    Procedure: EGD (ESOPHAGOGASTRODUODENOSCOPY);  Surgeon: Ángel Valdes MD;  Location: [facility];  Service: Gastroenterology; "  Laterality: N/A;    INTRALUMINAL GASTROINTESTINAL TRACT IMAGING VIA CAPSULE N/A 3/1/2023    Procedure: IMAGING PROCEDURE, GI TRACT, INTRALUMINAL, VIA CAPSULE;  Surgeon: First Available Roman Luevano;  Location: [facility];  Service: Endoscopy;  Laterality: N/A;    Tubal Lig      TUBAL LIGATION       Problem List[1]    Medications:  Medications Ordered Prior to Encounter[2]    Medications have been reviewed and reconciled with patient at this visit.    Exam:  Wt Readings from Last 3 Encounters:   05/26/25 83.8 kg (184 lb 11.9 oz)   04/03/25 83.7 kg (184 lb 8.4 oz)   02/20/25 81.3 kg (179 lb 3.7 oz)     Temp Readings from Last 3 Encounters:   05/26/25 97.2 °F (36.2 °C) (Tympanic)   08/01/24 97.5 °F (36.4 °C) (Tympanic)   02/23/24 97.6 °F (36.4 °C) (Tympanic)     BP Readings from Last 3 Encounters:   05/26/25 118/76   04/03/25 126/72   02/20/25 122/72     Pulse Readings from Last 3 Encounters:   05/26/25 72   04/03/25 87   02/20/25 84     Body mass index is 28.94 kg/m².      Review of Systems   Constitutional: Negative.  Negative for chills and fever.   HENT: Negative.  Negative for congestion, sinus pain and sore throat.    Eyes:  Negative for blurred vision and double vision.   Respiratory:  Negative for cough, sputum production, shortness of breath and wheezing.    Cardiovascular:  Positive for leg swelling. Negative for chest pain and palpitations.   Gastrointestinal:  Negative for abdominal pain, constipation, diarrhea, heartburn, nausea and vomiting.   Genitourinary: Negative.    Musculoskeletal: Negative.    Skin: Negative.  Negative for rash.   Neurological: Negative.    Endo/Heme/Allergies: Negative.  Negative for polydipsia. Does not bruise/bleed easily.   Psychiatric/Behavioral:  Negative for depression and substance abuse.      Physical Exam  Nursing note reviewed.   Cardiovascular:      Rate and Rhythm: Normal rate and regular rhythm.   Pulmonary:      Effort: Pulmonary effort is normal. No respiratory  distress.   Neurological:      Mental Status: She is alert and oriented to person, place, and time.   Psychiatric:         Mood and Affect: Mood normal.         Behavior: Behavior normal.         Thought Content: Thought content normal.         Judgment: Judgment normal.       Physical Exam             Laboratory Reviewed ({Yes)    Lab Results   Component Value Date    WBC 6.65 05/16/2025    HGB 11.2 (L) 05/16/2025    HCT 35.0 (L) 05/16/2025     05/16/2025    CHOL 231 (H) 08/02/2024    TRIG 134 08/02/2024    HDL 58 08/02/2024    ALT 66 (H) 05/16/2025    AST 44 05/16/2025     05/16/2025    K 3.9 05/16/2025     (H) 05/16/2025    CREATININE 0.9 05/16/2025    BUN 16 05/16/2025    CO2 23 05/16/2025    INR 0.9 01/11/2023    HGBA1C 5.3 08/02/2024       Assessment & Plan    K21.9 Gastro-esophageal reflux disease without esophagitis  D64.9 Anemia, unspecified    R74.01 Elevation of levels of liver transaminase levels      Reviewed recent labs: CBC stable (Hgb 11.2, Hct 35), slightly elevated ALT (66, normal <44), normal inflammatory markers (CRP, ESR).  Fatty liver likely cause of mildly elevated ALT given normal other liver enzymes and inflammatory markers.  No signs of rheumatoid flare based on Dr. AGARWAL's evaluation and normal inflammatory markers.  Ankle swelling attributed to possible dependent edema, potentially exacerbated by weight gain.  Evaluated dietary habits and alcohol consumption in relation to liver function and weight management.  Discussed potential benefits and risks of weight loss medications, including effects on fatty liver and insulin resistance.    GASTRO-ESOPHAGEAL REFLUX DISEASE:  - Ms. Aguero is careful not to eat after 15:30 due to reflux.    ANEMIA:  - Monitored blood count which was relatively stable at 11.2 and 35 compared to 6 months ago when it was 12 and 36.    FATTY LIVER DISEASE:  - Monitored ALT level which was 66, slightly elevated from normal 44, while other liver  functions were normal.  - Suspected fatty liver due to this elevation.  - The elevation is not concerning unless it reaches 3 times normal values.  - Ordered repeat liver function tests and planned to perform a liver ultrasound if elevation persists.  - Educated the patient about the relationship between diet, weight, and fatty liver disease, recommending diet modification and weight loss to normalize liver function.    HAND EFFUSION:  - Ms. Aguero reports significant hand swelling during labs, but without pain.  - Observed no signs of synovitis or flare in hands based on lab results.  - Considered whether synovitis can occur without elevation in inflammatory markers.    LEFT ANKLE EFFUSION:  - Ms. Aguero reports chronic left ankle swelling without pain.  - Considered swelling could be dependent edema due to weight and gravitational effects.    KNEE EFFUSION:  - Ms. Aguero reports knee swelling without pain.    SLEEP DISORDER:  - Ms. Aguero completed a sleep study and was instructed to undergo titration but never received follow-up.  - Planned to contact Sleep Laboratory to address insurance denial for titration study.    DIETARY AND LIFESTYLE RECOMMENDATIONS:  - Discussed impact of processed foods, high fructose corn syrup, and other additives on health.  - Educated on benefits of clean eating, focusing on natural, unprocessed foods.  - Recommend reducing intake of processed foods (breads, crackers, pastas, chips) and increasing consumption of whole, natural foods, especially leafy green vegetables over starchy vegetables like potatoes and corn.  - Suggested healthier snack options such as apples, mixed nuts, and raw vegetables.  - Explained metabolic changes associated with menopause and their impact on weight distribution.  - Recommend using a stationary bike for exercise.       Tammy was seen today for follow-up.    Diagnoses and all orders for this visit:    Screening mammogram, encounter for  -     Mammo Digital  Screening Bilat w/ Eric (XPD); Future    Interstitial pulmonary disease, unspecified    Sarcoidosis of lung with sarcoidosis of lymph nodes    Drug-induced immunodeficiency    Rheumatoid arthritis involving multiple sites with positive rheumatoid factor    Iron deficiency anemia, unspecified iron deficiency anemia type    Elevated liver function tests  -     Comprehensive Metabolic Panel; Future  -     CBC Auto Differential; Future                Care Plan/Goals: Reviewed     Follow up: Follow up in about 6 months (around 11/26/2025).    After visit summary was printed and given to patient upon discharge today.  Patient goals and care plan are included in After Visit Summary.           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      diphenhydrAMINE-acetaminophen (TYLENOL PM)  mg Tab Take 1 tablet by mouth nightly as needed.      omeprazole (PRILOSEC) 40 MG capsule Take 1 capsule by mouth once daily 90 capsule 1    upadacitinib (RINVOQ) 15 mg 24 hr tablet TAKE 1 TABLET DAILY 90 tablet 3    methylPREDNISolone (MEDROL DOSEPACK) 4 mg tablet  use as directed 21 tablet 0     No current facility-administered medications on file prior to visit.

## 2025-05-27 ENCOUNTER — TELEPHONE (OUTPATIENT)
Dept: PULMONOLOGY | Facility: CLINIC | Age: 55
End: 2025-05-27
Payer: COMMERCIAL

## 2025-05-27 ENCOUNTER — HOSPITAL ENCOUNTER (OUTPATIENT)
Dept: RADIOLOGY | Facility: HOSPITAL | Age: 55
Discharge: HOME | End: 2025-05-27
Attending: FAMILY MEDICINE
Payer: COMMERCIAL

## 2025-05-27 ENCOUNTER — E-CONSULT (OUTPATIENT)
Dept: HEPATOLOGY | Facility: CLINIC | Age: 55
End: 2025-05-27
Payer: COMMERCIAL

## 2025-05-27 ENCOUNTER — RESULTS FOLLOW-UP (OUTPATIENT)
Dept: INTERNAL MEDICINE | Facility: CLINIC | Age: 55
End: 2025-05-27

## 2025-05-27 DIAGNOSIS — R74.8 ELEVATED LIVER ENZYMES: ICD-10-CM

## 2025-05-27 DIAGNOSIS — R74.8 ELEVATED LIVER ENZYMES: Primary | ICD-10-CM

## 2025-05-27 PROCEDURE — 76705 ECHO EXAM OF ABDOMEN: CPT | Mod: 26,,, | Performed by: RADIOLOGY

## 2025-05-27 PROCEDURE — 99451 NTRPROF PH1/NTRNET/EHR 5/>: CPT | Mod: S$GLB,,, | Performed by: INTERNAL MEDICINE

## 2025-05-27 PROCEDURE — 76705 ECHO EXAM OF ABDOMEN: CPT | Mod: TC

## 2025-05-27 NOTE — CONSULTS
[facility]  Response for E-Consult     Patient Name: Tammy Aguero  MRN: [id number]  Primary Care Provider: Maria Teresa Aparicio MD   Requesting Provider: Maria Teresa Aparicio MD  E-Consult to Hepatology Outpatient  Consult performed by: Colten Hernández MD  Consult ordered by: Maria Teresa Aparicio MD          Recommendation: Likely fatty Liver    Recommend fibro scan  Since ANT is positive check IgG with next labs    MAFLD Management  Recommended a more pragmatic approach to medical problems which would include the following:  - life-style modifications: weight loss, daily exercise (30 mins of HIIT or cardio), low carb/low fat diet with goal of losing >3-5% to improve steatosis   - control of diabetes or insulin resistance   - control of high cholesterol, if needed with statin drugs or other cholesterol-lowering agents  - coffee consumption (low caloric): 2-3 cups per day may reduce liver fat   -Reduction of risk factors for Cardiovascular Disease  --Monitor labs and Fib4 or fibro scan 1-2 year    Additional future steps to consider:     Total time of Consultation: 10 minute    I did not speak to the requesting provider verbally about this.     *This eConsult is based on the clinical data available to me and is furnished without benefit of a physical examination. The eConsult will need to be interpreted in light of any clinical issues or changes in patient status not available to me at the time of filing this eConsults. Significant changes in patient condition or level of acuity should result in immediate formal consultation and reevaluation. Please alert me if you have further questions.    Thank you for this eConsult referral.     Colten Hernández MD  [facility]

## 2025-05-27 NOTE — TELEPHONE ENCOUNTER
Called patient to inform sleep titration was denied and schedule appt. Staff offered appt. Patient accepted and expressed understanding----- Message from Nancy Lemon PA-C sent at 5/27/2025  8:38 AM CDT -----  Titration study denied. Please schedule office visit to discuss getting CPAP  ----- Message -----  From: Maria Teresa Aparicio MD  Sent: 5/26/2025  11:02 AM CDT  To: Nancy Lemon PA-C    Looks like never had titration study or contacted by sleep lab? Possibly the tittration was denied?

## 2025-05-28 ENCOUNTER — RESULTS FOLLOW-UP (OUTPATIENT)
Dept: INTERNAL MEDICINE | Facility: CLINIC | Age: 55
End: 2025-05-28

## 2025-05-28 ENCOUNTER — OFFICE VISIT (OUTPATIENT)
Dept: GASTROENTEROLOGY | Facility: CLINIC | Age: 55
End: 2025-05-28
Payer: COMMERCIAL

## 2025-05-28 VITALS — HEIGHT: 67 IN | BODY MASS INDEX: 28.88 KG/M2 | WEIGHT: 184 LBS

## 2025-05-28 DIAGNOSIS — K25.0 ACUTE GASTRIC ULCER WITH HEMORRHAGE: ICD-10-CM

## 2025-05-28 DIAGNOSIS — K21.00 GASTROESOPHAGEAL REFLUX DISEASE WITH ESOPHAGITIS WITHOUT HEMORRHAGE: Primary | ICD-10-CM

## 2025-05-28 PROCEDURE — 98001 SYNCH AUDIO-VIDEO NEW LOW 30: CPT | Mod: 95,,, | Performed by: PHYSICIAN ASSISTANT

## 2025-06-02 ENCOUNTER — PATIENT MESSAGE (OUTPATIENT)
Dept: INTERNAL MEDICINE | Facility: CLINIC | Age: 55
End: 2025-06-02
Payer: COMMERCIAL

## 2025-06-02 DIAGNOSIS — R74.8 ELEVATED LIVER ENZYMES: Primary | ICD-10-CM

## 2025-06-04 ENCOUNTER — OFFICE VISIT (OUTPATIENT)
Dept: PULMONOLOGY | Facility: CLINIC | Age: 55
End: 2025-06-04
Payer: COMMERCIAL

## 2025-06-04 DIAGNOSIS — G47.33 OSA ON CPAP: Primary | ICD-10-CM

## 2025-06-04 DIAGNOSIS — M05.79 RHEUMATOID ARTHRITIS INVOLVING MULTIPLE SITES WITH POSITIVE RHEUMATOID FACTOR: ICD-10-CM

## 2025-06-04 DIAGNOSIS — D86.2 SARCOIDOSIS OF LUNG WITH SARCOIDOSIS OF LYMPH NODES: ICD-10-CM

## 2025-06-04 PROCEDURE — 98005 SYNCH AUDIO-VIDEO EST LOW 20: CPT | Mod: 95,,, | Performed by: PHYSICIAN ASSISTANT

## 2025-06-04 PROCEDURE — 1159F MED LIST DOCD IN RCRD: CPT | Mod: CPTII,95,, | Performed by: PHYSICIAN ASSISTANT

## 2025-06-04 PROCEDURE — 1160F RVW MEDS BY RX/DR IN RCRD: CPT | Mod: CPTII,95,, | Performed by: PHYSICIAN ASSISTANT

## 2025-06-05 ENCOUNTER — OFFICE VISIT (OUTPATIENT)
Dept: RHEUMATOLOGY | Facility: CLINIC | Age: 55
End: 2025-06-05
Payer: COMMERCIAL

## 2025-06-05 ENCOUNTER — LAB VISIT (OUTPATIENT)
Dept: LAB | Facility: HOSPITAL | Age: 55
End: 2025-06-05
Attending: INTERNAL MEDICINE
Payer: COMMERCIAL

## 2025-06-05 VITALS
HEIGHT: 67 IN | WEIGHT: 180.31 LBS | HEART RATE: 80 BPM | BODY MASS INDEX: 28.3 KG/M2 | DIASTOLIC BLOOD PRESSURE: 82 MMHG | SYSTOLIC BLOOD PRESSURE: 127 MMHG

## 2025-06-05 DIAGNOSIS — D84.821 DRUG-INDUCED IMMUNODEFICIENCY: ICD-10-CM

## 2025-06-05 DIAGNOSIS — D86.2 SARCOIDOSIS OF LUNG WITH SARCOIDOSIS OF LYMPH NODES: ICD-10-CM

## 2025-06-05 DIAGNOSIS — Z51.81 ENCOUNTER FOR MEDICATION MONITORING: ICD-10-CM

## 2025-06-05 DIAGNOSIS — D84.9 IMMUNOSUPPRESSED STATUS: ICD-10-CM

## 2025-06-05 DIAGNOSIS — M05.79 RHEUMATOID ARTHRITIS INVOLVING MULTIPLE SITES WITH POSITIVE RHEUMATOID FACTOR: ICD-10-CM

## 2025-06-05 DIAGNOSIS — Z79.899 DRUG-INDUCED IMMUNODEFICIENCY: ICD-10-CM

## 2025-06-05 DIAGNOSIS — M05.79 RHEUMATOID ARTHRITIS INVOLVING MULTIPLE SITES WITH POSITIVE RHEUMATOID FACTOR: Primary | ICD-10-CM

## 2025-06-05 DIAGNOSIS — R74.8 ELEVATED LIVER ENZYMES: ICD-10-CM

## 2025-06-05 LAB
ABSOLUTE EOSINOPHIL (OHS): 0.08 K/UL
ABSOLUTE MONOCYTE (OHS): 0.6 K/UL (ref 0.3–1)
ABSOLUTE NEUTROPHIL COUNT (OHS): 4.66 K/UL (ref 1.8–7.7)
ALBUMIN SERPL BCP-MCNC: 4.6 G/DL (ref 3.5–5.2)
ALP SERPL-CCNC: 79 UNIT/L (ref 40–150)
ALT SERPL W/O P-5'-P-CCNC: 91 UNIT/L (ref 10–44)
ANION GAP (OHS): 8 MMOL/L (ref 8–16)
AST SERPL-CCNC: 62 UNIT/L (ref 11–45)
BASOPHILS # BLD AUTO: 0.05 K/UL
BASOPHILS NFR BLD AUTO: 0.7 %
BILIRUB SERPL-MCNC: 0.5 MG/DL (ref 0.1–1)
BUN SERPL-MCNC: 13 MG/DL (ref 6–20)
CALCIUM SERPL-MCNC: 9.2 MG/DL (ref 8.7–10.5)
CHLORIDE SERPL-SCNC: 108 MMOL/L (ref 95–110)
CO2 SERPL-SCNC: 23 MMOL/L (ref 23–29)
CREAT SERPL-MCNC: 0.9 MG/DL (ref 0.5–1.4)
CRP SERPL-MCNC: 0.5 MG/L
ERYTHROCYTE [DISTWIDTH] IN BLOOD BY AUTOMATED COUNT: 14.5 % (ref 11.5–14.5)
ERYTHROCYTE [SEDIMENTATION RATE] IN BLOOD: <2 MM/HR
GFR SERPLBLD CREATININE-BSD FMLA CKD-EPI: >60 ML/MIN/1.73/M2
GLUCOSE SERPL-MCNC: 98 MG/DL (ref 70–110)
HCT VFR BLD AUTO: 37 % (ref 37–48.5)
HGB BLD-MCNC: 12.1 GM/DL (ref 12–16)
IMM GRANULOCYTES # BLD AUTO: 0.02 K/UL (ref 0–0.04)
IMM GRANULOCYTES NFR BLD AUTO: 0.3 % (ref 0–0.5)
LYMPHOCYTES # BLD AUTO: 1.56 K/UL (ref 1–4.8)
MCH RBC QN AUTO: 31 PG (ref 27–31)
MCHC RBC AUTO-ENTMCNC: 32.7 G/DL (ref 32–36)
MCV RBC AUTO: 95 FL (ref 82–98)
NUCLEATED RBC (/100WBC) (OHS): 0 /100 WBC
PLATELET # BLD AUTO: 372 K/UL (ref 150–450)
PMV BLD AUTO: 9.3 FL (ref 9.2–12.9)
POTASSIUM SERPL-SCNC: 4.1 MMOL/L (ref 3.5–5.1)
PROT SERPL-MCNC: 7.9 GM/DL (ref 6–8.4)
RBC # BLD AUTO: 3.9 M/UL (ref 4–5.4)
RELATIVE EOSINOPHIL (OHS): 1.1 %
RELATIVE LYMPHOCYTE (OHS): 22.4 % (ref 18–48)
RELATIVE MONOCYTE (OHS): 8.6 % (ref 4–15)
RELATIVE NEUTROPHIL (OHS): 66.9 % (ref 38–73)
SODIUM SERPL-SCNC: 139 MMOL/L (ref 136–145)
WBC # BLD AUTO: 6.97 K/UL (ref 3.9–12.7)

## 2025-06-05 PROCEDURE — 99999 PR PBB SHADOW E&M-EST. PATIENT-LVL III: CPT | Mod: PBBFAC,,, | Performed by: INTERNAL MEDICINE

## 2025-06-05 PROCEDURE — 86140 C-REACTIVE PROTEIN: CPT

## 2025-06-05 PROCEDURE — 3008F BODY MASS INDEX DOCD: CPT | Mod: CPTII,S$GLB,, | Performed by: INTERNAL MEDICINE

## 2025-06-05 PROCEDURE — 99215 OFFICE O/P EST HI 40 MIN: CPT | Mod: S$GLB,,, | Performed by: INTERNAL MEDICINE

## 2025-06-05 PROCEDURE — 36415 COLL VENOUS BLD VENIPUNCTURE: CPT

## 2025-06-05 PROCEDURE — 3079F DIAST BP 80-89 MM HG: CPT | Mod: CPTII,S$GLB,, | Performed by: INTERNAL MEDICINE

## 2025-06-05 PROCEDURE — 3074F SYST BP LT 130 MM HG: CPT | Mod: CPTII,S$GLB,, | Performed by: INTERNAL MEDICINE

## 2025-06-05 PROCEDURE — 1160F RVW MEDS BY RX/DR IN RCRD: CPT | Mod: CPTII,S$GLB,, | Performed by: INTERNAL MEDICINE

## 2025-06-05 PROCEDURE — 80053 COMPREHEN METABOLIC PANEL: CPT

## 2025-06-05 PROCEDURE — G2211 COMPLEX E/M VISIT ADD ON: HCPCS | Mod: S$GLB,,, | Performed by: INTERNAL MEDICINE

## 2025-06-05 PROCEDURE — 1159F MED LIST DOCD IN RCRD: CPT | Mod: CPTII,S$GLB,, | Performed by: INTERNAL MEDICINE

## 2025-06-05 PROCEDURE — 85025 COMPLETE CBC W/AUTO DIFF WBC: CPT

## 2025-06-05 PROCEDURE — 85652 RBC SED RATE AUTOMATED: CPT

## 2025-06-06 ENCOUNTER — TELEPHONE (OUTPATIENT)
Dept: GASTROENTEROLOGY | Facility: HOSPITAL | Age: 55
End: 2025-06-06
Payer: COMMERCIAL

## 2025-06-06 DIAGNOSIS — K76.0 METABOLIC DYSFUNCTION-ASSOCIATED FATTY LIVER DISEASE (MAFLD): Primary | ICD-10-CM

## 2025-06-06 RX ORDER — OMEPRAZOLE 40 MG/1
40 CAPSULE, DELAYED RELEASE ORAL
Qty: 60 CAPSULE | Refills: 5 | Status: SHIPPED | OUTPATIENT
Start: 2025-06-06

## 2025-06-24 ENCOUNTER — PATIENT MESSAGE (OUTPATIENT)
Dept: HEPATOLOGY | Facility: CLINIC | Age: 55
End: 2025-06-24
Payer: COMMERCIAL

## 2025-06-25 ENCOUNTER — LAB VISIT (OUTPATIENT)
Dept: LAB | Facility: HOSPITAL | Age: 55
End: 2025-06-25
Payer: COMMERCIAL

## 2025-06-25 DIAGNOSIS — D50.0 IRON DEFICIENCY ANEMIA DUE TO CHRONIC BLOOD LOSS: ICD-10-CM

## 2025-06-25 DIAGNOSIS — K76.0 METABOLIC DYSFUNCTION-ASSOCIATED FATTY LIVER DISEASE (MAFLD): ICD-10-CM

## 2025-06-25 DIAGNOSIS — R74.8 ELEVATED LIVER ENZYMES: ICD-10-CM

## 2025-06-25 LAB
ABSOLUTE EOSINOPHIL (OHS): 0.13 K/UL
ABSOLUTE MONOCYTE (OHS): 0.79 K/UL (ref 0.3–1)
ABSOLUTE NEUTROPHIL COUNT (OHS): 5.68 K/UL (ref 1.8–7.7)
ALBUMIN SERPL BCP-MCNC: 4.4 G/DL (ref 3.5–5.2)
ALP SERPL-CCNC: 84 UNIT/L (ref 40–150)
ALT SERPL W/O P-5'-P-CCNC: 57 UNIT/L (ref 10–44)
ANION GAP (OHS): 11 MMOL/L (ref 8–16)
AST SERPL-CCNC: 35 UNIT/L (ref 11–45)
BASOPHILS # BLD AUTO: 0.06 K/UL
BASOPHILS NFR BLD AUTO: 0.7 %
BILIRUB SERPL-MCNC: 0.3 MG/DL (ref 0.1–1)
BUN SERPL-MCNC: 19 MG/DL (ref 6–20)
CALCIUM SERPL-MCNC: 9 MG/DL (ref 8.7–10.5)
CHLORIDE SERPL-SCNC: 107 MMOL/L (ref 95–110)
CO2 SERPL-SCNC: 21 MMOL/L (ref 23–29)
CREAT SERPL-MCNC: 0.9 MG/DL (ref 0.5–1.4)
ERYTHROCYTE [DISTWIDTH] IN BLOOD BY AUTOMATED COUNT: 14.4 % (ref 11.5–14.5)
FERRITIN SERPL-MCNC: 19.9 NG/ML (ref 20–300)
GFR SERPLBLD CREATININE-BSD FMLA CKD-EPI: >60 ML/MIN/1.73/M2
GLUCOSE SERPL-MCNC: 92 MG/DL (ref 70–110)
HCT VFR BLD AUTO: 34.9 % (ref 37–48.5)
HGB BLD-MCNC: 11.2 GM/DL (ref 12–16)
IGA SERPL-MCNC: 439 MG/DL (ref 40–350)
IGG SERPL-MCNC: 1184 MG/DL (ref 650–1600)
IGM SERPL-MCNC: 88 MG/DL (ref 50–300)
IMM GRANULOCYTES # BLD AUTO: 0.05 K/UL (ref 0–0.04)
IMM GRANULOCYTES NFR BLD AUTO: 0.6 % (ref 0–0.5)
IRON SATN MFR SERPL: 9 % (ref 20–50)
IRON SERPL-MCNC: 40 UG/DL (ref 30–160)
LYMPHOCYTES # BLD AUTO: 1.72 K/UL (ref 1–4.8)
MCH RBC QN AUTO: 30.4 PG (ref 27–31)
MCHC RBC AUTO-ENTMCNC: 32.1 G/DL (ref 32–36)
MCV RBC AUTO: 95 FL (ref 82–98)
NUCLEATED RBC (/100WBC) (OHS): 0 /100 WBC
PLATELET # BLD AUTO: 342 K/UL (ref 150–450)
PMV BLD AUTO: 10.3 FL (ref 9.2–12.9)
POTASSIUM SERPL-SCNC: 3.7 MMOL/L (ref 3.5–5.1)
PROT SERPL-MCNC: 8.1 GM/DL (ref 6–8.4)
RBC # BLD AUTO: 3.69 M/UL (ref 4–5.4)
RELATIVE EOSINOPHIL (OHS): 1.5 %
RELATIVE LYMPHOCYTE (OHS): 20.4 % (ref 18–48)
RELATIVE MONOCYTE (OHS): 9.4 % (ref 4–15)
RELATIVE NEUTROPHIL (OHS): 67.4 % (ref 38–73)
SODIUM SERPL-SCNC: 139 MMOL/L (ref 136–145)
TIBC SERPL-MCNC: 432 UG/DL (ref 250–450)
TRANSFERRIN SERPL-MCNC: 292 MG/DL (ref 200–375)
WBC # BLD AUTO: 8.43 K/UL (ref 3.9–12.7)

## 2025-06-25 PROCEDURE — 82728 ASSAY OF FERRITIN: CPT

## 2025-06-25 PROCEDURE — 36415 COLL VENOUS BLD VENIPUNCTURE: CPT

## 2025-06-25 PROCEDURE — 82784 ASSAY IGA/IGD/IGG/IGM EACH: CPT | Mod: 59

## 2025-06-25 PROCEDURE — 84075 ASSAY ALKALINE PHOSPHATASE: CPT

## 2025-06-25 PROCEDURE — 81256 HFE GENE: CPT

## 2025-06-25 PROCEDURE — 85025 COMPLETE CBC W/AUTO DIFF WBC: CPT

## 2025-06-25 PROCEDURE — 84466 ASSAY OF TRANSFERRIN: CPT

## 2025-06-29 ENCOUNTER — ON-DEMAND VIRTUAL (OUTPATIENT)
Dept: URGENT CARE | Facility: CLINIC | Age: 55
End: 2025-06-29
Payer: COMMERCIAL

## 2025-06-29 DIAGNOSIS — N39.0 URINARY TRACT INFECTION WITHOUT HEMATURIA, SITE UNSPECIFIED: Primary | ICD-10-CM

## 2025-06-29 PROCEDURE — 98005 SYNCH AUDIO-VIDEO EST LOW 20: CPT | Mod: 95,,, | Performed by: PHYSICIAN ASSISTANT

## 2025-06-29 RX ORDER — NITROFURANTOIN 25; 75 MG/1; MG/1
100 CAPSULE ORAL 2 TIMES DAILY
Qty: 14 CAPSULE | Refills: 0 | Status: SHIPPED | OUTPATIENT
Start: 2025-06-29 | End: 2025-07-06

## 2025-06-29 NOTE — PROGRESS NOTES
Subjective:       Patient ID: Tammy Aguero is a 54 y.o. female.    Chief Complaint: Results and Anemia    HPI: Ms. Aguero is a 54 year old female who is following up for her iron def anemia. She has had IV iron, feraheme, last one 10/10/23. She has hx of acute recurrent severe anemia with iron deficiency with ER presentation mid March 2023 status post 1 unit PRBC transfusion. Repeat EGD with nonbleeding gastric ulcer. She had repeat EGD 6/26/23 showing erosive gastropathy with no bleeding. Last colonoscopy 2/1/23 normal, recommended to repeat in 10 years. She has not taken oral iron previously.  Pmhx: continues to follow with hepatology for possible fatty liver, elevated liver enzymes, continue to follow with rheumatology for RA.    Today:  in menopause. She states she has been feeling well, normal amount of fatigue for her. She is currently on abx for a UTI. She denies any fever, bleeding, n/v/d/c.     Social History     Socioeconomic History    Marital status: Single   Occupational History    Occupation: [employer], supervisor   Tobacco Use    Smoking status: Former     Types: Vaping with nicotine, Cigarettes     Start date: 1/1/2020     Passive exposure: Past    Smokeless tobacco: Never    Tobacco comments:     Pt states that she quit smoking cigarettes 1 year ago, and stopped using the vape 6 months ago.    Vaping Use    Vaping status: Former    Quit date: 3/3/2025   Substance and Sexual Activity    Alcohol use: Not Currently     Comment: I drank when i was younger but can no longer take the smell    Drug use: Never     Types: Marijuana    Sexual activity: Not Currently     Partners: Male     Birth control/protection: See Surgical Hx, None     Social Drivers of Health     Financial Resource Strain: Low Risk  (5/23/2025)    Overall Financial Resource Strain (CARDIA)     Difficulty of Paying Living Expenses: Not very hard   Food Insecurity: No Food Insecurity (5/23/2025)    Hunger Vital Sign     Worried About  Running Out of Food in the Last Year: Never true     Ran Out of Food in the Last Year: Never true   Transportation Needs: No Transportation Needs (5/23/2025)    PRAPARE - Transportation     Lack of Transportation (Medical): No     Lack of Transportation (Non-Medical): No   Physical Activity: Insufficiently Active (5/23/2025)    Exercise Vital Sign     Days of Exercise per Week: 4 days     Minutes of Exercise per Session: 10 min   Stress: Stress Concern Present (5/23/2025)    [employer] [place] [employer] - Occupational Stress Questionnaire     Feeling of Stress : To some extent   Housing Stability: High Risk (5/23/2025)    Housing Stability Vital Sign     Unable to Pay for Housing in the Last Year: Yes     Number of Times Moved in the Last Year: 0     Homeless in the Last Year: No       Past Medical History:   Diagnosis Date    RA (rheumatoid arthritis)        Family History   Problem Relation Name Age of Onset    Diabetes Sister Machelle Younger     Cancer Sister Machelle Younger     Breast cancer Sister Machelle Younger     Asthma Sister Machelle Younger     Diabetes Brother Melinda Gann     Breast cancer Other maternal great grandmoth     Alcohol abuse Mother Jeanette Grey     Asthma Mother Jeanette Grey     COPD Mother Jeanette Grey     Depression Mother Jeanette Grey     Heart disease Mother Jeanette Grey        Past Surgical History:   Procedure Laterality Date    COLONOSCOPY N/A 02/01/2023    Procedure: COLONOSCOPY;  Surgeon: Ángel Valdes MD;  Location: [facility];  Service: Gastroenterology;  Laterality: N/A;    ESOPHAGOGASTRODUODENOSCOPY N/A 02/01/2023    Procedure: EGD (ESOPHAGOGASTRODUODENOSCOPY);  Surgeon: Ángel Valdes MD;  Location: [facility];  Service: Gastroenterology;  Laterality: N/A;    ESOPHAGOGASTRODUODENOSCOPY N/A 3/17/2023    Procedure: EGD (ESOPHAGOGASTRODUODENOSCOPY);  Surgeon: Ashley Stevenson MD;  Location: [facility];  Service: Endoscopy;  Laterality: N/A;     ESOPHAGOGASTRODUODENOSCOPY N/A 6/26/2023    Procedure: EGD (ESOPHAGOGASTRODUODENOSCOPY);  Surgeon: Ángel Valdes MD;  Location: [facility];  Service: Gastroenterology;  Laterality: N/A;    INTRALUMINAL GASTROINTESTINAL TRACT IMAGING VIA CAPSULE N/A 3/1/2023    Procedure: IMAGING PROCEDURE, GI TRACT, INTRALUMINAL, VIA CAPSULE;  Surgeon: First Available Roman Luevano;  Location: [facility];  Service: Endoscopy;  Laterality: N/A;    Tubal Lig      TUBAL LIGATION         Review of Systems   Constitutional:  Negative for activity change, appetite change, chills, diaphoresis, fatigue, fever and unexpected weight change.   HENT:  Negative for congestion, nosebleeds and rhinorrhea.    Respiratory:  Negative for cough and shortness of breath.    Cardiovascular:  Negative for chest pain and leg swelling.   Gastrointestinal:  Negative for abdominal pain, anal bleeding, blood in stool, constipation, diarrhea, nausea and vomiting.   Genitourinary:  Negative for hematuria.        Uti   Skin:  Negative for color change and pallor.         Medication List with Changes/Refills   Current Medications    ACETAMINOPHEN (TYLENOL) 650 MG TBSR    Take 650 mg by mouth every 8 (eight) hours.    DIPHENHYDRAMINE-ACETAMINOPHEN (TYLENOL PM)  MG TAB    Take 1 tablet by mouth nightly as needed.    NITROFURANTOIN, MACROCRYSTAL-MONOHYDRATE, (MACROBID) 100 MG CAPSULE    Take 1 capsule (100 mg total) by mouth 2 (two) times daily. for 7 days    OMEPRAZOLE (PRILOSEC) 40 MG CAPSULE    Take 1 capsule (40 mg total) by mouth 2 (two) times daily before meals.    UPADACITINIB (RINVOQ) 15 MG 24 HR TABLET    TAKE 1 TABLET DAILY     Objective:     There were no vitals filed for this visit.      Physical Exam  Constitutional:       General: She is not in acute distress.     Appearance: She is not ill-appearing, toxic-appearing or diaphoretic.   Neurological:      Mental Status: She is alert.   Psychiatric:         Mood and Affect: Mood normal.           Physical exam limited due to video visit    Labs/Results:  Lab Results   Component Value Date    WBC 8.43 06/25/2025    RBC 3.69 (L) 06/25/2025    HGB 11.2 (L) 06/25/2025    HCT 34.9 (L) 06/25/2025    MCV 95 06/25/2025    MCH 30.4 06/25/2025    MCHC 32.1 06/25/2025    RDW 14.4 06/25/2025     06/25/2025    MPV 10.3 06/25/2025    GRAN 5.2 11/08/2024    GRAN 68.7 11/08/2024    LYMPH 20.4 06/25/2025    LYMPH 1.72 06/25/2025    MONO 9.4 06/25/2025    MONO 0.79 06/25/2025    EOS 1.5 06/25/2025    EOS 0.13 06/25/2025    BASO 0.04 11/08/2024    EOSINOPHIL 0.8 11/08/2024    BASOPHIL 0.7 06/25/2025    BASOPHIL 0.06 06/25/2025     CMP  Sodium   Date Value Ref Range Status   06/25/2025 139 136 - 145 mmol/L Final   11/08/2024 138 136 - 145 mmol/L Final     Potassium   Date Value Ref Range Status   06/25/2025 3.7 3.5 - 5.1 mmol/L Final   11/08/2024 4.1 3.5 - 5.1 mmol/L Final     Chloride   Date Value Ref Range Status   06/25/2025 107 95 - 110 mmol/L Final   11/08/2024 107 95 - 110 mmol/L Final     CO2   Date Value Ref Range Status   06/25/2025 21 (L) 23 - 29 mmol/L Final   11/08/2024 26 23 - 29 mmol/L Final     Glucose   Date Value Ref Range Status   06/25/2025 92 70 - 110 mg/dL Final   11/08/2024 92 70 - 110 mg/dL Final     BUN   Date Value Ref Range Status   06/25/2025 19 6 - 20 mg/dL Final     Creatinine   Date Value Ref Range Status   06/25/2025 0.9 0.5 - 1.4 mg/dL Final     Calcium   Date Value Ref Range Status   06/25/2025 9.0 8.7 - 10.5 mg/dL Final   11/08/2024 8.6 (L) 8.7 - 10.5 mg/dL Final     Protein Total   Date Value Ref Range Status   06/25/2025 8.1 6.0 - 8.4 gm/dL Final     Total Protein   Date Value Ref Range Status   11/08/2024 7.2 6.0 - 8.4 g/dL Final     Albumin   Date Value Ref Range Status   06/25/2025 4.4 3.5 - 5.2 g/dL Final   11/08/2024 4.1 3.5 - 5.2 g/dL Final     Total Bilirubin   Date Value Ref Range Status   11/08/2024 0.4 0.1 - 1.0 mg/dL Final     Comment:     For infants and newborns,  interpretation of results should be based  on gestational age, weight and in agreement with clinical  observations.    Premature Infant recommended reference ranges:  Up to 24 hours.............<8.0 mg/dL  Up to 48 hours............<12.0 mg/dL  3-5 days..................<15.0 mg/dL  6-29 days.................<15.0 mg/dL       Bilirubin Total   Date Value Ref Range Status   06/25/2025 0.3 0.1 - 1.0 mg/dL Final     Comment:     For infants and newborns, interpretation of results should be based   on gestational age, weight and in agreement with clinical   observations.    Premature Infant recommended reference ranges:   0-24 hours:  <8.0 mg/dL   24-48 hours: <12.0 mg/dL   3-5 days:    <15.0 mg/dL   6-29 days:   <15.0 mg/dL     Alkaline Phosphatase   Date Value Ref Range Status   11/08/2024 70 40 - 150 U/L Final     ALP   Date Value Ref Range Status   06/25/2025 84 40 - 150 unit/L Final     AST   Date Value Ref Range Status   06/25/2025 35 11 - 45 unit/L Final   11/08/2024 24 10 - 40 U/L Final     ALT   Date Value Ref Range Status   06/25/2025 57 (H) 10 - 44 unit/L Final   11/08/2024 28 10 - 44 U/L Final     Anion Gap   Date Value Ref Range Status   06/25/2025 11 8 - 16 mmol/L Final     eGFR   Date Value Ref Range Status   06/25/2025 >60 >60 mL/min/1.73/m2 Final     Comment:     Estimated GFR calculated using the CKD-EPI creatinine (2021) equation.   11/08/2024 >60 >60 mL/min/1.73 m^2 Final      Latest Reference Range & Units 06/25/25 15:00   Iron 30 - 160 ug/dL 40   TIBC 250 - 450 ug/dL 432   Transferrin 200 - 375 mg/dL 292   Ferritin 20.0 - 300.0 ng/mL 19.9 (L)   Iron Saturation 20 - 50 % 9 (L)     CT chest 3/29/23  Impression:  1. Pulmonary nodules are again noted in both lungs.  Some of these nodules have benign appearing central calcification. There has been no detrimental interval change in the size or appearance of these pulmonary nodules.  2. There is an 8 mm mass in the lateral limb of the left adrenal.  If  "additional imaging evaluation is clinically indicated, I recommend consideration of an MRI examination of the adrenals without and with IV contrast.    Assessment:     Problem List Items Addressed This Visit       Iron deficiency anemia due to chronic blood loss - Primary    Relevant Orders    CBC Auto Differential    Comprehensive Metabolic Panel    Ferritin    Iron and TIBC     Plan:     Pulmonary nodules  --followed by pulmonology    Iron deficiency anemia due to chronic blood loss  --continue to follow with GI. Avoid NSAIDs  --Last colonoscopy 2/1/23 normal, recommended to repeat in 10 years.  --She had repeat EGD 6/26/23 showing erosive gastropathy with no bleeding.  --last IV iron feraheme, 10/10/23  --hgb: 11.2g/dL  --iron:40, sat: 9%, ferritin:19 -iron depleted  --encouraged to incorporate iron rich foods into diet    Elevated liver enzymes   --MRI noted "in phase sequence shows decreased signal intensity of the liver, spleen, and severe decreased signal intensity of the adrenal glands compared to the out of phase sequence indicating hemosiderosis." Current labs do not reflect iron overload.  No history of other known liver disease hemoglobinopathy.  In menopause   --continue to follow with hepatology    Follow-Up: iv iron feraheme x 2 doses one week apart. 9 months with cbc cmp iron/tibc ferritin prior -vv ok    Mei Ponce PA-C  Hematology Oncology    Route Chart for Scheduling    Med Onc Chart Routing      Follow up with physician    Follow up with WILFRIDO . 9 months with cbc cmp iron/tibc ferritin prior-vv ok   Infusion scheduling note   iv ironf eraheme x 2 doses one week apart.   Injection scheduling note    Labs CMP, ferritin, CBC and iron and TIBC   Scheduling:  Preferred lab:  Lab interval:     Imaging    Pharmacy appointment    Other referrals                  The patient location is: work  The chief complaint leading to consultation is: anemia  Visit type:  Synchronous audio video  Face to Face " time with patient: 15 minutes of total time spent on the encounter, which includes face to face time and non-face to face time preparing to see the patient (eg, review of tests), Obtaining and/or reviewing separately obtained history, Documenting clinical information in the electronic or other health record, Independently interpreting results (not separately reported) and communicating results to the patient/family/caregiver, or Care coordination (not separately reported).   Each patient to whom he or she provides medical services by telemedicine is:  (1) informed of the relationship between the provider and patient and the respective role of any other health care provider with respect to management of the patient; and (2) notified that he or she may decline to receive medical services

## 2025-06-29 NOTE — PATIENT INSTRUCTIONS
PLEASE READ YOUR DISCHARGE INSTRUCTIONS ENTIRELY AS IT CONTAINS IMPORTANT INFORMATION.      Take the antibiotics to completion.     Drink plenty of fluids, wipe front to back, take showers not baths, no scented soaps, wear breathable cotton underwear, urinate after sexual intercourse.     Please go to Urgent Care or the ER for worsening symptoms including fever, worsening flank pain, vomiting, etc.       Please return or see your primary care doctor if you develop new or worsening symptoms.     Please arrange follow up with your primary medical clinic as soon as possible. You must understand that you've received an virtual treatment only and that you may be released before all of your medical problems are known or treated. You, the patient, will arrange for follow up as instructed. If your symptoms worsen or fail to improve you should go to the Emergency Room.  WE CANNOT RULE OUT ALL POSSIBLE CAUSES OF YOUR SYMPTOMS IN THE VIRTUAL SETTING PLEASE GO TO THE ER IF YOU FEELS YOUR CONDITION IS WORSENING OR YOU WOULD LIKE EMERGENT EVALUATION.     Thank you for choosing [facility]!    Our goal in the [facility] to always provide outstanding medical care. You may receive a survey by mail or e-mail in the next week regarding your experience today. We would greatly appreciate you completing and returning the survey. Your feedback provides us with a way to recognize our staff who provide very good care, and it helps us learn how to improve when your experience was below our aspiration of excellence.         We appreciate you trusting us with your medical care. We hope you feel better soon. We will be happy to take care of you for all of your future medical needs.    You must understand that you've received Virtual  treatment only and that you may be released before all your medical problems are known or treated. You, the patient, will arrange for follow up care as instructed.    Follow up with your PCP or  specialty clinic as directed in the next 1-2 weeks if not improved or as needed.  You can call [phone] to schedule an appointment with the appropriate provider.    If your condition worsens we recommend that you receive another evaluation in person, with your primary care provider, urgent care or at the emergency room immediately or contact your primary medical clinics after hours call service to discuss your concerns.

## 2025-06-29 NOTE — PROGRESS NOTES
Subjective:      Patient ID: Tammy Aguero is a 54 y.o. female.    Vitals:  vitals were not taken for this visit.     Chief Complaint: Dysuria      Visit Type: TELE AUDIOVISUAL    Patient Location: Home     Present with the patient at the time of consultation: TELEMED PRESENT WITH PATIENT: None    Past Medical History:   Diagnosis Date    RA (rheumatoid arthritis)      Past Surgical History:   Procedure Laterality Date    COLONOSCOPY N/A 02/01/2023    Procedure: COLONOSCOPY;  Surgeon: Ángel Valdes MD;  Location: [facility];  Service: Gastroenterology;  Laterality: N/A;    ESOPHAGOGASTRODUODENOSCOPY N/A 02/01/2023    Procedure: EGD (ESOPHAGOGASTRODUODENOSCOPY);  Surgeon: Ángel Valdes MD;  Location: [facility];  Service: Gastroenterology;  Laterality: N/A;    ESOPHAGOGASTRODUODENOSCOPY N/A 3/17/2023    Procedure: EGD (ESOPHAGOGASTRODUODENOSCOPY);  Surgeon: Ashley Stevenson MD;  Location: [facility];  Service: Endoscopy;  Laterality: N/A;    ESOPHAGOGASTRODUODENOSCOPY N/A 6/26/2023    Procedure: EGD (ESOPHAGOGASTRODUODENOSCOPY);  Surgeon: Ángel Valdes MD;  Location: [facility];  Service: Gastroenterology;  Laterality: N/A;    INTRALUMINAL GASTROINTESTINAL TRACT IMAGING VIA CAPSULE N/A 3/1/2023    Procedure: IMAGING PROCEDURE, GI TRACT, INTRALUMINAL, VIA CAPSULE;  Surgeon: First Available [place];  Location: [facility];  Service: Endoscopy;  Laterality: N/A;    Tubal Lig      TUBAL LIGATION       Review of patient's allergies indicates:   Allergen Reactions    Nsaids (non-steroidal anti-inflammatory drug) Other (See Comments)     Gastric ulcer     Medications Ordered Prior to Encounter[1]  Family History   Problem Relation Name Age of Onset    Diabetes Sister Machelle Younger     Cancer Sister Machelle Younger     Breast cancer Sister Machelle Younger     Asthma Sister Machelle Younger     Diabetes Brother Melinda Gann     Breast cancer Other maternal great grandmoth     Alcohol abuse Mother Jeanette Serras      Asthma Mother Jeanette Grey     COPD Mother Jeanette Grey     Depression Mother Jeanette Grey     Heart disease Mother Jeanette Grey        Medications Ordered                [id number] - [place], [place] - [address] [facility] [address]   [address] [address], [place] [address]    Telephone: [phone]   Fax: [phone]   Hours: Not open 24 hours                         E-Prescribed (1 of 1)              nitrofurantoin, macrocrystal-monohydrate, (MACROBID) 100 MG capsule    Sig: Take 1 capsule (100 mg total) by mouth 2 (two) times daily. for 7 days       Start: 6/29/25     Quantity: 14 capsule Refills: 0                           Ohs Peq Odvv Intake    6/29/2025 11:26 AM CDT - Filed by Patient   What is your current physical address in the event of a medical emergency? [address] [facility] ([place], [place]). [address]   Are you able to take your vital signs? No   Please attach any relevant images or files    Is your employer contracted with [facility]? No         Having incontinence as she is urinating so frequently. This occurred for her last UTI as well    Dysuria   This is a new problem. Episode onset: 2 days. The problem occurs every urination. The quality of the pain is described as burning. There has been no fever. Associated symptoms include frequency and urgency. Pertinent negatives include no flank pain, hematuria, hesitancy or vomiting. She has tried increased fluids for the symptoms.       Constitution: Negative for fever.   Gastrointestinal:  Negative for vomiting.   Genitourinary:  Positive for dysuria, frequency and urgency. Negative for flank pain and hematuria.        Objective:   The physical exam was conducted virtually.  Physical Exam   Abdominal: Normal appearance.   Neurological: She is alert.       Assessment:     1. Urinary tract infection without hematuria, site unspecified        Plan:       Urinary tract infection without hematuria, site unspecified    Other orders  -      nitrofurantoin, macrocrystal-monohydrate, (MACROBID) 100 MG capsule; Take 1 capsule (100 mg total) by mouth 2 (two) times daily. for 7 days  Dispense: 14 capsule; Refill: 0                          [1]   Current Outpatient Medications on File Prior to Visit   Medication Sig Dispense Refill    acetaminophen (TYLENOL) 650 MG TbSR Take 650 mg by mouth every 8 (eight) hours.      diphenhydrAMINE-acetaminophen (TYLENOL PM)  mg Tab Take 1 tablet by mouth nightly as needed.      omeprazole (PRILOSEC) 40 MG capsule Take 1 capsule (40 mg total) by mouth 2 (two) times daily before meals. 60 capsule 5    upadacitinib (RINVOQ) 15 mg 24 hr tablet TAKE 1 TABLET DAILY 90 tablet 3     No current facility-administered medications on file prior to visit.

## 2025-07-01 ENCOUNTER — RESULTS FOLLOW-UP (OUTPATIENT)
Dept: RHEUMATOLOGY | Facility: CLINIC | Age: 55
End: 2025-07-01

## 2025-07-01 ENCOUNTER — OFFICE VISIT (OUTPATIENT)
Dept: HEMATOLOGY/ONCOLOGY | Facility: CLINIC | Age: 55
End: 2025-07-01
Payer: COMMERCIAL

## 2025-07-01 DIAGNOSIS — D50.0 IRON DEFICIENCY ANEMIA DUE TO CHRONIC BLOOD LOSS: Primary | ICD-10-CM

## 2025-07-01 LAB
GENETIC VARIANT DETAILS BLD/T: NORMAL
GENETICIST REVIEW: NORMAL
LAB TEST METHOD: NORMAL
MOL DX INTERP BLD/T QL: NEGATIVE
PROVIDER SIGNING NAME: NORMAL
SPECIMEN SOURCE: NORMAL

## 2025-07-01 PROCEDURE — 98006 SYNCH AUDIO-VIDEO EST MOD 30: CPT | Mod: 95,,,

## 2025-07-02 ENCOUNTER — PATIENT MESSAGE (OUTPATIENT)
Dept: INFUSION THERAPY | Facility: HOSPITAL | Age: 55
End: 2025-07-02
Payer: COMMERCIAL

## 2025-07-02 RX ORDER — EPINEPHRINE 0.3 MG/.3ML
0.3 INJECTION SUBCUTANEOUS ONCE AS NEEDED
OUTPATIENT
Start: 2025-07-09

## 2025-07-02 RX ORDER — HEPARIN 100 UNIT/ML
500 SYRINGE INTRAVENOUS
OUTPATIENT
Start: 2025-07-09

## 2025-07-02 RX ORDER — SODIUM CHLORIDE 0.9 % (FLUSH) 0.9 %
10 SYRINGE (ML) INJECTION
OUTPATIENT
Start: 2025-07-09

## 2025-07-09 RX ORDER — SODIUM FERRIC GLUCONATE COMPLEX IN SUCROSE 12.5 MG/ML
125 INJECTION INTRAVENOUS
OUTPATIENT
Start: 2025-07-16

## 2025-07-09 RX ORDER — EPINEPHRINE 0.3 MG/.3ML
0.3 INJECTION SUBCUTANEOUS ONCE AS NEEDED
OUTPATIENT
Start: 2025-07-16

## 2025-07-09 RX ORDER — HEPARIN 100 UNIT/ML
500 SYRINGE INTRAVENOUS
OUTPATIENT
Start: 2025-07-16

## 2025-07-09 RX ORDER — SODIUM CHLORIDE 0.9 % (FLUSH) 0.9 %
10 SYRINGE (ML) INJECTION
OUTPATIENT
Start: 2025-07-16

## 2025-07-17 ENCOUNTER — PATIENT MESSAGE (OUTPATIENT)
Dept: INFUSION THERAPY | Facility: HOSPITAL | Age: 55
End: 2025-07-17
Payer: COMMERCIAL

## 2025-07-18 ENCOUNTER — PROCEDURE VISIT (OUTPATIENT)
Dept: HEPATOLOGY | Facility: CLINIC | Age: 55
End: 2025-07-18
Payer: COMMERCIAL

## 2025-07-18 DIAGNOSIS — K76.0 METABOLIC DYSFUNCTION-ASSOCIATED FATTY LIVER DISEASE (MAFLD): ICD-10-CM

## 2025-07-18 PROCEDURE — 91200 LIVER ELASTOGRAPHY: CPT | Mod: S$GLB,,, | Performed by: INTERNAL MEDICINE

## 2025-07-18 NOTE — PROCEDURES
FibroScan (Vibration Controlled Transient Elastography)    Date/Time: 7/18/2025 9:30 AM    Performed by: Colten Hernández MD  Authorized by: Bryant Valdivia PA-C    Diagnosis:  NAFLD    Probe:  M    Universal Protocol: Patient's identity, procedure and site were verified, confirmatory pause was performed.  Discussed procedure including risks and potential complications.  Questions answered.  Patient verbalizes understanding and wishes to proceed with VCTE.     Procedure: After providing explanations of the procedure, patient was placed in the supine position with right arm in maximum abduction to allow optimal exposure of right lateral abdomen.  Patient was briefly assessed, Testing was performed in the mid-axillary location, 50Hz Shear Wave pulses were applied and the resulting Shear Wave and Propagation Speed detected with a 3.5 MHz ultrasonic signal, using the FibroScan probe, Skin to liver capsule distance and liver parenchyma were accessed during the entire examination with the FibroScan probe, Patient was instructed to breathe normally and to abstain from sudden movements during the procedure, allowing for random measurements of liver stiffness. At least 10 Shear Waves were produced, Individual measurements of each Shear Wave were calculated.  Patient tolerated the procedure well with no complications.  Meets discharge criteria as was dismissed.  Rates pain 0 out of 10.  Patient will follow up with ordering provider to review results.    Findings  Median liver stiffness score:  5.6  CAP Reading: dB/m:  291    IQR/med %:  14  Interpretation  Fibrosis interpretation is based on medial liver stiffness - Kilopascal (kPa).    Fibrosis Stage:  F 0-1  Steatosis interpretation is based on controlled attenuation parameter - (dB/m).    Steatosis Grade:  S1  Comments/Plan:  Mild steatosis and mild fibrosis

## 2025-07-30 ENCOUNTER — TELEPHONE (OUTPATIENT)
Dept: INFUSION THERAPY | Facility: HOSPITAL | Age: 55
End: 2025-07-30
Payer: COMMERCIAL

## 2025-07-30 NOTE — TELEPHONE ENCOUNTER
Spoke with patient, fahad is still pending for iron infusion. Osmin around time is 15 days (7/31/25)/ Patient elected to remain on schedule for 7/31 to see if auth comes through. Call ended well.

## 2025-08-04 ENCOUNTER — PATIENT MESSAGE (OUTPATIENT)
Dept: HEMATOLOGY/ONCOLOGY | Facility: CLINIC | Age: 55
End: 2025-08-04
Payer: COMMERCIAL

## 2025-08-04 DIAGNOSIS — D50.0 IRON DEFICIENCY ANEMIA DUE TO CHRONIC BLOOD LOSS: Primary | ICD-10-CM

## 2025-08-04 RX ORDER — IRON,CARBONYL/ASCORBIC ACID 100-250 MG
1 TABLET ORAL DAILY
Qty: 30 EACH | Refills: 3 | Status: SHIPPED | OUTPATIENT
Start: 2025-08-04

## 2025-08-04 NOTE — PROGRESS NOTES
Iv iron has been denied.   DENIAL REASON: THE INFORMATION RECEIVED DOES NOT SHOW THAT THE PATIENT HAS TRIED AND FAILED  ORAL IRON (4-WEEK TRIAL REQUIRED)   Will try oral iron for at least 4 weeks. Then recheck.     Mei Ponce PA-C  Hematology Oncology    Route Chart for Scheduling    Med Onc Chart Routing      Follow up with physician    Follow up with WILFRIDO . 4 months with cbc cmp iron/tibc ferritin prior -vv ok   Infusion scheduling note    Injection scheduling note    Labs CBC, CMP, ferritin and iron and TIBC   Scheduling:  Preferred lab:  Lab interval:     Imaging    Pharmacy appointment    Other referrals                  Therapy Plan Information  OP IV IRON THERAPY for Iron deficiency anemia due to chronic blood loss, noted on 12/21/2022  Medications  ferric gluconate (Ferrlecit) 62.5 mg/5 mL injection 125 mg  125 mg, Intravenous, 1 time a week  Anaphylaxis/Hypersensitivity  EPINEPHrine (EPIPEN) 0.3 mg/0.3 mL pen injection 0.3 mg  0.3 mg, Intramuscular, PRN  hydrocortisone sodium succinate injection 100 mg  100 mg, Intravenous, PRN  Flushes  0.9% NaCl 250 mL flush bag  Intravenous, Every visit  sodium chloride 0.9% flush 10 mL  10 mL, Intravenous, Every visit  heparin, porcine (PF) 100 unit/mL injection flush 500 Units  500 Units, Intravenous, Every visit  alteplase injection 2 mg  2 mg, Intra-Catheter, Every visit      No therapy plan of the specified type found.    No therapy plan of the specified type found.

## 2025-08-13 ENCOUNTER — OFFICE VISIT (OUTPATIENT)
Dept: PULMONOLOGY | Facility: CLINIC | Age: 55
End: 2025-08-13
Payer: COMMERCIAL

## 2025-08-13 VITALS
HEART RATE: 71 BPM | BODY MASS INDEX: 28.46 KG/M2 | DIASTOLIC BLOOD PRESSURE: 80 MMHG | WEIGHT: 181.31 LBS | RESPIRATION RATE: 16 BRPM | HEIGHT: 67 IN | SYSTOLIC BLOOD PRESSURE: 118 MMHG | OXYGEN SATURATION: 96 %

## 2025-08-13 DIAGNOSIS — G47.33 OSA (OBSTRUCTIVE SLEEP APNEA): Primary | ICD-10-CM

## 2025-08-13 PROCEDURE — 3079F DIAST BP 80-89 MM HG: CPT | Mod: CPTII,S$GLB,, | Performed by: PHYSICIAN ASSISTANT

## 2025-08-13 PROCEDURE — 99213 OFFICE O/P EST LOW 20 MIN: CPT | Mod: S$GLB,,, | Performed by: PHYSICIAN ASSISTANT

## 2025-08-13 PROCEDURE — 3074F SYST BP LT 130 MM HG: CPT | Mod: CPTII,S$GLB,, | Performed by: PHYSICIAN ASSISTANT

## 2025-08-13 PROCEDURE — 3008F BODY MASS INDEX DOCD: CPT | Mod: CPTII,S$GLB,, | Performed by: PHYSICIAN ASSISTANT

## 2025-08-13 PROCEDURE — 1160F RVW MEDS BY RX/DR IN RCRD: CPT | Mod: CPTII,S$GLB,, | Performed by: PHYSICIAN ASSISTANT

## 2025-08-13 PROCEDURE — 1159F MED LIST DOCD IN RCRD: CPT | Mod: CPTII,S$GLB,, | Performed by: PHYSICIAN ASSISTANT

## 2025-08-13 PROCEDURE — 99999 PR PBB SHADOW E&M-EST. PATIENT-LVL III: CPT | Mod: PBBFAC,,, | Performed by: PHYSICIAN ASSISTANT
